# Patient Record
Sex: FEMALE | Race: WHITE | Employment: OTHER | URBAN - METROPOLITAN AREA
[De-identification: names, ages, dates, MRNs, and addresses within clinical notes are randomized per-mention and may not be internally consistent; named-entity substitution may affect disease eponyms.]

---

## 2017-02-09 ENCOUNTER — OFFICE VISIT (OUTPATIENT)
Dept: CARDIOLOGY CLINIC | Age: 82
End: 2017-02-09

## 2017-02-09 VITALS
SYSTOLIC BLOOD PRESSURE: 150 MMHG | WEIGHT: 213.2 LBS | HEIGHT: 63 IN | DIASTOLIC BLOOD PRESSURE: 80 MMHG | OXYGEN SATURATION: 99 % | BODY MASS INDEX: 37.78 KG/M2 | HEART RATE: 80 BPM

## 2017-02-09 DIAGNOSIS — I10 ESSENTIAL HYPERTENSION: Primary | ICD-10-CM

## 2017-02-09 DIAGNOSIS — R07.9 CHEST PAIN, UNSPECIFIED: ICD-10-CM

## 2017-02-09 DIAGNOSIS — E78.5 DYSLIPIDEMIA: ICD-10-CM

## 2017-02-09 RX ORDER — POTASSIUM CHLORIDE 750 MG/1
10 TABLET, EXTENDED RELEASE ORAL DAILY
Qty: 90 TAB | Refills: 3 | Status: SHIPPED | OUTPATIENT
Start: 2017-02-09 | End: 2018-03-08

## 2017-02-09 RX ORDER — NAPROXEN 250 MG/1
TABLET ORAL 2 TIMES DAILY WITH MEALS
COMMUNITY
End: 2017-10-30

## 2017-02-09 RX ORDER — CHLORTHALIDONE 25 MG/1
25 TABLET ORAL DAILY
Qty: 90 TAB | Refills: 3 | Status: SHIPPED | OUTPATIENT
Start: 2017-02-09 | End: 2019-12-10 | Stop reason: SDUPTHER

## 2017-02-09 RX ORDER — LOSARTAN POTASSIUM 100 MG/1
100 TABLET ORAL
Qty: 90 TAB | Refills: 3 | Status: SHIPPED | OUTPATIENT
Start: 2017-02-09 | End: 2018-03-08 | Stop reason: SDUPTHER

## 2017-02-09 NOTE — MR AVS SNAPSHOT
Visit Information Date & Time Provider Department Dept. Phone Encounter #  
 2/9/2017  2:00 PM Uma Rogers MD [facility] [place] [place] [phone] [id number] Your Appointments 8/25/2017 11:00 AM  
VASCULAR TEST with TANISHA SALGADO  
[facility] ([facility] SCHEDULING) Appt Note: vas at11am per dr Miguelina Lai dx carotid ov at [address] [facility] [address] [address]  
[phone]  
  
   
 [address] [address]  
  
    
 8/25/2017 11:40 AM  
ESTABLISHED PATIENT with Uma Rogers MD  
[facility] ([address]) Appt Note: vas at11am per dr Miguelina Lai dx carotid ov at [address] [facility] [address] [facility]  
[address] [facility] [place] [address] [address] Upcoming Health Maintenance Date Due  
 FOOT EXAM Q1 8/23/1945 MICROALBUMIN Q1 8/23/1945 EYE EXAM RETINAL OR DILATED Q1 8/23/1945 DTaP/Tdap/Td series (1 - Tdap) 8/23/1956 ZOSTER VACCINE AGE 60> 8/23/1995 GLAUCOMA SCREENING Q2Y 8/23/2000 MEDICARE YEARLY EXAM 8/23/2000 INFLUENZA AGE 9 TO ADULT 8/1/2016 HEMOGLOBIN A1C Q6M 2/17/2017 Pneumococcal 65+ Low/Medium Risk (2 of 2 - PCV13) 5/2/2017 LIPID PANEL Q1 8/17/2017 Allergies as of 2/9/2017  Review Complete On: 2/9/2017 By: Uma Rogers MD  
  
 Severity Noted Reaction Type Reactions Tolectin [Tolmetin] High 12/22/2010    Anaphylaxis Aldactone [Spironolactone]  12/06/2013    Other (comments)  
 hyperkalemia Carvedilol  08/29/2012   Side Effect Other (comments) \"slow heart rate down to 44\" Ceclor [Cefaclor]  12/22/2010    Hives Ciprofloxacin  12/22/2010    Hives, Swelling Darvon [Propoxyphene]  12/22/2010    Swelling Swelling lips and hands Beverli Self [Nitrofurantoin Monohyd/m-cryst]  12/16/2014    Other (comments) Does not recall Norvasc [Amlodipine]  08/29/2012   Side Effect Swelling Swelling ankles/legs Tetracycline  12/22/2010    Swelling Swelling lips/hands Toprol Xl [Metoprolol Succinate]  10/05/2012    Swelling Swellings ankles/legs Valsartan  05/01/2016    Other (comments) Also \"slow heart rate down to 44\" Zestril [Lisinopril]  12/22/2010    Hives Current Immunizations  Never Reviewed Name Date Pneumococcal Polysaccharide (PPSV-23) 5/2/2016  6:25 PM  
  
 Not reviewed this visit You Were Diagnosed With   
  
 Codes Comments Essential hypertension    -  Primary ICD-10-CM: I10 
ICD-9-CM: 401.9 Dyslipidemia     ICD-10-CM: E78.5 ICD-9-CM: 272.4 Chest pain, unspecified     ICD-10-CM: R07.9 ICD-9-CM: 786.50 Vitals BP Pulse Height(growth percentile) Weight(growth percentile) SpO2 BMI  
 150/80 (BP 1 Location: Right arm, BP Patient Position: Sitting) 80 5' 3\" (1.6 m) 213 lb 3.2 oz (96.7 kg) 99% 37.77 kg/m2 OB Status Smoking Status Postmenopausal Never Smoker Vitals History BMI and BSA Data Body Mass Index Body Surface Area  
 37.77 kg/m 2 2.07 m 2 Preferred Pharmacy Pharmacy Name Phone  [address] [phone] Your Updated Medication List  
  
   
This list is accurate as of: 2/9/17  2:33 PM.  Always use your most recent med list.  
  
  
  
  
 aspirin delayed-release 81 mg tablet Take 81 mg by mouth every evening. CALCIUM 600 PO Take 1 Tab by mouth two (2) times a day. 600-200 Iu  
  
 chlorthalidone 25 mg tablet Commonly known as:  Wilhemena Nickels Take 1 Tab by mouth daily. cloNIDine HCl 0.1 mg tablet Commonly known as:  CATAPRES Take 0.2 mg at bedtime and 0.1 mg in morning  
  
 cyanocobalamin 1,000 mcg tablet Take 1,000 mcg by mouth every seven (7) days. dilTIAZem  mg ER capsule Commonly known as:  CARDIZEM CD Take 1 Cap by mouth daily. EYE VITAMIN AND MINERALS PO Take 1 Cap by mouth daily. LASIX 40 mg tablet Generic drug:  furosemide Take 40 mg by mouth as needed. Only take about once a month. levothyroxine 100 mcg tablet Commonly known as:  SYNTHROID Take 100 mcg by mouth Daily (before breakfast). loratadine 10 mg tablet Commonly known as:  Myesha Delgado Take 10 mg by mouth daily. losartan 100 mg tablet Commonly known as:  COZAAR Take 1 Tab by mouth nightly. metFORMIN 500 mg tablet Commonly known as:  GLUCOPHAGE Take 500 mg by mouth two (2) times daily (with meals). [address] Take 1 Tab by mouth every evening. MULTIPLE VITAMINS DAILY PO Take 1 Tab by mouth daily. naproxen 250 mg tablet Commonly known as:  NAPROSYN Take  by mouth two (2) times daily (with meals). omeprazole 20 mg capsule Commonly known as:  PRILOSEC Take 20 mg by mouth two (2) times a day. potassium chloride 10 mEq tablet Commonly known as:  K-DUR, KLOR-CON Take 1 Tab by mouth daily. simvastatin 10 mg tablet Commonly known as:  ZOCOR Take 1 Tab by mouth nightly. SINGULAIR 10 mg tablet Generic drug:  montelukast  
Take 10 mg by mouth every evening. TYLENOL PM PO Take 1-2 Tabs by mouth nightly as needed. VITAMIN C 500 mg tablet Generic drug:  ascorbic acid (vitamin C) Take 500 mg by mouth every evening. Prescriptions Sent to Pharmacy Refills  
 chlorthalidone (HYGROTEN) 25 mg tablet 3 Sig: Take 1 Tab by mouth daily. Class: Normal  
 Pharmacy: [facility] [address] Ph #: [phone] Route: Oral  
 potassium chloride (K-DUR, KLOR-CON) 10 mEq tablet 3 Sig: Take 1 Tab by mouth daily. Class: Normal  
 Pharmacy: [facility] [address] Ph #: [phone] Route: Oral  
 losartan (COZAAR) 100 mg tablet 3 Sig: Take 1 Tab by mouth nightly.   
 Class: Normal  
 Pharmacy: [facility] [address] Ph #: [phone] Route: Oral  
  
Patient Instructions Heart-Healthy Diet: Care Instructions Your Care Instructions A heart-healthy diet has lots of vegetables, fruits, nuts, beans, and whole grains, and is low in salt. It limits foods that are high in saturated fat, such as meats, cheeses, and fried foods. It may be hard to change your diet, but even small changes can lower your risk of heart attack and heart disease. Follow-up care is a key part of your treatment and safety. Be sure to make and go to all appointments, and call your doctor if you are having problems. It's also a good idea to know your test results and keep a list of the medicines you take. How can you care for yourself at home? Watch your portions · Learn what a serving is. A \"serving\" and a \"portion\" are not always the same thing. Make sure that you are not eating larger portions than are recommended. For example, a serving of pasta is ½ cup. A serving size of meat is 2 to 3 ounces. A 3-ounce serving is about the size of a deck of cards. Measure serving sizes until you are good at [place]" them. Keep in mind that restaurants often serve portions that are 2 or 3 times the size of one serving. · To keep your energy level up and keep you from feeling hungry, eat often but in smaller portions. · Eat only the number of calories you need to stay at a healthy weight. If you need to lose weight, eat fewer calories than your body burns (through exercise and other physical activity). Eat more fruits and vegetables · Eat a variety of fruit and vegetables every day. Dark green, deep orange, red, or yellow fruits and vegetables are especially good for you. Examples include spinach, carrots, peaches, and berries. · Keep carrots, celery, and other veggies handy for snacks. Buy fruit that is in season and store it where you can see it so that you will be tempted to eat it. · Cook dishes that have a lot of veggies in them, such as stir-fries and soups. Limit saturated and trans fat · Read food labels, and try to avoid saturated and trans fats. They increase your risk of heart disease. Trans fat is found in many processed foods such as cookies and crackers. · Use olive or canola oil when you cook. Try cholesterol-lowering spreads, such as Benecol or Take Control. · Bake, broil, grill, or steam foods instead of frying them. · Choose lean meats instead of high-fat meats such as hot dogs and sausages. Cut off all visible fat when you prepare meat. · Eat fish, skinless poultry, and meat alternatives such as soy products instead of high-fat meats. Soy products, such as tofu, may be especially good for your heart. · Choose low-fat or fat-free milk and dairy products. Eat fish · Eat at least two servings of fish a week. Certain fish, such as salmon and tuna, contain omega-3 fatty acids, which may help reduce your risk of heart attack. Eat foods high in fiber · Eat a variety of grain products every day. Include whole-grain foods that have lots of fiber and nutrients. Examples of whole-grain foods include oats, whole wheat bread, and brown rice. · Buy whole-grain breads and cereals, instead of white bread or pastries. Limit salt and sodium · Limit how much salt and sodium you eat to help lower your blood pressure. · Taste food before you salt it. Add only a little salt when you think you need it. With time, your taste buds will adjust to less salt. · Eat fewer snack items, fast foods, and other high-salt, processed foods. Check food labels for the amount of sodium in packaged foods. · Choose low-sodium versions of canned goods (such as soups, vegetables, and beans). Limit sugar · Limit drinks and foods with added sugar. These include candy, desserts, and soda pop. Limit alcohol · Limit alcohol to no more than 2 drinks a day for men and 1 drink a day for women. Too much alcohol can cause health problems. When should you call for help? Watch closely for changes in your health, and be sure to contact your doctor if: 
· You would like help planning heart-healthy meals. Where can you learn more? Go to http://micki-ryland.info/. Enter V137 in the search box to learn more about \"Heart-Healthy Diet: Care Instructions. \" Current as of: January 27, 2016 Content Version: 11.1 © 1867-2916 [employer]. Care instructions adapted under license by [employer] (which disclaims liability or warranty for this information). If you have questions about a medical condition or this instruction, always ask your healthcare professional. [address] any warranty or liability for your use of this information. Please provide this summary of care documentation to your next provider. Your primary care clinician is listed as Larry Farmer. If you have any questions after today's visit, please call [phone].

## 2017-02-09 NOTE — PROGRESS NOTES
Visit Vitals    /80 (BP 1 Location: Right arm, BP Patient Position: Sitting)    Pulse 80    Ht 5' 3\" (1.6 m)    Wt 213 lb 3.2 oz (96.7 kg)    SpO2 99%    BMI 37.77 kg/m2   verbal order to discontinue medication per Dr June Girard

## 2017-02-09 NOTE — PATIENT INSTRUCTIONS

## 2017-02-09 NOTE — PROGRESS NOTES
Kareem Fletcher MD. [facility]              Patient: Cinda Mukherjee  : 1935      Today's Date: 2017            HISTORY OF PRESENT ILLNESS:     History of Present Illness:  Ms. Adan Pichardo is here for follow-up. Due to spinal stenosis she has problems with back and leg pain. She has had an epidural injection which helps. No falls. No CP. Has chronic CHRISTIAN. No cardiac complaints. Not as active as she used to be. PAST MEDICAL HISTORY:     Past Medical History   Diagnosis Date    Asthma     Breast cancer ([address]     Chest pain, unspecified      atypical chest discomfort with normal stress cardiolite , and normal stress echoes ,  and . Negative dobutamine cardiolite 2009 for ischemia with an EF=63%. Cardiac Cath 9/3/2010 showed mild CAD.  Diabetes mellitus ([address]     Dyslipidemia     Frequent PVCs     GERD (gastroesophageal reflux disease)     Hiatal hernia     HTN (hypertension)     Obesity     LA on CPAP     RBBB (right bundle branch block)     Spinal stenosis          Past Surgical History   Procedure Laterality Date    Hx bunionectomy      Hx hip replacement      Hx cholecystectomy      Hx heart catheterization       Cardiac Cath 9/3/2010 showed mild CAD.  Hx other surgical       Echo 10/5/12 - LVEF 60-65%, mod-marked LAE, MAC, grade 1 diastolic dysfunction     Hx other surgical       Lexiscan cardiolite 13 - normal MPI, LVEF 74%    Hx other surgical       Aorta duplex 14 - aorta size 1.7 cm, no dilation            MEDICATIONS:     Current Outpatient Prescriptions   Medication Sig Dispense Refill    naproxen (NAPROSYN) 250 mg tablet Take  by mouth two (2) times daily (with meals).  ibuprofen (MOTRIN) 800 mg tablet Take  by mouth two (2) times a day.  chlorthalidone (HYGROTEN) 25 mg tablet Take 1 Tab by mouth daily. 30 Tab 12    potassium chloride (K-DUR, KLOR-CON) 10 mEq tablet Take 1 Tab by mouth daily.  30 Tab 12  losartan (COZAAR) 100 mg tablet Take 1 Tab by mouth nightly. 30 Tab 12    cloNIDine HCl (CATAPRES) 0.1 mg tablet Take 0.2 mg at bedtime and 0.1 mg in morning 270 Tab 3    ACETAMINOPHEN/DIPHENHYDRAMINE (TYLENOL PM PO) Take 1-2 Tabs by mouth nightly as needed.  loratadine (CLARITIN) 10 mg tablet Take 10 mg by mouth daily.  cyanocobalamin 1,000 mcg tablet Take 1,000 mcg by mouth every seven (7) days.  VIT A/C/E AC/ZNOX/CUPRIC OXIDE (EYE VITAMIN AND MINERALS PO) Take 1 Cap by mouth daily.  GLUCOSAM/CHOND/HYALU/CF BORATE (MOVE FREE JOINT HEALTH PO) Take 1 Tab by mouth every evening.  levothyroxine (SYNTHROID) 100 mcg tablet Take 100 mcg by mouth Daily (before breakfast).  omeprazole (PRILOSEC) 20 mg capsule Take 20 mg by mouth two (2) times a day.  simvastatin (ZOCOR) 10 mg tablet Take 1 Tab by mouth nightly. 30 Tab 11    diltiazem CD (CARDIZEM CD) 300 mg ER capsule Take 1 Cap by mouth daily. 90 Cap 3    ascorbic acid (VITAMIN C) 500 mg tablet Take 500 mg by mouth every evening.  aspirin delayed-release 81 mg tablet Take 81 mg by mouth every evening.  CALCIUM CARBONATE (CALCIUM 600 PO) Take 1 Tab by mouth two (2) times a day. 600-200 Iu      furosemide (LASIX) 40 mg tablet Take 40 mg by mouth as needed. Only take about once a month.  metformin (GLUCOPHAGE) 500 mg tablet Take 500 mg by mouth two (2) times daily (with meals).  MULTIVITAMIN (MULTIPLE VITAMINS DAILY PO) Take 1 Tab by mouth daily.  montelukast (SINGULAIR) 10 mg tablet Take 10 mg by mouth every evening.          Allergies   Allergen Reactions    Tolectin [Tolmetin] Anaphylaxis    Aldactone [Spironolactone] Other (comments)     hyperkalemia    Carvedilol Other (comments)     \"slow heart rate down to 44\"    Ceclor [Cefaclor] Hives    Ciprofloxacin Hives and Swelling    Darvon [Propoxyphene] Swelling     Swelling lips and hands    Macrobid [Nitrofurantoin Monohyd/M-Cryst] Other (comments) Does not recall    Norvasc [Amlodipine] Swelling     Swelling ankles/legs    Tetracycline Swelling     Swelling lips/hands    Toprol Xl [Metoprolol Succinate] Swelling     Swellings ankles/legs    Valsartan Other (comments)     Also \"slow heart rate down to 44\"    Zestril [Lisinopril] Hives             SOCIAL HISTORY:     Social History   Substance Use Topics    Smoking status: Never Smoker    Smokeless tobacco: Never Used    Alcohol use No           FAMILY HISTORY:     Family History   Problem Relation Age of Onset    Coronary Artery Disease Father     Coronary Artery Disease Paternal Aunt              PHYSICAL EXAM:        Physical Exam:  Visit Vitals    /80 (BP 1 Location: Right arm, BP Patient Position: Sitting)    Pulse 80    Ht 5' 3\" (1.6 m)    Wt 213 lb 3.2 oz (96.7 kg)    SpO2 99%    BMI 37.77 kg/m2        Patient appears generally well, mood and affect are appropriate and pleasant. HEENT: Hearing intact, non-icteric, normocephalic, atraumatic.    Neck Exam: Supple, No JVD or carotid bruits.    Lung Exam: Clear to auscultation, even breath sounds.    Cardiac Exam: Regular rate and rhythm with no murmur  Abdomen: Soft, non-tender, normal bowel sounds. + obese   Extremities: Moves all ext well. Mild lower extremity edema.   Psych: Appropriate affect  Neuro - Grossly intact                      LABS / OTHER STUDIES:                Labs 5/17/16 - CMP OK, A1c 6.0, Mg 2.1  Labs 8/17/16 - glc 130, K 3.9, LFT's OK, chol 189, HDL 53, , LDL 84, A1c 5.9, TSH 3.3                Lab Results   Component Value Date/Time     SODIUM 140 10/31/2016 09:07 AM     POTASSIUM 3.9 10/31/2016 09:07 AM     CHLORIDE 98 10/31/2016 09:07 AM     CO2 27 10/31/2016 09:07 AM     ANION GAP 12 05/01/2016 03:46 PM     GLUCOSE 109 10/31/2016 09:07 AM     BUN 20 10/31/2016 09:07 AM     CREATININE 0.91 10/31/2016 09:07 AM     BUN/CREATININE RATIO 22 10/31/2016 09:07 AM     GFR EST AA 68 10/31/2016 09:07 AM     GFR EST NON-AA 59 10/31/2016 09:07 AM     CALCIUM 9.6 10/31/2016 09:07 AM                     CARDIAC DIAGNOSTICS:        Cardiac Evaluation Includes:  Cath 2010: LAD 25%, RCA 20%, Mid RCA 20%. Stress Nuc 2013: Normal perfusion. EF 74. V/Q 5/1/16 - low prob  Echo 5/2/16 - LVEF 60%  Lexiscan Cardiolite 5/2/16 - 1) No ischemic EKG changes with Lexiscan.  Frequent PVC's during stress.  Mild chest pain noted during Lexiscan Injection.    2) Normal Lexiscan gated SPECT myocardial perfusion study. 3) LVEF 55%          [facility] Screening 10/17/15 - mild carotid disease, normal PALAK and AAA      EKG 5/1/16 - Sinus tachycardia with frequent premature ventricular complexes, Incomplete right bundle branch block , Possible Right ventricular hypertrophy           ASSESSMENT AND PLAN:        Assessment and Plan:  1) CHRISTIAN and chest pain  - extensive cardiac evaluation in past has been unremarkable (see above).    - Prior stress test, echo and BNP level have been unremarkable.    - She saw Pulmonary and felt better with Advair    - Doing better recently       2) HTN    - Meds are being tolerated (using lasix PRN and rarely)  - She takes NSAIDS for back pain which can raise BP. She was counseled on that. - Switched to chlorthalidone on 10/18/16  - BP mildly high at home - she feels pain would be making pain worse and also is not sleeping well. Continue medications as is for now. Goal < 150/90. May consider hydralazine if BP remains well above goal.       3) Mild CAD - cont ASA and statin       4) Dyslipidemia - Ms. Gunter could not tolerate the switch form simvastatin to atorvastatin. She is on low dose simvastatin at 10 mg (low dose due to drug-drug interactions) . Lipids followed by Dr. Jarvis Arguelles.   Joanne Duncan  5) edema  - lasix PRN (takes once a month)       6) Mild-mod Carotid dz on screening doppler in June 2015  - recheck carotid dopplers next visit   - cont ASA and statin       7) See me in 6 months.   Patient expressed understanding of the plan - questions were answered.           Francisco Odell MD, [address], [address]  [address]  [address], [place]  Ph: [phone]  [phone]

## 2017-06-29 RX ORDER — CLONIDINE HYDROCHLORIDE 0.1 MG/1
TABLET ORAL
Qty: 270 TAB | Refills: 3 | Status: SHIPPED | OUTPATIENT
Start: 2017-06-29 | End: 2017-08-25 | Stop reason: SDUPTHER

## 2017-08-25 ENCOUNTER — OFFICE VISIT (OUTPATIENT)
Dept: CARDIOLOGY CLINIC | Age: 82
End: 2017-08-25

## 2017-08-25 ENCOUNTER — CLINICAL SUPPORT (OUTPATIENT)
Dept: CARDIOLOGY CLINIC | Age: 82
End: 2017-08-25

## 2017-08-25 VITALS
BODY MASS INDEX: 37.39 KG/M2 | HEART RATE: 68 BPM | DIASTOLIC BLOOD PRESSURE: 84 MMHG | HEIGHT: 63 IN | WEIGHT: 211 LBS | SYSTOLIC BLOOD PRESSURE: 140 MMHG

## 2017-08-25 DIAGNOSIS — E78.5 DYSLIPIDEMIA: ICD-10-CM

## 2017-08-25 DIAGNOSIS — I10 ESSENTIAL HYPERTENSION: Primary | ICD-10-CM

## 2017-08-25 DIAGNOSIS — I65.23 CAROTID STENOSIS, BILATERAL: Primary | ICD-10-CM

## 2017-08-25 DIAGNOSIS — I45.10 RBBB (RIGHT BUNDLE BRANCH BLOCK): ICD-10-CM

## 2017-08-25 RX ORDER — CLONIDINE HYDROCHLORIDE 0.1 MG/1
0.2 TABLET ORAL 2 TIMES DAILY
Qty: 270 TAB | Refills: 3
Start: 2017-08-25 | End: 2020-06-15 | Stop reason: SDUPTHER

## 2017-08-25 NOTE — PROCEDURES
[facility]  *** FINAL REPORT ***    Name: Mindi Donato  MRN: [id number]       Outpatient  : 23 Aug 1935  HIS Order #: [id number]  [address] Visit #: [id number]  Date: 25 Aug 2017    TYPE OF TEST: Cerebrovascular Duplex    REASON FOR TEST  Known carotid stenosis    Right Carotid:-             Proximal               Mid                 Distal  cm/s  Systolic  Diastolic  Systolic  Diastolic  Systolic  Diastolic  CCA:     85.3       9.0                            48.0       8.0  Bulb:  ECA:     66.0       4.0  ICA:     64.0      12.0       56.0      15.0       56.0      15.0  ICA/CCA:  1.3       1.5    ICA Stenosis: <50%    Right Vertebral:-  Finding: Antegrade  Sys:       39.0  Allyson:       11.0    Right Subclavian:    Left Carotid:-            Proximal                Mid                 Distal  cm/s  Systolic  Diastolic  Systolic  Diastolic  Systolic  Diastolic  CCA:     06.6      12.0                            47.0      12.0  Bulb:  ECA:     48.0       0.0  ICA:     49.0      14.0       68.0      15.0       81.0      23.0  ICA/CCA:  1.0       1.2    ICA Stenosis: <50%    Left Vertebral:-  Finding: Antegrade  Sys:       31.0  Allyson:       11.0    Left Subclavian:    INTERPRETATION/FINDINGS  PROCEDURE:  Evaluation of the extracranial cerebrovascular arteries  with ultrasound (B-mode imaging, pulsed Doppler, color Doppler). Includes the common carotid, internal carotid, external carotid, and  vertebral arteries. FINDINGS:    Right: Mild, partially calcified plaque is visualized at the level  of the bifurcation extending into the proximal internal and external  carotid arteries. Color flow imaging reveals filling defects at the  site of the plaque formation. Peak ICA velocities are normal at 64  cm/s. The distal ICA is noted to be tortuous. Left:  Mild focal plaque is exhibited within the proximal internal  carotid artery.   No appreciable filling defect is noted on color flow  imaging and peak systolic velocities are normal at 82 cm/s. IMPRESSION: Findings are consistent with 10-49% stenosis of the right  internal carotid and 0-9% stenosis of the left internal carotid. Vertebrals are patent with antegrade flow. ADDITIONAL COMMENTS    I have personally reviewed the data relevant to the interpretation of  this  study.     TECHNOLOGIST: JAVAN Wick  Signed: 08/25/2017 11:38 AM    PHYSICIAN: Jonathan Hughes MD, [facility]  Signed: 08/25/2017 07:32 PM

## 2017-08-25 NOTE — PROGRESS NOTES
Lorenzo Sullivan MD. [facility]              Patient: Darlene Bryant  : 1935      Today's Date: 2017          HISTORY OF PRESENT ILLNESS:     History of Present Illness:  Ms. Garcia He is here for follow-up  Doing OK overall. Biggest complaint is back pain. BP was up so Dr. Geovany Toledo increased her clonidine. No CP. Chronic CHRISTIAN. Clonidine makes her drowsy. PAST MEDICAL HISTORY:     Past Medical History:   Diagnosis Date    Asthma     Breast cancer ([address]     Chest pain, unspecified     atypical chest discomfort with normal stress cardiolite , and normal stress echoes ,  and . Negative dobutamine cardiolite 2009 for ischemia with an EF=63%. Cardiac Cath 9/3/2010 showed mild CAD.  Diabetes mellitus ([address]     Dyslipidemia     Frequent PVCs     GERD (gastroesophageal reflux disease)     Hiatal hernia     HTN (hypertension)     Obesity     LA on CPAP     RBBB (right bundle branch block)     Spinal stenosis        Past Surgical History:   Procedure Laterality Date    HX BUNIONECTOMY      HX CHOLECYSTECTOMY      HX HEART CATHETERIZATION      Cardiac Cath 9/3/2010 showed mild CAD.  HX HIP REPLACEMENT      HX OTHER SURGICAL      Echo 10/5/12 - LVEF 60-65%, mod-marked LAE, MAC, grade 1 diastolic dysfunction     HX OTHER SURGICAL      Lexiscan cardiolite 13 - normal MPI, LVEF 74%    HX OTHER SURGICAL      Aorta duplex 14 - aorta size 1.7 cm, no dilation            MEDICATIONS:     Current Outpatient Prescriptions   Medication Sig Dispense Refill    cloNIDine HCl (CATAPRES) 0.1 mg tablet Take 2 Tabs by mouth two (2) times a day. 270 Tab 3    naproxen (NAPROSYN) 250 mg tablet Take  by mouth two (2) times daily (with meals).  chlorthalidone (HYGROTEN) 25 mg tablet Take 1 Tab by mouth daily. 90 Tab 3    potassium chloride (K-DUR, KLOR-CON) 10 mEq tablet Take 1 Tab by mouth daily.  90 Tab 3    losartan (COZAAR) 100 mg tablet Take 1 Tab by mouth nightly. 90 Tab 3    ACETAMINOPHEN/DIPHENHYDRAMINE (TYLENOL PM PO) Take 1-2 Tabs by mouth nightly as needed.  loratadine (CLARITIN) 10 mg tablet Take 10 mg by mouth daily.  cyanocobalamin 1,000 mcg tablet Take 1,000 mcg by mouth every seven (7) days.  VIT A/C/E AC/ZNOX/CUPRIC OXIDE (EYE VITAMIN AND MINERALS PO) Take 1 Cap by mouth daily.  GLUCOSAM/CHOND/HYALU/CF BORATE (MOVE FREE JOINT HEALTH PO) Take 1 Tab by mouth every evening.  levothyroxine (SYNTHROID) 100 mcg tablet Take 100 mcg by mouth Daily (before breakfast).  omeprazole (PRILOSEC) 20 mg capsule Take 20 mg by mouth two (2) times a day.  simvastatin (ZOCOR) 10 mg tablet Take 1 Tab by mouth nightly. 30 Tab 11    diltiazem CD (CARDIZEM CD) 300 mg ER capsule Take 1 Cap by mouth daily. 90 Cap 3    ascorbic acid (VITAMIN C) 500 mg tablet Take 500 mg by mouth every evening.  aspirin delayed-release 81 mg tablet Take 81 mg by mouth every evening.  CALCIUM CARBONATE (CALCIUM 600 PO) Take 1 Tab by mouth two (2) times a day. 600-200 Iu      furosemide (LASIX) 40 mg tablet Take 40 mg by mouth as needed. Only take about once a month.  metformin (GLUCOPHAGE) 500 mg tablet Take 500 mg by mouth two (2) times daily (with meals).  MULTIVITAMIN (MULTIPLE VITAMINS DAILY PO) Take 1 Tab by mouth daily.  montelukast (SINGULAIR) 10 mg tablet Take 10 mg by mouth every evening.          Allergies   Allergen Reactions    Tolectin [Tolmetin] Anaphylaxis    Aldactone [Spironolactone] Other (comments)     hyperkalemia    Carvedilol Other (comments)     \"slow heart rate down to 44\"    Ceclor [Cefaclor] Hives    Ciprofloxacin Hives and Swelling    Darvon [Propoxyphene] Swelling     Swelling lips and hands    Macrobid [Nitrofurantoin Monohyd/M-Cryst] Other (comments)     Does not recall    Norvasc [Amlodipine] Swelling     Swelling ankles/legs    Tetracycline Swelling     Swelling lips/hands    Toprol Xl [Metoprolol Succinate] Swelling     Swellings ankles/legs    Valsartan Other (comments)     Also \"slow heart rate down to 44\"    Zestril [Lisinopril] Hives             SOCIAL HISTORY:     Social History   Substance Use Topics    Smoking status: Never Smoker    Smokeless tobacco: Never Used    Alcohol use No         FAMILY HISTORY:     Family History   Problem Relation Age of Onset    Coronary Artery Disease Father     Coronary Artery Disease Paternal Aunt              PHYSICAL EXAM:        Physical Exam:    Visit Vitals    /84    Pulse 68    Ht 5' 3\" (1.6 m)    Wt 211 lb (95.7 kg)    BMI 37.38 kg/m2         Patient appears generally well, mood and affect are appropriate and pleasant. HEENT: Hearing intact, non-icteric, normocephalic, atraumatic.    Neck Exam: Supple, No JVD or carotid bruits.    Lung Exam: Clear to auscultation, even breath sounds.    Cardiac Exam: Regular rate and rhythm with no murmur  Abdomen: Soft, non-tender, normal bowel sounds. + obese   Extremities: Moves all ext well. Mild lower extremity edema.   Psych: Appropriate affect  Neuro - Grossly intact                      LABS / OTHER STUDIES:                Labs 5/17/16 - CMP OK, A1c 6.0, Mg 2.1  Labs 8/17/16 - glc 130, K 3.9, LFT's OK, chol 189, HDL 53, , LDL 84, A1c 5.9, TSH 3.3                    Lab Results   Component Value Date/Time      SODIUM 140 10/31/2016 09:07 AM      POTASSIUM 3.9 10/31/2016 09:07 AM      CHLORIDE 98 10/31/2016 09:07 AM      CO2 27 10/31/2016 09:07 AM      ANION GAP 12 05/01/2016 03:46 PM      GLUCOSE 109 10/31/2016 09:07 AM      BUN 20 10/31/2016 09:07 AM      CREATININE 0.91 10/31/2016 09:07 AM      BUN/CREATININE RATIO 22 10/31/2016 09:07 AM      GFR EST AA 68 10/31/2016 09:07 AM      GFR EST NON-AA 59 10/31/2016 09:07 AM      CALCIUM 9.6 10/31/2016 09:07 AM           Labs 7/19/17 - chol 198, HDL 50, LDL 77, , Cr 1.2, K 4.6, LFT's OK , A1c 5.6     Labs 8/19/17 - TSH 1.8          CARDIAC DIAGNOSTICS:        Cardiac Evaluation Includes:  Cath 2010: LAD 25%, RCA 20%, Mid RCA 20%. Stress Nuc 2013: Normal perfusion. EF 74. V/Q 5/1/16 - low prob  Echo 5/2/16 - LVEF 60%  Lexiscan Cardiolite 5/2/16 - 1) No ischemic EKG changes with Lexiscan.  Frequent PVC's during stress.  Mild chest pain noted during Lexiscan Injection.    2) Normal Lexiscan gated SPECT myocardial perfusion study. 3) LVEF 55%      [facility] Screening 10/17/15 - mild carotid disease, normal PALAK and AAA  Carotid Doppler 8/25/17 - 10-49% stenosis JONATAN and 9-4% LICA,       EKG 9/1/33 - Sinus tachycardia with frequent premature ventricular complexes, Incomplete right bundle branch block , Possible Right ventricular hypertrophy   EKG 8/25/17 - NSR, RBBB            ASSESSMENT AND PLAN:        Assessment and Plan:  1) CHRISTIAN and chest pain  - extensive cardiac evaluation in past has been unremarkable (see above).    - Prior stress test, echo and BNP level have been unremarkable.    - She saw Pulmonary and felt better with Advair    - Doing better recently       2) HTN    - Switched to chlorthalidone on 10/18/16  - Dr. Marshall Castaneda recently increased her clonidine to 0.2 mg BID.    - Follow BP at home - if still high at home, will try low dose norvasc (swelling with it in the past) - she is to call with results (goal < 140-145/90)        3) Mild CAD and - cont ASA and statin       4) Dyslipidemia - Ms. Gunter could not tolerate the switch form simvastatin to atorvastatin. She is on low dose simvastatin at 10 mg (low dose due to drug-drug interactions) . Lipids followed by Dr. Ana Edgar.   Ashley Alejandro  5) edema  - lasix PRN (takes once a month)       6) Mild-mod Carotid dz on screening doppler in June 2015  - mild disease 8/17   - cont ASA and statin       7) See me in 6 months.   Patient expressed understanding of the plan - questions were answered.           Jonathan Hughes MD, [address]  [address] [facility], [address] [place] [facility]  [address], [address] [place], [address] [place] [address]  Ph: [phone] Ph [phone]

## 2017-09-29 ENCOUNTER — OFFICE VISIT (OUTPATIENT)
Dept: NEUROLOGY | Age: 82
End: 2017-09-29

## 2017-09-29 VITALS
WEIGHT: 200 LBS | BODY MASS INDEX: 35.44 KG/M2 | TEMPERATURE: 98 F | HEART RATE: 83 BPM | RESPIRATION RATE: 16 BRPM | DIASTOLIC BLOOD PRESSURE: 90 MMHG | OXYGEN SATURATION: 98 % | SYSTOLIC BLOOD PRESSURE: 140 MMHG | HEIGHT: 63 IN

## 2017-09-29 DIAGNOSIS — M48.062 NEUROGENIC CLAUDICATION DUE TO LUMBAR SPINAL STENOSIS: Primary | ICD-10-CM

## 2017-09-29 DIAGNOSIS — R20.2 NUMBNESS AND TINGLING OF BOTH LOWER EXTREMITIES: ICD-10-CM

## 2017-09-29 DIAGNOSIS — R26.9 GAIT DISORDER: ICD-10-CM

## 2017-09-29 DIAGNOSIS — R20.0 NUMBNESS AND TINGLING OF BOTH LOWER EXTREMITIES: ICD-10-CM

## 2017-09-29 DIAGNOSIS — R27.8 SENSORY ATAXIA: ICD-10-CM

## 2017-09-29 RX ORDER — GABAPENTIN 100 MG/1
200 CAPSULE ORAL
COMMUNITY
End: 2021-01-29 | Stop reason: SDUPTHER

## 2017-09-29 NOTE — PROGRESS NOTES
New patient presenting with unsteady gait. Reports numbness and tingling in lower back radiating down both legs since 2013. No scans since then. Receiving epidural for back pain by Dr Riley Brink.

## 2017-09-29 NOTE — PATIENT INSTRUCTIONS
Information Regarding Testing     If you have physican order for a test or a medication denied by your insurance company, this does not mean the test or medication is not appropriate for you as that is a medical decision, not a decision to be made by an insurance company representative or by an [facility] physician who has not interviewed and examined you. This is a decision to be made between you and your physician. The denial of services is a contractual matter between you and your insurance company, not an issue between your physician and the insurance company. If your test or medication is denied, you can take the following steps to help resolve the issue:    1. File a complaint with the [facility] regarding your insurance company's denial of services ordered for you. You can do this either by calling them directly or by completing an on-line complaint form on the [employer]. This can be found at www.SunLink    2. Also file a formal complaint with your insurance company and ask to have the name of the person denying the service so that you may explore a legal option should you be harmed by this denial of service. Again, the fact the insurance company will not pay for the service does not mean it is not medically necessary and I would encourage you to follow through with the plan that was made with your physician    3. File a written complaint with your employer so your employer and benefit manager is aware of the poor coverage they are providing their employees. If you have medicare/medicaid, complain to your representative in the House and to your Fifi Hernandez.     [facility]   Statement to Patients  April 1, 2014      In an effort to ensure the large volume of patient prescription refills is processed in the most efficient and expeditious manner, we are asking our patients to assist us by calling your Pharmacy for all prescription refills, this will include also your  Mail Order Pharmacy. The pharmacy will contact our office electronically to continue the refill process. Please do not wait until the last minute to call your pharmacy. We need at least 48 hours (2days) to fill prescriptions. We also encourage you to call your pharmacy before going to  your prescription to make sure it is ready. With regard to controlled substance prescription refill requests (narcotic refills) that need to be picked up at our office, we ask your cooperation by providing us with at least 72 hours (3days) notice that you will need a refill. We will not refill narcotic prescription refill requests after 4:00pm on any weekday, Monday through Thursday, or after 2:00pm on Fridays, or on the weekends. We encourage everyone to explore another way of getting your prescription refill request processed using Quizrr, our patient web portal through our electronic medical record system. Quizrr is an efficient and effective way to communicate your medication request directly to the office and  downloadable as an jakub on your smart phone . Quizrr also features a review functionality that allows you to view your medication list as well as leave messages for your physician. Are you ready to get connected? If so please review the attatched instructions or speak to any of our staff to get you set up right away! Thank you so much for your cooperation. Should you have any questions please contact our Practice Administrator. The Physicians and Staff,  [facility] [facility]     If we have ordered testing for you, we do not call patients with results and we do not give test results over the phone. We schedule follow up appointments so that your results can be discussed in person and any questions you have regarding them may be addressed.   If something of concern is revealed on your test, we will call you for a sooner follow up appointment. Additionally, results may be found by using the My Chart feature and one of our patient service representatives at the  can give you instructions on how to access this feature of our electronic medical record system. Learning About Living [place]ango  What is a living will? A living will is a legal form you use to write down the kind of care you want at the end of your life. It is used by the health professionals who will treat you if you aren't able to decide for yourself. If you put your wishes in writing, your loved ones and others will know what kind of care you want. They won't need to guess. This can ease your mind and be helpful to others. A living will is not the same as an estate or property will. An estate will explains what you want to happen with your money and property after you die. Is a living will a legal document? A living will is a legal document. Each state has its own laws about living quiroz. If you move to another state, make sure that your living will is legal in the state where you now live. Or you might use a universal form that has been approved by many states. This kind of form can sometimes be completed and stored online. Your electronic copy will then be available wherever you have a connection to the Internet. In most cases, doctors will respect your wishes even if you have a form from a different state. · You don't need an  to complete a living will. But legal advice can be helpful if your state's laws are unclear, your health history is complicated, or your family can't agree on what should be in your living will. · You can change your living will at any time. Some people find that their wishes about end-of-life care change as their health changes. · In addition to making a living will, think about completing a medical power of  form.  This form lets you name the person you want to make end-of-life treatment decisions for you (your \"health care agent\") if you're not able to. Many hospitals and nursing homes will give you the forms you need to complete a living will and a medical power of . · Your living will is used only if you can't make or communicate decisions for yourself anymore. If you become able to make decisions again, you can accept or refuse any treatment, no matter what you wrote in your living will. · Your state may offer an online registry. This is a place where you can store your living will online so the doctors and nurses who need to treat you can find it right away. What should you think about when creating a living will? Talk about your end-of-life wishes with your family members and your doctor. Let them know what you want. That way the people making decisions for you won't be surprised by your choices. Think about these questions as you make your living will:  · Do you know enough about life support methods that might be used? If not, talk to your doctor so you know what might be done if you can't breathe on your own, your heart stops, or you're unable to swallow. · What things would you still want to be able to do after you receive life-support methods? Would you want to be able to walk? To speak? To eat on your own? To live without the help of machines? · If you have a choice, where do you want to be cared for? In your home? At a hospital or nursing home? · Do you want certain Spiritism practices performed if you become very ill? · If you have a choice at the end of your life, where would you prefer to die? At home? In a hospital or nursing home? Somewhere else? · Would you prefer to be buried or cremated? · Do you want your organs to be donated after you die? What should you do with your living will? · Make sure that your family members and your health care agent have copies of your living will. · Give your doctor a copy of your living will to keep in your medical record.  If you have more than one doctor, make sure that each one has a copy. · You may want to put a copy of your living will where it can be easily found. Where can you learn more? Go to http://micki-ryland.info/. Enter Y164 in the search box to learn more about \"Learning About Living [place]. \"  Current as of: August 8, 2016  Content Version: 11.3  © 8253-7658 [employer]. Care instructions adapted under license by [employer] (which disclaims liability or warranty for this information). If you have questions about a medical condition or this instruction, always ask your healthcare professional. [address] any warranty or liability for your use of this information.

## 2017-09-29 NOTE — MR AVS SNAPSHOT
Visit Information Date & Time Provider Department Dept. Phone Encounter #  
 9/29/2017  1:30 PM Elana Valentino MD [facility] Neurology [facility] [phone] [id number] Follow-up Instructions Return for After tests. Your Appointments 3/8/2018 11:20 AM  
ESTABLISHED PATIENT with Brent Griffith MD  
[facility] ([address]) Appt Note: 6  mmo fu  
 [address] [facility] [place] [address] [facility]  
[address] [facility] [place] [address] [address] Upcoming Health Maintenance Date Due  
 FOOT EXAM Q1 8/23/1945 MICROALBUMIN Q1 8/23/1945 EYE EXAM RETINAL OR DILATED Q1 8/23/1945 DTaP/Tdap/Td series (1 - Tdap) 8/23/1956 ZOSTER VACCINE AGE 60> 6/23/1995 GLAUCOMA SCREENING Q2Y 8/23/2000 MEDICARE YEARLY EXAM 8/23/2000 HEMOGLOBIN A1C Q6M 2/17/2017 Pneumococcal 65+ Low/Medium Risk (2 of 2 - PCV13) 5/2/2017 INFLUENZA AGE 9 TO ADULT 8/1/2017 LIPID PANEL Q1 8/17/2017 Allergies as of 9/29/2017  Review Complete On: 9/29/2017 By: Elana Valentino MD  
  
 Severity Noted Reaction Type Reactions Tolectin [Tolmetin] High 12/22/2010    Anaphylaxis Aldactone [Spironolactone]  12/06/2013    Other (comments)  
 hyperkalemia Carvedilol  08/29/2012   Side Effect Other (comments) \"slow heart rate down to 44\" Ceclor [Cefaclor]  12/22/2010    Hives Ciprofloxacin  12/22/2010    Hives, Swelling Darvon [Propoxyphene]  12/22/2010    Swelling Swelling lips and hands Waldemar Breaux [Nitrofurantoin Monohyd/m-cryst]  12/16/2014    Other (comments) Does not recall Norvasc [Amlodipine]  08/29/2012   Side Effect Swelling Swelling ankles/legs Tetracycline  12/22/2010    Swelling Swelling lips/hands Toprol Xl [Metoprolol Succinate]  10/05/2012    Swelling Swellings ankles/legs Valsartan  05/01/2016    Other (comments) Also \"slow heart rate down to 44\" Zestril [Lisinopril]  12/22/2010    Hives Current Immunizations  Never Reviewed Name Date Pneumococcal Polysaccharide (PPSV-23) 5/2/2016  6:25 PM  
  
 Not reviewed this visit You Were Diagnosed With   
  
 Codes Comments Neurogenic claudication due to lumbar spinal stenosis    -  Primary ICD-10-CM: M48.06 
ICD-9-CM: 724.03 Gait disorder     ICD-10-CM: R26.9 ICD-9-CM: 781.2 Sensory ataxia     ICD-10-CM: R27.8 ICD-9-CM: 781.3 Numbness and tingling of both lower extremities     ICD-10-CM: R20.0, R20.2 ICD-9-CM: 983. 0 Vitals BP Pulse Temp Resp Height(growth percentile) Weight(growth percentile) 140/90 83 98 °F (36.7 °C) 16 5' 3\" (1.6 m) 200 lb (90.7 kg) SpO2 BMI OB Status Smoking Status 98% 35.43 kg/m2 Postmenopausal Never Smoker BMI and BSA Data Body Mass Index Body Surface Area  
 35.43 kg/m 2 2.01 m 2 Preferred Pharmacy Pharmacy Name Phone [address] [facility], . [address] [phone] Your Updated Medication List  
  
   
This list is accurate as of: 9/29/17  2:36 PM.  Always use your most recent med list. amLODIPine 2.5 mg tablet Commonly known as:  Kathie Grebe Take 1 Tab by mouth daily. aspirin delayed-release 81 mg tablet Take 81 mg by mouth every evening. CALCIUM 600 PO Take 1 Tab by mouth two (2) times a day. 600-200 Iu  
  
 chlorthalidone 25 mg tablet Commonly known as:  Asa Fails Take 1 Tab by mouth daily. cloNIDine HCl 0.1 mg tablet Commonly known as:  CATAPRES Take 2 Tabs by mouth two (2) times a day. cyanocobalamin 1,000 mcg tablet Take 1,000 mcg by mouth every seven (7) days. dilTIAZem  mg ER capsule Commonly known as:  CARDIZEM CD Take 1 Cap by mouth daily. EYE VITAMIN AND MINERALS PO Take 1 Cap by mouth daily. gabapentin 100 mg capsule Commonly known as:  NEURONTIN Take  by mouth daily. levothyroxine 100 mcg tablet Commonly known as:  SYNTHROID Take 100 mcg by mouth Daily (before breakfast). loratadine 10 mg tablet Commonly known as:  Phyliss Cam Take 10 mg by mouth daily. losartan 100 mg tablet Commonly known as:  COZAAR Take 1 Tab by mouth nightly. metFORMIN 500 mg tablet Commonly known as:  GLUCOPHAGE Take 500 mg by mouth two (2) times daily (with meals). [address] Take 1 Tab by mouth every evening. MULTIPLE VITAMINS DAILY PO Take 1 Tab by mouth daily. naproxen 250 mg tablet Commonly known as:  NAPROSYN Take  by mouth two (2) times daily (with meals). omeprazole 20 mg capsule Commonly known as:  PRILOSEC Take 20 mg by mouth two (2) times a day. OTHER  
zyflamend 2 caps daily  
  
 potassium chloride 10 mEq tablet Commonly known as:  KLOR-CON Take 1 Tab by mouth daily. simvastatin 10 mg tablet Commonly known as:  ZOCOR Take 1 Tab by mouth nightly. SINGULAIR 10 mg tablet Generic drug:  montelukast  
Take 10 mg by mouth every evening. TYLENOL PM PO Take 1-2 Tabs by mouth nightly as needed. VITAMIN C 500 mg tablet Generic drug:  ascorbic acid (vitamin C) Take 500 mg by mouth every evening. Follow-up Instructions Return for After tests. To-Do List   
 09/29/2017 Neurology:  EMG LIMITED   
  
 09/29/2017 Imaging:  MRI LUMB SPINE W WO CONT Patient Instructions Information Regarding Testing If you have physican order for a test or a medication denied by your insurance company, this does not mean the test or medication is not appropriate for you as that is a medical decision, not a decision to be made by an insurance company representative or by an [employer] physician who has not interviewed and examined you. This is a decision to be made between you and your physician. The denial of services is a contractual matter between you and your insurance company, not an issue between your physician and the insurance company. If your test or medication is denied, you can take the following steps to help resolve the issue: 1. File a complaint with the [facility] of Insurance regarding your insurance company's denial of services ordered for you. You can do this either by calling them directly or by completing an on-line complaint form on the [employer]. This can be found at [email or web address] 2. Also file a formal complaint with your insurance company and ask to have the name of the person denying the service so that you may explore a legal option should you be harmed by this denial of service. Again, the fact the insurance company will not pay for the service does not mean it is not medically necessary and I would encourage you to follow through with the plan that was made with your physician 3. File a written complaint with your employer so your employer and benefit manager is aware of the poor coverage they are providing their employees. If you have medicare/medicaid, complain to your representative in the House and to your Fifi Hernandez. PRESCRIPTION REFILL POLICY [facility] Statement to Patients April 1, 2014 In an effort to ensure the large volume of patient prescription refills is processed in the most efficient and expeditious manner, we are asking our patients to assist us by calling your Pharmacy for all prescription refills, this will include also your  Mail Order Pharmacy. The pharmacy will contact our office electronically to continue the refill process. Please do not wait until the last minute to call your pharmacy. We need at least 48 hours (2days) to fill prescriptions. We also encourage you to call your pharmacy before going to  your prescription to make sure it is ready. With regard to controlled substance prescription refill requests (narcotic refills) that need to be picked up at our office, we ask your cooperation by providing us with at least 72 hours (3days) notice that you will need a refill. We will not refill narcotic prescription refill requests after 4:00pm on any weekday, Monday through Thursday, or after 2:00pm on Fridays, or on the weekends. We encourage everyone to explore another way of getting your prescription refill request processed using Fiz, our patient web portal through our electronic medical record system. Fiz is an efficient and effective way to communicate your medication request directly to the office and  downloadable as an jakub on your smart phone . Fiz also features a review functionality that allows you to view your medication list as well as leave messages for your physician. Are you ready to get connected? If so please review the attatched instructions or speak to any of our staff to get you set up right away! Thank you so much for your cooperation. Should you have any questions please contact our Practice Administrator. The Physicians and Staff,  [facility] If we have ordered testing for you, we do not call patients with results and we do not give test results over the phone. We schedule follow up appointments so that your results can be discussed in person and any questions you have regarding them may be addressed. If something of concern is revealed on your test, we will call you for a sooner follow up appointment. Additionally, results may be found by using the My Chart feature and one of our patient service representatives at the  can give you instructions on how to access this feature of our electronic medical record system. Dereck Resendez 17[address] What is a living will?  
A living will is a legal form you use to write down the kind of care you want at the end of your life. It is used by the health professionals who will treat you if you aren't able to decide for yourself. If you put your wishes in writing, your loved ones and others will know what kind of care you want. They won't need to guess. This can ease your mind and be helpful to others. A living will is not the same as an estate or property will. An estate will explains what you want to happen with your money and property after you die. Is a living will a legal document? A living will is a legal document. Each state has its own laws about living quiroz. If you move to another state, make sure that your living will is legal in the state where you now live. Or you might use a universal form that has been approved by many states. This kind of form can sometimes be completed and stored online. Your electronic copy will then be available wherever you have a connection to the Internet. In most cases, doctors will respect your wishes even if you have a form from a different state. · You don't need an  to complete a living will. But legal advice can be helpful if your state's laws are unclear, your health history is complicated, or your family can't agree on what should be in your living will. · You can change your living will at any time. Some people find that their wishes about end-of-life care change as their health changes. · In addition to making a living will, think about completing a medical power of  form. This form lets you name the person you want to make end-of-life treatment decisions for you (your \"health care agent\") if you're not able to. Many hospitals and nursing homes will give you the forms you need to complete a living will and a medical power of . · Your living will is used only if you can't make or communicate decisions for yourself anymore.  If you become able to make decisions again, you can accept or refuse any treatment, no matter what you wrote in your living will. · Your state may offer an online registry. This is a place where you can store your living will online so the doctors and nurses who need to treat you can find it right away. What should you think about when creating a living will? Talk about your end-of-life wishes with your family members and your doctor. Let them know what you want. That way the people making decisions for you won't be surprised by your choices. Think about these questions as you make your living will: · Do you know enough about life support methods that might be used? If not, talk to your doctor so you know what might be done if you can't breathe on your own, your heart stops, or you're unable to swallow. · What things would you still want to be able to do after you receive life-support methods? Would you want to be able to walk? To speak? To eat on your own? To live without the help of machines? · If you have a choice, where do you want to be cared for? In your home? At a hospital or nursing home? · Do you want certain Yarsani practices performed if you become very ill? · If you have a choice at the end of your life, where would you prefer to die? At home? In a hospital or nursing home? Somewhere else? · Would you prefer to be buried or cremated? · Do you want your organs to be donated after you die? What should you do with your living will? · Make sure that your family members and your health care agent have copies of your living will. · Give your doctor a copy of your living will to keep in your medical record. If you have more than one doctor, make sure that each one has a copy. · You may want to put a copy of your living will where it can be easily found. Where can you learn more? Go to http://micki-ryland.info/. Enter T392 in the search box to learn more about \"Learning About Living Mango. \" Current as of: August 8, 2016 Content Version: 11.3 © 3419-7991 [employer]. Care instructions adapted under license by [employer] (which disclaims liability or warranty for this information). If you have questions about a medical condition or this instruction, always ask your healthcare professional. [address] any warranty or liability for your use of this information. Please provide this summary of care documentation to your next provider. Your primary care clinician is listed as Roni Angel. If you have any questions after today's visit, please call [phone].

## 2017-09-29 NOTE — PROGRESS NOTES
Date:  17     Name:  Hira Contreras  :  1935  MRN:  [id number]     PCP:  Del Malloy MD    Chief Complaint   Patient presents with    Gait Problem     new patient       HISTORY OF PRESENT ILLNESS: 80 y.o female Present today with numbness in bilateral legs. This hs been going on for 4 years. Recently she had 2 falls one April and the other sept. Both times she was reaching to pick something off the fall. She did Fracture her humerus is April. Patient has a history of spinal stenosis. Her balance was off so Dr. Keshawn Lopez felt like she needed to see a neurologist because of the falls and poor balance. She has been having numbness in the lower body for 4 years. Her balance has been off since her numbness in her lower extremities. The numbness is starting to become more frequent. Numbness starts in the hips and moves down. Intermitting numbness bilateral legs that occur  4-5 times day. That last for about 3-30 mins if she sits down. There is a relief of pain when she gets off her feet. she is unable to walk when the numbness occurs. States \" she waddles\". When walking in a grocery store  She has to use a shopping cart. She uses a cane and a walker to get around. She is currently on gabapentin 100mg  At bedtime for the numbness on her leg. She has not been on any other neuropathy medications. denies dizziness, but states  \"fizzy in the head\". Except as noted above, denies  fever, chills, cough. No CP or SOB. No dysuria, loss of bowel or bladder control. No Weight loss. Appetite good. Sleeping well. No sweats. No edema. No bruising or bleeding. No nausea or vomit. No diarrhea. No frequency, urgency, No depressive sxs. No anxiety. Denies sore throat, nasal congestion, nasal discharge, epistaxis, tinnitus, hearing loss, back pain, muscle pain, or joint pain.        Current Outpatient Prescriptions   Medication Sig    gabapentin (NEURONTIN) 100 mg capsule Take  by mouth daily.  OTHER zyflamend 2 caps daily    amLODIPine (NORVASC) 2.5 mg tablet Take 1 Tab by mouth daily.  cloNIDine HCl (CATAPRES) 0.1 mg tablet Take 2 Tabs by mouth two (2) times a day.  chlorthalidone (HYGROTEN) 25 mg tablet Take 1 Tab by mouth daily.  potassium chloride (K-DUR, KLOR-CON) 10 mEq tablet Take 1 Tab by mouth daily.  losartan (COZAAR) 100 mg tablet Take 1 Tab by mouth nightly.  ACETAMINOPHEN/DIPHENHYDRAMINE (TYLENOL PM PO) Take 1-2 Tabs by mouth nightly as needed.  loratadine (CLARITIN) 10 mg tablet Take 10 mg by mouth daily.  cyanocobalamin 1,000 mcg tablet Take 1,000 mcg by mouth every seven (7) days.  VIT A/C/E AC/ZNOX/CUPRIC OXIDE (EYE VITAMIN AND MINERALS PO) Take 1 Cap by mouth daily.  omeprazole (PRILOSEC) 20 mg capsule Take 20 mg by mouth two (2) times a day.  simvastatin (ZOCOR) 10 mg tablet Take 1 Tab by mouth nightly.  diltiazem CD (CARDIZEM CD) 300 mg ER capsule Take 1 Cap by mouth daily.  ascorbic acid (VITAMIN C) 500 mg tablet Take 500 mg by mouth every evening.  aspirin delayed-release 81 mg tablet Take 81 mg by mouth every evening.  CALCIUM CARBONATE (CALCIUM 600 PO) Take 1 Tab by mouth two (2) times a day. 600-200 Iu    metformin (GLUCOPHAGE) 500 mg tablet Take 500 mg by mouth two (2) times daily (with meals).  MULTIVITAMIN (MULTIPLE VITAMINS DAILY PO) Take 1 Tab by mouth daily.  montelukast (SINGULAIR) 10 mg tablet Take 10 mg by mouth every evening.  naproxen (NAPROSYN) 250 mg tablet Take  by mouth two (2) times daily (with meals).  GLUCOSAM/CHOND/HYALU/CF BORATE (MOVE FREE Narvalous PO) Take 1 Tab by mouth every evening.  levothyroxine (SYNTHROID) 100 mcg tablet Take 100 mcg by mouth Daily (before breakfast). No current facility-administered medications for this visit.       Allergies   Allergen Reactions    Tolectin [Tolmetin] Anaphylaxis    Aldactone [Spironolactone] Other (comments) hyperkalemia    Carvedilol Other (comments)     \"slow heart rate down to 44\"    Ceclor [Cefaclor] Hives    Ciprofloxacin Hives and Swelling    Darvon [Propoxyphene] Swelling     Swelling lips and hands    Macrobid [Nitrofurantoin Monohyd/M-Cryst] Other (comments)     Does not recall    Norvasc [Amlodipine] Swelling     Swelling ankles/legs    Tetracycline Swelling     Swelling lips/hands    Toprol Xl [Metoprolol Succinate] Swelling     Swellings ankles/legs    Valsartan Other (comments)     Also \"slow heart rate down to 44\"    Zestril [Lisinopril] Hives     Past Medical History:   Diagnosis Date    Arthritis     Asthma     Breast cancer ([facility] [address]     Chest pain, unspecified     atypical chest discomfort with normal stress cardiolite 2002, and normal stress echoes 2004, 2006 and 2009. Negative dobutamine cardiolite March 2009 for ischemia with an EF=63%. Cardiac Cath 9/3/2010 showed mild CAD.  Diabetes mellitus ([facility] [address]     Diverticulosis     Dyslipidemia     Frequent PVCs     GERD (gastroesophageal reflux disease)     GERD (gastroesophageal reflux disease)     Hiatal hernia     HTN (hypertension)     Obesity     LA on CPAP     RBBB (right bundle branch block)     Spinal stenosis     Thyroid disease      Past Surgical History:   Procedure Laterality Date    HX BUNIONECTOMY      HX CHOLECYSTECTOMY      HX HEART CATHETERIZATION      Cardiac Cath 9/3/2010 showed mild CAD.  HX HIP REPLACEMENT      HX ORTHOPAEDIC      HX OTHER SURGICAL      Echo 10/5/12 - LVEF 60-65%, mod-marked LAE, MAC, grade 1 diastolic dysfunction     HX OTHER SURGICAL      Lexiscan cardiolite 1/25/13 - normal MPI, LVEF 74%    HX OTHER SURGICAL      Aorta duplex 1/6/14 - aorta size 1.7 cm, no dilation      Social History     Social History    Marital status:      Spouse name: N/A    Number of children: N/A    Years of education: N/A     Occupational History    Not on file.      Social History Main Topics    Smoking status: Never Smoker    Smokeless tobacco: Never Used    Alcohol use No    Drug use: Not on file    Sexual activity: Not on file     Other Topics Concern    Not on file     Social History Narrative     Family History   Problem Relation Age of Onset    Coronary Artery Disease Father     Heart Disease Father     Coronary Artery Disease Paternal Aunt     Cancer Mother     Parkinson's Disease Mother     Stroke Mother     Thyroid Disease Mother     Seizures Brother        PHYSICAL EXAMINATION:    Visit Vitals    /90    Pulse 83    Temp 98 °F (36.7 °C)    Resp 16    Ht 5' 3\" (1.6 m)    Wt 90.7 kg (200 lb)    SpO2 98%    BMI 35.43 kg/m2   General: Well defined, nourished, and groomed individual in no acute distress. Neck: Supple, nontender, no bruits, no pain with resistance to active range of motion. Heart: Regular rate and rhythm, no murmurs, rub, or gallop. Normal S1S2. Lungs: Clear to auscultation bilaterally with equal chest expansion, no cough, no wheeze  Musculoskeletal: Extremities revealed no edema and had full range of motion of joints. Psych: Good mood and bright affect      NEUROLOGICAL EXAMINATION:   Mental Status: Alert and oriented to person, place, and time       Cranial Nerves:   II, III, IV, VI: Visual acuity grossly intact. Visual fields are normal.   Pupils are equal, round, and reactive to light and accommodation. Extra-ocular movements are full and fluid. Fundoscopic exam was benign, no ptosis or nystagmus. V-XII: Hearing is grossly intact. Facial features are symmetric, with normal sensation and strength. The palate rises symmetrically and the tongue protrudes midline. Sternocleidomastoids 5/5.       Motor Examination: Normal tone, bulk, and strength, 5/5 muscle strength throughout.       Coordination: No resting or intention tremor      Gait and Station: Steady while walking. Normal arm swing. No pronator drift.  No muscle wasting or fasiculations noted.       Reflexes: DTRs 2+ throughout. ASSESSMENT AND PLAN    ICD-10-CM ICD-9-CM    1. Neurogenic claudication due to lumbar spinal stenosis M48.06 724.03 MRI LUMB SPINE W WO CONT      EMG LIMITED   2. Gait disorder R26.9 781.2 MRI LUMB SPINE W WO CONT      EMG LIMITED   3. 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 Numbness and tingling of both lower extremities R20.0 782.0 MRI LUMB SPINE W WO CONT    R20.2  EMG LIMITED   This is an 80-year-old female who has been having numbness and tingling in her lower extremities starting from her hip down. This is more related to Neurogenic Claudication. I would like to get an MRI of the lumbar spine to check for any lesion or compression of a nerve. I would also like to get an EMG to check for any nerve damage. Follow up after testing     Crecencio Galeazzi FNP-ASTRID    I have reviewed the documentation provided by the nurse practitioner, Ms. Fidelia Mariscal, and we have discussed her findings and the clinical impression. I have formulated with her the proposed management plans for this patient. Additionally,  I have personally evaluated the patient to verify the history and to confirm physical findings. Below are my additional comments:  Nice 80-year-old lady who is here today with her  for evaluation of numbness in her lower extremities. She does have a known diagnosis of lumbar stenosis. I did review her MRI scan from 2011 of the lumbar spine I did that on PACS today. She does have significant lumbar stenosis by imaging. She tells me she has not had a recent MRI scan and that has really been the only MRI scan she has had. She describes that she gets numbness in her lower extremities bilaterally with ambulation. She says that if she walks any distance she gets numbness in the lower extremities and feels as though the legs will give out. She has to find a place to sit down quickly or she feels as though she will fall.   She has not fallen and I asked her what would happen if she did not sit and she tells me she does not get to that point and she always plans herself so that she knows she can sit down after walking just a short distance. In stores she always uses a cart and notes that that helps her as well. Once she sits and rests for just a few minutes the numbness goes away and then she is able to walk again. On examination for me today she does give direct resistance fully in all muscle groups. Her reflexes are globally depressed. She does have a Romberg. At this point she is describing quite clearly neurogenic claudication. She has known lumbar stenosis. The Romberg though does suggest some element of a peripheral neuropathy as well although again this could be secondary to multiple root compression. We are going to sort this a bit by getting a lumbar spine MRI seeing how much the stenosis has progressed and also get an EMG of the bilateral lower extremities. Once we get that data than we can have a better idea of how to proceed. With her advanced age and coexisting medical illnesses I am really not too enthused about sending her for surgical option but let us see what the studies show us and will have her return afterwards.     Esdras Murray MD

## 2017-10-05 NOTE — TELEPHONE ENCOUNTER
Requested Prescriptions     Pending Prescriptions Disp Refills    amLODIPine (NORVASC) 2.5 mg tablet 30 Tab 0     Sig: Take 1 Tab by mouth daily.      Last OV 8/25/17  Next OV 3/8/18    Pharmacy verified    Thank you, AP

## 2017-10-06 RX ORDER — AMLODIPINE BESYLATE 2.5 MG/1
2.5 TABLET ORAL DAILY
Qty: 30 TAB | Refills: 0 | Status: CANCELLED | OUTPATIENT
Start: 2017-10-06

## 2017-10-06 NOTE — TELEPHONE ENCOUNTER
----- Message from Fish Conner LPN sent at 02/4/0160  8:56 AM EDT -----  Regarding: Amlodipine Refill  There is a request in this patient's chart for amlodipine. A warning pops up regarding duplicate therapy with diltiazem. Please fill amlodipine if appropriate. Thanks.

## 2017-10-06 NOTE — TELEPHONE ENCOUNTER
Dr Stanton Balderas added amlodipine in a tele message recently. Will make sure he still wants both.

## 2017-10-09 ENCOUNTER — TELEPHONE (OUTPATIENT)
Dept: CARDIOLOGY CLINIC | Age: 82
End: 2017-10-09

## 2017-10-09 RX ORDER — AMLODIPINE BESYLATE 2.5 MG/1
2.5 TABLET ORAL DAILY
Qty: 30 TAB | Refills: 6 | Status: SHIPPED | OUTPATIENT
Start: 2017-10-09 | End: 2018-10-01 | Stop reason: SDUPTHER

## 2017-10-09 NOTE — TELEPHONE ENCOUNTER
Dr Kimberly Holm asked that I call and double check to see how she is doing before refilling the amlodipine. I reached her this AM.    She said HR & BP are better with the addition of the low dose amlodipine to her current regimen. HR running in the mid 60's 's to low 150's/70-low 80's. This is down from HR upper 70's 's/upper 90's.

## 2017-10-09 NOTE — TELEPHONE ENCOUNTER
Pt is in need of a refill on her amlodipine, however, when one of my co-workers tried to refill it, a \"warning\" box came up with a duplication med since she is taking diltiazem. Dr Nicole Briones had added the low dose amlodipine in a tele message 9/15/17. Dr Nicole Briones asked that I call and double check to see how she is doing before refilling the amlodipine. I reached her this AM.     She said HR & BP are better with the addition of the low dose amlodipine to her current regimen.      HR running in the mid 60's 's to low 150's/70-low 80's.     This is down from HR upper 70's 's/upper 90's.

## 2017-10-09 NOTE — TELEPHONE ENCOUNTER
MD Chirag Young, RN        Caller: Unspecified (Today,  9:33 AM)                     Ok - continue amlodipine.    Thanks,   SK

## 2017-10-12 ENCOUNTER — HOSPITAL ENCOUNTER (OUTPATIENT)
Dept: MRI IMAGING | Age: 82
Discharge: HOME | End: 2017-10-12
Attending: PSYCHIATRY & NEUROLOGY
Payer: MEDICARE

## 2017-10-12 ENCOUNTER — OFFICE VISIT (OUTPATIENT)
Dept: NEUROLOGY | Age: 82
End: 2017-10-12

## 2017-10-12 ENCOUNTER — TELEPHONE (OUTPATIENT)
Dept: CARDIOLOGY CLINIC | Age: 82
End: 2017-10-12

## 2017-10-12 DIAGNOSIS — R20.2 PARESTHESIA: Primary | ICD-10-CM

## 2017-10-12 DIAGNOSIS — M48.062 NEUROGENIC CLAUDICATION DUE TO LUMBAR SPINAL STENOSIS: ICD-10-CM

## 2017-10-12 DIAGNOSIS — R20.2 NUMBNESS AND TINGLING OF BOTH LOWER EXTREMITIES: ICD-10-CM

## 2017-10-12 DIAGNOSIS — R27.8 SENSORY ATAXIA: ICD-10-CM

## 2017-10-12 DIAGNOSIS — R20.0 NUMBNESS AND TINGLING OF BOTH LOWER EXTREMITIES: ICD-10-CM

## 2017-10-12 DIAGNOSIS — R26.9 GAIT DISORDER: ICD-10-CM

## 2017-10-12 LAB — CREAT BLD-MCNC: 1.3 MG/DL (ref 0.6–1.3)

## 2017-10-12 PROCEDURE — 72158 MRI LUMBAR SPINE W/O & W/DYE: CPT

## 2017-10-12 PROCEDURE — 74011250636 HC RX REV CODE- 250/636: Performed by: PSYCHIATRY & NEUROLOGY

## 2017-10-12 PROCEDURE — 82565 ASSAY OF CREATININE: CPT

## 2017-10-12 PROCEDURE — A9576 INJ PROHANCE MULTIPACK: HCPCS | Performed by: PSYCHIATRY & NEUROLOGY

## 2017-10-12 RX ADMIN — GADOTERIDOL 18 ML: 279.3 INJECTION, SOLUTION INTRAVENOUS at 13:59

## 2017-10-12 NOTE — PROCEDURES
EMG/ NCS Report  [facility], [address]  Ph: [phone]/ [phone]  FAX: [phone]/ [phone]  Test Date:  10/12/2017    Patient: Guerline Gallegos : 1935 Physician: Lorene Boyce MD   Sex: Female Height: ' \" Ref Phys: Antonino Peña MD   ID#: [id number] Weight:  lbs. Technician: Naty Larsen     Patient History / Exam:  CC:NUMBNESS,PAIN IN BOTH FEET AND LOWER BACK,SYMPTOMS X 6 YRS. DMX 8 YRS  (+) numbness of both hands    EMG & NCV Findings:  Evaluation of the left Fibular motor and the right Fibular motor nerves showed no response (Ankle), no response (B Fib), and no response (Poplt). The left Fibular TA motor and the right Fibular TA motor nerves showed normal distal onset latency (L3.8, R3.5 ms), reduced amplitude (L1.7, R2.8 mV), and normal conduction velocity (Poplit-Fib Head, L67, R63 m/s). The left tibial motor nerve showed normal distal onset latency (5.5 ms), normal amplitude (1.2 mV), and normal conduction velocity (Knee-Ankle, 42 m/s). The right tibial motor nerve showed normal distal onset latency (4.9 ms), reduced amplitude (0.5 mV), and decreased conduction velocity (Knee-Ankle, 38 m/s). The left Sup Fibular sensory nerve showed no response (Lower leg) and no response (Site 2). The right Sup Fibular sensory nerve showed no response (Lower leg). The left sural sensory and the right sural sensory nerves showed no response (Calf). F Wave studies indicate that the left tibial F wave has prolonged latency (59.38 ms). The right tibial F wave has prolonged latency (59.38 ms). All F Wave left vs. right side latency differences were within normal limits. H-reflex studies indicate that the left tibial H-reflex has no response. The right tibial H-reflex has no response.       Needle evaluation of the left extensor digitorum brevis and the right extensor digitorum brevis muscles showed recruitment, Incr Duration, and increased motor unit amplitude. The left abductor hallucis muscle showed diminished recruitment, Incr Duration, increased motor unit amplitude, and slightly increased polyphasic potentials. The right abductor hallucis muscle showed diminished recruitment, Incr Duration, and increased motor unit amplitude. All remaining muscles (as indicated in the following table) showed no evidence of electrical instability. 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         Melissa Austin MD  Diplomate, American Board of Psychiatry and Neurology  Diplomate, Neuromuscular Medicine  Diplomate, American Board of Electrodiagnostic Medicine        Nerve Conduction Studies  Anti Sensory Summary Table     Stim Site NR Peak (ms) Norm Peak (ms) P-T Amp (µV) Norm P-T Amp Site1 Site2 Dist (cm)   Left Sup Fibular Anti Sensory (Lat ankle)  33.3°C   Lower leg NR  <4.6  >4 Lower leg Lat ankle 10.0   Site 2 NR          Right Sup Fibular Anti Sensory (Lat ankle)  32.1°C   Lower leg NR  <4.6  >4 Lower leg Lat ankle 10.0   Left Sural Anti Sensory (Lat Mall)  36.3°C   Calf NR  <4.5  >4.0 Calf Lat Mall 14.0   Right Sural Anti Sensory (Lat Mall)  32.7°C   Calf NR  <4.5  >4.0 Calf Lat Mall 14.0     Motor Summary Table     Stim Site NR Onset (ms) Norm Onset (ms) O-P Amp (mV) Norm O-P Amp Amp (Prev) (%) Site1 Site2 Dist (cm) Jimmie (m/s) Norm Jimmie (m/s)   Left Fibular Motor (Ext Dig Brev)  32°C   Ankle NR  <6.5  >1.1  Ankle Ext Dig Brev 8.0     B Fib NR      B Fib Ankle 34.0  >38   Poplt NR      Poplt B Fib 10.0  >42   Right Fibular Motor (Ext Dig Brev)  31.7°C   Ankle NR  <6.5  >1.1  Ankle Ext Dig Brev 8.0     B Fib NR      B Fib Ankle 0.0  >38   Poplt NR      Poplt B Fib 10.0  >42   Left Fibular TA Motor (Tib Ant)  23.4°C   Fib Head    3.8 <4.5 1.7 >3.0 100.0 Fib Head Tib Ant 10.0 Poplit    5.3  1.4  82.4 Poplit Fib Head 10.0 67 >40   Right Fibular TA Motor (Tib Ant)  21.5°C   Fib Head    3.5 <4.5 2.8 >3.0 100.0 Fib Head Tib Ant 10.0     Poplit    5.1  2.7  96.4 Poplit Fib Head 10.0 63 >40   Left Tibial Motor (Abd Liriano Brev)  31.2°C   Ankle    5.5 <6.1 1.2 >1.1 100.0 Ankle Abd Liriano Brev 8.0     Knee    14.7  0.8  66.7 Knee Ankle 39.0 42 >39   Right Tibial Motor (Abd Liriano Brev)  31.3°C   Ankle    4.9 <6.1 0.5 >1.1 100.0 Ankle Abd Liriano Brev 8.0     Knee    15.5  0.4  80.0 Knee Ankle 40.0 38 >39     F Wave Studies     NR F-Lat (ms) Lat Norm (ms) L-R F-Lat (ms) L-R Lat Norm   Left Tibial (Mrkrs) (Abd Hallucis)  30.8°C      59.38 <56 0.00 <5.7   Right Tibial (Mrkrs) (Abd Hallucis)  31.1°C      59.38 <56 0.00 <5.7     H Reflex Studies     NR H-Lat (ms) L-R H-Lat (ms) L-R Lat Norm   Left Tibial (Gastroc)  30.5°C   NR   <2.0   Right Tibial (Gastroc)  31°C   NR   <2.0     EMG     Side Muscle Nerve Root Ins Act Fibs Psw Recrt Duration Amp Poly Comment   Left Ext Dig Brev Dp Br Peron L5, S1 Nml Nml Nml SMU Incr Incr Nml    Left AbdHallucis MedPlantar S1-2 Nml Nml Nml Reduced Incr Incr 1+    Left AntTibialis Dp Br Peron L4-5 Nml Nml Nml Nml Nml Nml Nml    Left MedGastroc Tibial S1-2 Nml Nml Nml Nml Nml Nml Nml    Left VastusLat Femoral L2-4 Nml Nml Nml Nml Nml Nml Nml    Right Ext Dig Brev Dp Br Peron L5, S1 Nml Nml Nml SMU Incr Incr Nml    Right AbdHallucis MedPlantar S1-2 Nml Nml Nml Reduced Incr Incr Nml    Right AntTibialis Dp Br Peron L4-5 Nml Nml Nml Nml Nml Nml Nml    Right MedGastroc Tibial S1-2 Nml Nml Nml Nml Nml Nml Nml    Right VastusLat Femoral L2-4 Nml Nml Nml Nml Nml Nml Nml    Right GluteusMed SupGluteal L4-S1 Nml Nml Nml Nml Nml Nml Nml    Right Lower Lumb Parasp Rami L5,S1 Nml Nml Nml Nml Nml Nml Nml                Nerve Conduction Studies  Anti Sensory Left/Right Comparison     Stim Site L Lat (ms) R Lat (ms) L-R Lat (ms) L Amp (µV) R Amp (µV) L-R Amp (%) Site1 Site2 L Jimmie (m/s) R Jimmie (m/s) L-R Jimmie (m/s)   Sup Fibular Anti Sensory (Lat ankle)  33.3°C   Lower leg       Lower leg Lat ankle      Site 2              Sural Anti Sensory (Lat Mall)  36.3°C   Calf       Calf Lat Mall        Motor Left/Right Comparison     Stim Site L Lat (ms) R Lat (ms) L-R Lat (ms) L Amp (mV) R Amp (mV) L-R Amp (%) Site1 Site2 L Jimmie (m/s) R Jimmie (m/s) L-R Jimmie (m/s)   Fibular Motor (Ext Dig Brev)  32°C   Ankle       Ankle Ext Dig Brev      B Fib       B Fib Ankle      Poplt       Poplt B Fib      Fibular TA Motor (Tib Ant)  23.4°C   Fib Head 3.8 3.5 0.3 1.7 2.8 39.3 Fib Head Tib Ant      Poplit 5.3 5.1 0.2 1.4 2.7 48.1 Poplit Fib Head 67 63 4   Tibial Motor (Abd Liriano Brev)  31.2°C   Ankle 5.5 4.9 0.6 1.2 0.5 58.3 Ankle Abd Liriano Brev      Knee 14.7 15.5 0.8 0.8 0.4 50.0 Knee Ankle 42 38 4         Waveforms:

## 2017-10-12 NOTE — TELEPHONE ENCOUNTER
Haley Mckinley with [facility] calling about the Amlodine the case number is [id number]. They can be reached at [phone].  [employer]

## 2017-10-30 ENCOUNTER — OFFICE VISIT (OUTPATIENT)
Dept: NEUROLOGY | Age: 82
End: 2017-10-30

## 2017-10-30 VITALS
SYSTOLIC BLOOD PRESSURE: 150 MMHG | HEIGHT: 63 IN | DIASTOLIC BLOOD PRESSURE: 80 MMHG | BODY MASS INDEX: 37.39 KG/M2 | WEIGHT: 211 LBS

## 2017-10-30 DIAGNOSIS — M54.50 LUMBAR SPINE PAIN: Primary | ICD-10-CM

## 2017-10-30 RX ORDER — DEXTROMETHORPHAN HYDROBROMIDE, GUAIFENESIN 5; 100 MG/5ML; MG/5ML
650 LIQUID ORAL
COMMUNITY
End: 2018-04-27 | Stop reason: DRUGHIGH

## 2017-10-30 NOTE — MR AVS SNAPSHOT
Visit Information Date & Time Provider Department Dept. Phone Encounter #  
 10/30/2017  2:00 PM Smith Polk NP [facility] [phone] [id number] Follow-up Instructions Return if symptoms worsen or fail to improve. Your Appointments 3/8/2018 11:20 AM  
ESTABLISHED PATIENT with Shan Schmid MD  
[facility] ([facility]) Appt Note: 6  mmo fu  
 [address] [facility]  
[address] [facility] [address] [address] [address] [place] Upcoming Health Maintenance Date Due  
 FOOT EXAM Q1 8/23/1945 MICROALBUMIN Q1 8/23/1945 EYE EXAM RETINAL OR DILATED Q1 8/23/1945 DTaP/Tdap/Td series (1 - Tdap) 8/23/1956 ZOSTER VACCINE AGE 60> 6/23/1995 GLAUCOMA SCREENING Q2Y 8/23/2000 MEDICARE YEARLY EXAM 8/23/2000 HEMOGLOBIN A1C Q6M 2/17/2017 Pneumococcal 65+ Low/Medium Risk (2 of 2 - PCV13) 5/2/2017 INFLUENZA AGE 9 TO ADULT 8/1/2017 LIPID PANEL Q1 8/17/2017 Allergies as of 10/30/2017  Review Complete On: 10/30/2017 By: Remedios Howard Severity Noted Reaction Type Reactions Tolectin [Tolmetin] High 12/22/2010    Anaphylaxis Aldactone [Spironolactone]  12/06/2013    Other (comments)  
 hyperkalemia Carvedilol  08/29/2012   Side Effect Other (comments) \"slow heart rate down to 44\" Ceclor [Cefaclor]  12/22/2010    Hives Ciprofloxacin  12/22/2010    Hives, Swelling Darvon [Propoxyphene]  12/22/2010    Swelling Swelling lips and hands Merleen Trey [Nitrofurantoin Monohyd/m-cryst]  12/16/2014    Other (comments) Does not recall Norvasc [Amlodipine]  08/29/2012   Side Effect Swelling Swelling ankles/legs Tetracycline  12/22/2010    Swelling Swelling lips/hands Toprol Xl [Metoprolol Succinate]  10/05/2012    Swelling Swellings ankles/legs Valsartan  05/01/2016    Other (comments) Also \"slow heart rate down to 44\" Zestril [Lisinopril]  12/22/2010    Hives Current Immunizations  Never Reviewed Name Date Pneumococcal Polysaccharide (PPSV-23) 5/2/2016  6:25 PM  
  
 Not reviewed this visit Vitals BP Height(growth percentile) Weight(growth percentile) BMI OB Status Smoking Status 150/80 5' 3\" (1.6 m) 211 lb (95.7 kg) 37.38 kg/m2 Postmenopausal Never Smoker Vitals History BMI and BSA Data Body Mass Index Body Surface Area  
 37.38 kg/m 2 2.06 m 2 Preferred Pharmacy Pharmacy Name Phone [facility] [phone] Your Updated Medication List  
  
   
This list is accurate as of: 10/30/17  2:24 PM.  Always use your most recent med list. amLODIPine 2.5 mg tablet Commonly known as:  Horris Bills Take 1 Tab by mouth daily. aspirin delayed-release 81 mg tablet Take 81 mg by mouth every evening. CALCIUM 600 PO Take 1 Tab by mouth two (2) times a day. 600-200 Iu  
  
 chlorthalidone 25 mg tablet Commonly known as:  Shruthi Carry Take 1 Tab by mouth daily. cloNIDine HCl 0.1 mg tablet Commonly known as:  CATAPRES Take 2 Tabs by mouth two (2) times a day. cyanocobalamin 1,000 mcg tablet Take 1,000 mcg by mouth every seven (7) days. dilTIAZem  mg ER capsule Commonly known as:  CARDIZEM CD Take 1 Cap by mouth daily. EYE VITAMIN AND MINERALS PO Take 1 Cap by mouth daily. gabapentin 100 mg capsule Commonly known as:  NEURONTIN Take  by mouth daily. levothyroxine 100 mcg tablet Commonly known as:  SYNTHROID Take 100 mcg by mouth Daily (before breakfast). loratadine 10 mg tablet Commonly known as:  Jearline María Take 10 mg by mouth daily. losartan 100 mg tablet Commonly known as:  COZAAR Take 1 Tab by mouth nightly. metFORMIN 500 mg tablet Commonly known as:  GLUCOPHAGE  
 Take 500 mg by mouth two (2) times daily (with meals). MULTIPLE VITAMINS DAILY PO Take 1 Tab by mouth daily. omeprazole 20 mg capsule Commonly known as:  PRILOSEC Take 20 mg by mouth two (2) times a day. OTHER  
zyflamend 2 caps daily  
  
 potassium chloride 10 mEq tablet Commonly known as:  KLOR-CON Take 1 Tab by mouth daily. simvastatin 10 mg tablet Commonly known as:  ZOCOR Take 1 Tab by mouth nightly. SINGULAIR 10 mg tablet Generic drug:  montelukast  
Take 10 mg by mouth every evening. TYLENOL ARTHRITIS PAIN 650 mg CR tablet Generic drug:  acetaminophen Take 650 mg by mouth every six (6) hours as needed for Pain. VITAMIN C 500 mg tablet Generic drug:  ascorbic acid (vitamin C) Take 500 mg by mouth every evening. Follow-up Instructions Return if symptoms worsen or fail to improve. Patient Instructions PRESCRIPTION REFILL POLICY [facility] Statement to Patients April 1, 2014 In an effort to ensure the large volume of patient prescription refills is processed in the most efficient and expeditious manner, we are asking our patients to assist us by calling your Pharmacy for all prescription refills, this will include also your  Mail Order Pharmacy. The pharmacy will contact our office electronically to continue the refill process. Please do not wait until the last minute to call your pharmacy. We need at least 48 hours (2days) to fill prescriptions. We also encourage you to call your pharmacy before going to  your prescription to make sure it is ready. With regard to controlled substance prescription refill requests (narcotic refills) that need to be picked up at our office, we ask your cooperation by providing us with at least 72 hours (3days) notice that you will need a refill.  
 
We will not refill narcotic prescription refill requests after 4:00pm on any weekday, Monday through Thursday, or after 2:00pm on Fridays, or on the weekends. We encourage everyone to explore another way of getting your prescription refill request processed using [employer], our patient web portal through our electronic medical record system. Zandot is an efficient and effective way to communicate your medication request directly to the office and  downloadable as an jakub on your smart phone . [employer] also features a review functionality that allows you to view your medication list as well as leave messages for your physician. Are you ready to get connected? If so please review the attatched instructions or speak to any of our staff to get you set up right away! Thank you so much for your cooperation. Should you have any questions please contact our Practice Administrator. The Physicians and Staff,  [facility] [address] What is a living will? A living will is a legal form you use to write down the kind of care you want at the end of your life. It is used by the health professionals who will treat you if you aren't able to decide for yourself. If you put your wishes in writing, your loved ones and others will know what kind of care you want. They won't need to guess. This can ease your mind and be helpful to others. A living will is not the same as an estate or property will. An estate will explains what you want to happen with your money and property after you die. Is a living will a legal document? A living will is a legal document. Each state has its own laws about living quiroz. If you move to another state, make sure that your living will is legal in the state where you now live. Or you might use a universal form that has been approved by many states. This kind of form can sometimes be completed and stored online. Your electronic copy will then be available wherever you have a connection to the Internet.  In most cases, doctors will respect your wishes even if you have a form from a different state. · You don't need an  to complete a living will. But legal advice can be helpful if your state's laws are unclear, your health history is complicated, or your family can't agree on what should be in your living will. · You can change your living will at any time. Some people find that their wishes about end-of-life care change as their health changes. · In addition to making a living will, think about completing a medical power of  form. This form lets you name the person you want to make end-of-life treatment decisions for you (your \"health care agent\") if you're not able to. Many hospitals and nursing homes will give you the forms you need to complete a living will and a medical power of . · Your living will is used only if you can't make or communicate decisions for yourself anymore. If you become able to make decisions again, you can accept or refuse any treatment, no matter what you wrote in your living will. · Your state may offer an online registry. This is a place where you can store your living will online so the doctors and nurses who need to treat you can find it right away. What should you think about when creating a living will? Talk about your end-of-life wishes with your family members and your doctor. Let them know what you want. That way the people making decisions for you won't be surprised by your choices. Think about these questions as you make your living will: · Do you know enough about life support methods that might be used? If not, talk to your doctor so you know what might be done if you can't breathe on your own, your heart stops, or you're unable to swallow. · What things would you still want to be able to do after you receive life-support methods? Would you want to be able to walk? To speak? To eat on your own? To live without the help of machines? · If you have a choice, where do you want to be cared for? In your home? At a hospital or nursing home? · Do you want certain Yarsani practices performed if you become very ill? · If you have a choice at the end of your life, where would you prefer to die? At home? In a hospital or nursing home? Somewhere else? · Would you prefer to be buried or cremated? · Do you want your organs to be donated after you die? What should you do with your living will? · Make sure that your family members and your health care agent have copies of your living will. · Give your doctor a copy of your living will to keep in your medical record. If you have more than one doctor, make sure that each one has a copy. · You may want to put a copy of your living will where it can be easily found. Where can you learn more? Go to http://mickiLatest Medicalryland.info/. Enter O416 in the search box to learn more about \"Learning About Living [place] [facility]. \" Current as of: September 24, 2016 Content Version: 11.4 © 3552-1839 [employer] Care instructions adapted under license by [employer] (which disclaims liability or warranty for this information). If you have questions about a medical condition or this instruction, always ask your healthcare professional. [place] [address] any warranty or liability for your use of this information. Advance Directives: Care Instructions Your Care Instructions An advance directive is a legal way to state your wishes at the end of your life. It tells your family and your doctor what to do if you can no longer say what you want. There are two main types of advance directives. You can change them any time that your wishes change. · A living will tells your family and your doctor your wishes about life support and other treatment.  
· A durable power of  for health care lets you name a person to make treatment decisions for you when you can't speak for yourself. This person is called a health care agent. If you do not have an advance directive, decisions about your medical care may be made by a doctor or a  who doesn't know you. It may help to think of an advance directive as a gift to the people who care for you. If you have one, they won't have to make tough decisions by themselves. Follow-up care is a key part of your treatment and safety. Be sure to make and go to all appointments, and call your doctor if you are having problems. It's also a good idea to know your test results and keep a list of the medicines you take. How can you care for yourself at home? · Discuss your wishes with your loved ones and your doctor. This way, there are no surprises. · Many states have a unique form. Or you might use a universal form that has been approved by many states. This kind of form can sometimes be completed and stored online. Your electronic copy will then be available wherever you have a connection to the Internet. In most cases, doctors will respect your wishes even if you have a form from a different state. · You don't need a  to do an advance directive. But you may want to get legal advice. · Think about these questions when you prepare an advance directive: ¨ Who do you want to make decisions about your medical care if you are not able to? Many people choose a family member or close friend. ¨ Do you know enough about life support methods that might be used? If not, talk to your doctor so you understand. ¨ What are you most afraid of that might happen? You might be afraid of having pain, losing your independence, or being kept alive by machines. ¨ Where would you prefer to die? Choices include your home, a hospital, or a nursing home. ¨ Would you like to have information about hospice care to support you and your family? ¨ Do you want to donate organs when you die? ¨ Do you want certain Jehovah's witness practices performed before you die? If so, put your wishes in the advance directive. · Read your advance directive every year, and make changes as needed. When should you call for help? Be sure to contact your doctor if you have any questions. Where can you learn more? Go to http://micki-ryland.info/. Enter R264 in the search box to learn more about \"Advance Directives: Care Instructions. \" Current as of: September 24, 2016 Content Version: 11.4 © 3197-2808 [employer]. Care instructions adapted under license by [employer] (which disclaims liability or warranty for this information). If you have questions about a medical condition or this instruction, always ask your healthcare professional. [address] any warranty or liability for your use of this information. Please provide this summary of care documentation to your next provider. Your primary care clinician is listed as Lauren Grant. If you have any questions after today's visit, please call [phone].

## 2017-10-30 NOTE — PROGRESS NOTES
Patient is here for MRI and EMG results. Pt is accompanied by her . She is scheduled for another epidural Nov. 8 at the [facility]. She was told that there are 2 bulging discs but nothing else.

## 2017-10-30 NOTE — PATIENT INSTRUCTIONS
10 [facility]   Statement to Patients  April 1, 2014      In an effort to ensure the large volume of patient prescription refills is processed in the most efficient and expeditious manner, we are asking our patients to assist us by calling your Pharmacy for all prescription refills, this will include also your  Mail Order Pharmacy. The pharmacy will contact our office electronically to continue the refill process. Please do not wait until the last minute to call your pharmacy. We need at least 48 hours (2days) to fill prescriptions. We also encourage you to call your pharmacy before going to  your prescription to make sure it is ready. With regard to controlled substance prescription refill requests (narcotic refills) that need to be picked up at our office, we ask your cooperation by providing us with at least 72 hours (3days) notice that you will need a refill. We will not refill narcotic prescription refill requests after 4:00pm on any weekday, Monday through Thursday, or after 2:00pm on Fridays, or on the weekends. We encourage everyone to explore another way of getting your prescription refill request processed using "[employer]", our patient web portal through our electronic medical record system. "[employer]" is an efficient and effective way to communicate your medication request directly to the office and  downloadable as an jakub on your smart phone . "[employer]" also features a review functionality that allows you to view your medication list as well as leave messages for your physician. Are you ready to get connected? If so please review the attatched instructions or speak to any of our staff to get you set up right away! Thank you so much for your cooperation. Should you have any questions please contact our Practice Administrator.     The Physicians and Staff,  [facility] [address] [address]  What is a living will?    A living will is a legal form you use to write down the kind of care you want at the end of your life. It is used by the health professionals who will treat you if you aren't able to decide for yourself. If you put your wishes in writing, your loved ones and others will know what kind of care you want. They won't need to guess. This can ease your mind and be helpful to others. A living will is not the same as an estate or property will. An estate will explains what you want to happen with your money and property after you die. Is a living will a legal document? A living will is a legal document. Each state has its own laws about living quiroz. If you move to another state, make sure that your living will is legal in the state where you now live. Or you might use a universal form that has been approved by many states. This kind of form can sometimes be completed and stored online. Your electronic copy will then be available wherever you have a connection to the Internet. In most cases, doctors will respect your wishes even if you have a form from a different state. · You don't need an  to complete a living will. But legal advice can be helpful if your state's laws are unclear, your health history is complicated, or your family can't agree on what should be in your living will. · You can change your living will at any time. Some people find that their wishes about end-of-life care change as their health changes. · In addition to making a living will, think about completing a medical power of  form. This form lets you name the person you want to make end-of-life treatment decisions for you (your \"health care agent\") if you're not able to. Many hospitals and nursing homes will give you the forms you need to complete a living will and a medical power of . · Your living will is used only if you can't make or communicate decisions for yourself anymore.  If you become able to make decisions again, you can accept or refuse any treatment, no matter what you wrote in your living will. · Your state may offer an online registry. This is a place where you can store your living will online so the doctors and nurses who need to treat you can find it right away. What should you think about when creating a living will? Talk about your end-of-life wishes with your family members and your doctor. Let them know what you want. That way the people making decisions for you won't be surprised by your choices. Think about these questions as you make your living will:  · Do you know enough about life support methods that might be used? If not, talk to your doctor so you know what might be done if you can't breathe on your own, your heart stops, or you're unable to swallow. · What things would you still want to be able to do after you receive life-support methods? Would you want to be able to walk? To speak? To eat on your own? To live without the help of machines? · If you have a choice, where do you want to be cared for? In your home? At a hospital or nursing home? · Do you want certain Worship practices performed if you become very ill? · If you have a choice at the end of your life, where would you prefer to die? At home? In a hospital or nursing home? Somewhere else? · Would you prefer to be buried or cremated? · Do you want your organs to be donated after you die? What should you do with your living will? · Make sure that your family members and your health care agent have copies of your living will. · Give your doctor a copy of your living will to keep in your medical record. If you have more than one doctor, make sure that each one has a copy. · You may want to put a copy of your living will where it can be easily found. Where can you learn more? Go to http://micki-ryland.info/. Enter J584 in the search box to learn more about \"Learning About Living [employer]. \"  Current as of: September 24, 2016  Content Version: 11.4  © 4866-1011 [employer]. Care instructions adapted under license by [employer] technology (which disclaims liability or warranty for this information). If you have questions about a medical condition or this instruction, always ask your healthcare professional. [address] any warranty or liability for your use of this information. Advance Directives: Care Instructions  Your Care Instructions  An advance directive is a legal way to state your wishes at the end of your life. It tells your family and your doctor what to do if you can no longer say what you want. There are two main types of advance directives. You can change them any time that your wishes change. · A living will tells your family and your doctor your wishes about life support and other treatment. · A durable power of  for health care lets you name a person to make treatment decisions for you when you can't speak for yourself. This person is called a health care agent. If you do not have an advance directive, decisions about your medical care may be made by a doctor or a  who doesn't know you. It may help to think of an advance directive as a gift to the people who care for you. If you have one, they won't have to make tough decisions by themselves. Follow-up care is a key part of your treatment and safety. Be sure to make and go to all appointments, and call your doctor if you are having problems. It's also a good idea to know your test results and keep a list of the medicines you take. How can you care for yourself at home? · Discuss your wishes with your loved ones and your doctor. This way, there are no surprises. · Many states have a unique form. Or you might use a universal form that has been approved by many states. This kind of form can sometimes be completed and stored online.  Your electronic copy will then be available wherever you have a connection to the Internet. In most cases, doctors will respect your wishes even if you have a form from a different state. · You don't need a  to do an advance directive. But you may want to get legal advice. · Think about these questions when you prepare an advance directive:  ¨ Who do you want to make decisions about your medical care if you are not able to? Many people choose a family member or close friend. ¨ Do you know enough about life support methods that might be used? If not, talk to your doctor so you understand. ¨ What are you most afraid of that might happen? You might be afraid of having pain, losing your independence, or being kept alive by machines. ¨ Where would you prefer to die? Choices include your home, a hospital, or a nursing home. ¨ Would you like to have information about hospice care to support you and your family? ¨ Do you want to donate organs when you die? ¨ Do you want certain Christian practices performed before you die? If so, put your wishes in the advance directive. · Read your advance directive every year, and make changes as needed. When should you call for help? Be sure to contact your doctor if you have any questions. Where can you learn more? Go to http://micki-ryland.info/. Enter R264 in the search box to learn more about \"Advance Directives: Care Instructions. \"  Current as of: September 24, 2016  Content Version: 11.4  © 1549-6561 [employer]. Care instructions adapted under license by [employer] (which disclaims liability or warranty for this information). If you have questions about a medical condition or this instruction, always ask your healthcare professional. [address] any warranty or liability for your use of this information.

## 2017-10-30 NOTE — PROGRESS NOTES
Blanca Corona is a 80 y.o. female who presents with the following  Chief Complaint   Patient presents with    Results     MRI, EMG       HPI patient comes in with  for a follow up for MRI lumbar spine and EMG results. Still having daily lumbar spine pain accompanied with leg weakness, numbness and tingling. She states she has to sit down 3-4 times during making dinner for her . She also states that she will have to sit down multiple times when walking to the car, or around the house as her legs feel like they will give out. She is getting YULY with Dr. Marcus Zapien and is set to get another next week. Did have 9 painless days with the last injection and felt like this really helped her symptoms for these 9 days. She has had 2 bad falls this year due to her symptoms that are chronic, daily. She feels as if sometimes she can not get into the shower to bath but does have a shower chair if needed. Allergies   Allergen Reactions    Tolectin [Tolmetin] Anaphylaxis    Aldactone [Spironolactone] Other (comments)     hyperkalemia    Carvedilol Other (comments)     \"slow heart rate down to 44\"    Ceclor [Cefaclor] Hives    Ciprofloxacin Hives and Swelling    Darvon [Propoxyphene] Swelling     Swelling lips and hands    Macrobid [Nitrofurantoin Monohyd/M-Cryst] Other (comments)     Does not recall    Norvasc [Amlodipine] Swelling     Swelling ankles/legs    Tetracycline Swelling     Swelling lips/hands    Toprol Xl [Metoprolol Succinate] Swelling     Swellings ankles/legs    Valsartan Other (comments)     Also \"slow heart rate down to 44\"    Zestril [Lisinopril] Hives       Current Outpatient Prescriptions   Medication Sig    acetaminophen (TYLENOL ARTHRITIS PAIN) 650 mg CR tablet Take 650 mg by mouth every six (6) hours as needed for Pain.  amLODIPine (NORVASC) 2.5 mg tablet Take 1 Tab by mouth daily.  gabapentin (NEURONTIN) 100 mg capsule Take  by mouth daily.     OTHER zyflamend 2 caps daily    cloNIDine HCl (CATAPRES) 0.1 mg tablet Take 2 Tabs by mouth two (2) times a day.  chlorthalidone (HYGROTEN) 25 mg tablet Take 1 Tab by mouth daily.  potassium chloride (K-DUR, KLOR-CON) 10 mEq tablet Take 1 Tab by mouth daily.  losartan (COZAAR) 100 mg tablet Take 1 Tab by mouth nightly.  loratadine (CLARITIN) 10 mg tablet Take 10 mg by mouth daily.  cyanocobalamin 1,000 mcg tablet Take 1,000 mcg by mouth every seven (7) days.  VIT A/C/E AC/ZNOX/CUPRIC OXIDE (EYE VITAMIN AND MINERALS PO) Take 1 Cap by mouth daily.  levothyroxine (SYNTHROID) 100 mcg tablet Take 100 mcg by mouth Daily (before breakfast).  omeprazole (PRILOSEC) 20 mg capsule Take 20 mg by mouth two (2) times a day.  simvastatin (ZOCOR) 10 mg tablet Take 1 Tab by mouth nightly.  diltiazem CD (CARDIZEM CD) 300 mg ER capsule Take 1 Cap by mouth daily.  ascorbic acid (VITAMIN C) 500 mg tablet Take 500 mg by mouth every evening.  aspirin delayed-release 81 mg tablet Take 81 mg by mouth every evening.  CALCIUM CARBONATE (CALCIUM 600 PO) Take 1 Tab by mouth two (2) times a day. 600-200 Iu    metformin (GLUCOPHAGE) 500 mg tablet Take 500 mg by mouth two (2) times daily (with meals).  MULTIVITAMIN (MULTIPLE VITAMINS DAILY PO) Take 1 Tab by mouth daily.  montelukast (SINGULAIR) 10 mg tablet Take 10 mg by mouth every evening. No current facility-administered medications for this visit. History   Smoking Status    Never Smoker   Smokeless Tobacco    Never Used       Past Medical History:   Diagnosis Date    Arthritis     Asthma     Breast cancer ([facility] [address] [address]     Chest pain, unspecified     atypical chest discomfort with normal stress cardiolite 2002, and normal stress echoes 2004, 2006 and 2009. Negative dobutamine cardiolite March 2009 for ischemia with an EF=63%. Cardiac Cath 9/3/2010 showed mild CAD.       Diabetes mellitus ([address]     Diverticulosis     Dyslipidemia     Frequent PVCs     GERD (gastroesophageal reflux disease)     GERD (gastroesophageal reflux disease)     Hiatal hernia     HTN (hypertension)     Obesity     LA on CPAP     RBBB (right bundle branch block)     Spinal stenosis     Thyroid disease        Past Surgical History:   Procedure Laterality Date    HX BUNIONECTOMY      HX CHOLECYSTECTOMY      HX HEART CATHETERIZATION      Cardiac Cath 9/3/2010 showed mild CAD.  HX HIP REPLACEMENT      HX ORTHOPAEDIC      HX OTHER SURGICAL      Echo 10/5/12 - LVEF 60-65%, mod-marked LAE, MAC, grade 1 diastolic dysfunction     HX OTHER SURGICAL      Lexiscan cardiolite 1/25/13 - normal MPI, LVEF 74%    HX OTHER SURGICAL      Aorta duplex 1/6/14 - aorta size 1.7 cm, no dilation        Family History   Problem Relation Age of Onset    Coronary Artery Disease Father     Heart Disease Father     Coronary Artery Disease Paternal Aunt     Cancer Mother     Parkinson's Disease Mother     Stroke Mother     Thyroid Disease Mother     Seizures Brother        Social History     Social History    Marital status:      Spouse name: N/A    Number of children: N/A    Years of education: N/A     Social History Main Topics    Smoking status: Never Smoker    Smokeless tobacco: Never Used    Alcohol use No    Drug use: None    Sexual activity: Not Asked     Other Topics Concern    None     Social History Narrative       Review of Systems   Constitutional: Positive for malaise/fatigue. HENT: Negative for ear pain, hearing loss and tinnitus. Eyes: Negative for blurred vision, double vision and photophobia. Gastrointestinal: Negative for nausea and vomiting. Musculoskeletal: Positive for back pain and falls. Neurological: Positive for tingling, sensory change and weakness. Negative for dizziness, tremors and headaches. Remainder of comprehensive review is negative.      Physical Exam :    Visit Vitals    /80    Ht 5' 3\" (1.6 m)    Wt 95.7 kg (211 lb)  BMI 37.38 kg/m2       EXAM:  MRI LUMB SPINE W WO CONT     INDICATION:  numbness in bilateral hips and lower extremities/legs. Katherene Means Spinal  stenosis, lumbar region with neurogenic claudication   TECHNIQUE:  Sagittal T1, T2, STIR and axial T1 and T2-weighted images of the lumbar spine  were obtained. Following intravenous infusion 20 mL ProHance gadolinium repeat  sagittal and axial T1-weighted images with fat sat were obtained. COMPARISON: MRI lumbar spine 3/2/11  FINDINGS:  The lumbar vertebra are in good alignment. The distal spinal cord has normal size, contour and signal.   There is no abnormal intradural enhancement. T11-12: Mild chronic degenerative changes with minimal T12 superior endplate  compression deformity on the right. No significant stenosis. T12-L1: No disc bulge or stenosis. L1-L2: Loss of disc space height with mild disc bulge. Mild facet arthrosis. Mild stenosis. L2-L3: Since 2011 there has been significant interval progression of  degenerative disc findings. There is now near total loss of disc space height  with reactive degenerative endplate changes. Diffuse disc bulging and endplate  osteophyte formation is well as facet arthrosis and hypertrophy. The left facet  cyst is slightly smaller however overall there is now at least moderate spinal  stenosis somewhat greater in the subarticular zones. There is also moderate  foramina stenosis. L3-L4: Broad-based posterior disc bulge with left posterior lateral disc  protrusion extending into the foramen. Moderate facet arthrosis and hypertrophy  with ligament hypertrophy and at least moderate spinal stenosis. Interval  progression since 2011. Bilateral foramina stenosis. L4-L5: Advanced chronic facet arthrosis and hypertrophy with ligament  hypertrophy and associated mild degenerative spondylolisthesis with broad-based  posterior disc bulge and protrusion greater on the left extending into the  foramen. Severe spinal stenosis.  Severe right foramina stenosis. Interval  progression since 2011. L5-S1: Diffuse disc bulge. At least moderate facet arthrosis with joint  effusions. At least mild spinal stenosis with moderate left greater than right  foramina stenosis. Interval progression since 2011.     IMPRESSION  IMPRESSION:   1. Interval progression of degenerative disc and facet findings with associated  stenosis throughout the lumbar spine since 2011. 2. Progression of chronic degenerative disc and facet findings with degenerative  spondylolisthesis and severe stenosis L4-5.  3. At least moderate stenosis L3-4 and L2-3.  4. Multilevel foramina stenosis.   .        Results for orders placed or performed during the hospital encounter of 10/12/17   POC CREATININE   Result Value Ref Range    Creatinine (POC) 1.3 0.6 - 1.3 MG/DL    GFRAA, POC 48 (L) >60 ml/min/1.73m2    GFRNA, POC 39 (L) >60 ml/min/1.73m2       Orders Placed This Encounter    acetaminophen (TYLENOL ARTHRITIS PAIN) 650 mg CR tablet     Sig: Take 650 mg by mouth every six (6) hours as needed for Pain. No diagnosis found. Follow-up Disposition:  Return if symptoms worsen or fail to improve. We discussed her MRI lumbar spine results as seen above and her EMG as seen as polyneuropathy. We discussed her symptoms and POC. We also talked about a surgical evaluation but she will defer at the moment as she does state she has seen a drastic decline in the past few months and is close to getting an opinion but not ready. Wants to try the YULY at a higher level like next injection. We also talked about PT and she had this a week ago with no real changes in balance, walking, or pain. Call if she is interested and we will send to Dr. Claudio Porter.            This note will not be viewable in [employer]

## 2018-03-08 ENCOUNTER — OFFICE VISIT (OUTPATIENT)
Dept: CARDIOLOGY CLINIC | Age: 83
End: 2018-03-08

## 2018-03-08 VITALS
RESPIRATION RATE: 16 BRPM | HEIGHT: 63 IN | SYSTOLIC BLOOD PRESSURE: 152 MMHG | OXYGEN SATURATION: 96 % | HEART RATE: 70 BPM | WEIGHT: 207 LBS | BODY MASS INDEX: 36.68 KG/M2 | DIASTOLIC BLOOD PRESSURE: 70 MMHG

## 2018-03-08 DIAGNOSIS — I10 ESSENTIAL HYPERTENSION: Primary | ICD-10-CM

## 2018-03-08 DIAGNOSIS — E78.5 DYSLIPIDEMIA: ICD-10-CM

## 2018-03-08 RX ORDER — LOSARTAN POTASSIUM 100 MG/1
100 TABLET ORAL
Qty: 90 TAB | Refills: 3 | Status: SHIPPED | OUTPATIENT
Start: 2018-03-08 | End: 2021-03-04 | Stop reason: SDUPTHER

## 2018-03-08 RX ORDER — LOSARTAN POTASSIUM 100 MG/1
100 TABLET ORAL
Qty: 90 TAB | Refills: 3 | Status: SHIPPED | OUTPATIENT
Start: 2018-03-08 | End: 2018-03-08 | Stop reason: SDUPTHER

## 2018-03-08 RX ORDER — SPIRONOLACTONE 25 MG/1
12.5 TABLET ORAL DAILY
Qty: 30 TAB | Refills: 6 | Status: SHIPPED | OUTPATIENT
Start: 2018-03-08 | End: 2019-03-13 | Stop reason: SDUPTHER

## 2018-03-08 RX ORDER — DM/PE/ACETAMINOPHEN/CHLORPHENR 10-5-325-2
1500 TABLET, SEQUENTIAL ORAL
COMMUNITY
End: 2018-04-27

## 2018-03-08 NOTE — PROGRESS NOTES
Chief Complaint   Patient presents with    Follow-up     HTN, RBBB (right bundle branch block)     1. Have you been to the ER, urgent care clinic since your last visit? Hospitalized since your last visit? No    2. Have you seen or consulted any other health care providers outside of the [address] [place] [address] since your last visit? Include any pap smears or colon screening.  No

## 2018-03-08 NOTE — MR AVS SNAPSHOT
[address] [facility] 600 1007 [facility] 
[phone] Patient: Luz Marina Martinez MRN: [id number] CEA:[id number] Visit Information Date & Time Provider Department Dept. Phone Encounter #  
 3/8/2018 11:20 AM Mark Anthony Pineda MD [facility] [phone] [id number] Your Appointments 4/27/2018 11:00 AM  
ESTABLISHED PATIENT with Mark Anthony Pineda MD  
[facility] ([address]) Appt Note: 6 wk fu  
 [address] [facility] 600 1007 [facility]  
[address] [facility] [address] [address] Upcoming Health Maintenance Date Due  
 FOOT EXAM Q1 8/23/1945 MICROALBUMIN Q1 8/23/1945 EYE EXAM RETINAL OR DILATED Q1 8/23/1945 DTaP/Tdap/Td series (1 - Tdap) 8/23/1956 ZOSTER VACCINE AGE 60> 6/23/1995 GLAUCOMA SCREENING Q2Y 8/23/2000 Pneumococcal 65+ Low/Medium Risk (2 of 2 - PCV13) 5/2/2017 Influenza Age 5 to Adult 8/1/2017 HEMOGLOBIN A1C Q6M 1/19/2018 LIPID PANEL Q1 7/19/2018 Allergies as of 3/8/2018  Review Complete On: 3/8/2018 By: Robyn Daugherty LPN Severity Noted Reaction Type Reactions Tolectin [Tolmetin] High 12/22/2010    Anaphylaxis Aldactone [Spironolactone]  12/06/2013    Other (comments)  
 hyperkalemia Carvedilol  08/29/2012   Side Effect Other (comments) \"slow heart rate down to 44\" Ceclor [Cefaclor]  12/22/2010    Hives Ciprofloxacin  12/22/2010    Hives, Swelling Darvon [Propoxyphene]  12/22/2010    Swelling Swelling lips and hands Sandra Blocker [Nitrofurantoin Monohyd/m-cryst]  12/16/2014    Other (comments) Does not recall Norvasc [Amlodipine]  08/29/2012   Side Effect Swelling Swelling ankles/legs Tetracycline  12/22/2010    Swelling Swelling lips/hands Toprol Xl [Metoprolol Succinate]  10/05/2012    Swelling Swellings ankles/legs Valsartan  05/01/2016    Other (comments) Also \"slow heart rate down to 44\" Zestril [Lisinopril]  12/22/2010    Hives Current Immunizations  Never Reviewed Name Date Pneumococcal Polysaccharide (PPSV-23) 5/2/2016  6:25 PM  
  
 Not reviewed this visit You Were Diagnosed With   
  
 Codes Comments Essential hypertension    -  Primary ICD-10-CM: I10 
ICD-9-CM: 401.9 Dyslipidemia     ICD-10-CM: E78.5 ICD-9-CM: 272.4 Vitals BP Pulse Resp Height(growth percentile) Weight(growth percentile) SpO2  
 152/70 (BP 1 Location: Right arm, BP Patient Position: Sitting) 70 16 5' 3\" (1.6 m) 207 lb (93.9 kg) 96% BMI OB Status Smoking Status 36.67 kg/m2 Postmenopausal Never Smoker Vitals History BMI and BSA Data Body Mass Index Body Surface Area  
 36.67 kg/m 2 2.04 m 2 Preferred Pharmacy Pharmacy Name Phone  [address] [phone] Your Updated Medication List  
  
   
This list is accurate as of 3/8/18 11:38 AM.  Always use your most recent med list. amLODIPine 2.5 mg tablet Commonly known as:  Kaye Prow Take 1 Tab by mouth daily. aspirin delayed-release 81 mg tablet Take 81 mg by mouth every evening. CALCIUM 600 PO Take 1 Tab by mouth two (2) times a day. 600-200 Iu  
  
 chlorthalidone 25 mg tablet Commonly known as:   Ek Take 1 Tab by mouth daily. cloNIDine HCl 0.1 mg tablet Commonly known as:  CATAPRES Take 2 Tabs by mouth two (2) times a day. cyanocobalamin 1,000 mcg tablet Take 1,000 mcg by mouth four (4) days a week. dilTIAZem  mg ER capsule Commonly known as:  CARDIZEM CD Take 1 Cap by mouth daily. EYE VITAMIN AND MINERALS PO Take 1 Cap by mouth daily. gabapentin 100 mg capsule Commonly known as:  NEURONTIN Take  by mouth daily. GLUCOSAMINE RELIEF 1,000 mg Tab Generic drug:  glucosamine Take 1,500 mg by mouth.  
  
 levothyroxine 100 mcg tablet Commonly known as:  SYNTHROID Take 100 mcg by mouth Daily (before breakfast). loratadine 10 mg tablet Commonly known as:  Breana Marcos Take 10 mg by mouth daily. losartan 100 mg tablet Commonly known as:  COZAAR Take 1 Tab by mouth nightly. metFORMIN 500 mg tablet Commonly known as:  GLUCOPHAGE Take 500 mg by mouth two (2) times daily (with meals). MULTIPLE VITAMINS DAILY PO Take 1 Tab by mouth daily. omeprazole 20 mg capsule Commonly known as:  PRILOSEC Take 20 mg by mouth two (2) times a day. OTHER  
zyflamend 2 caps daily  
  
 simvastatin 10 mg tablet Commonly known as:  ZOCOR Take 1 Tab by mouth nightly. SINGULAIR 10 mg tablet Generic drug:  montelukast  
Take 10 mg by mouth every evening. spironolactone 25 mg tablet Commonly known as:  ALDACTONE Take 0.5 Tabs by mouth daily. TYLENOL ARTHRITIS PAIN 650 mg Pepper Huddle Generic drug:  acetaminophen Take 650 mg by mouth every six (6) hours as needed for Pain. VITAMIN C 500 mg tablet Generic drug:  ascorbic acid (vitamin C) Take 500 mg by mouth every evening. Prescriptions Sent to Pharmacy Refills  
 spironolactone (ALDACTONE) 25 mg tablet 6 Sig: Take 0.5 Tabs by mouth daily. Class: Normal  
 Pharmacy: [address] [place] [address]. Ph #: [phone] Route: Oral  
 losartan (COZAAR) 100 mg tablet 3 Sig: Take 1 Tab by mouth nightly. Class: Normal  
 Pharmacy:  [address] Ph #: [phone] Route: Oral  
  
We Performed the Following METABOLIC PANEL, BASIC [74821 CPT(R)] METABOLIC PANEL, BASIC [04573 CPT(R)] Introducing [facility]! [address] [facility] introduces [facility] patient portal. Now you can access parts of your medical record, email your doctor's office, and request medication refills online. 1. In your internet browser, go to [email or web address] 2. Click on the First Time User? Click Here link in the Sign In box. You will see the New Member Sign Up page. 3. Enter your "[employer] Access Code exactly as it appears below. You will not need to use this code after youve completed the sign-up process. If you do not sign up before the expiration date, you must request a new code. · "[employer] Access Code: [email or web address] Expires: 6/6/2018 11:14 AM 
 
4. Enter the last four digits of your Social Security Number (xxxx) and Date of Birth (mm/dd/yyyy) as indicated and click Submit. You will be taken to the next sign-up page. 5. Create a H[employer] ID. This will be your "[employer] login ID and cannot be changed, so think of one that is secure and easy to remember. 6. Create a "[employer] password. You can change your password at any time. 7. Enter your Password Reset Question and Answer. This can be used at a later time if you forget your password. 8. Enter your e-mail address. You will receive e-mail notification when new information is available in [address] 9. Click Sign Up. You can now view and download portions of your medical record. 10. Click the Download Summary menu link to download a portable copy of your medical information. If you have questions, please visit the Frequently Asked Questions section of the "[employer] website. Remember, "[employer] is NOT to be used for urgent needs. For medical emergencies, dial 911. Now available from your iPhone and Android! Please provide this summary of care documentation to your next provider. Your primary care clinician is listed as Ahsan Perales. If you have any questions after today's visit, please call [phone].

## 2018-03-08 NOTE — PROGRESS NOTES
Barber Howell MD. [facility] [place]              Patient: Aishwarya Storey  : 1935      Today's Date: 3/8/2018            HISTORY OF PRESENT ILLNESS:     History of Present Illness:  Ms. Daylin Hennessy is here for follow-up. No cardiac complaints. No CP. Chronic CHRISTIAN. Due to knee pain and back pain, hard to walk around. Is fairly sedentary. Uses a cane and rollator - not steady on her feet. BP still high at home. Is in pain which raises BP she worries. PAST MEDICAL HISTORY:     Past Medical History:   Diagnosis Date    Arthritis     Asthma     Breast cancer ([address]     Chest pain, unspecified     atypical chest discomfort with normal stress cardiolite , and normal stress echoes ,  and . Negative dobutamine cardiolite 2009 for ischemia with an EF=63%. Cardiac Cath 9/3/2010 showed mild CAD.  Diabetes mellitus ([address]     Diverticulosis     Dyslipidemia     Frequent PVCs     GERD (gastroesophageal reflux disease)     GERD (gastroesophageal reflux disease)     Hiatal hernia     HTN (hypertension)     Obesity     LA on CPAP     RBBB (right bundle branch block)     Spinal stenosis     Thyroid disease          Past Surgical History:   Procedure Laterality Date    HX BUNIONECTOMY      HX CHOLECYSTECTOMY      HX HEART CATHETERIZATION      Cardiac Cath 9/3/2010 showed mild CAD.  HX HIP REPLACEMENT      HX ORTHOPAEDIC      HX OTHER SURGICAL      Echo 10/5/12 - LVEF 60-65%, mod-marked LAE, MAC, grade 1 diastolic dysfunction     HX OTHER SURGICAL      Lexiscan cardiolite 13 - normal MPI, LVEF 74%    HX OTHER SURGICAL      Aorta duplex 14 - aorta size 1.7 cm, no dilation            MEDICATIONS:     Current Outpatient Prescriptions   Medication Sig Dispense Refill    acetaminophen (TYLENOL ARTHRITIS PAIN) 650 mg CR tablet Take 650 mg by mouth every six (6) hours as needed for Pain.       amLODIPine (NORVASC) 2.5 mg tablet Take 1 Tab by mouth daily. 30 Tab 6    gabapentin (NEURONTIN) 100 mg capsule Take  by mouth daily.  OTHER zyflamend 2 caps daily      cloNIDine HCl (CATAPRES) 0.1 mg tablet Take 2 Tabs by mouth two (2) times a day. 270 Tab 3    chlorthalidone (HYGROTEN) 25 mg tablet Take 1 Tab by mouth daily. 90 Tab 3    potassium chloride (K-DUR, KLOR-CON) 10 mEq tablet Take 1 Tab by mouth daily. 90 Tab 3    losartan (COZAAR) 100 mg tablet Take 1 Tab by mouth nightly. 90 Tab 3    loratadine (CLARITIN) 10 mg tablet Take 10 mg by mouth daily.  cyanocobalamin 1,000 mcg tablet Take 1,000 mcg by mouth every seven (7) days.  VIT A/C/E AC/ZNOX/CUPRIC OXIDE (EYE VITAMIN AND MINERALS PO) Take 1 Cap by mouth daily.  levothyroxine (SYNTHROID) 100 mcg tablet Take 100 mcg by mouth Daily (before breakfast).  omeprazole (PRILOSEC) 20 mg capsule Take 20 mg by mouth two (2) times a day.  simvastatin (ZOCOR) 10 mg tablet Take 1 Tab by mouth nightly. 30 Tab 11    diltiazem CD (CARDIZEM CD) 300 mg ER capsule Take 1 Cap by mouth daily. 90 Cap 3    ascorbic acid (VITAMIN C) 500 mg tablet Take 500 mg by mouth every evening.  aspirin delayed-release 81 mg tablet Take 81 mg by mouth every evening.  CALCIUM CARBONATE (CALCIUM 600 PO) Take 1 Tab by mouth two (2) times a day. 600-200 Iu      metformin (GLUCOPHAGE) 500 mg tablet Take 500 mg by mouth two (2) times daily (with meals).  MULTIVITAMIN (MULTIPLE VITAMINS DAILY PO) Take 1 Tab by mouth daily.  montelukast (SINGULAIR) 10 mg tablet Take 10 mg by mouth every evening.          Allergies   Allergen Reactions    Tolectin [Tolmetin] Anaphylaxis    Aldactone [Spironolactone] Other (comments)     hyperkalemia    Carvedilol Other (comments)     \"slow heart rate down to 44\"    Ceclor [Cefaclor] Hives    Ciprofloxacin Hives and Swelling    Darvon [Propoxyphene] Swelling     Swelling lips and hands    Macrobid [Nitrofurantoin Monohyd/M-Cryst] Other (comments)     Does not recall    Norvasc [Amlodipine] Swelling     Swelling ankles/legs    Tetracycline Swelling     Swelling lips/hands    Toprol Xl [Metoprolol Succinate] Swelling     Swellings ankles/legs    Valsartan Other (comments)     Also \"slow heart rate down to 44\"    Zestril [Lisinopril] Hives             SOCIAL HISTORY:     Social History   Substance Use Topics    Smoking status: Never Smoker    Smokeless tobacco: Never Used    Alcohol use No           FAMILY HISTORY:     Family History   Problem Relation Age of Onset    Coronary Artery Disease Father     Heart Disease Father     Coronary Artery Disease Paternal Aunt     Cancer Mother     Parkinson's Disease Mother     Stroke Mother     Thyroid Disease Mother     Seizures Brother                REVIEW OF SYMPTOMS:     Review of Symptoms:  Constitutional: Negative for fever, chills  HEENT: Negative for nosebleeds, tinnitus, and vision changes. Respiratory: Negative for cough, wheezing  Cardiovascular: Negative for syncope, and PND. Gastrointestinal: Negative for abdominal pain, diarrhea, melena. Genitourinary: Negative for dysuria  Musculoskeletal: + back pain, joint pain   Skin: Negative for rash  Heme: No problems bleeding. Neurological: Negative for speech change and focal weakness.             PHYSICAL EXAM:        Physical Exam:    Visit Vitals    /70 (BP 1 Location: Right arm, BP Patient Position: Sitting)    Pulse 70    Resp 16    Ht 5' 3\" (1.6 m)    Wt 207 lb (93.9 kg)    SpO2 96%    BMI 36.67 kg/m2         Patient appears generally well, mood and affect are appropriate and pleasant. HEENT: Hearing intact, non-icteric, normocephalic, atraumatic.    Neck Exam: Supple, No JVD  Lung Exam: Clear to auscultation, even breath sounds.    Cardiac Exam: Regular rate and rhythm with no murmur  Abdomen: Soft, non-tender, normal bowel sounds. + obese   Extremities: Moves all ext well. Mild lower extremity edema.   Psych: Appropriate affect  Neuro - Grossly intact                      LABS / OTHER STUDIES:                Labs 5/17/16 - CMP OK, A1c 6.0, Mg 2.1  Labs 8/17/16 - glc 130, K 3.9, LFT's OK, chol 189, HDL 53, , LDL 84, A1c 5.9, TSH 3.3                        Lab Results   Component Value Date/Time      SODIUM 140 10/31/2016 09:07 AM      POTASSIUM 3.9 10/31/2016 09:07 AM      CHLORIDE 98 10/31/2016 09:07 AM      CO2 27 10/31/2016 09:07 AM      ANION GAP 12 05/01/2016 03:46 PM      GLUCOSE 109 10/31/2016 09:07 AM      BUN 20 10/31/2016 09:07 AM      CREATININE 0.91 10/31/2016 09:07 AM      BUN/CREATININE RATIO 22 10/31/2016 09:07 AM      GFR EST AA 68 10/31/2016 09:07 AM      GFR EST NON-AA 59 10/31/2016 09:07 AM      CALCIUM 9.6 10/31/2016 09:07 AM           Labs 7/19/17 - chol 198, HDL 50, LDL 77, , Cr 1.2, K 4.6, LFT's OK , A1c 5.6     Labs 8/19/17 - TSH 1.8  Labs 1/23/18 - Cr 1.18, \"CMP OK\"          CARDIAC DIAGNOSTICS:        Cardiac Evaluation Includes:  Cath 2010: LAD 25%, RCA 20%, Mid RCA 20%. Stress Nuc 2013: Normal perfusion. EF 74. V/Q 5/1/16 - low prob  Echo 5/2/16 - LVEF 60%  Lexiscan Cardiolite 5/2/16 - 1) No ischemic EKG changes with Lexiscan.  Frequent PVC's during stress.  Mild chest pain noted during Lexiscan Injection.    2) Normal Lexiscan gated SPECT myocardial perfusion study. 3) LVEF 55%        [facility] Screening 10/17/15 - mild carotid disease, normal PALAK and AAA  Carotid Doppler 8/25/17 - 10-49% stenosis JONATAN and 7-9% LICA,        EKG 5/3/20 - Sinus tachycardia with frequent premature ventricular complexes, Incomplete right bundle branch block , Possible Right ventricular hypertrophy   EKG 8/25/17 - NSR, RBBB             ASSESSMENT AND PLAN:        Assessment and Plan:  1) CHRISTIAN and chest pain  - extensive cardiac evaluation in past has been unremarkable (see above).     - Prior stress test, echo and BNP level have been unremarkable.    - She saw Pulmonary and felt better with Advair    - Doing better recently       2) HTN    - Switched to chlorthalidone on 10/18/16  - Dr. Jagdish Vallejo recently increased her clonidine to 0.2 mg BID.    - BP still high at home (will be conservative with her fall risk aiming for a BP < 140/90). - Will try low dose aldactone at 12.5 mg daily - will stop Kdur and hopefully should not have hyperkalemia since also on thiazide diuretic. Follow BMP closely.    3) Mild CAD and - cont ASA and statin       4) Dyslipidemia - Ms. Gunter could not tolerate the switch form simvastatin to atorvastatin. She is on low dose simvastatin at 10 mg (low dose due to drug-drug interactions) . Lipids followed by Dr. Russell Rojas.   Natalia Loco  5) edema  - lasix PRN (takes once a month)       6) Mild-mod Carotid dz on screening doppler in June 2015  - mild disease 8/17   - cont ASA and statin       7) See me in 1 month.  Patient expressed understanding of the plan - questions were answered. 25 yr old grandson lives with her (parents half a mile away).  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   Natanael Lindsay MD, [address], [facility] [address]  Ph: [phone]   Ph [phone]

## 2018-03-20 LAB
BUN SERPL-MCNC: 26 MG/DL (ref 8–27)
BUN/CREAT SERPL: 28 (ref 12–28)
CALCIUM SERPL-MCNC: 9.4 MG/DL (ref 8.7–10.3)
CHLORIDE SERPL-SCNC: 98 MMOL/L (ref 96–106)
CO2 SERPL-SCNC: 23 MMOL/L (ref 18–29)
CREAT SERPL-MCNC: 0.93 MG/DL (ref 0.57–1)
GFR SERPLBLD CREATININE-BSD FMLA CKD-EPI: 57 ML/MIN/1.73
GFR SERPLBLD CREATININE-BSD FMLA CKD-EPI: 66 ML/MIN/1.73
GLUCOSE SERPL-MCNC: 110 MG/DL (ref 65–99)
INTERPRETATION: NORMAL
POTASSIUM SERPL-SCNC: 4.2 MMOL/L (ref 3.5–5.2)
SODIUM SERPL-SCNC: 140 MMOL/L (ref 134–144)

## 2018-04-11 LAB
BUN SERPL-MCNC: 29 MG/DL (ref 8–27)
BUN/CREAT SERPL: 28 (ref 12–28)
CALCIUM SERPL-MCNC: 9.3 MG/DL (ref 8.7–10.3)
CHLORIDE SERPL-SCNC: 98 MMOL/L (ref 96–106)
CO2 SERPL-SCNC: 25 MMOL/L (ref 18–29)
CREAT SERPL-MCNC: 1.04 MG/DL (ref 0.57–1)
GFR SERPLBLD CREATININE-BSD FMLA CKD-EPI: 50 ML/MIN/1.73
GFR SERPLBLD CREATININE-BSD FMLA CKD-EPI: 58 ML/MIN/1.73
GLUCOSE SERPL-MCNC: 105 MG/DL (ref 65–99)
INTERPRETATION: NORMAL
POTASSIUM SERPL-SCNC: 4.6 MMOL/L (ref 3.5–5.2)
SODIUM SERPL-SCNC: 141 MMOL/L (ref 134–144)

## 2018-04-27 ENCOUNTER — OFFICE VISIT (OUTPATIENT)
Dept: CARDIOLOGY CLINIC | Age: 83
End: 2018-04-27

## 2018-04-27 VITALS
WEIGHT: 205.4 LBS | RESPIRATION RATE: 18 BRPM | HEIGHT: 63 IN | DIASTOLIC BLOOD PRESSURE: 78 MMHG | BODY MASS INDEX: 36.39 KG/M2 | SYSTOLIC BLOOD PRESSURE: 152 MMHG | HEART RATE: 80 BPM | OXYGEN SATURATION: 97 %

## 2018-04-27 DIAGNOSIS — E78.5 DYSLIPIDEMIA: ICD-10-CM

## 2018-04-27 DIAGNOSIS — I10 ESSENTIAL HYPERTENSION: Primary | ICD-10-CM

## 2018-04-27 DIAGNOSIS — I45.10 RBBB (RIGHT BUNDLE BRANCH BLOCK): ICD-10-CM

## 2018-04-27 RX ORDER — ACETAMINOPHEN 500 MG
500-1000 TABLET ORAL
COMMUNITY

## 2018-04-27 RX ORDER — POTASSIUM CHLORIDE 750 MG/1
TABLET, FILM COATED, EXTENDED RELEASE ORAL DAILY
COMMUNITY
End: 2018-04-27

## 2018-04-27 NOTE — MR AVS SNAPSHOT
[address] [facility] 600 1007 [facility] 
[phone] Patient: Jude Hutchinson MRN: [id number] ILN:[id number] Visit Information Date & Time Provider Department Dept. Phone Encounter #  
 4/27/2018 11:00 AM Neeta Magaña MD [facility] Am[facility] Blair [phone] [id number] Your Appointments 10/12/2018 11:20 AM  
ESTABLISHED PATIENT with Neeta Magaña MD  
[facility] Appt Note: 6 mo fu  
 [address] [facility] 600 1007 [facility]  
[address] [facility] [place] [address] [address] Upcoming Health Maintenance Date Due  
 FOOT EXAM Q1 8/23/1945 MICROALBUMIN Q1 8/23/1945 EYE EXAM RETINAL OR DILATED Q1 8/23/1945 DTaP/Tdap/Td series (1 - Tdap) 8/23/1956 ZOSTER VACCINE AGE 60> 6/23/1995 GLAUCOMA SCREENING Q2Y 8/23/2000 Pneumococcal 65+ Low/Medium Risk (2 of 2 - PCV13) 5/2/2017 HEMOGLOBIN A1C Q6M 1/19/2018 MEDICARE YEARLY EXAM 3/14/2018 LIPID PANEL Q1 7/19/2018 Influenza Age 5 to Adult 8/1/2018 Allergies as of 4/27/2018  Review Complete On: 4/27/2018 By: Neeta Magaña MD  
  
 Severity Noted Reaction Type Reactions Tolectin [Tolmetin] High 12/22/2010    Anaphylaxis Aldactone [Spironolactone]  12/06/2013    Other (comments)  
 hyperkalemia Carvedilol  08/29/2012   Side Effect Other (comments) \"slow heart rate down to 44\" Ceclor [Cefaclor]  12/22/2010    Hives Ciprofloxacin  12/22/2010    Hives, Swelling Darvon [Propoxyphene]  12/22/2010    Swelling Swelling lips and hands Bonnye Amara [Nitrofurantoin Monohyd/m-cryst]  12/16/2014    Other (comments) Does not recall Norvasc [Amlodipine]  08/29/2012   Side Effect Swelling Swelling ankles/legs Tetracycline  12/22/2010    Swelling Swelling lips/hands Toprol Xl [Metoprolol Succinate]  10/05/2012    Swelling Swellings ankles/legs Valsartan  05/01/2016    Other (comments) Also \"slow heart rate down to 44\" Zestril [Lisinopril]  12/22/2010    Hives Current Immunizations  Never Reviewed Name Date Pneumococcal Polysaccharide (PPSV-23) 5/2/2016  6:25 PM  
  
 Not reviewed this visit You Were Diagnosed With   
  
 Codes Comments Essential hypertension    -  Primary ICD-10-CM: I10 
ICD-9-CM: 401.9 Dyslipidemia     ICD-10-CM: E78.5 ICD-9-CM: 272.4 RBBB (right bundle branch block)     ICD-10-CM: I45.10 ICD-9-CM: 426.4 Vitals BP Pulse Resp Height(growth percentile) Weight(growth percentile) SpO2  
 152/78 (BP 1 Location: Right arm) 80 18 5' 3\" (1.6 m) 205 lb 6.4 oz (93.2 kg) 97% BMI OB Status Smoking Status 36.38 kg/m2 Postmenopausal Never Smoker Vitals History BMI and BSA Data Body Mass Index Body Surface Area  
 36.38 kg/m 2 2.04 m 2 Preferred Pharmacy Pharmacy Name Phone  [facility] [phone] Your Updated Medication List  
  
   
This list is accurate as of 4/27/18 11:30 AM.  Always use your most recent med list. amLODIPine 2.5 mg tablet Commonly known as:  Jesse Rolon Take 1 Tab by mouth daily. aspirin delayed-release 81 mg tablet Take 81 mg by mouth every evening. CALCIUM 600 PO Take 1 Tab by mouth two (2) times a day. chlorthalidone 25 mg tablet Commonly known as:  Kamran Champ Take 1 Tab by mouth daily. cloNIDine HCl 0.1 mg tablet Commonly known as:  CATAPRES Take 2 Tabs by mouth two (2) times a day. cyanocobalamin 1,000 mcg tablet Take 1,000 mcg by mouth four (4) days a week. dilTIAZem  mg ER capsule Commonly known as:  CARDIZEM CD Take 1 Cap by mouth daily. gabapentin 100 mg capsule Commonly known as:  NEURONTIN Take 200 mg by mouth daily. levothyroxine 100 mcg tablet Commonly known as:  SYNTHROID Take 100 mcg by mouth Daily (before breakfast). loratadine 10 mg tablet Commonly known as:  Veryl Bleak Take 10 mg by mouth daily. losartan 100 mg tablet Commonly known as:  COZAAR Take 1 Tab by mouth nightly. metFORMIN 500 mg tablet Commonly known as:  GLUCOPHAGE Take 500 mg by mouth two (2) times daily (with meals). [address] Take  by mouth daily. omeprazole 20 mg capsule Commonly known as:  PRILOSEC Take 20 mg by mouth two (2) times a day. OTHER  
zyflamend 2 caps daily PRESERVISION AREDS 2 PO Take  by mouth two (2) times a day. simvastatin 10 mg tablet Commonly known as:  ZOCOR Take 1 Tab by mouth nightly. SINGULAIR 10 mg tablet Generic drug:  montelukast  
Take 10 mg by mouth every evening. spironolactone 25 mg tablet Commonly known as:  ALDACTONE Take 0.5 Tabs by mouth daily. TYLENOL EXTRA STRENGTH 500 mg tablet Generic drug:  acetaminophen Take  by mouth every six (6) hours as needed for Pain. Introducing [facility]! [facility] introduces Urbster patient portal. Now you can access parts of your medical record, email your doctor's office, and request medication refills online. 1. In your internet browser, go to [email or web address] 2. Click on the First Time User? Click Here link in the Sign In box. You will see the New Member Sign Up page. 3. Enter your Urbster Access Code exactly as it appears below. You will not need to use this code after youve completed the sign-up process. If you do not sign up before the expiration date, you must request a new code. · Urbster Access Code: [email or web address] Expires: 6/6/2018 12:14 PM 
 
4. Enter the last four digits of your Social Security Number (xxxx) and Date of Birth (mm/dd/yyyy) as indicated and click Submit.  You will be taken to the next sign-up page. 5. Create a Xsilon ID. This will be your Xsilon login ID and cannot be changed, so think of one that is secure and easy to remember. 6. Create a Xsilon password. You can change your password at any time. 7. Enter your Password Reset Question and Answer. This can be used at a later time if you forget your password. 8. Enter your e-mail address. You will receive e-mail notification when new information is available in [address] 9. Click Sign Up. You can now view and download portions of your medical record. 10. Click the Download Summary menu link to download a portable copy of your medical information. If you have questions, please visit the Frequently Asked Questions section of the Xsilon website. Remember, Xsilon is NOT to be used for urgent needs. For medical emergencies, dial 911. Now available from your iPhone and Android! Please provide this summary of care documentation to your next provider. Your primary care clinician is listed as Arie Trujillo. If you have any questions after today's visit, please call [phone].

## 2018-04-27 NOTE — PROGRESS NOTES
Liza Hernandez MD. [facility] [place]              Patient: Luh Crabtree  : 1935      Today's Date: 2018            HISTORY OF PRESENT ILLNESS:     History of Present Illness:  Here for follow-up. Doing OK. Back pain better after epidural but still has back pain. Has arthritis. BP at home 120's/65. Walking is difficult. Breathing OK. No dizziness. PAST MEDICAL HISTORY:     Past Medical History:   Diagnosis Date    Arthritis     Asthma     Breast cancer ([address]     Chest pain, unspecified     atypical chest discomfort with normal stress cardiolite , and normal stress echoes ,  and . Negative dobutamine cardiolite 2009 for ischemia with an EF=63%. Cardiac Cath 9/3/2010 showed mild CAD.  Diabetes mellitus ([facility]     Diverticulosis     Dyslipidemia     Frequent PVCs     GERD (gastroesophageal reflux disease)     Hiatal hernia     HTN (hypertension)     Obesity     LA on CPAP     RBBB (right bundle branch block)     Spinal stenosis     Thyroid disease          Past Surgical History:   Procedure Laterality Date    HX BUNIONECTOMY      HX CHOLECYSTECTOMY      HX HEART CATHETERIZATION      Cardiac Cath 9/3/2010 showed mild CAD.  HX HIP REPLACEMENT      HX ORTHOPAEDIC      HX OTHER SURGICAL      Echo 10/5/12 - LVEF 60-65%, mod-marked LAE, MAC, grade 1 diastolic dysfunction     HX OTHER SURGICAL      Lexiscan cardiolite 13 - normal MPI, LVEF 74%    HX OTHER SURGICAL      Aorta duplex 14 - aorta size 1.7 cm, no dilation            MEDICATIONS:     Current Outpatient Prescriptions   Medication Sig Dispense Refill    vit C/E/Zn/coppr/lutein/zeaxan (PRESERVISION AREDS 2 PO) Take  by mouth two (2) times a day.  potassium chloride SR (KLOR-CON 10) 10 mEq tablet Take  by mouth daily.  glucosam/chond/hyalu/CF borate (MOVE FREE JOINT HEALTH PO) Take  by mouth daily.       acetaminophen (TYLENOL EXTRA STRENGTH) 500 mg tablet Take  by mouth every six (6) hours as needed for Pain.  spironolactone (ALDACTONE) 25 mg tablet Take 0.5 Tabs by mouth daily. 30 Tab 6    losartan (COZAAR) 100 mg tablet Take 1 Tab by mouth nightly. 90 Tab 3    amLODIPine (NORVASC) 2.5 mg tablet Take 1 Tab by mouth daily. 30 Tab 6    gabapentin (NEURONTIN) 100 mg capsule Take 200 mg by mouth daily.  OTHER zyflamend 2 caps daily      cloNIDine HCl (CATAPRES) 0.1 mg tablet Take 2 Tabs by mouth two (2) times a day. 270 Tab 3    chlorthalidone (HYGROTEN) 25 mg tablet Take 1 Tab by mouth daily. 90 Tab 3    loratadine (CLARITIN) 10 mg tablet Take 10 mg by mouth daily.  cyanocobalamin 1,000 mcg tablet Take 1,000 mcg by mouth four (4) days a week.  levothyroxine (SYNTHROID) 100 mcg tablet Take 100 mcg by mouth Daily (before breakfast).  omeprazole (PRILOSEC) 20 mg capsule Take 20 mg by mouth two (2) times a day.  simvastatin (ZOCOR) 10 mg tablet Take 1 Tab by mouth nightly. 30 Tab 11    diltiazem CD (CARDIZEM CD) 300 mg ER capsule Take 1 Cap by mouth daily. 90 Cap 3    aspirin delayed-release 81 mg tablet Take 81 mg by mouth every evening.  CALCIUM CARBONATE (CALCIUM 600 PO) Take 1 Tab by mouth two (2) times a day.  metformin (GLUCOPHAGE) 500 mg tablet Take 500 mg by mouth two (2) times daily (with meals).  montelukast (SINGULAIR) 10 mg tablet Take 10 mg by mouth every evening.          Allergies   Allergen Reactions    Tolectin [Tolmetin] Anaphylaxis    Aldactone [Spironolactone] Other (comments)     hyperkalemia    Carvedilol Other (comments)     \"slow heart rate down to 44\"    Ceclor [Cefaclor] Hives    Ciprofloxacin Hives and Swelling    Darvon [Propoxyphene] Swelling     Swelling lips and hands    Macrobid [Nitrofurantoin Monohyd/M-Cryst] Other (comments)     Does not recall    Norvasc [Amlodipine] Swelling     Swelling ankles/legs    Tetracycline Swelling     Swelling lips/hands    Toprol Xl [Metoprolol Succinate] Swelling     Swellings ankles/legs    Valsartan Other (comments)     Also \"slow heart rate down to 44\"    Zestril [Lisinopril] Hives             SOCIAL HISTORY:     Social History   Substance Use Topics    Smoking status: Never Smoker    Smokeless tobacco: Never Used    Alcohol use No           FAMILY HISTORY:     Family History   Problem Relation Age of Onset    Coronary Artery Disease Father     Heart Disease Father     Coronary Artery Disease Paternal Aunt     Cancer Mother     Parkinson's Disease Mother     Stroke Mother     Thyroid Disease Mother     Seizures Brother             REVIEW OF SYMPTOMS:      Review of Symptoms:  Constitutional: Negative for fever, chills  HEENT: Negative for nosebleeds, tinnitus, and vision changes. Respiratory: Negative for cough, wheezing  Cardiovascular: Negative for syncope, and PND. Gastrointestinal: Negative for abdominal pain, diarrhea, melena. Genitourinary: Negative for dysuria  Musculoskeletal: + back pain, joint pain   Skin: Negative for rash  Heme: No problems bleeding. Neurological: Negative for speech change and focal weakness.                PHYSICAL EXAM:        Physical Exam:    Visit Vitals    /78 (BP 1 Location: Right arm)    Pulse 80    Resp 18    Ht 5' 3\" (1.6 m)    Wt 205 lb 6.4 oz (93.2 kg)    SpO2 97%    BMI 36.38 kg/m2          Patient appears generally well, mood and affect are appropriate and pleasant. HEENT: Hearing intact, non-icteric, normocephalic, atraumatic.    Neck Exam: Supple, No JVD  Lung Exam: Clear to auscultation, even breath sounds.    Cardiac Exam: Regular rate and rhythm with no murmur  Abdomen: Soft, non-tender, normal bowel sounds. + obese   Extremities: Moves all ext well. Mild lower extremity edema.   Psych: Appropriate affect  Neuro - Grossly intact                      LABS / OTHER STUDIES:            Labs 7/19/17 - chol 198, HDL 50, LDL 77, , Cr 1.2, K 4.6, LFT's OK , A1c 5.6       Component      Latest Ref Rng & Units 4/10/2018 3/19/2018           2:35 PM  1:51 PM   Glucose      65 - 99 mg/dL 105 (H) 110 (H)   BUN      8 - 27 mg/dL 29 (H) 26   Creatinine      0.57 - 1.00 mg/dL 1.04 (H) 0.93   GFR est non-AA      >59 mL/min/1.73 50 (L) 57 (L)   GFR est AA      >59 mL/min/1.73 58 (L) 66   BUN/Creatinine ratio      12 - 28 28 28   Sodium      134 - 144 mmol/L 141 140   Potassium      3.5 - 5.2 mmol/L 4.6 4.2   Chloride      96 - 106 mmol/L 98 98   CO2      18 - 29 mmol/L 25 23   Calcium      8.7 - 10.3 mg/dL 9.3 9.4               CARDIAC DIAGNOSTICS:        Cardiac Evaluation Includes:  Cath 2010: LAD 25%, RCA 20%, Mid RCA 20%. Stress Nuc 2013: Normal perfusion. EF 74. V/Q 5/1/16 - low prob  Echo 5/2/16 - LVEF 60%  Lexiscan Cardiolite 5/2/16 - 1) No ischemic EKG changes with Lexiscan.  Frequent PVC's during stress.  Mild chest pain noted during Lexiscan Injection.    2) Normal Lexiscan gated SPECT myocardial perfusion study. 3) LVEF 55%          [facility] Screening 10/17/15 - mild carotid disease, normal PALAK and AAA  Carotid Doppler 8/25/17 - 10-49% stenosis JONATAN and 4-7% LICA,         EKG 0/8/42 - Sinus tachycardia with frequent premature ventricular complexes, Incomplete right bundle branch block , Possible Right ventricular hypertrophy   EKG 8/25/17 - NSR, RBBB              ASSESSMENT AND PLAN:        Assessment and Plan:  1) CHRISTIAN and chest pain  - extensive cardiac evaluation in past has been unremarkable (see above).    - Prior stress test, echo and BNP level have been unremarkable.    - She saw Pulmonary and felt better with Advair    - Doing better recently       2) HTN    - Switched to chlorthalidone on 10/18/16  - Dr. Lieutenant Thurman recently increased her clonidine to 0.2 mg BID.    - On 3/18 - BP still high at home (will be conservative with her fall risk aiming for a BP < 140/90). Added low dose aldactone then. - On 4/27/18 she is doing OK.   BP at home now 120's/60's (although a little high in the office) - will keep meds as they are.        3) Mild CAD and - cont ASA and statin       4) Dyslipidemia - Ms. Gunter could not tolerate the switch form simvastatin to atorvastatin. She is on low dose simvastatin at 10 mg (low dose due to drug-drug interactions) . Lipids followed by Dr. Poncho Bowen.   Lj M[address]  5) edema  - lasix PRN (takes once a month)       6) Mild-mod Carotid dz on screening doppler in June 2015  - mild disease 8/17   - cont ASA and statin       7) See me in 6 months.  Patient expressed understanding of the plan - questions were answered. 25 yr old grandson lives with her (parents half a mile away).  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   Jerrica Camarillo MD, [address]  Ph: [phone]                                                             Ph [phone]

## 2018-10-12 ENCOUNTER — OFFICE VISIT (OUTPATIENT)
Dept: CARDIOLOGY CLINIC | Age: 83
End: 2018-10-12

## 2018-10-12 VITALS
HEART RATE: 78 BPM | HEIGHT: 63 IN | BODY MASS INDEX: 36.14 KG/M2 | WEIGHT: 204 LBS | OXYGEN SATURATION: 97 % | DIASTOLIC BLOOD PRESSURE: 82 MMHG | SYSTOLIC BLOOD PRESSURE: 160 MMHG

## 2018-10-12 DIAGNOSIS — I45.10 RBBB (RIGHT BUNDLE BRANCH BLOCK): ICD-10-CM

## 2018-10-12 DIAGNOSIS — I10 ESSENTIAL HYPERTENSION: Primary | ICD-10-CM

## 2018-10-12 DIAGNOSIS — E78.5 DYSLIPIDEMIA: ICD-10-CM

## 2018-10-12 RX ORDER — DILTIAZEM HYDROCHLORIDE 300 MG/1
300 CAPSULE, COATED, EXTENDED RELEASE ORAL DAILY
Qty: 90 CAP | Refills: 3 | Status: ON HOLD | OUTPATIENT
Start: 2018-10-12 | End: 2020-07-20

## 2018-10-12 RX ORDER — ASPIRIN 81 MG/1
81 TABLET ORAL DAILY
Qty: 30 TAB | Refills: 0
Start: 2018-10-12 | End: 2020-02-13
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
## 2018-10-12 NOTE — PROGRESS NOTES
Patient has some swelling in ankles.     Patient needs refill on cardizem 300 mg     Visit Vitals    /82 (BP 1 Location: Right arm, BP Patient Position: Sitting)    Pulse 78    Ht 5' 3\" (1.6 m)    Wt 204 lb (92.5 kg)    SpO2 97%    BMI 36.14 kg/m2

## 2018-10-12 NOTE — MR AVS SNAPSHOT
[address] [facility] [address] [facility] 
[phone] Patient: Izabella Mayfield MRN: [id number] QCQ:[id number]/34/[id number] Visit Information Date & Time Provider Department Dept. Phone Encounter #  
 10/12/2018 11:20 AM Darrell Hughes MD [facility]ay[facility] Falcon [phone] [id number] Your Appointments 4/19/2019 11:00 AM  
VASCULAR TEST with VASCULARTANISHA  
[facility] ([facility] SCHEDULING) Appt Note: vas at 11am per dr Morrow Running at [address] [facility] [address] [place]  
[address] [address] [address] [place] 4/19/2019 11:40 AM  
ESTABLISHED PATIENT with Darrell Hughes MD  
[facility] ([facility]) Appt Note: vas at 11am per dr Morrow Running at [address] [facility] [address] [facility]  
[address] [address] [address] Upcoming Health Maintenance Date Due  
 FOOT EXAM Q1 8/23/1945 MICROALBUMIN Q1 8/23/1945 EYE EXAM RETINAL OR DILATED Q1 8/23/1945 DTaP/Tdap/Td series (1 - Tdap) 8/23/1956 Shingrix Vaccine Age 50> (1 of 2) 8/23/1985 GLAUCOMA SCREENING Q2Y 8/23/2000 Pneumococcal 65+ Low/Medium Risk (2 of 2 - PCV13) 5/2/2017 HEMOGLOBIN A1C Q6M 1/19/2018 MEDICARE YEARLY EXAM 3/14/2018 LIPID PANEL Q1 7/19/2018 Influenza Age 5 to Adult 8/1/2018 Allergies as of 10/12/2018  Review Complete On: 10/12/2018 By: Darrell Hughes MD  
  
 Severity Noted Reaction Type Reactions Tolectin [Tolmetin] High 12/22/2010    Anaphylaxis Aldactone [Spironolactone]  12/06/2013    Other (comments)  
 hyperkalemia Carvedilol  08/29/2012   Side Effect Other (comments) \"slow heart rate down to 44\" Ceclor [Cefaclor]  12/22/2010    Hives Ciprofloxacin  12/22/2010    Hives, Swelling Darvon [Propoxyphene]  12/22/2010    Swelling Swelling lips and hands Roilda Mccaining [Nitrofurantoin Monohyd/m-cryst]  12/16/2014    Other (comments) Does not recall Norvasc [Amlodipine]  08/29/2012   Side Effect Swelling Swelling ankles/legs Tetracycline  12/22/2010    Swelling Swelling lips/hands Toprol Xl [Metoprolol Succinate]  10/05/2012    Swelling Swellings ankles/legs Valsartan  05/01/2016    Other (comments) Also \"slow heart rate down to 44\" Zestril [Lisinopril]  12/22/2010    Hives Current Immunizations  Never Reviewed Name Date Pneumococcal Polysaccharide (PPSV-23) 5/2/2016  6:25 PM  
  
 Not reviewed this visit You Were Diagnosed With   
  
 Codes Comments Essential hypertension    -  Primary ICD-10-CM: I10 
ICD-9-CM: 401.9 Vitals BP Pulse Height(growth percentile) Weight(growth percentile) SpO2 BMI  
 160/82 (BP 1 Location: Right arm, BP Patient Position: Sitting) 78 5' 3\" (1.6 m) 204 lb (92.5 kg) 97% 36.14 kg/m2 OB Status Smoking Status Postmenopausal Never Smoker Vitals History BMI and BSA Data Body Mass Index Body Surface Area  
 36.14 kg/m 2 2.03 m 2 Preferred Pharmacy Pharmacy Name Phone  [address] [phone] Your Updated Medication List  
  
   
This list is accurate as of 10/12/18 12:15 PM.  Always use your most recent med list. amLODIPine 2.5 mg tablet Commonly known as:  Jeana Lamas TAKE 1 TABLET DAILY * aspirin delayed-release 81 mg tablet Take 81 mg by mouth every evening. * aspirin delayed-release 81 mg tablet Take 1 Tab by mouth daily. CALCIUM 600 PO Take 1 Tab by mouth two (2) times a day. chlorthalidone 25 mg tablet Commonly known as:  Aleck Vargas Take 1 Tab by mouth daily. cloNIDine HCl 0.1 mg tablet Commonly known as:  CATAPRES Take 2 Tabs by mouth two (2) times a day. cyanocobalamin 1,000 mcg tablet Take 1,000 mcg by mouth daily. dilTIAZem  mg ER capsule Commonly known as:  CARDIZEM CD Take 1 Cap by mouth daily. gabapentin 100 mg capsule Commonly known as:  NEURONTIN Take 200 mg by mouth daily. levothyroxine 100 mcg tablet Commonly known as:  SYNTHROID Take 100 mcg by mouth Daily (before breakfast). loratadine 10 mg tablet Commonly known as:  Si Reedy Take 10 mg by mouth daily. losartan 100 mg tablet Commonly known as:  COZAAR Take 1 Tab by mouth nightly. metFORMIN 500 mg tablet Commonly known as:  GLUCOPHAGE Take 500 mg by mouth two (2) times daily (with meals). [address] Take  by mouth daily. omeprazole 20 mg capsule Commonly known as:  PRILOSEC Take 20 mg by mouth two (2) times a day. OTHER  
zyflamend 2 caps daily PRESERVISION AREDS 2 PO Take  by mouth two (2) times a day. simvastatin 10 mg tablet Commonly known as:  ZOCOR Take 1 Tab by mouth nightly. SINGULAIR 10 mg tablet Generic drug:  montelukast  
Take 10 mg by mouth every evening. spironolactone 25 mg tablet Commonly known as:  ALDACTONE Take 0.5 Tabs by mouth daily. TYLENOL EXTRA STRENGTH 500 mg tablet Generic drug:  acetaminophen Take  by mouth every six (6) hours as needed for Pain. * Notice: This list has 2 medication(s) that are the same as other medications prescribed for you. Read the directions carefully, and ask your doctor or other care provider to review them with you. Prescriptions Sent to Pharmacy Refills  
 dilTIAZem CD (CARDIZEM CD) 300 mg ER capsule 3 Sig: Take 1 Cap by mouth daily. Class: Normal  
 Pharmacy: [facility] [address] Ph #: [phone] Route: Oral  
  
We Performed the Following AMB POC EKG ROUTINE W/ 12 LEADS, INTER & REP [48529 CPT(R)] Introducing [facility] SERVICES! [facility] [place] introduces [employer] patient portal. Now you can access parts of your medical record, email your doctor's office, and request medication refills online. 1. In your internet browser, go to [email or web address] 2. Click on the First Time User? Click Here link in the Sign In box. You will see the New Member Sign Up page. 3. Enter your [employer] Access Code exactly as it appears below. You will not need to use this code after youve completed the sign-up process. If you do not sign up before the expiration date, you must request a new code. · [employer] Access Code: [email or web address] Expires: 1/10/2019 12:15 PM 
 
4. Enter the last four digits of your Social Security Number (xxxx) and Date of Birth (mm/dd/yyyy) as indicated and click Submit. You will be taken to the next sign-up page. 5. Create a [employer] ID. This will be your [employer] login ID and cannot be changed, so think of one that is secure and easy to remember. 6. Create a [employer] password. You can change your password at any time. 7. Enter your Password Reset Question and Answer. This can be used at a later time if you forget your password. 8. Enter your e-mail address. You will receive e-mail notification when new information is available in [address] 9. Click Sign Up. You can now view and download portions of your medical record. 10. Click the Download Summary menu link to download a portable copy of your medical information. If you have questions, please visit the Frequently Asked Questions section of the [employer] website. Remember, [employer] is NOT to be used for urgent needs. For medical emergencies, dial 911. Now available from your iPhone and Android! Please provide this summary of care documentation to your next provider. Your primary care clinician is listed as [facility]. If you have any questions after today's visit, please call [phone].

## 2019-02-14 ENCOUNTER — HOSPITAL ENCOUNTER (OUTPATIENT)
Dept: MAMMOGRAPHY | Age: 84
Discharge: HOME | End: 2019-02-14
Attending: INTERNAL MEDICINE
Payer: MEDICARE

## 2019-02-14 DIAGNOSIS — M81.0 AGE-RELATED OSTEOPOROSIS WITHOUT CURRENT PATHOLOGICAL FRACTURE: ICD-10-CM

## 2019-02-14 PROCEDURE — 77080 DXA BONE DENSITY AXIAL: CPT

## 2019-04-19 ENCOUNTER — OFFICE VISIT (OUTPATIENT)
Dept: CARDIOLOGY CLINIC | Age: 84
End: 2019-04-19

## 2019-04-19 VITALS
OXYGEN SATURATION: 98 % | DIASTOLIC BLOOD PRESSURE: 78 MMHG | HEIGHT: 64 IN | BODY MASS INDEX: 35.17 KG/M2 | SYSTOLIC BLOOD PRESSURE: 172 MMHG | WEIGHT: 206 LBS | HEART RATE: 83 BPM

## 2019-04-19 DIAGNOSIS — E78.5 DYSLIPIDEMIA: ICD-10-CM

## 2019-04-19 DIAGNOSIS — I10 ESSENTIAL HYPERTENSION: Primary | ICD-10-CM

## 2019-04-19 RX ORDER — SPIRONOLACTONE 25 MG/1
25 TABLET ORAL DAILY
Qty: 90 TAB | Refills: 3 | Status: SHIPPED | OUTPATIENT
Start: 2019-04-19 | End: 2020-04-29

## 2019-04-19 NOTE — PROGRESS NOTES
Lashae Tijerina MD. [facility] [place] Patient: Esteban Chiang : 1935 Today's Date: 2019 HISTORY OF PRESENT ILLNESS:  
 
History of Present Illness: 
Here for follow-up. She feels fine sitting. But has bad back and knee pain on her feet. Some CHRISTIAN, but OK at rest.  Biggest complaint is arthritis. BP at home 130-160's/ 70-80 --- higher when she has pain. PAST MEDICAL HISTORY:  
 
Past Medical History:  
Diagnosis Date  Arthritis  Asthma  Breast cancer ([address]  Chest pain, unspecified   
 atypical chest discomfort with normal stress cardiolite , and normal stress echoes ,  and . Negative dobutamine cardiolite 2009 for ischemia with an EF=63%. Cardiac Cath 9/3/2010 showed mild CAD.  Diabetes mellitus ([address]  Diverticulosis  Dyslipidemia  Frequent PVCs  GERD (gastroesophageal reflux disease)  Hiatal hernia   
 HTN (hypertension)  Obesity  LA on CPAP   
 RBBB (right bundle branch block)  Spinal stenosis  Thyroid disease Past Surgical History:  
Procedure Laterality Date  HX BUNIONECTOMY  HX CHOLECYSTECTOMY  HX HEART CATHETERIZATION Cardiac Cath 9/3/2010 showed mild CAD.  HX HIP REPLACEMENT    
 HX ORTHOPAEDIC    
 HX OTHER SURGICAL Echo 10/5/12 - LVEF 60-65%, mod-marked LAE, MAC, grade 1 diastolic dysfunction  HX OTHER SURGICAL Lexiscan cardiolite 13 - normal MPI, LVEF 74%  HX OTHER SURGICAL Aorta duplex 14 - aorta size 1.7 cm, no dilation MEDICATIONS:  
 
Current Outpatient Medications Medication Sig Dispense Refill  spironolactone (ALDACTONE) 25 mg tablet TAKE 1/2 TABLET BY MOUTH DAILY 30 Tab 2  
 aspirin delayed-release 81 mg tablet Take 1 Tab by mouth daily. 30 Tab 0  
 dilTIAZem CD (CARDIZEM CD) 300 mg ER capsule Take 1 Cap by mouth daily.  90 Cap 3  
  amLODIPine (NORVASC) 2.5 mg tablet TAKE 1 TABLET DAILY 90 Tab 0  
 vit C/E/Zn/coppr/lutein/zeaxan (PRESERVISION AREDS 2 PO) Take  by mouth two (2) times a day.  glucosam/chond/hyalu/CF borate (MOVE FREE JOINT HEALTH PO) Take  by mouth daily.  acetaminophen (TYLENOL EXTRA STRENGTH) 500 mg tablet Take  by mouth every six (6) hours as needed for Pain.  losartan (COZAAR) 100 mg tablet Take 1 Tab by mouth nightly. 90 Tab 3  
 gabapentin (NEURONTIN) 100 mg capsule Take 200 mg by mouth daily.  OTHER zyflamend 2 caps daily  cloNIDine HCl (CATAPRES) 0.1 mg tablet Take 2 Tabs by mouth two (2) times a day. 270 Tab 3  chlorthalidone (HYGROTEN) 25 mg tablet Take 1 Tab by mouth daily. 90 Tab 3  
 loratadine (CLARITIN) 10 mg tablet Take 10 mg by mouth daily.  cyanocobalamin 1,000 mcg tablet Take 1,000 mcg by mouth every seven (7) days.  levothyroxine (SYNTHROID) 100 mcg tablet Take 100 mcg by mouth Daily (before breakfast).  omeprazole (PRILOSEC) 20 mg capsule Take 20 mg by mouth two (2) times a day.  simvastatin (ZOCOR) 10 mg tablet Take 1 Tab by mouth nightly. 30 Tab 11  
 aspirin delayed-release 81 mg tablet Take 81 mg by mouth every evening.  CALCIUM CARBONATE (CALCIUM 600 PO) Take 1 Tab by mouth two (2) times a day.  metformin (GLUCOPHAGE) 500 mg tablet Take 500 mg by mouth daily (with breakfast).  montelukast (SINGULAIR) 10 mg tablet Take 10 mg by mouth every evening. Allergies Allergen Reactions  Tolectin [Tolmetin] Anaphylaxis  Aldactone [Spironolactone] Other (comments)  
  hyperkalemia  Carvedilol Other (comments) \"slow heart rate down to 44\"  Ceclor [Cefaclor] Hives  Ciprofloxacin Hives and Swelling  Darvon [Propoxyphene] Swelling Swelling lips and hands  Macrobid [Nitrofurantoin Monohyd/M-Cryst] Other (comments) Does not recall  Norvasc [Amlodipine] Swelling Swelling ankles/legs  Tetracycline Swelling Swelling lips/hands  Toprol Xl [Metoprolol Succinate] Swelling Swellings ankles/legs  Valsartan Other (comments) Also \"slow heart rate down to 44\"  Zestril [Lisinopril] Hives SOCIAL HISTORY:  
 
Social History Tobacco Use  Smoking status: Never Smoker  Smokeless tobacco: Never Used Substance Use Topics  Alcohol use: No  
  Alcohol/week: 0.0 oz  Drug use: No  
 
 
 
FAMILY HISTORY:  
 
Family History Problem Relation Age of Onset  Coronary Artery Disease Father  Heart Disease Father  Coronary Artery Disease Paternal Aunt  Cancer Mother  Parkinson's Disease Mother  Stroke Mother  Thyroid Disease Mother  Seizures Brother   
 
 
 
  
  
REVIEW OF SYMPTOMS:  
   
Review of Symptoms: 
Constitutional: Negative for fever, chills HEENT: Negative for nosebleeds, tinnitus, and vision changes. Respiratory: Negative for cough, wheezing Cardiovascular: Negative for syncope, and PND. Gastrointestinal: Negative for abdominal pain, diarrhea, melena. Genitourinary: Negative for dysuria Musculoskeletal: + back pain, joint pain Skin: Negative for rash Heme: No problems bleeding. Neurological: Negative for speech change and focal weakness.  
   
   
   
   
PHYSICAL EXAM:   
   
Physical Exam: 
  
Visit Vitals /78 (BP 1 Location: Right arm, BP Patient Position: Sitting) Pulse 83 Ht 5' 3.5\" (1.613 m) Wt 206 lb (93.4 kg) SpO2 98% BMI 35.92 kg/m²  
 
 
  
Patient appears generally well, mood and affect are appropriate and pleasant. HEENT: Hearing intact, non-icteric, normocephalic, atraumatic.   
Neck Exam: Supple, No JVD Lung Exam: Clear to auscultation, even breath sounds.   
Cardiac Exam: Regular rate and rhythm with no murmur Abdomen: Soft, non-tender, normal bowel sounds. + obese Extremities: Moves all ext well. Mild lower extremity edema. Psych: Appropriate affect Neuro - Grossly intact 
   
    
   
   
   
LABS / OTHER STUDIES:   
   
Lab Results Component Value Date/Time Sodium 141 04/10/2018 02:35 PM  
 Potassium 4.6 04/10/2018 02:35 PM  
 Chloride 98 04/10/2018 02:35 PM  
 CO2 25 04/10/2018 02:35 PM  
 Anion gap 12 05/01/2016 03:46 PM  
 Glucose 105 (H) 04/10/2018 02:35 PM  
 BUN 29 (H) 04/10/2018 02:35 PM  
 Creatinine 1.04 (H) 04/10/2018 02:35 PM  
 BUN/Creatinine ratio 28 04/10/2018 02:35 PM  
 GFR est AA 58 (L) 04/10/2018 02:35 PM  
 GFR est non-AA 50 (L) 04/10/2018 02:35 PM  
 Calcium 9.3 04/10/2018 02:35 PM  
 Bilirubin, total 0.3 05/01/2016 03:46 PM  
 AST (SGOT) 15 05/01/2016 03:46 PM  
 Alk. phosphatase 65 05/01/2016 03:46 PM  
 Protein, total 6.7 05/01/2016 03:46 PM  
 Albumin 3.4 (L) 05/01/2016 03:46 PM  
 Globulin 3.3 05/01/2016 03:46 PM  
 A-G Ratio 1.0 (L) 05/01/2016 03:46 PM  
 ALT (SGPT) 19 05/01/2016 03:46 PM  
 
Lab Results Component Value Date/Time WBC 11.3 (H) 05/01/2016 10:22 AM  
 HGB 12.5 05/01/2016 10:22 AM  
 HCT 39.0 05/01/2016 10:22 AM  
 PLATELET 033 60/69/8794 10:22 AM  
 MCV 95.6 05/01/2016 10:22 AM  
 
Will get labs from PCP.    
   
CARDIAC DIAGNOSTICS:   
   
Cardiac Evaluation Includes: 
Cath 2010: LAD 25%, RCA 20%, Mid RCA 20%. Stress Nuc 2013: Normal perfusion. EF 74. V/Q 5/1/16 - low prob Echo 5/2/16 - LVEF 60% Lexiscan Cardiolite 5/2/16 - 1) No ischemic EKG changes with Lexiscan.  Frequent PVC's during stress.  Mild chest pain noted during Lexiscan Injection.    2) Normal Lexiscan gated SPECT myocardial perfusion study. 3) LVEF 55%    
   
[facility] Screening 10/17/15 - mild carotid disease, normal PALAK and AAA Carotid Doppler 8/25/17 - 10-49% stenosis JONATAN and 2-9% LICA,    
 
Renal Artery Dopplers - 4/19/19 - normal  
   
EKG 5/1/16 - Sinus tachycardia with frequent premature ventricular complexes, Incomplete right bundle branch block , Possible Right ventricular hypertrophy EKG 8/25/17 - NSR, RBBB EKG 10/12/18 - NSR, RBBB, LAFB 
   
   
   
 ASSESSMENT AND PLAN:   
   
Assessment and Plan: 
1) CHRISTIAN and chest pain 
- extensive cardiac evaluation in past has been unremarkable (see above).   
- Prior stress test, echo and BNP level have been unremarkable.   
- She saw Pulmonary and felt better with Advair   
- Doing better recently  
   
2) HTN   
- Will be conservative with her fall risk aiming for a BP < 140/90).    
- BP at home is high often - She says aldactone in past did increase K+ at the higher dose. She is now on chlorthalidone. Will try increasing aldactone to 25 mg daily and follow K+ closely with repeat labs.  
   
3) Mild CAD and - cont ASA and statin  
   
4) Dyslipidemia - Given age and lack of significant CAD and presence of joint pain, she can stop her statin  
   
5) edema 
- lasix PRN (takes once a month)  
   
6) Mild-mod Carotid dz on screening doppler in June 2015 
- mild disease 8/17  
- cont ASA and statin  
   
7) See me in 6 months.  Patient expressed understanding of the plan - questions were answered. 25 yr old grandson lives with her (parents half a mile away).   She was a nurse for 43 years.  
Daniel Houston MD, [facility] - [address] [place] [address], [address]  [place] [address] [address] Ph: [phone]   Ph [phone]

## 2019-04-19 NOTE — PROGRESS NOTES
Patient has shortness of breath with exertion Patient has swelling in ankles everyday Visit Vitals /78 (BP 1 Location: Right arm, BP Patient Position: Sitting) Pulse 83 Ht 5' 3.5\" (1.613 m) Wt 206 lb (93.4 kg) SpO2 98% BMI 35.92 kg/m²

## 2019-04-23 LAB
BUN SERPL-MCNC: 29 MG/DL (ref 8–27)
BUN/CREAT SERPL: 25 (ref 12–28)
CALCIUM SERPL-MCNC: 9.5 MG/DL (ref 8.7–10.3)
CHLORIDE SERPL-SCNC: 104 MMOL/L (ref 96–106)
CO2 SERPL-SCNC: 21 MMOL/L (ref 20–29)
CREAT SERPL-MCNC: 1.15 MG/DL (ref 0.57–1)
GLUCOSE SERPL-MCNC: 115 MG/DL (ref 65–99)
INTERPRETATION: NORMAL
POTASSIUM SERPL-SCNC: 4.9 MMOL/L (ref 3.5–5.2)
SODIUM SERPL-SCNC: 143 MMOL/L (ref 134–144)

## 2019-04-30 LAB
BUN SERPL-MCNC: 32 MG/DL (ref 8–27)
BUN/CREAT SERPL: 34 (ref 12–28)
CALCIUM SERPL-MCNC: 9.5 MG/DL (ref 8.7–10.3)
CHLORIDE SERPL-SCNC: 99 MMOL/L (ref 96–106)
CO2 SERPL-SCNC: 21 MMOL/L (ref 20–29)
CREAT SERPL-MCNC: 0.95 MG/DL (ref 0.57–1)
GLUCOSE SERPL-MCNC: 108 MG/DL (ref 65–99)
INTERPRETATION: NORMAL
POTASSIUM SERPL-SCNC: 4 MMOL/L (ref 3.5–5.2)
SODIUM SERPL-SCNC: 136 MMOL/L (ref 134–144)

## 2019-12-10 ENCOUNTER — OFFICE VISIT (OUTPATIENT)
Dept: CARDIOLOGY CLINIC | Age: 84
End: 2019-12-10

## 2019-12-10 VITALS
BODY MASS INDEX: 34.31 KG/M2 | OXYGEN SATURATION: 96 % | HEIGHT: 64 IN | HEART RATE: 61 BPM | WEIGHT: 201 LBS | DIASTOLIC BLOOD PRESSURE: 60 MMHG | SYSTOLIC BLOOD PRESSURE: 138 MMHG | RESPIRATION RATE: 18 BRPM

## 2019-12-10 DIAGNOSIS — I10 ESSENTIAL HYPERTENSION: Primary | ICD-10-CM

## 2019-12-10 DIAGNOSIS — E78.5 DYSLIPIDEMIA: ICD-10-CM

## 2019-12-10 RX ORDER — ROSUVASTATIN CALCIUM 10 MG/1
10 TABLET, COATED ORAL
Status: ON HOLD | COMMUNITY
Start: 2019-12-03 | End: 2021-02-04 | Stop reason: SDUPTHER

## 2019-12-10 RX ORDER — CHLORTHALIDONE 25 MG/1
25 TABLET ORAL DAILY
Qty: 90 TAB | Refills: 3 | Status: ON HOLD | OUTPATIENT
Start: 2019-12-10 | End: 2020-07-20

## 2019-12-10 NOTE — PROGRESS NOTES
Clarisa Martell is a 80 y.o. female    Chief Complaint   Patient presents with    Hypertension    Cholesterol Problem       Visit Vitals  /64 (BP 1 Location: Left arm, BP Patient Position: Sitting)   Pulse 61   Resp 18   Ht 5' 3.5\" (1.613 m)   Wt 201 lb (91.2 kg)   SpO2 96%   BMI 35.05 kg/m²       1. Have you been to the ER, urgent care clinic since your last visit? Hospitalized since your last visit? No    2. Have you seen or consulted any other health care providers outside of the [address] [place] [address] since your last visit? Include any pap smears or colon screening.  No

## 2019-12-10 NOTE — PROGRESS NOTES
Halley Montalvo MD. [facility] [place]              Patient: Dwayne Greer  : 1935      Today's Date: 12/10/2019            HISTORY OF PRESENT ILLNESS:     History of Present Illness:  Here for follow-up. BP high at home -170's  Had back pain so has a hard time on her back. Poor balance due to back. No CP or SOB. No syncope. PAST MEDICAL HISTORY:     Past Medical History:   Diagnosis Date    Arthritis     Asthma     Breast cancer ([address]     Chest pain, unspecified     atypical chest discomfort with normal stress cardiolite , and normal stress echoes ,  and . Negative dobutamine cardiolite 2009 for ischemia with an EF=63%. Cardiac Cath 9/3/2010 showed mild CAD.  Diabetes mellitus ([address]     Diverticulosis     Dyslipidemia     Frequent PVCs     GERD (gastroesophageal reflux disease)     Hiatal hernia     HTN (hypertension)     Obesity     LA on CPAP     RBBB (right bundle branch block)     Spinal stenosis     Thyroid disease        Past Surgical History:   Procedure Laterality Date    HX BUNIONECTOMY      HX CHOLECYSTECTOMY      HX HEART CATHETERIZATION      Cardiac Cath 9/3/2010 showed mild CAD.  HX HIP REPLACEMENT      HX ORTHOPAEDIC      HX OTHER SURGICAL      Echo 10/5/12 - LVEF 60-65%, mod-marked LAE, MAC, grade 1 diastolic dysfunction     HX OTHER SURGICAL      Lexiscan cardiolite 13 - normal MPI, LVEF 74%    HX OTHER SURGICAL      Aorta duplex 14 - aorta size 1.7 cm, no dilation            MEDICATIONS:     Current Outpatient Medications   Medication Sig Dispense Refill    rosuvastatin (CRESTOR) 10 mg tablet       spironolactone (ALDACTONE) 25 mg tablet Take 1 Tab by mouth daily. 90 Tab 3    aspirin delayed-release 81 mg tablet Take 1 Tab by mouth daily. 30 Tab 0    dilTIAZem CD (CARDIZEM CD) 300 mg ER capsule Take 1 Cap by mouth daily.  90 Cap 3    amLODIPine (NORVASC) 2.5 mg tablet TAKE 1 TABLET DAILY 90 Tab 0  vit C/E/Zn/coppr/lutein/zeaxan (PRESERVISION AREDS 2 PO) Take  by mouth two (2) times a day.  glucosam/chond/hyalu/CF borate (MOVE FREE JOINT HEALTH PO) Take  by mouth daily.  acetaminophen (TYLENOL EXTRA STRENGTH) 500 mg tablet Take  by mouth every six (6) hours as needed for Pain.  losartan (COZAAR) 100 mg tablet Take 1 Tab by mouth nightly. 90 Tab 3    gabapentin (NEURONTIN) 100 mg capsule Take 200 mg by mouth daily.  OTHER zyflamend 2 caps daily      cloNIDine HCl (CATAPRES) 0.1 mg tablet Take 2 Tabs by mouth two (2) times a day. 270 Tab 3    chlorthalidone (HYGROTEN) 25 mg tablet Take 1 Tab by mouth daily. 90 Tab 3    loratadine (CLARITIN) 10 mg tablet Take 10 mg by mouth daily.  cyanocobalamin 1,000 mcg tablet Take 1,000 mcg by mouth every seven (7) days.  levothyroxine (SYNTHROID) 100 mcg tablet Take 100 mcg by mouth Daily (before breakfast).  omeprazole (PRILOSEC) 20 mg capsule Take 20 mg by mouth two (2) times a day.  CALCIUM CARBONATE (CALCIUM 600 PO) Take 1 Tab by mouth two (2) times a day.  metformin (GLUCOPHAGE) 500 mg tablet Take 500 mg by mouth daily (with breakfast).  montelukast (SINGULAIR) 10 mg tablet Take 10 mg by mouth every evening.        Allergies   Allergen Reactions    Tolectin [Tolmetin] Anaphylaxis    Aldactone [Spironolactone] Other (comments)     hyperkalemia    Carvedilol Other (comments)     \"slow heart rate down to 44\"    Ceclor [Cefaclor] Hives    Ciprofloxacin Hives and Swelling    Darvon [Propoxyphene] Swelling     Swelling lips and hands    Macrobid [Nitrofurantoin Monohyd/M-Cryst] Other (comments)     Does not recall    Norvasc [Amlodipine] Swelling     Swelling ankles/legs    Tetracycline Swelling     Swelling lips/hands    Toprol Xl [Metoprolol Succinate] Swelling     Swellings ankles/legs    Valsartan Other (comments)     Also \"slow heart rate down to 44\"    Zestril [Lisinopril] Hives             SOCIAL HISTORY:     Social History     Tobacco Use    Smoking status: Never Smoker    Smokeless tobacco: Never Used   Substance Use Topics    Alcohol use: No     Alcohol/week: 0.0 standard drinks    Drug use: No         FAMILY HISTORY:     Family History   Problem Relation Age of Onset    Coronary Artery Disease Father     Heart Disease Father     Coronary Artery Disease Paternal Aunt     Cancer Mother     Parkinson's Disease Mother     Stroke Mother     Thyroid Disease Mother     Seizures Brother           REVIEW OF SYMPTOMS:       Review of Symptoms:  Constitutional: Negative for fever, chills  HEENT: Negative for nosebleeds, tinnitus, and vision changes. Respiratory: Negative for cough, wheezing  Cardiovascular: Negative for syncope, and PND. Gastrointestinal: Negative for abdominal pain, diarrhea, melena. Genitourinary: Negative for dysuria  Musculoskeletal: + back pain, joint pain   Skin: Negative for rash  Heme: No problems bleeding. Neurological: Negative for speech change and focal weakness.                   PHYSICAL EXAM:        Physical Exam:     Visit Vitals  /60   Pulse 61   Resp 18   Ht 5' 3.5\" (1.613 m)   Wt 201 lb (91.2 kg)   SpO2 96%   BMI 35.05 kg/m²             Patient appears generally well, mood and affect are appropriate and pleasant. HEENT: Hearing intact, non-icteric, normocephalic, atraumatic.    Neck Exam: Supple, No JVD  Lung Exam: Clear to auscultation, even breath sounds.    Cardiac Exam: Regular rate and rhythm with no murmur  Abdomen: Soft, non-tender, normal bowel sounds. + obese   Extremities: Moves all ext well. Mild lower extremity edema.   Psych: Appropriate affect  Neuro - Grossly intact                      LABS / OTHER STUDIES:        Lab Results   Component Value Date/Time    Sodium 136 04/29/2019 04:34 PM    Potassium 4.0 04/29/2019 04:34 PM    Chloride 99 04/29/2019 04:34 PM    CO2 21 04/29/2019 04:34 PM    Anion gap 12 05/01/2016 03:46 PM    Glucose 108 (H) 04/29/2019 04:34 PM    BUN 32 (H) 04/29/2019 04:34 PM    Creatinine 0.95 04/29/2019 04:34 PM    BUN/Creatinine ratio 34 (H) 04/29/2019 04:34 PM    GFR est AA 64 04/29/2019 04:34 PM    GFR est non-AA 56 (L) 04/29/2019 04:34 PM    Calcium 9.5 04/29/2019 04:34 PM     Labs 11/7/19 - Hgb 11.5, Cr 1.25, K 4.7, LFT's OK       CARDIAC DIAGNOSTICS:        Cardiac Evaluation Includes:  Cath 2010: LAD 25%, RCA 20%, Mid RCA 20%. Stress Nuc 2013: Normal perfusion. EF 74. V/Q 5/1/16 - low prob  Echo 5/2/16 - LVEF 60%  Lexiscan Cardiolite 5/2/16 - 1) No ischemic EKG changes with Lexiscan.  Frequent PVC's during stress.  Mild chest pain noted during Lexiscan Injection.    2) Normal Lexiscan gated SPECT myocardial perfusion study. 3) LVEF 55%          [facility] Screening 10/17/15 - mild carotid disease, normal PALAK and AAA  Carotid Doppler 8/25/17 - 10-49% stenosis JONATAN and 7-3% LICA,        Renal Artery Dopplers - 4/19/19 - normal       EKG 5/1/16 - Sinus tachycardia with frequent premature ventricular complexes, Incomplete right bundle branch block , Possible Right ventricular hypertrophy   EKG 8/25/17 - NSR, RBBB  EKG 10/12/18 - NSR, RBBB, LAFB  EKG 12/10/19 - NSR, RBBB, LAFB              ASSESSMENT AND PLAN:        Assessment and Plan:  1) CHRISTIAN and chest pain  - extensive cardiac evaluation in past has been unremarkable (see above). - Prior stress test, echo and BNP level have been unremarkable.    - She saw Pulmonary and felt better with Advair    - Doing better recently       2) HTN    - Will be conservative with her fall risk aiming for a BP < 140/90).     - On 4/19 - She says aldactone in past did increase K+ at the higher dose. She is now on chlorthalidone. Will try increasing aldactone to 25 mg daily and follow K+ closely with repeat labs. - On 12/10/19 -  at home, but in office looks better today - 138/60. Will have her follow BP at home more closely and then we can adjust meds as needed.  Diastolic is already low at 60 and need to be careful about lowering that too low.       3) Mild CAD - ASA not necessary       4) Dyslipidemia  - Given age and lack of significant CAD and presence of joint pain,  stopped her statin       5) edema  - lasix PRN (takes once a month)       6) Mild-mod Carotid dz on screening doppler in June 2015  - mild disease 8/17   - cont ASA and statin       7) See me in 6 months.  Patient expressed understanding of the plan - questions were answered. 25 yr old grandson lives with her (parents half a mile away).   She was a nurse for 37 years.   Gerri Rosales MD, [address], [address]      [address] [place] [address].  Suite [facility] [address], [address]                 [address]  Ph: [phone]                               Ph [phone]

## 2020-01-03 ENCOUNTER — TELEPHONE (OUTPATIENT)
Dept: CARDIOLOGY CLINIC | Age: 85
End: 2020-01-03

## 2020-01-03 NOTE — TELEPHONE ENCOUNTER
Patient wants to speak with Dmitriy Blake in regards to her BP reading per Dr. Geno Steiner.     QYOOA:[phone]

## 2020-01-06 NOTE — TELEPHONE ENCOUNTER
Patient calling in to report BP values:      12/16- 146/92 77 AM   12/18- 170/80 54 PM  12/20- 169/83 83 PM   12/21- 177/85 81 PM  12/22- 136/77 55 PM  12/23- 162/83 83 AM   12/24- 140/90 69 159/75 70 PM   12/26- 179/93 60 PM  12/27- 161/84 57 PM  12/28- 165/96 56   12/29- 149/85 69   12/30- 149/73 64   1/3/2020- 163/77 61     Medications: Taking as prescribed  Chlorthalidone 25 mg daily AM  Spironolactone 25 mg daily AM  Diltiazem 300 mg daily AM  Amlodipine 2.5 mg daily AM  Losartan 100 mg daily AM  Clonidine 0.1mg 2 tabs AM and PM    Patient is Asymptomatic.

## 2020-01-07 RX ORDER — AMLODIPINE BESYLATE 5 MG/1
5 TABLET ORAL DAILY
Qty: 90 TAB | Refills: 2 | Status: SHIPPED | OUTPATIENT
Start: 2020-01-07 | End: 2020-02-06 | Stop reason: SINTOL

## 2020-01-07 NOTE — TELEPHONE ENCOUNTER
Spoke to pt,  Per Dr. Pierce Left: Tamiko Spine amlodipine to 5 mg daily --- if still high, will make it 10 mg daily (side effect could be leg swelling).       Goal BP < 140/90. Keep following BP at home. \"    Per Dr. Godfrey Vazquez VO:  FTN:[id number]  Future Appointments   Date Time Provider Shanon Farida   6/11/2020  1:00 PM Demetrice Mendoza MD [facility] SCHED     Requested Prescriptions     Pending Prescriptions Disp Refills    amLODIPine (NORVASC) 5 mg tablet 90 Tab 1     Sig: Take 1 Tab by mouth daily.

## 2020-02-05 ENCOUNTER — TELEPHONE (OUTPATIENT)
Dept: CARDIOLOGY CLINIC | Age: 85
End: 2020-02-05

## 2020-02-05 NOTE — TELEPHONE ENCOUNTER
Spoke to pt,  Lower legs and feet are so swollen can't get shoes on. Amlodipine causing swelling? Recommended to hold in morning until I call her back with instruction.     BP:   Am readings:  1/12 152/79 p 65  1/15 160/93 p 66  1/18 143/84 p 65  1/23 136/78 p 62  1/30 164/73 p 56  1/31 174/66 p 81  2/4 128/56 p 54  2/5 141/85 p 54    Pm readings:  1/10 147/70 p 67  1/25 155/98 p 70  2/1 143/73 p 78

## 2020-02-05 NOTE — TELEPHONE ENCOUNTER
Patient requesting to speak with Amari Harris to give BP readings.  Please advise    DOBAT:[phone]

## 2020-02-06 NOTE — TELEPHONE ENCOUNTER
Spoke to pt,  Per Dr. Burt Montoya: \"Strop amlodipine - see me in next few days. \"  Made appt to see Dr. Burt Montoya next Thursday at 3:40pm

## 2020-02-13 ENCOUNTER — OFFICE VISIT (OUTPATIENT)
Dept: CARDIOLOGY CLINIC | Age: 85
End: 2020-02-13

## 2020-02-13 VITALS
BODY MASS INDEX: 34.83 KG/M2 | RESPIRATION RATE: 16 BRPM | DIASTOLIC BLOOD PRESSURE: 76 MMHG | SYSTOLIC BLOOD PRESSURE: 130 MMHG | HEART RATE: 67 BPM | OXYGEN SATURATION: 97 % | HEIGHT: 64 IN | WEIGHT: 204 LBS

## 2020-02-13 DIAGNOSIS — I10 ESSENTIAL HYPERTENSION: Primary | ICD-10-CM

## 2020-02-13 DIAGNOSIS — E78.5 DYSLIPIDEMIA: ICD-10-CM

## 2020-02-13 RX ORDER — TERBINAFINE HYDROCHLORIDE 250 MG/1
250 TABLET ORAL DAILY
Status: ON HOLD | COMMUNITY
End: 2020-07-20

## 2020-02-13 NOTE — PROGRESS NOTES
Chief Complaint   Patient presents with    Other     RBBB    Hypertension     Visit Vitals  /76 (BP 1 Location: Left arm, BP Patient Position: Sitting)   Pulse 67   Resp 16   Ht 5' 3.5\" (1.613 m)   Wt 204 lb (92.5 kg)   SpO2 97%   BMI 35.57 kg/m²     Patient presents to office with no complaints of pain, SOB, dizziness, or chest pain. Reports dizziness on Monday, all day, no sudden movement before it, none today. Resolved on its own Monday. Reports swelling is improved. No refills needed. No recent hospitalizations reported.    Ronald Tomas LPN

## 2020-02-13 NOTE — PROGRESS NOTES
Elayne Gastelum MD. [facility]              Patient: Rosetta Trivedi  : 1935      Today's Date: 2020            HISTORY OF PRESENT ILLNESS:     History of Present Illness:  Here for follow-up. She is doing OK. Back pain seems better. Has knee pain - injected in December. Cardiac wise seems OK. Back and joint limit her walking. Breathing OK and no CP. PAST MEDICAL HISTORY:     Past Medical History:   Diagnosis Date    Arthritis     Asthma     Breast cancer ([address]     Chest pain, unspecified     atypical chest discomfort with normal stress cardiolite , and normal stress echoes ,  and . Negative dobutamine cardiolite 2009 for ischemia with an EF=63%. Cardiac Cath 9/3/2010 showed mild CAD.  Diabetes mellitus ([address]     Diverticulosis     Dyslipidemia     Frequent PVCs     GERD (gastroesophageal reflux disease)     Hiatal hernia     HTN (hypertension)     Obesity     LA on CPAP     RBBB (right bundle branch block)     Spinal stenosis     Thyroid disease        Past Surgical History:   Procedure Laterality Date    HX BUNIONECTOMY      HX CHOLECYSTECTOMY      HX HEART CATHETERIZATION      Cardiac Cath 9/3/2010 showed mild CAD.  HX HIP REPLACEMENT      HX ORTHOPAEDIC      HX OTHER SURGICAL      Echo 10/5/12 - LVEF 60-65%, mod-marked LAE, MAC, grade 1 diastolic dysfunction     HX OTHER SURGICAL      Lexiscan cardiolite 13 - normal MPI, LVEF 74%    HX OTHER SURGICAL      Aorta duplex 14 - aorta size 1.7 cm, no dilation          MEDICATIONS:     Current Outpatient Medications   Medication Sig Dispense Refill    terbinafine HCl (LAMISIL) 250 mg tablet Take 250 mg by mouth daily.  rosuvastatin (CRESTOR) 10 mg tablet       chlorthalidone (HYGROTEN) 25 mg tablet Take 1 Tab by mouth daily. 90 Tab 3    spironolactone (ALDACTONE) 25 mg tablet Take 1 Tab by mouth daily.  90 Tab 3    dilTIAZem CD (CARDIZEM CD) 300 mg ER capsule Take 1 Cap by mouth daily. 90 Cap 3    vit C/E/Zn/coppr/lutein/zeaxan (PRESERVISION AREDS 2 PO) Take  by mouth two (2) times a day.  glucosam/chond/hyalu/CF borate (MOVE FREE JOINT HEALTH PO) Take  by mouth daily.  acetaminophen (TYLENOL EXTRA STRENGTH) 500 mg tablet Take  by mouth every six (6) hours as needed for Pain.  losartan (COZAAR) 100 mg tablet Take 1 Tab by mouth nightly. 90 Tab 3    gabapentin (NEURONTIN) 100 mg capsule Take 200 mg by mouth daily.  OTHER zyflamend 2 caps daily      cloNIDine HCl (CATAPRES) 0.1 mg tablet Take 2 Tabs by mouth two (2) times a day. 270 Tab 3    loratadine (CLARITIN) 10 mg tablet Take 10 mg by mouth daily.  cyanocobalamin 1,000 mcg tablet Take 1,000 mcg by mouth every seven (7) days.  levothyroxine (SYNTHROID) 100 mcg tablet Take 100 mcg by mouth Daily (before breakfast).  omeprazole (PRILOSEC) 20 mg capsule Take 20 mg by mouth two (2) times a day.  CALCIUM CARBONATE (CALCIUM 600 PO) Take 1 Tab by mouth two (2) times a day.  metformin (GLUCOPHAGE) 500 mg tablet Take 500 mg by mouth daily (with breakfast).  montelukast (SINGULAIR) 10 mg tablet Take 10 mg by mouth every evening.          Allergies   Allergen Reactions    Tolectin [Tolmetin] Anaphylaxis    Aldactone [Spironolactone] Other (comments)     hyperkalemia    Carvedilol Other (comments)     \"slow heart rate down to 44\"    Ceclor [Cefaclor] Hives    Ciprofloxacin Hives and Swelling    Darvon [Propoxyphene] Swelling     Swelling lips and hands    Macrobid [Nitrofurantoin Monohyd/M-Cryst] Other (comments)     Does not recall    Norvasc [Amlodipine] Swelling     Swelling ankles/legs    Tetracycline Swelling     Swelling lips/hands    Toprol Xl [Metoprolol Succinate] Swelling     Swellings ankles/legs    Valsartan Other (comments)     Also \"slow heart rate down to 44\"    Zestril [Lisinopril] Hives             SOCIAL HISTORY:     Social History Tobacco Use    Smoking status: Never Smoker    Smokeless tobacco: Never Used   Substance Use Topics    Alcohol use: No     Alcohol/week: 0.0 standard drinks    Drug use: No         FAMILY HISTORY:     Family History   Problem Relation Age of Onset    Coronary Artery Disease Father     Heart Disease Father     Coronary Artery Disease Paternal Aunt     Cancer Mother     Parkinson's Disease Mother     Stroke Mother     Thyroid Disease Mother     Seizures Brother           REVIEW OF SYMPTOMS:       Review of Symptoms:  Constitutional: Negative for fever, chills  HEENT: Negative for nosebleeds, tinnitus, and vision changes. Respiratory: Negative for cough, wheezing  Cardiovascular: Negative for syncope, and PND. Gastrointestinal: Negative for abdominal pain, diarrhea, melena. Genitourinary: Negative for dysuria  Musculoskeletal: + back pain, joint pain   Skin: Negative for rash  Heme: No problems bleeding. Neurological: Negative for speech change and focal weakness.                   PHYSICAL EXAM:        Physical Exam:       Visit Vitals  /76 (BP 1 Location: Left arm, BP Patient Position: Sitting)   Pulse 67   Resp 16   Ht 5' 3.5\" (1.613 m)   Wt 204 lb (92.5 kg)   SpO2 97%   BMI 35.57 kg/m²         Patient appears generally well, mood and affect are appropriate and pleasant. HEENT: Hearing intact, non-icteric, normocephalic, atraumatic.    Neck Exam: Supple, No JVD  Lung Exam: Clear to auscultation, even breath sounds.    Cardiac Exam: Regular rate and rhythm with no murmur  Abdomen: Soft, non-tender, normal bowel sounds. + obese   Extremities: Moves all ext well. Mild lower extremity edema.   Psych: Appropriate affect  Neuro - Grossly intact                      LABS / OTHER STUDIES:              Lab Results   Component Value Date/Time     Sodium 136 04/29/2019 04:34 PM     Potassium 4.0 04/29/2019 04:34 PM     Chloride 99 04/29/2019 04:34 PM     CO2 21 04/29/2019 04:34 PM     Anion gap 12 05/01/2016 03:46 PM     Glucose 108 (H) 04/29/2019 04:34 PM     BUN 32 (H) 04/29/2019 04:34 PM     Creatinine 0.95 04/29/2019 04:34 PM     BUN/Creatinine ratio 34 (H) 04/29/2019 04:34 PM     GFR est AA 64 04/29/2019 04:34 PM     GFR est non-AA 56 (L) 04/29/2019 04:34 PM     Calcium 9.5 04/29/2019 04:34 PM      Labs 11/7/19 - Hgb 11.5, Cr 1.25, K 4.7, LFT's OK     Will get labs to review from PCP     [facility]  CARDIAC DIAGNOSTICS:        Cardiac Evaluation Includes:  Cath 2010: LAD 25%, RCA 20%, Mid RCA 20%. Stress Nuc 2013: Normal perfusion. EF 74. V/Q 5/1/16 - low prob  Echo 5/2/16 - LVEF 60%  Lexiscan Cardiolite 5/2/16 - 1) No ischemic EKG changes with Lexiscan.  Frequent PVC's during stress.  Mild chest pain noted during Lexiscan Injection.    2) Normal Lexiscan gated SPECT myocardial perfusion study. 3) LVEF 55%          [facility] Screening 10/17/15 - mild carotid disease, normal PALAK and AAA  Carotid Doppler 8/25/17 - 10-49% stenosis JONATAN and 9-6% LICA,        Renal Artery Dopplers - 4/19/19 - normal       EKG 5/1/16 - Sinus tachycardia with frequent premature ventricular complexes, Incomplete right bundle branch block , Possible Right ventricular hypertrophy   EKG 8/25/17 - NSR, RBBB  EKG 10/12/18 - NSR, RBBB, LAFB  EKG 12/10/19 - NSR, RBBB, LAFB              ASSESSMENT AND PLAN:        Assessment and Plan:  1) CHRISTIAN and prior chest pain  - extensive cardiac evaluation in past has been unremarkable (see above). - Prior stress test, echo and BNP level have been unremarkable.    - She saw Pulmonary and felt better with Advair    - Doing better recently. Breathing is OK. Back and joint issues limit her walking.       2) HTN    - Will be conservative with her fall risk aiming for a BP < 140/90).     - On 4/19 - She says aldactone in past did increase K+ at the higher dose.  She is now on chlorthalidone.  Will try increasing aldactone to 25 mg daily and follow K+ closely with repeat labs.    - On 12/10/19 -  at home, but in office looks better today - 138/60. Will have her follow BP at home more closely and then we can adjust meds as needed. Diastolic is already low at 60 and need to be careful about lowering that too low. - On 2/13/20 - BP has been up and down, but looks OK today. She had labs checked by PCP this AM.  Amlodipine stopped last week due to LE edema. Follow BP at home. Get labs from PCP to review. BP at home 130/70's usually - higher sometimes.       3) Mild CAD      4) Dyslipidemia  - Given age and lack of significant CAD and presence of joint pain, statin benefit questionable. - can continue crestor if she wants (Endocrinology Dr. Oxana Hyde restarted a statin)       5) edema  - lasix PRN (takes once a month)       6) Mild-mod Carotid dz on screening doppler in June 2015  - mild disease 8/17       7) See me in 4 months.  Patient expressed understanding of the plan - questions were answered. 21 yr old grandson lives with her (parents half a mile away) - was  in 2019.   She was a nurse for 37 years.   Katelyn Murcia MD, [address] [place]  [address] [place] [address] [address], [address]      [id number] [address].  [address]  Donna Farley, [address] [place] [address]  Ph: [phone]                                [phone]

## 2020-04-23 ENCOUNTER — TELEPHONE (OUTPATIENT)
Dept: CARDIOLOGY CLINIC | Age: 85
End: 2020-04-23

## 2020-04-29 DIAGNOSIS — E78.5 DYSLIPIDEMIA: ICD-10-CM

## 2020-04-29 DIAGNOSIS — I10 ESSENTIAL HYPERTENSION: ICD-10-CM

## 2020-04-29 RX ORDER — SPIRONOLACTONE 25 MG/1
TABLET ORAL
Qty: 90 TAB | Refills: 3 | Status: SHIPPED | OUTPATIENT
Start: 2020-04-29 | End: 2020-08-31 | Stop reason: SDUPTHER

## 2020-06-15 ENCOUNTER — VIRTUAL VISIT (OUTPATIENT)
Dept: CARDIOLOGY CLINIC | Age: 85
End: 2020-06-15

## 2020-06-15 DIAGNOSIS — I10 ESSENTIAL HYPERTENSION: Primary | ICD-10-CM

## 2020-06-15 RX ORDER — CETIRIZINE HCL 10 MG
10 TABLET ORAL DAILY
COMMUNITY
End: 2021-06-08

## 2020-06-15 RX ORDER — CLONIDINE HYDROCHLORIDE 0.1 MG/1
0.2 TABLET ORAL
Qty: 270 TAB | Refills: 3 | Status: ON HOLD
Start: 2020-06-15 | End: 2021-02-04 | Stop reason: SDUPTHER

## 2020-06-15 NOTE — PROGRESS NOTES
Mohan Sandoval MD. [facility] [place]  [address] [facility], [address] [place] [address] [phone]; Fax [phone]        Patient: Lakshmi Gomez  : 1935      Today's Date: 6/15/2020        [facility] Telemedicine Encounter                                                         Pursuant to the emergency declaration under the [address] [place] [address] waiver authority and the [employer] Act, this Virtual  Visit was conducted, with patient's consent, to reduce the patient's risk of exposure to COVID-19 and provide continuity of care for an established patient. Services were provided through a synchronous discussion virtually to substitute for in-person clinic visit. HISTORY OF PRESENT ILLNESS:     History of Present Illness:    Lakshmi Gomez is a 80 y.o. female who was seen by synchronous (real-time) audio- technology on 6/15/2020. She has been doing OK. BP a little high but she has not taking morning clonidine since it makes her sleepy in AM.       PAST MEDICAL HISTORY:     Past Medical History:   Diagnosis Date    Arthritis     Asthma     Breast cancer ([address]     Chest pain, unspecified     atypical chest discomfort with normal stress cardiolite , and normal stress echoes ,  and . Negative dobutamine cardiolite 2009 for ischemia with an EF=63%. Cardiac Cath 9/3/2010 showed mild CAD.  Diabetes mellitus ([address]     Diverticulosis     Dyslipidemia     Frequent PVCs     GERD (gastroesophageal reflux disease)     Hiatal hernia     HTN (hypertension)     Obesity     LA on CPAP     RBBB (right bundle branch block)     Spinal stenosis     Thyroid disease            Past Surgical History:   Procedure Laterality Date    HX BUNIONECTOMY      HX CHOLECYSTECTOMY      HX HEART CATHETERIZATION      Cardiac Cath 9/3/2010 showed mild CAD.       HX HIP REPLACEMENT      HX ORTHOPAEDIC      HX OTHER SURGICAL      Echo 10/5/12 - LVEF 60-65%, mod-marked LAE, MAC, grade 1 diastolic dysfunction     HX OTHER SURGICAL      Lexiscan cardiolite 1/25/13 - normal MPI, LVEF 74%    HX OTHER SURGICAL      Aorta duplex 1/6/14 - aorta size 1.7 cm, no dilation            Patient Active Problem List   Diagnosis Code    Chest pain, unspecified R07.9    RBBB (right bundle branch block) I45.10    Diabetes mellitus ([facility] E11.9    Asthma J45.909    LA on CPAP G47.33, Z99.89    Dyslipidemia E78.5    HTN (hypertension) I10    Chest pain R07.9             MEDICATIONS:     Current Outpatient Medications   Medication Sig Dispense Refill    cetirizine (ZyrTEC) 10 mg tablet Take 10 mg by mouth daily.  spironolactone (ALDACTONE) 25 mg tablet TAKE 1 TABLET DAILY 90 Tab 3    rosuvastatin (CRESTOR) 10 mg tablet       chlorthalidone (HYGROTEN) 25 mg tablet Take 1 Tab by mouth daily. 90 Tab 3    dilTIAZem CD (CARDIZEM CD) 300 mg ER capsule Take 1 Cap by mouth daily. 90 Cap 3    vit C/E/Zn/coppr/lutein/zeaxan (PRESERVISION AREDS 2 PO) Take  by mouth two (2) times a day.  glucosam/chond/hyalu/CF borate (MOVE FREE JOINT HEALTH PO) Take  by mouth daily.  acetaminophen (TYLENOL EXTRA STRENGTH) 500 mg tablet Take  by mouth every six (6) hours as needed for Pain.  losartan (COZAAR) 100 mg tablet Take 1 Tab by mouth nightly. 90 Tab 3    OTHER zyflamend 2 caps daily      loratadine (CLARITIN) 10 mg tablet Take 10 mg by mouth daily.  cyanocobalamin 1,000 mcg tablet Take 1,000 mcg by mouth every seven (7) days.  levothyroxine (SYNTHROID) 100 mcg tablet Take 100 mcg by mouth Daily (before breakfast).  omeprazole (PRILOSEC) 20 mg capsule Take 20 mg by mouth two (2) times a day.  CALCIUM CARBONATE (CALCIUM 600 PO) Take 1 Tab by mouth two (2) times a day.  metformin (GLUCOPHAGE) 500 mg tablet Take 500 mg by mouth daily (with breakfast).       montelukast (SINGULAIR) 10 mg tablet Take 10 mg by mouth every evening.  terbinafine HCl (LAMISIL) 250 mg tablet Take 250 mg by mouth daily.  gabapentin (NEURONTIN) 100 mg capsule Take 200 mg by mouth daily.  cloNIDine HCl (CATAPRES) 0.1 mg tablet Take 2 Tabs by mouth two (2) times a day. 270 Tab 3           Allergies   Allergen Reactions    Tolectin [Tolmetin] Anaphylaxis    Aldactone [Spironolactone] Other (comments)     hyperkalemia    Carvedilol Other (comments)     \"slow heart rate down to 44\"    Ceclor [Cefaclor] Hives    Ciprofloxacin Hives and Swelling    Darvon [Propoxyphene] Swelling     Swelling lips and hands    Macrobid [Nitrofurantoin Monohyd/M-Cryst] Other (comments)     Does not recall    Norvasc [Amlodipine] Swelling     Swelling ankles/legs    Tetracycline Swelling     Swelling lips/hands    Toprol Xl [Metoprolol Succinate] Swelling     Swellings ankles/legs    Valsartan Other (comments)     Also \"slow heart rate down to 44\"    Zestril [Lisinopril] Hives               SOCIAL HISTORY:     Social History     Tobacco Use    Smoking status: Never Smoker    Smokeless tobacco: Never Used   Substance Use Topics    Alcohol use: No     Alcohol/week: 0.0 standard drinks    Drug use: No               FAMILY HISTORY:     Family History   Problem Relation Age of Onset    Coronary Artery Disease Father     Heart Disease Father     Coronary Artery Disease Paternal Aunt     Cancer Mother     Parkinson's Disease Mother     Stroke Mother     Thyroid Disease Mother     Seizures Brother                REVIEW OF SYMPTOMS:     Review of Symptoms:  Constitutional: Negative for fever, chills  HEENT: Negative for nosebleeds, vision changes. Respiratory: Negative for cough, wheezing  Cardiovascular: Negative for claudication, syncope  Gastrointestinal: Negative for abdominal pain, diarrhea, melena. Genitourinary: Negative for dysuria  Musculoskeletal: Negative for myalgias.    Skin: Negative for rash  Heme: No problems bleeding. Neurological: Negative for speech change and focal weakness. PHYSICAL EXAM:       Physical Exam:    Due to this being a TeleHealth evaluation, many elements of the physical examination are unable to be assessed. General: Well developed, in no acute distress, cooperative and alert  HEENT: Hearing intact, non-icteric, normocephalic, atraumatic. Pupils equal/round. Moist mucous membranes. Lungs / Respiratory: No audible wheezing, no signs of respiratory distress, lips non cyanotic  Cardiac: No marked JVD visible on video. Extremities:  No edema  Neuro: A&Ox3, speech clear, no facial droop, answering questions appropriately  Skin: Skin color is normal. No rashes or lesions. Non diaphoretic on visible skin during exam  Psych: Appropriate affect         LABS / OTHER STUDIES:         No results found for: CHOL, CHOLX, CHLST, CHOLV, 690928, HDL, HDLP, LDL, LDLC, DLDLP, TGLX, TRIGL, TRIGP, CHHD, CHHDX    Lab Results   Component Value Date/Time    Sodium 136 04/29/2019 04:34 PM    Potassium 4.0 04/29/2019 04:34 PM    Chloride 99 04/29/2019 04:34 PM    CO2 21 04/29/2019 04:34 PM    Anion gap 12 05/01/2016 03:46 PM    Glucose 108 (H) 04/29/2019 04:34 PM    BUN 32 (H) 04/29/2019 04:34 PM    Creatinine 0.95 04/29/2019 04:34 PM    BUN/Creatinine ratio 34 (H) 04/29/2019 04:34 PM    GFR est AA 64 04/29/2019 04:34 PM    GFR est non-AA 56 (L) 04/29/2019 04:34 PM    Calcium 9.5 04/29/2019 04:34 PM    Bilirubin, total 0.3 05/01/2016 03:46 PM    Alk.  phosphatase 65 05/01/2016 03:46 PM    Protein, total 6.7 05/01/2016 03:46 PM    Albumin 3.4 (L) 05/01/2016 03:46 PM    Globulin 3.3 05/01/2016 03:46 PM    A-G Ratio 1.0 (L) 05/01/2016 03:46 PM    ALT (SGPT) 19 05/01/2016 03:46 PM        Lab Results   Component Value Date/Time    WBC 11.3 (H) 05/01/2016 10:22 AM    HGB 12.5 05/01/2016 10:22 AM    HCT 39.0 05/01/2016 10:22 AM    PLATELET 935 10/58/4955 10:22 AM    MCV 95.6 05/01/2016 10:22 AM       Lab Results   Component Value Date/Time    TSH 3.22 05/01/2016 03:46 PM         Labs 11/7/19 - Hgb 11.5, Cr 1.25, K 4.7, LFT's OK      Will get labs to review from PCP          CARDIAC DIAGNOSTICS:        Cardiac Evaluation Includes:  Cath 2010: LAD 25%, RCA 20%, Mid RCA 20%. Stress Nuc 2013: Normal perfusion. EF 74. V/Q 5/1/16 - low prob  Echo 5/2/16 - LVEF 60%  Lexiscan Cardiolite 5/2/16 - 1) No ischemic EKG changes with Lexiscan.  Frequent PVC's during stress.  Mild chest pain noted during Lexiscan Injection.    2) Normal Lexiscan gated SPECT myocardial perfusion study. 3) LVEF 55%          [facility] Screening 10/17/15 - mild carotid disease, normal PALAK and AAA  Carotid Doppler 8/25/17 - 10-49% stenosis JONATAN and 0-4% LICA,        Renal Artery Dopplers - 4/19/19 - normal       EKG 5/1/16 - Sinus tachycardia with frequent premature ventricular complexes, Incomplete right bundle branch block , Possible Right ventricular hypertrophy   EKG 8/25/17 - NSR, RBBB  EKG 10/12/18 - NSR, RBBB, LAFB  EKG 12/10/19 - NSR, RBBB, LAFB              ASSESSMENT AND PLAN:        Assessment and Plan:  1) CHRISTIAN and prior chest pain  - extensive cardiac evaluation in past has been unremarkable (see above). - Prior stress test, echo and BNP level have been unremarkable.    - She saw Pulmonary and felt better with Advair    - Doing better recently. Breathing is OK. Back and joint issues limit her walking.       2) HTN    - Will be conservative with her fall risk aiming for a BP < 140/90).     -Mikel Boor 4/19 - She says aldactone in past did increase K+ at the higher dose.  She is now on chlorthalidone.  Will try increasing aldactone to 25 mg daily and follow K+ closely with repeat labs.   - On 12/10/19 -  at home, but in office looks better today - 138/60.   Will have her follow BP at home more closely and then we can adjust meds as needed.  Diastolic is already low at 60 and need to be careful about lowering that too low. - On 2/13/20 - BP has been up and down, but looks OK today. She had labs checked by PCP. Amlodipine stopped last week due to LE edema. Follow BP at home. Get labs from PCP to review. BP at home 130/70's usually - higher sometimes. - On 6/15/20 - BP up and down - BP seems to be OK after her meds. Will get recent labs to review. May increase aldactone after reviewing recent labs. Will have her try cardizem at bedtime and see how she does.         3) Mild CAD      4) Dyslipidemia  - Given age and lack of significant CAD and presence of joint pain, statin benefit questionable. - can continue crestor if she wants (Endocrinology Dr. Wallace Tsang restarted a statin)       5) edema  - lasix PRN (takes once a month)       6) Mild-mod Carotid dz on screening doppler in June 2015  - mild disease 8/17       7) See me in 4 months.  Patient expressed understanding of the plan - questions were answered. 21 yr old grandson lives with her (parents half a mile away) - was  in 2019.   She was a nurse for 37 years.   Helen Jackson MD, [facility] [address], [address]      [id number] [facility]. [address] [place] [address]  Ph: [phone]                                [phone]               We discussed the expected course, resolution and complications of the diagnosis(es) in detail. Medication risks, benefits, costs, interactions, and alternatives were discussed as indicated. I advised her to contact the office if her condition worsens, changes or fails to improve as anticipated. She expressed understanding with the diagnosis(es) and plan    Romeo Fowler MD    Greater than 20 minutes was spent in direct video patient care, planning and chart review. This visit was conducted using telemedicine services.            I was in the office while conducting this encounter. Consent:  She and/or her healthcare decision maker is aware that this patient-initiated Telehealth encounter is a billable service, with coverage as determined by her insurance carrier. She is aware that she may receive a bill and has provided verbal consent to proceed: Yes    This virtual visit was conducted telephone encounter only. -  I affirm this is a Patient Initiated Episode with an Established Patient who has not had a related appointment within my department in the past 7 days or scheduled within the next 24 hours. Note: this encounter is not billable if this call serves to triage the patient into an appointment for the relevant concern. Total Time: minutes: 11-20 minutes.

## 2020-07-20 ENCOUNTER — APPOINTMENT (OUTPATIENT)
Dept: CT IMAGING | Age: 85
End: 2020-07-20
Attending: STUDENT IN AN ORGANIZED HEALTH CARE EDUCATION/TRAINING PROGRAM
Payer: MEDICARE

## 2020-07-20 ENCOUNTER — HOSPITAL ENCOUNTER (OUTPATIENT)
Age: 85
Setting detail: OBSERVATION
Discharge: HOME | End: 2020-07-22
Attending: EMERGENCY MEDICINE | Admitting: INTERNAL MEDICINE
Payer: MEDICARE

## 2020-07-20 ENCOUNTER — APPOINTMENT (OUTPATIENT)
Dept: GENERAL RADIOLOGY | Age: 85
End: 2020-07-20
Attending: STUDENT IN AN ORGANIZED HEALTH CARE EDUCATION/TRAINING PROGRAM
Payer: MEDICARE

## 2020-07-20 DIAGNOSIS — R06.02 SOB (SHORTNESS OF BREATH): Primary | ICD-10-CM

## 2020-07-20 DIAGNOSIS — R07.89 ATYPICAL CHEST PAIN: ICD-10-CM

## 2020-07-20 PROBLEM — R06.00 DYSPNEA: Status: ACTIVE | Noted: 2020-07-20

## 2020-07-20 LAB
ALBUMIN SERPL-MCNC: 3.7 G/DL (ref 3.5–5)
ALBUMIN/GLOB SERPL: 1 {RATIO} (ref 1.1–2.2)
ALP SERPL-CCNC: 49 U/L (ref 45–117)
ALT SERPL-CCNC: 30 U/L (ref 12–78)
ANION GAP SERPL CALC-SCNC: 9 MMOL/L (ref 5–15)
AST SERPL-CCNC: 16 U/L (ref 15–37)
BASOPHILS # BLD: 0 K/UL (ref 0–0.1)
BASOPHILS NFR BLD: 0 % (ref 0–1)
BILIRUB SERPL-MCNC: 0.4 MG/DL (ref 0.2–1)
BNP SERPL-MCNC: 675 PG/ML
BUN SERPL-MCNC: 34 MG/DL (ref 6–20)
BUN/CREAT SERPL: 27 (ref 12–20)
CALCIUM SERPL-MCNC: 8.4 MG/DL (ref 8.5–10.1)
CHLORIDE SERPL-SCNC: 97 MMOL/L (ref 97–108)
CK SERPL-CCNC: 174 U/L (ref 26–192)
CO2 SERPL-SCNC: 23 MMOL/L (ref 21–32)
COMMENT, HOLDF: NORMAL
CREAT SERPL-MCNC: 1.28 MG/DL (ref 0.55–1.02)
D DIMER PPP FEU-MCNC: 1.12 MG/L FEU (ref 0–0.65)
DIFFERENTIAL METHOD BLD: ABNORMAL
EOSINOPHIL # BLD: 0.1 K/UL (ref 0–0.4)
EOSINOPHIL NFR BLD: 1 % (ref 0–7)
ERYTHROCYTE [DISTWIDTH] IN BLOOD BY AUTOMATED COUNT: 13 % (ref 11.5–14.5)
GLOBULIN SER CALC-MCNC: 3.8 G/DL (ref 2–4)
GLUCOSE SERPL-MCNC: 116 MG/DL (ref 65–100)
HCT VFR BLD AUTO: 35.2 % (ref 35–47)
HGB BLD-MCNC: 11.9 G/DL (ref 11.5–16)
IMM GRANULOCYTES # BLD AUTO: 0 K/UL (ref 0–0.04)
IMM GRANULOCYTES NFR BLD AUTO: 0 % (ref 0–0.5)
LYMPHOCYTES # BLD: 1 K/UL (ref 0.8–3.5)
LYMPHOCYTES NFR BLD: 14 % (ref 12–49)
MCH RBC QN AUTO: 32.8 PG (ref 26–34)
MCHC RBC AUTO-ENTMCNC: 33.8 G/DL (ref 30–36.5)
MCV RBC AUTO: 97 FL (ref 80–99)
MONOCYTES # BLD: 0.6 K/UL (ref 0–1)
MONOCYTES NFR BLD: 7 % (ref 5–13)
NEUTS SEG # BLD: 5.8 K/UL (ref 1.8–8)
NEUTS SEG NFR BLD: 78 % (ref 32–75)
NRBC # BLD: 0 K/UL (ref 0–0.01)
NRBC BLD-RTO: 0 PER 100 WBC
PLATELET # BLD AUTO: 236 K/UL (ref 150–400)
PMV BLD AUTO: 10.2 FL (ref 8.9–12.9)
POTASSIUM SERPL-SCNC: 4.5 MMOL/L (ref 3.5–5.1)
PROCALCITONIN SERPL-MCNC: <0.05 NG/ML
PROT SERPL-MCNC: 7.5 G/DL (ref 6.4–8.2)
RBC # BLD AUTO: 3.63 M/UL (ref 3.8–5.2)
SAMPLES BEING HELD,HOLD: NORMAL
SODIUM SERPL-SCNC: 129 MMOL/L (ref 136–145)
TROPONIN I SERPL-MCNC: <0.05 NG/ML
TSH SERPL DL<=0.05 MIU/L-ACNC: 1.36 UIU/ML (ref 0.36–3.74)
WBC # BLD AUTO: 7.5 K/UL (ref 3.6–11)

## 2020-07-20 PROCEDURE — 87635 SARS-COV-2 COVID-19 AMP PRB: CPT

## 2020-07-20 PROCEDURE — 82550 ASSAY OF CK (CPK): CPT

## 2020-07-20 PROCEDURE — 85025 COMPLETE CBC W/AUTO DIFF WBC: CPT

## 2020-07-20 PROCEDURE — 84484 ASSAY OF TROPONIN QUANT: CPT

## 2020-07-20 PROCEDURE — 84145 PROCALCITONIN (PCT): CPT

## 2020-07-20 PROCEDURE — 85379 FIBRIN DEGRADATION QUANT: CPT

## 2020-07-20 PROCEDURE — 74011250637 HC RX REV CODE- 250/637: Performed by: INTERNAL MEDICINE

## 2020-07-20 PROCEDURE — 96374 THER/PROPH/DIAG INJ IV PUSH: CPT

## 2020-07-20 PROCEDURE — 99218 HC RM OBSERVATION: CPT

## 2020-07-20 PROCEDURE — 74011250636 HC RX REV CODE- 250/636: Performed by: INTERNAL MEDICINE

## 2020-07-20 PROCEDURE — 80053 COMPREHEN METABOLIC PANEL: CPT

## 2020-07-20 PROCEDURE — 93005 ELECTROCARDIOGRAM TRACING: CPT

## 2020-07-20 PROCEDURE — 82803 BLOOD GASES ANY COMBINATION: CPT

## 2020-07-20 PROCEDURE — 83880 ASSAY OF NATRIURETIC PEPTIDE: CPT

## 2020-07-20 PROCEDURE — 71046 X-RAY EXAM CHEST 2 VIEWS: CPT

## 2020-07-20 PROCEDURE — 74011636320 HC RX REV CODE- 636/320: Performed by: INTERNAL MEDICINE

## 2020-07-20 PROCEDURE — 99285 EMERGENCY DEPT VISIT HI MDM: CPT

## 2020-07-20 PROCEDURE — 36600 WITHDRAWAL OF ARTERIAL BLOOD: CPT

## 2020-07-20 PROCEDURE — 84443 ASSAY THYROID STIM HORMONE: CPT

## 2020-07-20 PROCEDURE — 71275 CT ANGIOGRAPHY CHEST: CPT

## 2020-07-20 RX ORDER — CHLORTHALIDONE 25 MG/1
25 TABLET ORAL DAILY
COMMUNITY
End: 2020-08-31

## 2020-07-20 RX ORDER — MONTELUKAST SODIUM 10 MG/1
10 TABLET ORAL EVERY EVENING
Status: DISCONTINUED | OUTPATIENT
Start: 2020-07-21 | End: 2020-07-22 | Stop reason: HOSPADM

## 2020-07-20 RX ORDER — LEVOTHYROXINE SODIUM 100 UG/1
100 TABLET ORAL
Status: DISCONTINUED | OUTPATIENT
Start: 2020-07-21 | End: 2020-07-22 | Stop reason: HOSPADM

## 2020-07-20 RX ORDER — SPIRONOLACTONE 25 MG/1
25 TABLET ORAL DAILY
Status: DISCONTINUED | OUTPATIENT
Start: 2020-07-21 | End: 2020-07-22 | Stop reason: HOSPADM

## 2020-07-20 RX ORDER — FAMOTIDINE 20 MG/1
20 TABLET, FILM COATED ORAL DAILY
Status: DISCONTINUED | OUTPATIENT
Start: 2020-07-21 | End: 2020-07-20

## 2020-07-20 RX ORDER — ONDANSETRON 2 MG/ML
4 INJECTION INTRAMUSCULAR; INTRAVENOUS
Status: DISCONTINUED | OUTPATIENT
Start: 2020-07-20 | End: 2020-07-22 | Stop reason: HOSPADM

## 2020-07-20 RX ORDER — CHLORTHALIDONE 25 MG/1
25 TABLET ORAL DAILY
Status: DISCONTINUED | OUTPATIENT
Start: 2020-07-21 | End: 2020-07-22 | Stop reason: HOSPADM

## 2020-07-20 RX ORDER — LOSARTAN POTASSIUM 50 MG/1
100 TABLET ORAL
Status: DISCONTINUED | OUTPATIENT
Start: 2020-07-21 | End: 2020-07-22 | Stop reason: HOSPADM

## 2020-07-20 RX ORDER — METFORMIN HYDROCHLORIDE 500 MG/1
500 TABLET ORAL
Status: DISCONTINUED | OUTPATIENT
Start: 2020-07-23 | End: 2020-07-22 | Stop reason: HOSPADM

## 2020-07-20 RX ORDER — POLYETHYLENE GLYCOL 3350 17 G/17G
17 POWDER, FOR SOLUTION ORAL DAILY PRN
Status: DISCONTINUED | OUTPATIENT
Start: 2020-07-20 | End: 2020-07-22 | Stop reason: HOSPADM

## 2020-07-20 RX ORDER — INSULIN LISPRO 100 [IU]/ML
INJECTION, SOLUTION INTRAVENOUS; SUBCUTANEOUS
Status: DISCONTINUED | OUTPATIENT
Start: 2020-07-21 | End: 2020-07-22 | Stop reason: HOSPADM

## 2020-07-20 RX ORDER — FUROSEMIDE 40 MG/1
40 TABLET ORAL
COMMUNITY
End: 2020-07-22

## 2020-07-20 RX ORDER — ONDANSETRON 4 MG/1
4 TABLET, ORALLY DISINTEGRATING ORAL
Status: DISCONTINUED | OUTPATIENT
Start: 2020-07-20 | End: 2020-07-22 | Stop reason: HOSPADM

## 2020-07-20 RX ORDER — SODIUM CHLORIDE 0.9 % (FLUSH) 0.9 %
5-40 SYRINGE (ML) INJECTION EVERY 8 HOURS
Status: DISCONTINUED | OUTPATIENT
Start: 2020-07-21 | End: 2020-07-22 | Stop reason: HOSPADM

## 2020-07-20 RX ORDER — MULTIVITAMIN
1 TABLET ORAL 2 TIMES DAILY WITH MEALS
COMMUNITY
End: 2021-06-08

## 2020-07-20 RX ORDER — FAMOTIDINE 20 MG/1
20 TABLET, FILM COATED ORAL 2 TIMES DAILY
Status: DISCONTINUED | OUTPATIENT
Start: 2020-07-21 | End: 2020-07-20

## 2020-07-20 RX ORDER — FAMOTIDINE 20 MG/1
20 TABLET, FILM COATED ORAL
Status: DISCONTINUED | OUTPATIENT
Start: 2020-07-21 | End: 2020-07-22 | Stop reason: HOSPADM

## 2020-07-20 RX ORDER — ACETAMINOPHEN 325 MG/1
650 TABLET ORAL
Status: DISCONTINUED | OUTPATIENT
Start: 2020-07-20 | End: 2020-07-22 | Stop reason: HOSPADM

## 2020-07-20 RX ORDER — SODIUM CHLORIDE 0.9 % (FLUSH) 0.9 %
5-40 SYRINGE (ML) INJECTION AS NEEDED
Status: DISCONTINUED | OUTPATIENT
Start: 2020-07-20 | End: 2020-07-22 | Stop reason: HOSPADM

## 2020-07-20 RX ORDER — PANTOPRAZOLE SODIUM 40 MG/1
40 TABLET, DELAYED RELEASE ORAL
Status: DISCONTINUED | OUTPATIENT
Start: 2020-07-21 | End: 2020-07-22 | Stop reason: HOSPADM

## 2020-07-20 RX ORDER — HYDRALAZINE HYDROCHLORIDE 20 MG/ML
20 INJECTION INTRAMUSCULAR; INTRAVENOUS
Status: COMPLETED | OUTPATIENT
Start: 2020-07-20 | End: 2020-07-20

## 2020-07-20 RX ORDER — GABAPENTIN 100 MG/1
200 CAPSULE ORAL DAILY
Status: DISCONTINUED | OUTPATIENT
Start: 2020-07-21 | End: 2020-07-22 | Stop reason: HOSPADM

## 2020-07-20 RX ORDER — DILTIAZEM HYDROCHLORIDE 240 MG/1
240 CAPSULE, COATED, EXTENDED RELEASE ORAL DAILY
Status: DISCONTINUED | OUTPATIENT
Start: 2020-07-21 | End: 2020-07-22 | Stop reason: HOSPADM

## 2020-07-20 RX ORDER — ROSUVASTATIN CALCIUM 10 MG/1
10 TABLET, COATED ORAL
Status: DISCONTINUED | OUTPATIENT
Start: 2020-07-21 | End: 2020-07-22 | Stop reason: HOSPADM

## 2020-07-20 RX ORDER — ACETAMINOPHEN 650 MG/1
650 SUPPOSITORY RECTAL
Status: DISCONTINUED | OUTPATIENT
Start: 2020-07-20 | End: 2020-07-22 | Stop reason: HOSPADM

## 2020-07-20 RX ORDER — DEXTROSE 50 % IN WATER (D50W) INTRAVENOUS SYRINGE
25-50 AS NEEDED
Status: DISCONTINUED | OUTPATIENT
Start: 2020-07-20 | End: 2020-07-22 | Stop reason: HOSPADM

## 2020-07-20 RX ORDER — CLONIDINE HYDROCHLORIDE 0.1 MG/1
0.1 TABLET ORAL 2 TIMES DAILY
Status: DISCONTINUED | OUTPATIENT
Start: 2020-07-21 | End: 2020-07-21

## 2020-07-20 RX ORDER — ENOXAPARIN SODIUM 100 MG/ML
40 INJECTION SUBCUTANEOUS DAILY
Status: DISCONTINUED | OUTPATIENT
Start: 2020-07-21 | End: 2020-07-22 | Stop reason: HOSPADM

## 2020-07-20 RX ORDER — DILTIAZEM HYDROCHLORIDE 300 MG/1
300 CAPSULE, EXTENDED RELEASE ORAL
COMMUNITY
End: 2020-08-31

## 2020-07-20 RX ORDER — MAGNESIUM SULFATE 100 %
4 CRYSTALS MISCELLANEOUS AS NEEDED
Status: DISCONTINUED | OUTPATIENT
Start: 2020-07-20 | End: 2020-07-22 | Stop reason: HOSPADM

## 2020-07-20 RX ADMIN — LOSARTAN POTASSIUM 100 MG: 50 TABLET, FILM COATED ORAL at 23:46

## 2020-07-20 RX ADMIN — CHLORTHALIDONE 25 MG: 25 TABLET ORAL at 23:46

## 2020-07-20 RX ADMIN — SPIRONOLACTONE 25 MG: 25 TABLET ORAL at 23:47

## 2020-07-20 RX ADMIN — ROSUVASTATIN CALCIUM 10 MG: 10 TABLET, COATED ORAL at 23:47

## 2020-07-20 RX ADMIN — Medication 10 ML: at 23:47

## 2020-07-20 RX ADMIN — FAMOTIDINE 20 MG: 20 TABLET, FILM COATED ORAL at 23:46

## 2020-07-20 RX ADMIN — GABAPENTIN 200 MG: 100 CAPSULE ORAL at 23:46

## 2020-07-20 RX ADMIN — IOPAMIDOL 70 ML: 755 INJECTION, SOLUTION INTRAVENOUS at 20:22

## 2020-07-20 RX ADMIN — CLONIDINE HYDROCHLORIDE 0.1 MG: 0.1 TABLET ORAL at 23:46

## 2020-07-20 RX ADMIN — HYDRALAZINE HYDROCHLORIDE 20 MG: 20 INJECTION INTRAMUSCULAR; INTRAVENOUS at 21:50

## 2020-07-20 NOTE — ED PROVIDER NOTES
Ms Demetra Wright is an 81 yo F w/ hx of HTN, HLD, OA, T2DM, GERD, LA, RBBB, asthma, and hypothyroidism that presents with CHRISTIAN. This started 2 days ago. She has noticed that she can only take 2 steps without becoming short of breath. She denies chest pain, but endorses some intermittent chest tightness in addition. She has also noticed BLE edema since this time as well. She was told to not take her chlorthalidone and replace it with lasix 40 mg if she has LE edema, which she did for the past two days. This has not helped her symptoms, but she has gained 2 pounds since Saturday. Pt also reports that she has been having some episodes at home with her heart rate in the 40s. She has not had any medication changes recently other than her moving her diltiazem morning time dosing to nighttime. Pt follows with Dr. Irina Gill for cardiology. Her last echo was in 2016 with an EF of 60%. She had a cath in 2010 without significant stenosis. Past Medical History:   Diagnosis Date    Arthritis     Asthma     Breast cancer ([address]     Chest pain, unspecified     atypical chest discomfort with normal stress cardiolite 2002, and normal stress echoes 2004, 2006 and 2009. Negative dobutamine cardiolite March 2009 for ischemia with an EF=63%. Cardiac Cath 9/3/2010 showed mild CAD.  Diabetes mellitus ([address]     Diverticulosis     Dyslipidemia     Frequent PVCs     GERD (gastroesophageal reflux disease)     Hiatal hernia     HTN (hypertension)     Obesity     LA on CPAP     RBBB (right bundle branch block)     Spinal stenosis     Thyroid disease        Past Surgical History:   Procedure Laterality Date    HX BUNIONECTOMY      HX CHOLECYSTECTOMY      HX HEART CATHETERIZATION      Cardiac Cath 9/3/2010 showed mild CAD.       HX HIP REPLACEMENT      HX ORTHOPAEDIC      HX OTHER SURGICAL      Echo 10/5/12 - LVEF 60-65%, mod-marked LAE, MAC, grade 1 diastolic dysfunction     HX OTHER SURGICAL      Lexiscan cardiolite 1/25/13 - normal MPI, LVEF 74%    HX OTHER SURGICAL      Aorta duplex 1/6/14 - aorta size 1.7 cm, no dilation          Family History:   Problem Relation Age of Onset    Coronary Artery Disease Father     Heart Disease Father     Coronary Artery Disease Paternal Aunt     Cancer Mother     Parkinson's Disease Mother     Stroke Mother     Thyroid Disease Mother     Seizures Brother        Social History     Socioeconomic History    Marital status:      Spouse name: Not on file    Number of children: Not on file    Years of education: Not on file    Highest education level: Not on file   Occupational History    Not on file   Social Needs    Financial resource strain: Not on file    Food insecurity     Worry: Not on file     Inability: Not on file    Transportation needs     Medical: Not on file     Non-medical: Not on file   Tobacco Use    Smoking status: Never Smoker    Smokeless tobacco: Never Used   Substance and Sexual Activity    Alcohol use: No     Alcohol/week: 0.0 standard drinks    Drug use: No    Sexual activity: Never   Lifestyle    Physical activity     Days per week: Not on file     Minutes per session: Not on file    Stress: Not on file   Relationships    Social connections     Talks on phone: Not on file     Gets together: Not on file     Attends Hinduism service: Not on file     Active member of club or organization: Not on file     Attends meetings of clubs or organizations: Not on file     Relationship status: Not on file    Intimate partner violence     Fear of current or ex partner: Not on file     Emotionally abused: Not on file     Physically abused: Not on file     Forced sexual activity: Not on file   Other Topics Concern    Not on file   Social History Narrative    Not on file         ALLERGIES: Tolectin [tolmetin]; Aldactone [spironolactone]; Carvedilol; Ceclor [cefaclor]; Ciprofloxacin; Darvon [propoxyphene];  Macrobid [nitrofurantoin monohyd/m-cryst]; Norvasc [amlodipine]; Tetracycline; Toprol xl [metoprolol succinate]; Valsartan; and Zestril [lisinopril]    Review of Systems   Constitutional: Negative for chills and fever. HENT: Negative for congestion, ear pain, sinus pain and sore throat. Eyes: Negative for pain and redness. Respiratory: Positive for chest tightness and shortness of breath. Negative for cough and wheezing. Cardiovascular: Positive for leg swelling. Negative for chest pain and palpitations. Gastrointestinal: Negative for abdominal pain, constipation, diarrhea, nausea and vomiting. Genitourinary: Negative for dysuria, frequency, hematuria and urgency. Musculoskeletal: Negative for arthralgias and joint swelling. Skin: Negative for color change and rash. Neurological: Negative for dizziness, light-headedness and headaches. Hematological: Negative for adenopathy. Does not bruise/bleed easily. Psychiatric/Behavioral: Negative for confusion. The patient is not nervous/anxious. Vitals:    07/20/20 1645 07/20/20 1700 07/20/20 1732 07/20/20 1745   BP: 184/58 188/61  159/88   Pulse: 71 67  61   Resp: 13 16  14   Temp:       SpO2: 98% 98% 97% 97%   Weight:       Height:                Physical Exam  Constitutional:       Appearance: She is well-developed. HENT:      Head: Normocephalic and atraumatic. Mouth/Throat:      Mouth: Mucous membranes are moist.      Pharynx: No oropharyngeal exudate. Eyes:      Extraocular Movements: Extraocular movements intact. Pupils: Pupils are equal, round, and reactive to light. Neck:      Musculoskeletal: Normal range of motion and neck supple. Cardiovascular:      Rate and Rhythm: Normal rate and regular rhythm. Pulses: Normal pulses. Heart sounds: Murmur present. Comments: 1/6 systolic murmur auscultated over RSB  Pulmonary:      Effort: Pulmonary effort is normal. No tachypnea or respiratory distress.       Breath sounds: Examination of the right-lower field reveals rales. Examination of the left-lower field reveals rales. Rales present. Chest:      Chest wall: No tenderness. Abdominal:      General: Bowel sounds are normal.      Palpations: Abdomen is soft. Tenderness: There is no abdominal tenderness. There is no guarding. Musculoskeletal: Normal range of motion. Comments: 1+ BLE edema   Skin:     General: Skin is warm and dry. Capillary Refill: Capillary refill takes less than 2 seconds. Neurological:      General: No focal deficit present. Mental Status: She is alert and oriented to person, place, and time. Psychiatric:         Mood and Affect: Mood normal.         Behavior: Behavior normal.          MDM  Number of Diagnoses or Management Options  Atypical chest pain:   SOB (shortness of breath):   Diagnosis management comments: 1. Dyspnea on exertion - Wells 0, placing pt at low risk. PERC 1, due to age. Pt is low risk, so will collect d-dimer to rule out PE given dyspnea on exertion and older age with less mobility. In addition, will rule out ACS and heart failure. No wheezing on exam with good air movement, so asthma exacerbation is less likely. - EKG unchanged, interpretation below  - troponin, CK, NT pro-BNP  - CBC, CMP  - CXR    2. BLE edema - equal on both sides. No recent echo. Last in 2016 with EF of 60%. - NT-pro BNP, BMP, d-dimer    ED EKG interpretation: 4:39 PM  Rhythm: normal sinus rhythm; and regular . Rate (approx.): 78; Axis: left axis deviation; P wave: normal; QRS interval: prolonged; ST/T wave: T wave inverted; Other findings: abnormal ekg. RBBB. Only change noted was inversion in V2 when compared to 12/2019. This EKG was interpreted by Perry Denny MD,ED Provider. 7:54 PM Reassessment  Pt stable. Doing well. D-dimer elevated and pro-BNP mildly elevated. Will get CTA to rule out PE given SOB and no findings on xray. 8:48 PM  CTA negative for PE.  Will admit to hospitalist team for ACS rule out. Procedures    Hospitalist Meño Serve for Admission  8:43 PM    ED Room Number: ER06/06  Patient Name and age:  Bonnie Gunter 80 y.o.  female  Working Diagnosis:   1. SOB (shortness of breath)    2. Atypical chest pain      Pt has been having CHRISTIAN for 3 days with associated chest pressure. Increase BLE edema and weight gain. Intermittent bradycardia at home. No O2 requirement. EKG without significant change. CXR normal. Trop, CK normal.  D-dimer elevated. Pro-BNP very mildly elevated. CTA neg for PE. Admit for monitoring and trending cardiac enzymes. COVID-19 Suspicion:  no    Code Status:  Full Code  Readmission: no  Isolation Requirements:  no  Recommended Level of Care:  telemetry  Department:[facility] ED - [phone]        Discussed with supervising physician, Dr. Riaz Gusman.     Maddy Mccollum MD  Family Medicine Resident

## 2020-07-20 NOTE — ED NOTES
Report received from [facility], [facility]. Assumed pt care. Pt resting in bed. No acute distress at this time.

## 2020-07-21 ENCOUNTER — APPOINTMENT (OUTPATIENT)
Dept: NON INVASIVE DIAGNOSTICS | Age: 85
End: 2020-07-21
Attending: SPECIALIST
Payer: MEDICARE

## 2020-07-21 LAB
ALBUMIN SERPL-MCNC: 3.2 G/DL (ref 3.5–5)
ALBUMIN/GLOB SERPL: 0.9 {RATIO} (ref 1.1–2.2)
ALP SERPL-CCNC: 38 U/L (ref 45–117)
ALT SERPL-CCNC: 25 U/L (ref 12–78)
ANION GAP SERPL CALC-SCNC: 10 MMOL/L (ref 5–15)
APPEARANCE UR: CLEAR
ARTERIAL PATENCY WRIST A: ABNORMAL
AST SERPL-CCNC: 18 U/L (ref 15–37)
ATRIAL RATE: 78 BPM
BACTERIA URNS QL MICRO: NEGATIVE /HPF
BASE DEFICIT BLDA-SCNC: 0.3 MMOL/L
BDY SITE: ABNORMAL
BILIRUB SERPL-MCNC: 0.2 MG/DL (ref 0.2–1)
BILIRUB UR QL: NEGATIVE
BUN SERPL-MCNC: 30 MG/DL (ref 6–20)
BUN/CREAT SERPL: 22 (ref 12–20)
CALCIUM SERPL-MCNC: 8.1 MG/DL (ref 8.5–10.1)
CALCULATED P AXIS, ECG09: 72 DEGREES
CALCULATED R AXIS, ECG10: -40 DEGREES
CALCULATED T AXIS, ECG11: 3 DEGREES
CHLORIDE SERPL-SCNC: 99 MMOL/L (ref 97–108)
CO2 SERPL-SCNC: 22 MMOL/L (ref 21–32)
COLOR UR: NORMAL
CREAT SERPL-MCNC: 1.37 MG/DL (ref 0.55–1.02)
DIAGNOSIS, 93000: NORMAL
ECHO AO ROOT DIAM: 3.2 CM
ECHO AR MAX VEL PISA: 185.99 CENTIMETER/SECOND
ECHO AV AREA PEAK VELOCITY: 1.33 CM2
ECHO AV AREA PEAK VELOCITY: 1.4 CM2
ECHO AV AREA VTI: 1.56 CM2
ECHO AV AREA/BSA VTI: 0.8 CM2/M2
ECHO AV MEAN GRADIENT: 10.02 MMHG
ECHO AV PEAK GRADIENT: 22.06 MMHG
ECHO AV PEAK VELOCITY: 234.84 CM/S
ECHO AV REGURGITANT PHT: 1.23 S
ECHO AV VTI: 45.77 CM
ECHO EST RA PRESSURE: 3 MMHG
ECHO LA AREA 4C: 18.27 CM2
ECHO LA MAJOR AXIS: 2.9 CM
ECHO LA MINOR AXIS: 1.49 CM
ECHO LA VOL 2C: 62.52 ML (ref 22–52)
ECHO LA VOL 4C: 42.55 ML (ref 22–52)
ECHO LA VOL BP: 58.66 ML (ref 22–52)
ECHO LA VOL/BSA BIPLANE: 30.08 ML/M2 (ref 16–28)
ECHO LA VOLUME INDEX A2C: 32.06 ML/M2 (ref 16–28)
ECHO LA VOLUME INDEX A4C: 21.82 ML/M2 (ref 16–28)
ECHO LV E' LATERAL VELOCITY: 5.16 CENTIMETER/SECOND
ECHO LV E' SEPTAL VELOCITY: 5.11 CENTIMETER/SECOND
ECHO LV INTERNAL DIMENSION DIASTOLIC: 4.36 CM (ref 3.9–5.3)
ECHO LV INTERNAL DIMENSION SYSTOLIC: 2.77 CM
ECHO LV IVSD: 0.89 CM (ref 0.6–0.9)
ECHO LV MASS 2D: 140.7 G (ref 67–162)
ECHO LV MASS INDEX 2D: 72.1 G/M2 (ref 43–95)
ECHO LV POSTERIOR WALL DIASTOLIC: 1.06 CM (ref 0.6–0.9)
ECHO LVOT DIAM: 1.83 CM
ECHO LVOT PEAK GRADIENT: 5.63 MMHG
ECHO LVOT PEAK GRADIENT: 6.32 MMHG
ECHO LVOT PEAK VELOCITY: 118.62 CM/S
ECHO LVOT PEAK VELOCITY: 124.04 CM/S
ECHO LVOT SV: 71.4 ML
ECHO LVOT VTI: 27.02 CM
ECHO MV A VELOCITY: 141.79 CENTIMETER/SECOND
ECHO MV AREA VTI: 1.93 CM2
ECHO MV E DECELERATION TIME (DT): 0.31 S
ECHO MV E VELOCITY: 93.42 CENTIMETER/SECOND
ECHO MV MAX VELOCITY: 138.12 CM/S
ECHO MV MEAN GRADIENT: 2.72 MMHG
ECHO MV PEAK GRADIENT: 7.63 MMHG
ECHO MV VTI: 36.91 CM
ECHO PV PEAK INSTANTANEOUS GRADIENT SYSTOLIC: 5.14 MMHG
ECHO PV REGURGITANT MAX VELOCITY: 113.38 CM/S
ECHO RIGHT VENTRICULAR SYSTOLIC PRESSURE (RVSP): 27.3 MMHG
ECHO RV INTERNAL DIMENSION: 3.17 CM
ECHO RV TAPSE: 1.85 CM (ref 1.5–2)
ECHO TV REGURGITANT MAX VELOCITY: 246.5 CM/S
ECHO TV REGURGITANT PEAK GRADIENT: 24.3 MMHG
EPITH CASTS URNS QL MICRO: NORMAL /LPF
ERYTHROCYTE [DISTWIDTH] IN BLOOD BY AUTOMATED COUNT: 13.2 % (ref 11.5–14.5)
FIO2 ON VENT: 21 %
GLOBULIN SER CALC-MCNC: 3.4 G/DL (ref 2–4)
GLUCOSE BLD STRIP.AUTO-MCNC: 107 MG/DL (ref 65–100)
GLUCOSE BLD STRIP.AUTO-MCNC: 112 MG/DL (ref 65–100)
GLUCOSE BLD STRIP.AUTO-MCNC: 118 MG/DL (ref 65–100)
GLUCOSE BLD STRIP.AUTO-MCNC: 147 MG/DL (ref 65–100)
GLUCOSE SERPL-MCNC: 162 MG/DL (ref 65–100)
GLUCOSE UR STRIP.AUTO-MCNC: NEGATIVE MG/DL
HCO3 BLDA-SCNC: 23 MMOL/L (ref 22–26)
HCT VFR BLD AUTO: 34 % (ref 35–47)
HGB BLD-MCNC: 11.7 G/DL (ref 11.5–16)
HGB UR QL STRIP: NEGATIVE
HYALINE CASTS URNS QL MICRO: NORMAL /LPF (ref 0–5)
KETONES UR QL STRIP.AUTO: NEGATIVE MG/DL
LA VOL DISK BP: 53.71 ML (ref 22–52)
LEUKOCYTE ESTERASE UR QL STRIP.AUTO: NEGATIVE
LVOT MG: 2.58 MMHG
MAGNESIUM SERPL-MCNC: 2.3 MG/DL (ref 1.6–2.4)
MCH RBC QN AUTO: 33.3 PG (ref 26–34)
MCHC RBC AUTO-ENTMCNC: 34.4 G/DL (ref 30–36.5)
MCV RBC AUTO: 96.9 FL (ref 80–99)
NITRITE UR QL STRIP.AUTO: NEGATIVE
NRBC # BLD: 0 K/UL (ref 0–0.01)
NRBC BLD-RTO: 0 PER 100 WBC
P-R INTERVAL, ECG05: 154 MS
PCO2 BLDA: 32 MMHG (ref 35–45)
PH BLDA: 7.46 [PH] (ref 7.35–7.45)
PH UR STRIP: 7.5 [PH] (ref 5–8)
PLATELET # BLD AUTO: 214 K/UL (ref 150–400)
PMV BLD AUTO: 9.6 FL (ref 8.9–12.9)
PO2 BLDA: 99 MMHG (ref 80–100)
POTASSIUM SERPL-SCNC: 4.2 MMOL/L (ref 3.5–5.1)
PROT SERPL-MCNC: 6.6 G/DL (ref 6.4–8.2)
PROT UR STRIP-MCNC: NEGATIVE MG/DL
Q-T INTERVAL, ECG07: 408 MS
QRS DURATION, ECG06: 136 MS
QTC CALCULATION (BEZET), ECG08: 465 MS
RBC # BLD AUTO: 3.51 M/UL (ref 3.8–5.2)
RBC #/AREA URNS HPF: NORMAL /HPF (ref 0–5)
SAO2 % BLD: 98 % (ref 92–97)
SARS-COV-2, COV2: NOT DETECTED
SERVICE CMNT-IMP: ABNORMAL
SODIUM SERPL-SCNC: 131 MMOL/L (ref 136–145)
SOURCE, COVRS: NORMAL
SP GR UR REFRACTOMETRY: 1.02 (ref 1–1.03)
SPECIMEN SITE: ABNORMAL
SPECIMEN SOURCE, FCOV2M: NORMAL
UROBILINOGEN UR QL STRIP.AUTO: 0.2 EU/DL (ref 0.2–1)
VENTRICULAR RATE, ECG03: 78 BPM
WBC # BLD AUTO: 6.8 K/UL (ref 3.6–11)
WBC URNS QL MICRO: NORMAL /HPF (ref 0–4)

## 2020-07-21 PROCEDURE — 97165 OT EVAL LOW COMPLEX 30 MIN: CPT

## 2020-07-21 PROCEDURE — 83735 ASSAY OF MAGNESIUM: CPT

## 2020-07-21 PROCEDURE — 87086 URINE CULTURE/COLONY COUNT: CPT

## 2020-07-21 PROCEDURE — 74011250636 HC RX REV CODE- 250/636: Performed by: INTERNAL MEDICINE

## 2020-07-21 PROCEDURE — 81001 URINALYSIS AUTO W/SCOPE: CPT

## 2020-07-21 PROCEDURE — 97535 SELF CARE MNGMENT TRAINING: CPT

## 2020-07-21 PROCEDURE — 77030018846 HC SOL IRR STRL H20 ICUM -A

## 2020-07-21 PROCEDURE — 97116 GAIT TRAINING THERAPY: CPT

## 2020-07-21 PROCEDURE — 82962 GLUCOSE BLOOD TEST: CPT

## 2020-07-21 PROCEDURE — 74011636637 HC RX REV CODE- 636/637: Performed by: INTERNAL MEDICINE

## 2020-07-21 PROCEDURE — 96376 TX/PRO/DX INJ SAME DRUG ADON: CPT

## 2020-07-21 PROCEDURE — 87186 SC STD MICRODIL/AGAR DIL: CPT

## 2020-07-21 PROCEDURE — 97161 PT EVAL LOW COMPLEX 20 MIN: CPT

## 2020-07-21 PROCEDURE — 85027 COMPLETE CBC AUTOMATED: CPT

## 2020-07-21 PROCEDURE — 96372 THER/PROPH/DIAG INJ SC/IM: CPT

## 2020-07-21 PROCEDURE — 80053 COMPREHEN METABOLIC PANEL: CPT

## 2020-07-21 PROCEDURE — 74011250637 HC RX REV CODE- 250/637: Performed by: INTERNAL MEDICINE

## 2020-07-21 PROCEDURE — 36415 COLL VENOUS BLD VENIPUNCTURE: CPT

## 2020-07-21 PROCEDURE — 93306 TTE W/DOPPLER COMPLETE: CPT

## 2020-07-21 PROCEDURE — 99218 HC RM OBSERVATION: CPT

## 2020-07-21 PROCEDURE — 87077 CULTURE AEROBIC IDENTIFY: CPT

## 2020-07-21 PROCEDURE — 74011250637 HC RX REV CODE- 250/637: Performed by: SPECIALIST

## 2020-07-21 RX ORDER — CLONIDINE HYDROCHLORIDE 0.1 MG/1
0.1 TABLET ORAL
Status: DISCONTINUED | OUTPATIENT
Start: 2020-07-21 | End: 2020-07-22 | Stop reason: HOSPADM

## 2020-07-21 RX ORDER — HYDRALAZINE HYDROCHLORIDE 20 MG/ML
10 INJECTION INTRAMUSCULAR; INTRAVENOUS
Status: DISCONTINUED | OUTPATIENT
Start: 2020-07-21 | End: 2020-07-22 | Stop reason: HOSPADM

## 2020-07-21 RX ADMIN — CLONIDINE HYDROCHLORIDE 0.1 MG: 0.1 TABLET ORAL at 08:10

## 2020-07-21 RX ADMIN — INSULIN LISPRO 2 UNITS: 100 INJECTION, SOLUTION INTRAVENOUS; SUBCUTANEOUS at 08:10

## 2020-07-21 RX ADMIN — LEVOTHYROXINE SODIUM 100 MCG: 0.1 TABLET ORAL at 06:15

## 2020-07-21 RX ADMIN — HYDRALAZINE HYDROCHLORIDE 10 MG: 20 INJECTION INTRAMUSCULAR; INTRAVENOUS at 23:31

## 2020-07-21 RX ADMIN — GABAPENTIN 200 MG: 100 CAPSULE ORAL at 21:02

## 2020-07-21 RX ADMIN — Medication 10 ML: at 21:02

## 2020-07-21 RX ADMIN — MONTELUKAST SODIUM 10 MG: 10 TABLET, FILM COATED ORAL at 17:21

## 2020-07-21 RX ADMIN — PANTOPRAZOLE SODIUM 40 MG: 40 TABLET, DELAYED RELEASE ORAL at 06:15

## 2020-07-21 RX ADMIN — FAMOTIDINE 20 MG: 20 TABLET, FILM COATED ORAL at 21:02

## 2020-07-21 RX ADMIN — LOSARTAN POTASSIUM 100 MG: 50 TABLET, FILM COATED ORAL at 21:02

## 2020-07-21 RX ADMIN — ACETAMINOPHEN 650 MG: 325 TABLET ORAL at 16:05

## 2020-07-21 RX ADMIN — ACETAMINOPHEN 650 MG: 325 TABLET ORAL at 08:37

## 2020-07-21 RX ADMIN — CLONIDINE HYDROCHLORIDE 0.1 MG: 0.1 TABLET ORAL at 21:02

## 2020-07-21 RX ADMIN — Medication 10 ML: at 16:06

## 2020-07-21 RX ADMIN — ENOXAPARIN SODIUM 40 MG: 40 INJECTION SUBCUTANEOUS at 08:10

## 2020-07-21 RX ADMIN — Medication 10 ML: at 06:17

## 2020-07-21 RX ADMIN — ROSUVASTATIN CALCIUM 10 MG: 10 TABLET, COATED ORAL at 21:02

## 2020-07-21 RX ADMIN — HYDRALAZINE HYDROCHLORIDE 10 MG: 20 INJECTION INTRAMUSCULAR; INTRAVENOUS at 11:33

## 2020-07-21 NOTE — CONSULTS
Manuel Diez MD. [facility]              Patient: Stanislav Ferrera  : 1935      Today's Date: 2020          HISTORY OF PRESENT ILLNESS:     History of Present Illness:    Ms. Jeremi Fuentes is a 80 y.o.  female with PMH of HTN, DM, LA, who presented to the Emergency Department complaining of CHRISTIAN. She has a long history of CHRISTIAN, but it has been worse past few days. She drank a lot of water to compensate for recent lasix dose and gained 2 lbs prior to admission. At rest she feels OK but has CHRISTIAN. Does not seem to have much orthopnea. BP at home is OK in AM but higher ('s) in evening. She has some chest pressure with a deep breath, but otherwise no sig CP. She felt pulse was 40's at home recently.            PAST MEDICAL HISTORY:     Past Medical History:   Diagnosis Date    Arthritis     Asthma     Breast cancer ([address]     Chest pain, unspecified     atypical chest discomfort with normal stress cardiolite , and normal stress echoes ,  and . Negative dobutamine cardiolite 2009 for ischemia with an EF=63%. Cardiac Cath 9/3/2010 showed mild CAD.  Diabetes mellitus ([address]     Diverticulosis     Dyslipidemia     Frequent PVCs     GERD (gastroesophageal reflux disease)     Hiatal hernia     HTN (hypertension)     Hypothyroid     Neuropathy     Obesity     LA on CPAP     RBBB (right bundle branch block)     Spinal stenosis          Past Surgical History:   Procedure Laterality Date    HX BUNIONECTOMY      HX CHOLECYSTECTOMY      HX HEART CATHETERIZATION      Cardiac Cath 9/3/2010 showed mild CAD.       HX HIP REPLACEMENT      HX ORTHOPAEDIC      HX OTHER SURGICAL      Echo 10/5/12 - LVEF 60-65%, mod-marked LAE, MAC, grade 1 diastolic dysfunction     HX OTHER SURGICAL      Lexiscan cardiolite 13 - normal MPI, LVEF 74%    HX OTHER SURGICAL      Aorta duplex 14 - aorta size 1.7 cm, no dilation            MEDICATIONS:       Prior to Admission Medications:      Medication Documentation Review Audit         Reviewed by Joseph Gibson PHARMD (Pharmacist) on 07/20/20 at 2251       Medication Sig Documenting Provider Last Dose Status Taking?   acetaminophen (TYLENOL EXTRA STRENGTH) 500 mg tablet Take  by mouth every six (6) hours as needed for Pain. Provider, Historical 7/19/2020 Active Yes   calcium-cholecalciferol, D3, (CALTRATE 600+D) tablet Take 1 Tab by mouth two (2) times daily (with meals). Provider, Historical 7/20/2020 AM Active Yes   cetirizine (ZyrTEC) 10 mg tablet Take 10 mg by mouth daily. Provider, Historical 7/20/2020 Active Yes   chlorthalidone (HYGROTEN) 25 mg tablet Take 25 mg by mouth daily. Does not take if taking furosemide 40 mg Provider, Historical 7/17/2020 Active Yes   cloNIDine HCL (CATAPRES) 0.1 mg tablet Take 2 Tabs by mouth nightly. Ck Little MD 7/19/2020 PM Active Yes   cyanocobalamin 1,000 mcg tablet Take 1,000 mcg by mouth every Sunday. Tee Salazar MD 7/19/2020 Active Yes   dilTIAZem ER (TIAZAC) 300 mg capsule Take 300 mg by mouth nightly. Provider, Historical 7/19/2020 PM Active Yes   furosemide (LASIX) 40 mg tablet Take 40 mg by mouth daily as needed (Swelling, shortness of breath). Patient has furosemide 40 mg tablets at home, she takes this once daily as needed for swelling or SOB. Patient holds the chlorthalidone dose if she is taking lasix. Provider, Historical 7/20/2020 Active Yes   gabapentin (NEURONTIN) 100 mg capsule Take 200 mg by mouth nightly. Provider, Historical 7/19/2020 Active Yes                 Med Note (Deidre Christiansen   Fri Apr 27, 2018 11:07 AM)     glucosam/chond/hyalu/CF borate (MOVE FREE JOINT HEALTH PO) Take 1 Tab by mouth daily. Provider, Historical 7/20/2020 Active Yes   levothyroxine (SYNTHROID) 100 mcg tablet Take 100 mcg by mouth Daily (before breakfast). Provider, Historical 7/20/2020 Active Yes   losartan (COZAAR) 100 mg tablet Take 1 Tab by mouth nightly.  Ck Little MD 7/19/2020 PM Active Yes   metformin (GLUCOPHAGE) 500 mg tablet Take 500 mg by mouth daily (with breakfast). Provider, Historical 7/20/2020 Active Yes   montelukast (SINGULAIR) 10 mg tablet Take 10 mg by mouth nightly. Provider, Historical 7/19/2020 Active Yes   omeprazole (PRILOSEC) 20 mg capsule Take 20 mg by mouth two (2) times a day. Provider, Historical 7/20/2020 AM Active Yes   OTHER zyflamend 2 caps daily Provider, Historical 7/20/2020 Active Yes   rosuvastatin (CRESTOR) 10 mg tablet Take 10 mg by mouth nightly. Provider, Historical 7/19/2020 Active Yes   spironolactone (ALDACTONE) 25 mg tablet TAKE 1 TABLET DAILY Lisa GREENBERG NP 7/20/2020 Active Yes   vit C/E/Zn/coppr/lutein/zeaxan (PRESERVISION AREDS 2 PO) Take 1 Cap by mouth two (2) times a day.  Provider, Historical 7/20/2020 AM Active Yes                     Current Facility-Administered Medications   Medication Dose Route Frequency Provider Last Rate Last Dose    hydrALAZINE (APRESOLINE) 20 mg/mL injection 10 mg  10 mg IntraVENous Q6H PRN Scott Gusman MD   10 mg at 07/21/20 1133    chlorthalidone (HYGROTEN) tablet 25 mg  25 mg Oral DAILY Deshawn Menezes MD   25 mg at 07/20/20 2346    cloNIDine HCL (CATAPRES) tablet 0.1 mg  0.1 mg Oral BID Deshawn Menezes MD   0.1 mg at 07/21/20 2891    dilTIAZem ER (CARDIZEM CD) capsule 240 mg  240 mg Oral DAILY Deshawn Menezes MD   Stopped at 07/21/20 0900    gabapentin (NEURONTIN) capsule 200 mg  200 mg Oral DAILY Deshawn Menezes MD   200 mg at 07/20/20 2346    levothyroxine (SYNTHROID) tablet 100 mcg  100 mcg Oral ACB Deshawn Menezes MD   100 mcg at 07/21/20 0615    losartan (COZAAR) tablet 100 mg  100 mg Oral QHS Deshawn Menezes MD   100 mg at 07/20/20 2346    [START ON 7/23/2020] metFORMIN (GLUCOPHAGE) tablet 500 mg  500 mg Oral DAILY WITH Elzbieta Castellon MD        montelukast (SINGULAIR) tablet 10 mg  10 mg Oral QPM Deshawn Menezes MD        pantoprazole (PROTONIX) tablet 40 mg  40 mg Oral ACB Glenn Boggs MD   40 mg at 07/21/20 0615    rosuvastatin (CRESTOR) tablet 10 mg  10 mg Oral QHS Glenn Boggs MD   10 mg at 07/20/20 2347    spironolactone (ALDACTONE) tablet 25 mg  25 mg Oral DAILY Glenn Boggs MD   25 mg at 07/20/20 2347    glucose chewable tablet 16 g  4 Tab Oral PRN Glenn Boggs MD        dextrose (D50W) injection syrg 12.5-25 g  25-50 mL IntraVENous PRN Glenn Boggs MD        glucagon [facility]) injection 1 mg  1 mg IntraMUSCular PRN Glenn Boggs MD        acetaminophen (TYLENOL) tablet 650 mg  650 mg Oral Q6H PRN Glenn Boggs MD   650 mg at 07/21/20 4259    Or    acetaminophen (TYLENOL) suppository 650 mg  650 mg Rectal Q6H PRN Glenn Boggs MD        polyethylene glycol [facility]) packet 17 g  17 g Oral DAILY PRN Glenn Boggs MD        ondansetron (ZOFRAN ODT) tablet 4 mg  4 mg Oral Q8H PRN Glenn Boggs MD        Or    ondansetron [facility]) injection 4 mg  4 mg IntraVENous Q6H PRN Glenn Boggs MD        enoxaparin (LOVENOX) injection 40 mg  40 mg SubCUTAneous DAILY Glenn Boggs MD   40 mg at 07/21/20 0810    insulin lispro (HUMALOG) injection   SubCUTAneous AC&HS Glenn Boggs MD   Stopped at 07/21/20 1130    sodium chloride (NS) flush 5-40 mL  5-40 mL IntraVENous Q8H Glenn Boggs MD   10 mL at 07/21/20 0617    sodium chloride (NS) flush 5-40 mL  5-40 mL IntraVENous PRN Glenn Boggs MD        famotidine (PEPCID) tablet 20 mg  20 mg Oral QHS Glenn Boggs MD   20 mg at 07/20/20 2346       Allergies   Allergen Reactions    Tolectin [Tolmetin] Anaphylaxis    Aldactone [Spironolactone] Other (comments)     hyperkalemia    Carvedilol Other (comments)     \"slow heart rate down to 44\"    Ceclor [Cefaclor] Hives    Ciprofloxacin Hives and Swelling    Darvon [Propoxyphene] Swelling     Swelling lips and hands    Macrobid [Nitrofurantoin Monohyd/M-Cryst] Other (comments)     Does not recall    Norvasc [Amlodipine] Swelling     Swelling ankles/legs    Tetracycline Swelling     Swelling lips/hands    Toprol Xl [Metoprolol Succinate] Swelling     Swellings ankles/legs    Valsartan Other (comments)     Also \"slow heart rate down to 44\"    Zestril [Lisinopril] Hives           SOCIAL HISTORY:     Social History     Tobacco Use    Smoking status: Never Smoker    Smokeless tobacco: Never Used   Substance Use Topics    Alcohol use: No     Alcohol/week: 0.0 standard drinks    Drug use: No         FAMILY HISTORY:     Family History   Problem Relation Age of Onset    Coronary Artery Disease Father     Heart Disease Father     Coronary Artery Disease Paternal Aunt     Cancer Mother     Parkinson's Disease Mother     Stroke Mother     Thyroid Disease Mother     Seizures Brother           REVIEW OF SYMPTOMS:       Review of Symptoms:  Constitutional: Negative for fever, chills  HEENT: Negative for nosebleeds, tinnitus, and vision changes. Respiratory: Negative for cough, wheezing  Cardiovascular: Negative for syncope, and PND. Gastrointestinal: Negative for abdominal pain, diarrhea, melena. Genitourinary: Negative for dysuria  Musculoskeletal: + back pain, joint pain   Skin: Negative for rash  Heme: No problems bleeding. Neurological: Negative for speech change and focal weakness.                   PHYSICAL EXAM:        Physical Exam:     Visit Vitals  /81   Pulse (!) 58   Temp 98 °F (36.7 °C)   Resp 16   Ht 5' 3.5\" (1.613 m)   Wt 204 lb (92.5 kg)   SpO2 96%   BMI 35.57 kg/m²       Patient appears generally well, mood and affect are appropriate and pleasant. HEENT: Hearing intact, non-icteric, normocephalic, atraumatic.    Neck Exam: Supple, No JVD or bruits   Lung Exam: Clear to auscultation, even breath sounds.    Cardiac Exam: Regular rate and rhythm with no murmur  Abdomen: Soft, non-tender, normal bowel sounds.  + obese Extremities: Moves all ext well. Mild lower extremity edema. Psych: Appropriate affect  Neuro - Grossly intact          LABS / OTHER STUDIES:     Recent Results (from the past 24 hour(s))   CK W/ REFLX CKMB    Collection Time: 07/20/20  4:34 PM   Result Value Ref Range     26 - 192 U/L   CBC WITH AUTOMATED DIFF    Collection Time: 07/20/20  4:34 PM   Result Value Ref Range    WBC 7.5 3.6 - 11.0 K/uL    RBC 3.63 (L) 3.80 - 5.20 M/uL    HGB 11.9 11.5 - 16.0 g/dL    HCT 35.2 35.0 - 47.0 %    MCV 97.0 80.0 - 99.0 FL    MCH 32.8 26.0 - 34.0 PG    MCHC 33.8 30.0 - 36.5 g/dL    RDW 13.0 11.5 - 14.5 %    PLATELET 180 780 - 427 K/uL    MPV 10.2 8.9 - 12.9 FL    NRBC 0.0 0  WBC    ABSOLUTE NRBC 0.00 0.00 - 0.01 K/uL    NEUTROPHILS 78 (H) 32 - 75 %    LYMPHOCYTES 14 12 - 49 %    MONOCYTES 7 5 - 13 %    EOSINOPHILS 1 0 - 7 %    BASOPHILS 0 0 - 1 %    IMMATURE GRANULOCYTES 0 0.0 - 0.5 %    ABS. NEUTROPHILS 5.8 1.8 - 8.0 K/UL    ABS. LYMPHOCYTES 1.0 0.8 - 3.5 K/UL    ABS. MONOCYTES 0.6 0.0 - 1.0 K/UL    ABS. EOSINOPHILS 0.1 0.0 - 0.4 K/UL    ABS. BASOPHILS 0.0 0.0 - 0.1 K/UL    ABS. IMM. GRANS. 0.0 0.00 - 0.04 K/UL    DF AUTOMATED     METABOLIC PANEL, COMPREHENSIVE    Collection Time: 07/20/20  4:34 PM   Result Value Ref Range    Sodium 129 (L) 136 - 145 mmol/L    Potassium 4.5 3.5 - 5.1 mmol/L    Chloride 97 97 - 108 mmol/L    CO2 23 21 - 32 mmol/L    Anion gap 9 5 - 15 mmol/L    Glucose 116 (H) 65 - 100 mg/dL    BUN 34 (H) 6 - 20 MG/DL    Creatinine 1.28 (H) 0.55 - 1.02 MG/DL    BUN/Creatinine ratio 27 (H) 12 - 20      GFR est AA 48 (L) >60 ml/min/1.73m2    GFR est non-AA 40 (L) >60 ml/min/1.73m2    Calcium 8.4 (L) 8.5 - 10.1 MG/DL    Bilirubin, total 0.4 0.2 - 1.0 MG/DL    ALT (SGPT) 30 12 - 78 U/L    AST (SGOT) 16 15 - 37 U/L    Alk.  phosphatase 49 45 - 117 U/L    Protein, total 7.5 6.4 - 8.2 g/dL    Albumin 3.7 3.5 - 5.0 g/dL    Globulin 3.8 2.0 - 4.0 g/dL    A-G Ratio 1.0 (L) 1.1 - 2.2     NT-PRO BNP Collection Time: 07/20/20  4:34 PM   Result Value Ref Range    NT pro- (H) <450 PG/ML   TROPONIN I    Collection Time: 07/20/20  4:34 PM   Result Value Ref Range    Troponin-I, Qt. <0.05 <0.05 ng/mL   PROCALCITONIN    Collection Time: 07/20/20  4:34 PM   Result Value Ref Range    Procalcitonin <0.05 ng/mL   SAMPLES BEING HELD    Collection Time: 07/20/20  4:35 PM   Result Value Ref Range    SAMPLES BEING HELD SST.[id number].[email or web address]     COMMENT        Add-on orders for these samples will be processed based on acceptable specimen integrity and analyte stability, which may vary by analyte.    D DIMER    Collection Time: 07/20/20  4:35 PM   Result Value Ref Range    D-dimer 1.12 (H) 0.00 - 0.65 mg/L FEU   TSH 3RD GENERATION    Collection Time: 07/20/20  4:35 PM   Result Value Ref Range    TSH 1.36 0.36 - 3.74 uIU/mL   SARS-COV-2    Collection Time: 07/20/20  9:27 PM   Result Value Ref Range    Specimen source Nasopharyngeal      SARS-CoV-2 PENDING     Specimen source Nasopharyngeal     BLOOD GAS, ARTERIAL    Collection Time: 07/20/20 11:50 PM   Result Value Ref Range    pH 7.46 (H) 7.35 - 7.45      PCO2 32 (L) 35.0 - 45.0 mmHg    PO2 99 80 - 100 mmHg    O2 SAT 98 (H) 92 - 97 %    BICARBONATE 23 22 - 26 mmol/L    BASE DEFICIT 0.3 mmol/L    FIO2 21 %    Sample source ARTERIAL      SITE RB      NOVA'S TEST N/A     URINALYSIS W/MICROSCOPIC    Collection Time: 07/21/20  1:23 AM   Result Value Ref Range    Color YELLOW/STRAW      Appearance CLEAR CLEAR      Specific gravity 1.016 1.003 - 1.030      pH (UA) 7.5 5.0 - 8.0      Protein Negative NEG mg/dL    Glucose Negative NEG mg/dL    Ketone Negative NEG mg/dL    Bilirubin Negative NEG      Blood Negative NEG      Urobilinogen 0.2 0.2 - 1.0 EU/dL    Nitrites Negative NEG      Leukocyte Esterase Negative NEG      WBC 0-4 0 - 4 /hpf    RBC 0-5 0 - 5 /hpf    Epithelial cells FEW FEW /lpf    Bacteria Negative NEG /hpf    Hyaline cast 0-2 0 - 5 /lpf   METABOLIC PANEL, COMPREHENSIVE    Collection Time: 07/21/20  1:23 AM   Result Value Ref Range    Sodium 131 (L) 136 - 145 mmol/L    Potassium 4.2 3.5 - 5.1 mmol/L    Chloride 99 97 - 108 mmol/L    CO2 22 21 - 32 mmol/L    Anion gap 10 5 - 15 mmol/L    Glucose 162 (H) 65 - 100 mg/dL    BUN 30 (H) 6 - 20 MG/DL    Creatinine 1.37 (H) 0.55 - 1.02 MG/DL    BUN/Creatinine ratio 22 (H) 12 - 20      GFR est AA 45 (L) >60 ml/min/1.73m2    GFR est non-AA 37 (L) >60 ml/min/1.73m2    Calcium 8.1 (L) 8.5 - 10.1 MG/DL    Bilirubin, total 0.2 0.2 - 1.0 MG/DL    ALT (SGPT) 25 12 - 78 U/L    AST (SGOT) 18 15 - 37 U/L    Alk. phosphatase 38 (L) 45 - 117 U/L    Protein, total 6.6 6.4 - 8.2 g/dL    Albumin 3.2 (L) 3.5 - 5.0 g/dL    Globulin 3.4 2.0 - 4.0 g/dL    A-G Ratio 0.9 (L) 1.1 - 2.2     MAGNESIUM    Collection Time: 07/21/20  1:23 AM   Result Value Ref Range    Magnesium 2.3 1.6 - 2.4 mg/dL   CBC W/O DIFF    Collection Time: 07/21/20  1:23 AM   Result Value Ref Range    WBC 6.8 3.6 - 11.0 K/uL    RBC 3.51 (L) 3.80 - 5.20 M/uL    HGB 11.7 11.5 - 16.0 g/dL    HCT 34.0 (L) 35.0 - 47.0 %    MCV 96.9 80.0 - 99.0 FL    MCH 33.3 26.0 - 34.0 PG    MCHC 34.4 30.0 - 36.5 g/dL    RDW 13.2 11.5 - 14.5 %    PLATELET 660 787 - 103 K/uL    MPV 9.6 8.9 - 12.9 FL    NRBC 0.0 0  WBC    ABSOLUTE NRBC 0.00 0.00 - 0.01 K/uL   GLUCOSE, POC    Collection Time: 07/21/20  6:38 AM   Result Value Ref Range    Glucose (POC) 147 (H) 65 - 100 mg/dL    Performed by Jorje Ortiz (PCT)    GLUCOSE, POC    Collection Time: 07/21/20 11:06 AM   Result Value Ref Range    Glucose (POC) 107 (H) 65 - 100 mg/dL    Performed by Yohan Cali (PCT)              CARDIAC DIAGNOSTICS:        Cardiac Evaluation Includes:  Cath 2010: LAD 25%, RCA 20%, Mid RCA 20%. Stress Nuc 2013: Normal perfusion. EF 74.   V/Q 5/1/16 - low prob  Echo 5/2/16 - LVEF 60%  Lexiscan Cardiolite 5/2/16 - 1) No ischemic EKG changes with Lexiscan.  Frequent PVC's during stress.  Mild chest pain noted during Isidor Yohana Injection.    2) Normal Lexiscan gated SPECT myocardial perfusion study. 3) LVEF 55%  [facility] Screening 10/17/15 - mild carotid disease, normal PALAK and AAA  Carotid Doppler 8/25/17 - 10-49% stenosis JONATAN and 2-3% LICA,     Renal Artery Dopplers - 4/19/19 - normal       CXR 7/20/20 - no acute process     Chest CTA 7/20/20 - No PE. No acute disease in the chest. + coronary calcifications         EKG 8/25/17 - NSR, RBBB  EKG 10/12/18 - NSR, RBBB, LAFB  EKG 12/10/19 - NSR, RBBB,   EKG 7/21/20 - NSR, RBBB, LAD      I viewed EKG and CXR myself.                   ASSESSMENT AND PLAN:        Assessment and Plan:  1) CHRISTIAN   - She has along history of CHRISTIAN and atypical chest pain   - Prior cath, stress test, echo and BNP level have been unremarkable (see above)   - She saw Pulmonary and felt better with Advair in past   - This admission CXR and Chest CTA unremarkable 7/20/20. ProBNP just minimally elevated (and not diagnostic for CHF). No JVD or crackles on exam. I suspect volume status overall compensated. - Will recheck an echo   - She probably some degree of diastolic dysfunction which may be playing a role in [address] (in addition to her obesity, age, deconditioning, and asthma playing roles). For management of diastolic dysfunction aim for tight BP control and using diuretics as well (aldactone specifically). Given hyponatremia and elevated Cr, will have to be cautious with dieretics as well.   ---> Due to bradycardia, Cardizem dose cut to 240 mg daily - continue her outpatient chlorthalidone, aldactone, losartan, and clonidine. Clonidine helps for BP but it makes her tired so can only take it at night. Avoiding adding amlodipine since she is already on cardizem and has problems with LE edema at baseline.   - O2 sats are 97% on RA at rest   - Hopefully home tomorrow if BP is better   - Can do lexiscan cardiolite as outpatient       2) HTN   - BP high on arrival and at home in evenings.    - med changes as above       3) Mild CAD      4) Dyslipidemia  - Given age and lack of significant CAD and presence of joint pain, statin benefit questionable. - can continue crestor if she wants (Endocrinology Dr. Juwan Knott restarted a statin)       5) Chronic LE edema  - Is on chlorthalidone and aldactone -- uses lasix PRN  - apply TEDS       6) Reviewed with Dr. Sarah Hurley. Hopefully home tomorrow. Luz Elena President, MD, [facility] [address] [place] [address].  [address], [address] [place] [address]  Ph: [phone]   Ph [phone]

## 2020-07-21 NOTE — DIABETES MGMT
RENETTA GARZA  CLINICAL NURSE SPECIALIST CONSULT  [facility]    INITIAL NOTE    Presentation   Christella Barthel is a 80 y.o. female admitted for dyspnea on exertion. PMH: arthritis, asthma, breast cancer, chest pain, diabetes, diverticulosis, dyslipidemia, frequent PVC's, GERD, hiatal hernia, HTN, hypothyroid, neuropathy, obesity, LA with CPAP, right bundle branch block, and spinal stenosis. Current clinical course has been uncomplicated. Diabetes: Patient has known Type 2 diabetes, diagno\"over 10 years\" ago. Patient's home diabetes medication regimen is Metformin 500 mg once daily. Family history positive for diabetes- both parents diagnosed in their 63's with type 2 diabetes. A1c 2/13/20: 6.1% (patient reports had an A1c in May 2020 that was 6%, no record of this result in chart)    Consulted by Provider for advanced diabetes nursing assessment and care, specifically related to     [x] Home management assessment      Diabetes-related medical history  Acute complications  denies  Neurological complications  Peripheral neuropathy  Microvascular disease  Nephropathy  Macrovascular disease  denies  Other associated conditions     denies    Diabetes medication history  Drug class Currently in use Discontinued Never used   Biguanide Metformin 500mg daily     DPP-4 inhibitor       Sulfonylurea      Thiazolidinedione      GLP-1 RA      SGLT-2 inhibitors      Basal insulin      Fixed Dose  Combinations      Bolus insulin        Subjective   I just take Metformin and supposed to watch my diet but I am not always as compliant with the diet as I should be.     Patient reports the following home diabetes self-care practices:  Eating pattern  [x] Breakfast 1/2 bagel with cream cheese or cereal with a banana, orange juice and coffee  [x] Lunch  Patient reports she doesn't typically eat lunch but occasionally will eat 1/2 peanut butter sandwich if hungry  [x] Dinner  Meat, potato or macaroni, vegetable and roll  [x] Snacks \"Small pieces of candy\"   [x] Beverages Water with crystal light lemonade or diet soda. Physical activity pattern  Patient denies a specific exercise routine at home. Reports can't do much activity without holding on to something. Patient uses a walker. Monitoring pattern  Patient reports that she checks her BG \"when I think about it. \" patient reports was checking more frequently but since A1c has been consistently around 6% she hasn't been checking as often. Taking medications pattern  [x] Consistent administration  [x] Affordable    Social determinants of health impacting diabetes self-management practices   Patient denies any issues or concerns at this time. Objective   Physical exam  General Alert, oriented and in no acute distress. Conversant and cooperative. Vital Signs   Visit Vitals  /72 (BP 1 Location: Right arm, BP Patient Position: At rest)   Pulse (!) 58   Temp 98 °F (36.7 °C)   Resp 16   Ht 5' 3.5\" (1.613 m)   Wt 92.5 kg (204 lb)   SpO2 96%   BMI 35.57 kg/m²     Skin  Warm and dry. No Acanthosis noted along neckline. No lipohypertrophy or lipoatrophy noted at injection sites   Heart   Regular rate and rhythm.  No murmurs, rubs or gallops  Lungs  Clear to auscultation without rales or rhonchi  Extremities No foot wounds    Diabetic foot exam:    Left Foot     Visual Exam: normal    Pulse DP: 2+ (normal)   Filament test: normal sensation      Right Foot   Visual Exam: normal    Pulse DP: 2+ (normal)   Filament test: normal sensation         Laboratory  No results found for: HBA1C, HGBE8, HRZ2RJOS, SXB3LLFQ  No results found for: LDL, LDLC, DLDLP  Lab Results   Component Value Date/Time    Creatinine (POC) 1.3 10/12/2017 01:51 PM    Creatinine 1.37 (H) 07/21/2020 01:23 AM     Lab Results   Component Value Date/Time    Sodium 131 (L) 07/21/2020 01:23 AM    Potassium 4.2 07/21/2020 01:23 AM    Chloride 99 07/21/2020 01:23 AM    CO2 22 07/21/2020 01:23 AM    Anion gap 10 07/21/2020 01:23 AM    Glucose 162 (H) 07/21/2020 01:23 AM    BUN 30 (H) 07/21/2020 01:23 AM    Creatinine 1.37 (H) 07/21/2020 01:23 AM    BUN/Creatinine ratio 22 (H) 07/21/2020 01:23 AM    GFR est AA 45 (L) 07/21/2020 01:23 AM    GFR est non-AA 37 (L) 07/21/2020 01:23 AM    Calcium 8.1 (L) 07/21/2020 01:23 AM    Bilirubin, total 0.2 07/21/2020 01:23 AM    Alk. phosphatase 38 (L) 07/21/2020 01:23 AM    Protein, total 6.6 07/21/2020 01:23 AM    Albumin 3.2 (L) 07/21/2020 01:23 AM    Globulin 3.4 07/21/2020 01:23 AM    A-G Ratio 0.9 (L) 07/21/2020 01:23 AM    ALT (SGPT) 25 07/21/2020 01:23 AM     Lab Results   Component Value Date/Time    ALT (SGPT) 25 07/21/2020 01:23 AM       Factors affecting BG pattern  Factor Dose Comments   Nutrition:  Carb-controlled meals   60 grams/meal   Cardiac 2 GM NA, consistent carb, 1800kcal, FR 1500 mL   Drugs:  Steroids  HIV   Other: n/a   n/a  n/a    Pain 0/10    Infection n/a    Other: n/a n/a      Blood glucose pattern      Evaluation   This 80year old female with Type 2 diabetes, has achieved inpatient blood glucose target of 100-180mg/dl. BG's have ranged 107-162 over the past 24 hours. Basal insulin isn't in use. Bolus insulin isn't in use. Patient is eating meals. Corrective insulin is in use. Patient has required 2 units of corrective insulin over the past 24 hours. Metformin 500 mg daily ordered to start 7/23/20. Patient had CT scan with contrast 7/20/20. Inpatient blood glucose management has been impacted by  [x] Kidney dysfunction    Impression:  With an A1c of 6.1% it indicates that patient's home diabetes medication regimen has been effective in maintaining control of BG's. It is likely that this will continue on corrective insulin alone until Metformin can be restarted. Metformin can be restarted 7/23/20.      Assessment and Plan   Nursing Diagnosis Risk for unstable blood glucose pattern   Nursing Intervention Domain 8978 Decision-making Support   Nursing Interventions Examined current inpatient diabetes control   Explored factors facilitating and impeding inpatient management  Identified self-management practices impeding diabetes control  Explored corrective strategies with patient and responsible inpatient provider   Informed patient of rational for insulin strategy while hospitalized       Recommendations     Resume Metformin 500mg daily 7/23/20 (48 hours after contrast given)    Use Subcutaneous Insulin Order set (9867):    Corrective insulin  INITIATE CORRECTIVE INSULIN PROTOCOL (ANETTE):  RX ANETTE Insulin Resistant (Obese, Steroids use)    AC (before meals), Q6H, and Q4H CORRECTIONAL SCALE only For Blood Sugar (mg/dl) of :             140-199=3 units            200-249=4 units  250-299=7 units  300-349=10 units  350 or greater = Call MD  Give in addition to basal medications. Do Not Hold for NPO    BEDTIME CORRECTIONAL sliding scale when scheduled:  200-249=2 units  250-299=4 units   300-349=5 units  350 or greater = Call MD  Give in addition to basal medications. Do Not Hold for NPO  Fast Acting - Administer Immediately - or within 15 minutes of start of meal, if mealtime coverage. Referral     [x] Diabetes Self-Management Training through [facility] (Phone [phone] to schedule appointment)    Time Spent     Total time spent with patient: 30 Minutes   I personally reviewed chart, notes, data and current medications in the medical record. I have personally examined and treated the patient at bedside during this period.      Kameron Collado, CNS  [facility]  Access via [employer]

## 2020-07-21 NOTE — PROGRESS NOTES
[facility] [address], [place], [address] [place]  [phone]      Medical Progress Note      NAME: Zain Batista   :  1935  MRM:  [id number]    Date of service: 2020  7:19 AM       Assessment and Plan:   1. Exertional dyspnea/ BL leg edema - POA, unclear etiology. No hypoxia. No xray findings. check echocardiogram. Consult Dr Franko Centeno. Needs pulmonary PFTs as outpatient.         2.  Diabetes mellitus type 2 without complications - Diabetic diet and counseling. SSI per protocol. Continue home metformin. Check A1c.     3. Asthma - Per chart hx, but never been tested and not on any meds. Not wheezing.      4. LA on CPAP / Obesity - She states she is compliant with CPAP. Can use home machine. Advise weight loss     5. HTN (hypertension) / Bradycardia -   resume clonidine, spironolactone, chlorthalidone, diltiazem (reduced dose), losartan.       6. Dyslipidemia - continue rosuvastatin     7. Hypothyroid - Continue synthroid. TSH is normal     8. GERD (gastroesophageal reflux disease) - PPI     9. Arthritis / Neuropathy - At baseline. Continue gabapentin              Subjective:     Chief Complaint[de-identified] Patient was seen and examined as a follow up for SOB. Chart was reviewed. c/o exertional dyspnea    ROS:  (bold if positive, if negative)    Tolerating PT  Tolerating Diet        Objective:     Last 24hrs VS reviewed since prior progress note.  Most recent are:    Visit Vitals  /52 (BP 1 Location: Right arm, BP Patient Position: At rest)   Pulse 68   Temp 98 °F (36.7 °C)   Resp 16   Ht 5' 3.5\" (1.613 m)   Wt 92.5 kg (204 lb)   SpO2 96%   BMI 35.57 kg/m²     SpO2 Readings from Last 6 Encounters:   20 96%   20 97%   12/10/19 96%   19 98%   10/12/18 97%   18 97%        No intake or output data in the 24 hours ending 20 0719     Physical Exam:    Gen:  Well-developed, well-nourished, in no acute distress  HEENT:  Pink conjunctivae, PERRL, hearing intact to voice, moist mucous membranes  Neck:  Supple, without masses, thyroid non-tender  Resp:  No accessory muscle use, clear breath sounds without wheezes rales or rhonchi  Card:  No murmurs, normal S1, S2 without thrills, bruits.  ++ peripheral edema  Abd:  Soft, non-tender, non-distended, normoactive bowel sounds are present, no palpable organomegaly and no detectable hernias  Lymph:  No cervical or inguinal adenopathy  Musc:  No cyanosis or clubbing  Skin:  No rashes or ulcers, skin turgor is good  Neuro:  Cranial nerves are grossly intact, no focal motor weakness, follows commands appropriately  Psych:  Good insight, oriented to person, place and time, alert  __________________________________________________________________  Medications Reviewed: (see below)  Medications:     Current Facility-Administered Medications   Medication Dose Route Frequency    chlorthalidone (HYGROTEN) tablet 25 mg  25 mg Oral DAILY    cloNIDine HCL (CATAPRES) tablet 0.1 mg  0.1 mg Oral BID    dilTIAZem ER (CARDIZEM CD) capsule 240 mg  240 mg Oral DAILY    gabapentin (NEURONTIN) capsule 200 mg  200 mg Oral DAILY    levothyroxine (SYNTHROID) tablet 100 mcg  100 mcg Oral ACB    losartan (COZAAR) tablet 100 mg  100 mg Oral QHS    [START ON 7/23/2020] metFORMIN (GLUCOPHAGE) tablet 500 mg  500 mg Oral DAILY WITH BREAKFAST    montelukast (SINGULAIR) tablet 10 mg  10 mg Oral QPM    pantoprazole (PROTONIX) tablet 40 mg  40 mg Oral ACB    rosuvastatin (CRESTOR) tablet 10 mg  10 mg Oral QHS    spironolactone (ALDACTONE) tablet 25 mg  25 mg Oral DAILY    glucose chewable tablet 16 g  4 Tab Oral PRN    dextrose (D50W) injection syrg 12.5-25 g  25-50 mL IntraVENous PRN    glucagon (GLUCAGEN) injection 1 mg  1 mg IntraMUSCular PRN    acetaminophen (TYLENOL) tablet 650 mg  650 mg Oral Q6H PRN    Or    acetaminophen (TYLENOL) suppository 650 mg  650 mg Rectal Q6H PRN    polyethylene glycol (MIRALAX) packet 17 g  17 g Oral DAILY PRN    ondansetron (ZOFRAN ODT) tablet 4 mg  4 mg Oral Q8H PRN    Or    ondansetron (ZOFRAN) injection 4 mg  4 mg IntraVENous Q6H PRN    enoxaparin (LOVENOX) injection 40 mg  40 mg SubCUTAneous DAILY    insulin lispro (HUMALOG) injection   SubCUTAneous AC&HS    sodium chloride (NS) flush 5-40 mL  5-40 mL IntraVENous Q8H    sodium chloride (NS) flush 5-40 mL  5-40 mL IntraVENous PRN    famotidine (PEPCID) tablet 20 mg  20 mg Oral QHS        Lab Data Reviewed: (see below)  Lab Review:     Recent Labs     07/21/20  0123 07/20/20  1634   WBC 6.8 7.5   HGB 11.7 11.9   HCT 34.0* 35.2    236     Recent Labs     07/21/20  0123 07/20/20  1634   * 129*   K 4.2 4.5   CL 99 97   CO2 22 23   * 116*   BUN 30* 34*   CREA 1.37* 1.28*   CA 8.1* 8.4*   MG 2.3  --    ALB 3.2* 3.7   TBILI 0.2 0.4   ALT 25 30     Lab Results   Component Value Date/Time    Glucose (POC) 147 (H) 07/21/2020 06:38 AM    Glucose (POC) 115 (H) 05/02/2016 07:49 AM    Glucose (POC) 111 (H) 05/01/2016 09:45 PM    Glucose (POC) 107 (H) 05/01/2016 06:17 PM    Glucose (POC) 121 (H) 09/03/2010 12:26 PM     Recent Labs     07/20/20  2350   PH 7.46*   PCO2 32*   PO2 99   HCO3 23   FIO2 21     No results for input(s): INR, INREXT in the last 72 hours. All Micro Results     Procedure Component Value Units Date/Time    CULTURE, URINE [[id number]] Collected:  07/21/20 0123    Order Status:  Completed Specimen:  Cath Urine Updated:  07/21/20 0132          I have reviewed notes of prior 24hr. Other pertinent lab:      Total time spent with patient: [address] discussed with: Patient, Nursing Staff and >50% of time spent in counseling and coordination of care    Discussed:  Care Plan    Prophylaxis:  Lovenox    Disposition: home            ___________________________________________________    Attending Physician: Brannon Hernandez MD

## 2020-07-21 NOTE — PROGRESS NOTES
OCCUPATIONAL THERAPY EVALUATION/DISCHARGE  Patient: Carmen Mendoza (78 y.o. female)  Date: 7/21/2020  Primary Diagnosis: Dyspnea [R06.00]       Precautions:  Bed Alarm, Fall    ASSESSMENT  Based on the objective data described below, the patient presents with near baseline ADL performance following admission for CHRISTIAN. Pt is pleasant and agreeable to participate and follows all commands during session. She is able to perform serial ADL tasks in bathroom and seated in chair with overall mod I. Pt reports familiarity of AE but is able to demonstrate lower body dressing with side sitting on EOB. Pt is receptive to all education regarding energy conservation, home safety, and ADL adaptations. She requires min cues for safe RW mgmt at sink and is able to maintain stable SpO2 at 97% on RA throughout ADL tasks. All questions answered. No further skilled OT needs indicated at this time. Current Level of Function (ADLs/self-care): overall mod I to supervision for ADLs    Functional Outcome Measure: The patient scored 75/100 on the Barthel outcome measure which is indicative of minimal functional impairment. Other factors to consider for discharge: lives with      PLAN :  Recommend with staff: OOB to chair for all meals with 1 person assist; mobility to bathroom for toileting with 1 person assist    Recommendation for discharge: (in order for the patient to meet his/her long term goals)  No skilled occupational therapy/ follow up rehabilitation needs identified at this time. This discharge recommendation:  Has been made in collaboration with the attending provider and/or case management    IF patient discharges home will need the following DME: patient owns DME required for discharge       SUBJECTIVE:   Patient stated I like to just stand at the sink to bathe because I get afraid to get in the shower.     OBJECTIVE DATA SUMMARY:   HISTORY:   Past Medical History:   Diagnosis Date    Arthritis     Asthma  [facility] cancer ([facility] [address]     Chest pain, unspecified     atypical chest discomfort with normal stress cardiolite 2002, and normal stress echoes 2004, 2006 and 2009. Negative dobutamine cardiolite March 2009 for ischemia with an EF=63%. Cardiac Cath 9/3/2010 showed mild CAD.  Diabetes mellitus ([facility] [address]     Diverticulosis     Dyslipidemia     Frequent PVCs     GERD (gastroesophageal reflux disease)     Hiatal hernia     HTN (hypertension)     Hypothyroid     Neuropathy     Obesity     LA on CPAP     RBBB (right bundle branch block)     Spinal stenosis      Past Surgical History:   Procedure Laterality Date    HX BUNIONECTOMY      HX CHOLECYSTECTOMY      HX HEART CATHETERIZATION      Cardiac Cath 9/3/2010 showed mild CAD.  HX HIP REPLACEMENT      HX ORTHOPAEDIC      HX OTHER SURGICAL      Echo 10/5/12 - LVEF 60-65%, mod-marked LAE, MAC, grade 1 diastolic dysfunction     HX OTHER SURGICAL      Lexiscan cardiolite 1/25/13 - normal MPI, LVEF 74%    HX OTHER SURGICAL      Aorta duplex 1/6/14 - aorta size 1.7 cm, no dilation        Prior Level of Function/Environment/Context: Pt is mod I for ADLs and mobility with use of walker; lives with ;   Expanded or extensive additional review of patient history:   Home Situation  Home Environment: Private residence  Wheelchair Ramp: Yes  One/Two Story Residence: Two story, live on 1st floor  # of Interior Steps: 12  Interior Rails: Right  Lift Chair Available: No  Living Alone: No  Support Systems: Spouse/Significant Other/Partner  Patient Expects to be Discharged to[de-identified] Private residence  Current DME Used/Available at Home: Darrall Osier, straight, Walker, rollator, Walker, rolling  Tub or Shower Type: Shower    Hand dominance: Right    EXAMINATION OF PERFORMANCE DEFICITS:  Cognitive/Behavioral Status:  Neurologic State: Alert  Orientation Level: Oriented X4  Cognition: Appropriate decision making; Appropriate for age attention/concentration; Appropriate safety awareness; Follows commands  Perception: Appears intact  Perseveration: No perseveration noted  Safety/Judgement: Awareness of environment; Fall prevention;Good awareness of safety precautions; Home safety; Insight into deficits    Hearing: Auditory  Auditory Impairment: None    Range of Motion:  AROM: Generally decreased, functional  PROM: Generally decreased, functional    Strength:  Strength: Generally decreased, functional    Coordination:  Coordination: Within functional limits  Fine Motor Skills-Upper: Left Intact; Right Intact    Gross Motor Skills-Upper: Left Intact; Right Intact    Tone & Sensation:  Tone: Normal  Sensation: Intact    Balance:  Sitting: Intact; Without support  Standing: Intact; With support    Functional Mobility and Transfers for ADLs:  Bed Mobility:  Rolling: Modified independent  Supine to Sit: Modified independent  Sit to Supine: (Pt seated up in chair at end of session)  Scooting: Modified independent    Transfers:  Sit to Stand: Modified independent  Stand to Sit: Modified independent  Bathroom Mobility: Modified independent  Toilet Transfer : Modified independent    ADL Assessment:  Feeding: Independent    Oral Facial Hygiene/Grooming: Modified Independent(standing at sink)    Bathing: Supervision(for standing bathing tasks)    Upper Body Dressing: Setup    Lower Body Dressing: Modified independent    Toileting: Modified independent    ADL Intervention and task modifications:  Grooming  Grooming Assistance: Modified independent  Position Performed: Standing(at sink with rolling walker)  Washing Face: Modified independent  Washing Hands: Modified independent  Brushing/Combing Hair: Modified independent  Cues: Verbal cues provided(for safety with rolling walker at sink)    Upper Body Bathing  Bathing Assistance: Supervision  Position Performed: Standing(at sink)  Adaptive Equipment: Walker    Lower Body Bathing  Perineal  : Modified independent  Position Performed: Seated on toilet  Adaptive Equipment: Chales Car    Lower Body Dressing Assistance  Underpants: Modified independent  Pants With Elastic Waist: Modified independent  Socks: Modified independent(pt side sits on EOB with LE up on mattress)  Shoes with Cloth Laces: Modified independent(pt side sits EOB )  Leg Crossed Method Used: No  Position Performed: Seated edge of bed  Cues: Don;Verbal cues provided    Toileting  Bladder Hygiene: Modified independent(seated)  Clothing Management: Modified independent  Adaptive Equipment: Grab bars; Walker    Cognitive Retraining  Safety/Judgement: Awareness of environment; Fall prevention;Good awareness of safety precautions; Home safety; Insight into deficits    Educated patient about importance of keeping hands free at all times when using rolling walker for fall prevention. Instructed patient on safe and appropriate hand placement during transfers (push up from sitting surface when standing up, reach back for sitting surface when sitting down, use grab bars, etc.), including never to pull up on rolling walker for fall prevention and safety. Instructed patient to push rolling walker up to surface (countertop, sink, etc.) and  it as opposed to abandoning rolling walker on the side for safety. Patient verbalized understanding of same. Patient instructed and indicated understanding energy conservation techniques to increase independence and safety during ADLs. Functional Measure:  Barthel Index:    Bathin  Bladder: 10  Bowels: 10  Groomin  Dressing: 10  Feeding: 10  Mobility: 5  Stairs: 0  Toilet Use: 10  Transfer (Bed to Chair and Back): 15  Total: 75/100        The Barthel ADL Index: Guidelines  1. The index should be used as a record of what a patient does, not as a record of what a patient could do. 2. The main aim is to establish degree of independence from any help, physical or verbal, however minor and for whatever reason.   3. The need for supervision renders the patient not independent. 4. A patient's performance should be established using the best available evidence. Asking the patient, friends/relatives and nurses are the usual sources, but direct observation and common sense are also important. However direct testing is not needed. 5. Usually the patient's performance over the preceding 24-48 hours is important, but occasionally longer periods will be relevant. 6. Middle categories imply that the patient supplies over 50 per cent of the effort. 7. Use of aids to be independent is allowed. Precious Saldana., BarthelMELLISAW. (3672). Functional evaluation: the Barthel Index. [address] [facility] (14)2. Auther Pelon sarabjit CECY Barrios, Claudio Valdez, Mehran Sauceda, Dianne, [address] (1999). Measuring the change indisability after inpatient rehabilitation; comparison of the responsiveness of the Barthel Index and Functional [place] Measure. Journal of Neurology, Neurosurgery, and Psychiatry, 66(4), 709-760. Tramaine Mcgill, N.J.A, SUDARSHAN Bates, & Daniel Reynoso MMikiA. (2004.) Assessment of post-stroke quality of life in cost-effectiveness studies: The usefulness of the Barthel Index and the EuroQoL-5D. Quality of Life Research, 15, 355-01     Occupational Therapy Evaluation Charge Determination   History Examination Decision-Making   LOW Complexity : Brief history review  LOW Complexity : 1-3 performance deficits relating to physical, cognitive , or psychosocial skils that result in activity limitations and / or participation restrictions  MEDIUM Complexity : Patient may present with comorbidities that affect occupational performnce.  Miniml to moderate modification of tasks or assistance (eg, physical or verbal ) with assesment(s) is necessary to enable patient to complete evaluation       Based on the above components, the patient evaluation is determined to be of the following complexity level: LOW   Pain Rating:  Pt reporting minimal pain    Activity Tolerance:   Fair and SpO2 stable on RA  Please refer to the flowsheet for vital signs taken during this treatment. After treatment patient left in no apparent distress:    Sitting in chair, Call bell within reach, Bed / chair alarm activated and RN notified    COMMUNICATION/EDUCATION:   The patients plan of care was discussed with: Physical therapist and Registered nurse.      Thank you for this referral.  Garett Brian OT  Time Calculation: 47 mins

## 2020-07-21 NOTE — PROGRESS NOTES
[facility]: MED RECONCILIATION    Comments/Recommendations:   Reviewed PTA medications with patient over the phone, patient is a good historian regarding home medications. Of note- patient has been taking furosemide 40 mg daily for ~3 days for edema; she holds her chlorthalidone when she is taking this. Noted hx valsartan allergy- pt reports she tolerates losartan. Hx spironolactone \"allergy\" hyperkalemia- Pt states the diuretic has been preventing this. Potassium WNL = 4.5 on admission. Medications added:     Furosemide 40 mg PRN- see above    Medications removed:    Loratadine  Lamisil    Medications adjusted:    Diltiazem, clonidine, gabapentin, losartan, montelukast, rosuvastatin at bedtime    Information obtained from: Patient, RxQuery, Chart review    Allergies: Tolectin [tolmetin]; Aldactone [spironolactone]; Carvedilol; Ceclor [cefaclor]; Ciprofloxacin; Darvon [propoxyphene]; Macrobid [nitrofurantoin monohyd/m-cryst]; Norvasc [amlodipine]; Tetracycline; Toprol xl [metoprolol succinate]; Valsartan; and Zestril [lisinopril]    Prior to Admission Medications:     Medication Documentation Review Audit       Reviewed by Leilani Romero, PHARMD (Pharmacist) on 07/20/20 at 2251      Medication Sig Documenting Provider Last Dose Status Taking?   acetaminophen (TYLENOL EXTRA STRENGTH) 500 mg tablet Take  by mouth every six (6) hours as needed for Pain. Provider, Historical 7/19/2020 Active Yes   calcium-cholecalciferol, D3, (CALTRATE 600+D) tablet Take 1 Tab by mouth two (2) times daily (with meals). Provider, Historical 7/20/2020 AM Active Yes   cetirizine (ZyrTEC) 10 mg tablet Take 10 mg by mouth daily. Provider, Historical 7/20/2020 Active Yes   chlorthalidone (HYGROTEN) 25 mg tablet Take 25 mg by mouth daily. Does not take if taking furosemide 40 mg Provider, Historical 7/17/2020 Active Yes   cloNIDine HCL (CATAPRES) 0.1 mg tablet Take 2 Tabs by mouth nightly.  Arnoldo Foster MD 7/19/2020 PM Active Yes cyanocobalamin 1,000 mcg tablet Take 1,000 mcg by mouth every Sunday. Tee Salazar MD 7/19/2020 Active Yes   dilTIAZem ER (TIAZAC) 300 mg capsule Take 300 mg by mouth nightly. Provider, Historical 7/19/2020 PM Active Yes   furosemide (LASIX) 40 mg tablet Take 40 mg by mouth daily as needed (Swelling, shortness of breath). Patient has furosemide 40 mg tablets at home, she takes this once daily as needed for swelling or SOB. Patient holds the chlorthalidone dose if she is taking lasix. Provider, Historical 7/20/2020 Active Yes   gabapentin (NEURONTIN) 100 mg capsule Take 200 mg by mouth nightly. Provider, Historical 7/19/2020 Active Yes           Med Note (Sarah Callaway Apr 27, 2018 11:07 AM)     glucosam/chond/hyalu/CF borate (MOVE FREE JOINT HEALTH PO) Take 1 Tab by mouth daily. Provider, Historical 7/20/2020 Active Yes   levothyroxine (SYNTHROID) 100 mcg tablet Take 100 mcg by mouth Daily (before breakfast). Provider, Historical 7/20/2020 Active Yes   losartan (COZAAR) 100 mg tablet Take 1 Tab by mouth nightly. Cosmo Wiggins MD 7/19/2020 PM Active Yes   metformin (GLUCOPHAGE) 500 mg tablet Take 500 mg by mouth daily (with breakfast). Provider, Historical 7/20/2020 Active Yes   montelukast (SINGULAIR) 10 mg tablet Take 10 mg by mouth nightly. Provider, Historical 7/19/2020 Active Yes   omeprazole (PRILOSEC) 20 mg capsule Take 20 mg by mouth two (2) times a day. Provider, Historical 7/20/2020 AM Active Yes   OTHER zyflamend 2 caps daily Provider, Historical 7/20/2020 Active Yes   rosuvastatin (CRESTOR) 10 mg tablet Take 10 mg by mouth nightly. Provider, Historical 7/19/2020 Active Yes   spironolactone (ALDACTONE) 25 mg tablet TAKE 1 TABLET DAILY Gwyn GREENBERG NP 7/20/2020 Active Yes   vit C/E/Zn/coppr/lutein/zeaxan (PRESERVISION AREDS 2 PO) Take 1 Cap by mouth two (2) times a day.  Provider, Historical 7/20/2020 AM Active Yes                  Tasha Mclain, PHARMD   Contact: [phone]

## 2020-07-21 NOTE — PROGRESS NOTES
Pharmacy changed famotidine to 20 mg daily, for CrCl of 36 ml/min, per renal protocol. Pharmacy will follow daily. Also, metormin to be held for 48 hours, per protocol, for CrCl <60 ml/min and received IV contrast, per protocol.

## 2020-07-21 NOTE — PROGRESS NOTES
CM Note:  Spoke with pt on the phone for d/c planning. Pt stated PTA she was independent with ADL's and used a cane, RW or rollator to assist with ambulation. Medications by mail or from [facility]. Reason for Admission:   dyspnea                   RUR Score:        N/A             Plan for utilizing home health:      none    PCP: First and Last name:  Crys Chiang   Name of Practice:    Are you a current patient: Yes/No: yes   Approximate date of last visit: May 13   Can you participate in a virtual visit with your PCP: yes                    Current Advanced Directive/Advance Care Plan: yes, not on file                         Transition of Care Plan:                    1. Family to transport pt home at d/c  2. Has PT/OT consults  3. CM following for any needs  RAYO Caceres RN    Care Management Interventions  PCP Verified by CM: Yes  Mode of Transport at Discharge:  Other (see comment)(family)  Transition of Care Consult (CM Consult): Discharge Planning  Physical Therapy Consult: Yes  Occupational Therapy Consult: Yes  Current Support Network: Lives with Spouse, Own Home(2 story house)  Confirm Follow Up Transport: Family  Discharge Location  Discharge Placement: Home with family assistance

## 2020-07-21 NOTE — H&P
[facility]    Hospitalist Admission Note      NAME:  Maxwell Mahajan   :   1935   MRN:  [id number]     PCP:  Gloria Louise MD     Date/Time of service:  2020 9:01 PM          Subjective:     CHIEF COMPLAINT: CHRISTIAN    HISTORY OF PRESENT ILLNESS:     Ms. Irena Torres is a 80 y.o.  female who presented to the Emergency Department complaining of CHRISTIAN. Worsen over last 3 days. Severe with any activity. Has seen 4 cardiologist over the years, most recently Dr James Cárdenas, and all gave opinion dyspnea was pulmonary. She has never been to a pulmonologist. She reports she LE edema, and took lasix and drank lots of water \"so I wouldn't get dehydrated\" and gained 2 pounds today. ER workup unremarkable. We will admit her for observation. Past Medical History:   Diagnosis Date    Arthritis     Asthma     Breast cancer ([address]     Chest pain, unspecified     atypical chest discomfort with normal stress cardiolite , and normal stress echoes ,  and . Negative dobutamine cardiolite 2009 for ischemia with an EF=63%. Cardiac Cath 9/3/2010 showed mild CAD.  Diabetes mellitus ([address]     Diverticulosis     Dyslipidemia     Frequent PVCs     GERD (gastroesophageal reflux disease)     Hiatal hernia     HTN (hypertension)     Hypothyroid     Neuropathy     Obesity     LA on CPAP     RBBB (right bundle branch block)     Spinal stenosis         Past Surgical History:   Procedure Laterality Date    HX BUNIONECTOMY      HX CHOLECYSTECTOMY      HX HEART CATHETERIZATION      Cardiac Cath 9/3/2010 showed mild CAD.       HX HIP REPLACEMENT      HX ORTHOPAEDIC      HX OTHER SURGICAL      Echo 10/5/12 - LVEF 60-65%, mod-marked LAE, MAC, grade 1 diastolic dysfunction     HX OTHER SURGICAL      Lexiscan cardiolite 13 - normal MPI, LVEF 74%    HX OTHER SURGICAL      Aorta duplex 14 - aorta size 1.7 cm, no dilation        Social History     Tobacco Use  Smoking status: Never Smoker    Smokeless tobacco: Never Used   Substance Use Topics    Alcohol use: No     Alcohol/week: 0.0 standard drinks        Family History   Problem Relation Age of Onset    Coronary Artery Disease Father     Heart Disease Father     Coronary Artery Disease Paternal Aunt     Cancer Mother     Parkinson's Disease Mother     Stroke Mother     Thyroid Disease Mother     Seizures Brother      Allergies   Allergen Reactions    Tolectin [Tolmetin] Anaphylaxis    Aldactone [Spironolactone] Other (comments)     hyperkalemia    Carvedilol Other (comments)     \"slow heart rate down to 44\"    Ceclor [Cefaclor] Hives    Ciprofloxacin Hives and Swelling    Darvon [Propoxyphene] Swelling     Swelling lips and hands    Macrobid [Nitrofurantoin Monohyd/M-Cryst] Other (comments)     Does not recall    Norvasc [Amlodipine] Swelling     Swelling ankles/legs    Tetracycline Swelling     Swelling lips/hands    Toprol Xl [Metoprolol Succinate] Swelling     Swellings ankles/legs    Valsartan Other (comments)     Also \"slow heart rate down to 44\"    Zestril [Lisinopril] Hives        Prior to Admission medications    Medication Sig Start Date End Date Taking? Authorizing Provider   cetirizine (ZyrTEC) 10 mg tablet Take 10 mg by mouth daily. Provider, Historical   cloNIDine HCL (CATAPRES) 0.1 mg tablet Take 2 Tabs by mouth nightly. 6/15/20   Jered Chapman MD   spironolactone (ALDACTONE) 25 mg tablet TAKE 1 TABLET DAILY 4/29/20   Virgen GREENBERG NP   terbinafine HCl (LAMISIL) 250 mg tablet Take 250 mg by mouth daily. Provider, Historical   rosuvastatin (CRESTOR) 10 mg tablet  12/3/19   Provider, Historical   chlorthalidone (HYGROTEN) 25 mg tablet Take 1 Tab by mouth daily. 12/10/19   Jered Chapman MD   dilTIAZem CD (CARDIZEM CD) 300 mg ER capsule Take 1 Cap by mouth daily.  10/12/18   Jered Chapman MD   vit C/E/Zn/coppr/lutein/zeaxan (PRESERVISION AREDS 2 PO) Take  by mouth two (2) times a day. Provider, Historical   glucosam/chond/hyalu/CF borate (MOVE FREE JOINT HEALTH PO) Take  by mouth daily. Provider, Historical   acetaminophen (TYLENOL EXTRA STRENGTH) 500 mg tablet Take  by mouth every six (6) hours as needed for Pain. Provider, Historical   losartan (COZAAR) 100 mg tablet Take 1 Tab by mouth nightly. 3/8/18   Joseph Terrazas MD   gabapentin (NEURONTIN) 100 mg capsule Take 200 mg by mouth daily. Provider, Historical   OTHER zyflamend 2 caps daily    Provider, Historical   loratadine (CLARITIN) 10 mg tablet Take 10 mg by mouth daily. Other, MD Tee   cyanocobalamin 1,000 mcg tablet Take 1,000 mcg by mouth every seven (7) days. Other, MD Tee   levothyroxine (SYNTHROID) 100 mcg tablet Take 100 mcg by mouth Daily (before breakfast). Provider, Historical   omeprazole (PRILOSEC) 20 mg capsule Take 20 mg by mouth two (2) times a day. Provider, Historical   CALCIUM CARBONATE (CALCIUM 600 PO) Take 1 Tab by mouth two (2) times a day. 12/22/10   Provider, Historical   metformin (GLUCOPHAGE) 500 mg tablet Take 500 mg by mouth daily (with breakfast). Provider, Historical   montelukast (SINGULAIR) 10 mg tablet Take 10 mg by mouth every evening.  12/22/10   Provider, Historical       Review of Systems:  (bold if positive, if negative)    Gen:  Eyes:  ENT:  CVS:  Pulm:  dyspneaGI:  :  MS:  Skin:  Psych:  Endo:  Hem:  Renal:  Neuro:  weakness      Objective:      VITALS:    Vital signs reviewed; most recent are:    Visit Vitals  /74   Pulse 67   Temp 98.7 °F (37.1 °C)   Resp 14   Ht 5' 3.5\" (1.613 m)   Wt 92.5 kg (204 lb)   SpO2 98%   BMI 35.57 kg/m²     SpO2 Readings from Last 6 Encounters:   07/20/20 98%   02/13/20 97%   12/10/19 96%   04/19/19 98%   10/12/18 97%   04/27/18 97%        No intake or output data in the 24 hours ending 07/20/20 2101     Exam:     Physical Exam:    Gen:  Obese, in no acute distress  HEENT:  Pink conjunctivae, PERRL, hearing intact to voice, moist mucous membranes  Neck:  Supple, without masses, thyroid non-tender  Resp:  No accessory muscle use, clear breath sounds without wheezes rales or rhonchi  Card:  No murmurs, normal S1, S2 without thrills, bruits or peripheral edema  Abd:  Soft, non-tender, non-distended, normoactive bowel sounds are present, no mass  Lymph:  No cervical or inguinal adenopathy  Musc:  No cyanosis or clubbing  Skin:  No rashes or ulcers, skin turgor is good  Neuro:  Cranial nerves are grossly intact, mild motor weakness, follows commands   Psych:  Poor insight, oriented to person, place and time, alert     Labs:    Recent Labs     07/20/20  1634   WBC 7.5   HGB 11.9   HCT 35.2        Recent Labs     07/20/20  1634   *   K 4.5   CL 97   CO2 23   *   BUN 34*   CREA 1.28*   CA 8.4*   ALB 3.7   TBILI 0.4   ALT 30     Lab Results   Component Value Date/Time    Glucose (POC) 115 (H) 05/02/2016 07:49 AM    Glucose (POC) 111 (H) 05/01/2016 09:45 PM     No results for input(s): PH, PCO2, PO2, HCO3, FIO2 in the last 72 hours. No results for input(s): INR, INREXT in the last 72 hours. All Micro Results     Procedure Component Value Units Date/Time    CULTURE, URINE [[id number]]     Order Status:  Sent Specimen:  Cath Urine           I have reviewed previous records       Assessment and Plan:      Dyspnea - POA, unclear etiology. No hypoxia. No xray findings. Check ABG. 6 minute walk. Consult Dr Thorne Current. Needs pulmonary PFTs as outpatient. I think anxiety and deconditioning play a large role. Diabetes mellitus type 2 without complications - Diabetic diet and counseling. SSI per protocol. Continue home metformin. Check A1c. Asthma - Per chart hx, but never been tested and not on any meds. Not wheezing. I will not treat asthma at this [facility]. LA on CPAP / Obesity - She states she is compliant with CPAP. Can use home machine.  Advise weight loss    HTN (hypertension) / Bradycardia - Elevated BP in ER, resume clonidine, spironolactone, chlorthalidone, diltiazem (reduced dose), losartan. I gave a dose of hydralazine. Dyslipidemia - continue rosuvastatin    Hypothyroid - Continue synthroid and check TSH    GERD (gastroesophageal reflux disease) - PPI    Arthritis / Neuropathy - At baseline. Continue gabapentin      Telemetry reviewed:   normal sinus rhythm    Risk of deterioration: high      Total time spent with patient: 79 Minutes I personally reviewed chart, notes, data and current medications in the medical record. I have personally examined and treated the patient at bedside during this period.                  Care Plan discussed with: Patient, Nursing Staff and >50% of time spent in counseling and coordination of care    Discussed:  Care Plan       ___________________________________________________    Attending Physician: Merritt Castro MD

## 2020-07-21 NOTE — PROGRESS NOTES
Bedside and Verbal shift change report given to Padmaja Mora (oncoming nurse) by Mary Pollard (offgoing nurse). Report included the following information SBAR, Kardex, Intake/Output, MAR and Accordion.

## 2020-07-21 NOTE — PROGRESS NOTES
Bedside and Verbal shift change report given to Tanya RN (oncoming nurse) by Zita Hess RN (offgoing nurse). Report included the following information SBAR, Kardex, MAR, Accordion and Recent Results.

## 2020-07-21 NOTE — PROGRESS NOTES
CMS Note  7/21/2020    Patient received and signed both the Observation and Barber Conrado letters. Patient was given copies for their record.   Jesse Curry CMS

## 2020-07-21 NOTE — ED NOTES
TRANSFER - OUT REPORT:    Verbal report given to SUSAN Martínez(name) on Ary Gunter  being transferred to 5th Floor(unit) for routine progression of care       Report consisted of patients Situation, Background, Assessment and   Recommendations(SBAR). Information from the following report(s) SBAR, Kardex, ED Summary, MAR, Accordion and Recent Results was reviewed with the receiving nurse. Lines:   Peripheral IV 07/20/20 Left Antecubital (Active)   Site Assessment Clean, dry, & intact 07/20/20 1634   Phlebitis Assessment 0 07/20/20 1634   Infiltration Assessment 0 07/20/20 1634   Dressing Status Clean, dry, & intact 07/20/20 1634   Dressing Type Tape;Transparent 07/20/20 1634   Hub Color/Line Status Pink;Patent; Flushed 07/20/20 1634   Action Taken Blood drawn 07/20/20 1634        Opportunity for questions and clarification was provided.       Patient transported with:   Monitor  Registered Nurse

## 2020-07-21 NOTE — PROGRESS NOTES
PHYSICAL THERAPY EVALUATION/DISCHARGE  Patient: Tati Velázquez (73 y.o. female)  Date: 7/21/2020  Primary Diagnosis: Dyspnea [R06.00]       Precautions:  Bed Alarm, Fall      ASSESSMENT  Based on the objective data described below, the patient presents with BLE edema, mild shortness of breath with activity, decreased dynamic higher level balance and decreased endurance. Patient admitted yesterday due to increased dyspnea and BLE edema. Edema improved today per patient. At baseline patient able to walk household distances and tends to furniture walk. In community she can walk in parking lots but with rest breaks and uses her rollator. She is on room air and VSS 96-97% HR 112bpm. Patient's RW wheels adjusted for improved camber. Patient advised to clear path in the home and use RW at all times for fall prevention. Patient verbalized understanding but unsure if she will do that because of space. Patient lives in 2 story home but stays on 1st floor and has a ramp to enter. She states her 79 yo  assists her more than she assists him. Patient appears at baseline and is cleared for discharge from PT standpoint. Functional Outcome Measure: The patient scored 25/28 on the Tinetti Test outcome measure which is indicative of low risk for falls. .      Other factors to consider for discharge: none     Further skilled acute physical therapy is not indicated at this time. PLAN :  Recommendation for discharge: (in order for the patient to meet his/her long term goals)  No skilled physical therapy/ follow up rehabilitation needs identified at this time.     This discharge recommendation:  Has not yet been discussed the attending provider and/or case management    IF patient discharges home will need the following DME: none       SUBJECTIVE:   Patient stated my  helps me more and he's 79 yo.    OBJECTIVE DATA SUMMARY:   HISTORY:    Past Medical History:   Diagnosis Date    Arthritis     Asthma     Breast cancer [facility])     Chest pain, unspecified     atypical chest discomfort with normal stress cardiolite 2002, and normal stress echoes 2004, 2006 and 2009. Negative dobutamine cardiolite March 2009 for ischemia with an EF=63%. Cardiac Cath 9/3/2010 showed mild CAD.  Diabetes mellitus ([address]     Diverticulosis     Dyslipidemia     Frequent PVCs     GERD (gastroesophageal reflux disease)     Hiatal hernia     HTN (hypertension)     Hypothyroid     Neuropathy     Obesity     LA on CPAP     RBBB (right bundle branch block)     Spinal stenosis      Past Surgical History:   Procedure Laterality Date    HX BUNIONECTOMY      HX CHOLECYSTECTOMY      HX HEART CATHETERIZATION      Cardiac Cath 9/3/2010 showed mild CAD.  HX HIP REPLACEMENT      HX ORTHOPAEDIC      HX OTHER SURGICAL      Echo 10/5/12 - LVEF 60-65%, mod-marked LAE, MAC, grade 1 diastolic dysfunction     HX OTHER SURGICAL      Lexiscan cardiolite 1/25/13 - normal MPI, LVEF 74%    HX OTHER SURGICAL      Aorta duplex 1/6/14 - aorta size 1.7 cm, no dilation        Prior level of function: mod I  Personal factors and/or comorbidities impacting plan of care: decreased endurance    Home Situation  Home Environment: Private residence  Wheelchair Ramp: Yes  One/Two Story Residence: Two story, live on 1st floor  # of Interior Steps: 12  Interior Rails: Right  Lift Chair Available: No  Living Alone: No  Support Systems: Spouse/Significant Other/Partner  Patient Expects to be Discharged to[de-identified] Private residence  Current DME Used/Available at Home: Bynum Devendra, straight, Walker, rollator, Walker, rolling  Tub or Shower Type: Shower    EXAMINATION/PRESENTATION/DECISION MAKING:   Critical Behavior:  Neurologic State: Alert  Orientation Level: Oriented X4        Hearing:   Auditory  Auditory Impairment: None    Range Of Motion:  AROM: Generally decreased, functional           PROM: Generally decreased, functional           Strength:    Strength: Generally decreased, functional                    Tone & Sensation:   Tone: Normal              Sensation: Intact               Coordination:  Coordination: Generally decreased, functional       Functional Mobility:  Bed Mobility:  Rolling: Modified independent  Supine to Sit: Modified independent  Sit to Supine: Modified independent  Scooting: Modified independent  Transfers:  Sit to Stand: Modified independent  Stand to Sit: Modified independent  Stand Pivot Transfers: Modified independent                    Balance:   Sitting: Intact; Without support  Standing: Intact; With support  Ambulation/Gait Training:  Distance (ft): 75 Feet (ft)(x 2)  Assistive Device: Gait belt;Walker, rolling  Ambulation - Level of Assistance: Modified independent                 Base of Support: Narrowed          Stairs - Level of Assistance: (NT)      Functional Measure:  Tinetti test:    Sitting Balance: 1  Arises: 2  Attempts to Rise: 2  Immediate Standing Balance: 2  Standing Balance: 2  Nudged: 2  Eyes Closed: 1  Turn 360 Degrees - Continuous/Discontinuous: 1  Turn 360 Degrees - Steady/Unsteady: 1  Sitting Down: 1  Balance Score: 15 Balance total score  Indication of Gait: 1  R Step Length/Height: 1  L Step Length/Height: 1  R Foot Clearance: 1  L Foot Clearance: 1  Step Symmetry: 1  Step Continuity: 1  Path: 2  Trunk: 0  Walking Time: 1  Gait Score: 10 Gait total score  Total Score: 25/28 Overall total score         Tinetti Tool Score Risk of Falls  <19 = High Fall Risk  19-24 = Moderate Fall Risk  25-28 = Low Fall Risk  Tinetti ME. Performance-Oriented Assessment of Mobility Problems in Elderly Patients. [address]; [id number].  (Scoring Description: PT Bulletin Feb. 10, 1993)    Older adults: Radha Connor al, 2009; n = [address] [facility] elderly evaluated with ABC, RHONA, ADL, and IADL)  · Mean RHONA score for males aged 69-68 years = 26.21(3.40)  · Mean RHONA score for females age 69-68 years = 25.16(4.30)  · Mean RHONA score for males over 80 years = 23.29(6.02)  · Mean RHONA score for females over 80 years = 17.20(8.32)          Physical Therapy Evaluation Charge Determination   History Examination Presentation Decision-Making   LOW Complexity : Zero comorbidities / personal factors that will impact the outcome / POC LOW Complexity : 1-2 Standardized tests and measures addressing body structure, function, activity limitation and / or participation in recreation  LOW Complexity : Stable, uncomplicated  Other outcome measures Tinetti test  LOW       Based on the above components, the patient evaluation is determined to be of the following complexity level: LOW     Pain Rating:  3/10 knees    Activity Tolerance:   Fair, SpO2 stable on RA, requires rest breaks and observed SOB with activity  Please refer to the flowsheet for vital signs taken during this treatment. After treatment patient left in no apparent distress:   Supine in bed, Call bell within reach and Side rails x 3    COMMUNICATION/EDUCATION:   The patients plan of care was discussed with: Registered nurse. Fall prevention education was provided and the patient/caregiver indicated understanding. and Patient/family have participated as able in goal setting and plan of care.     Thank you for this referral.  Michael Glasgow, PT, DPT   Time Calculation: 24 mins

## 2020-07-22 VITALS
DIASTOLIC BLOOD PRESSURE: 73 MMHG | WEIGHT: 204 LBS | BODY MASS INDEX: 36.14 KG/M2 | TEMPERATURE: 98.1 F | SYSTOLIC BLOOD PRESSURE: 184 MMHG | RESPIRATION RATE: 16 BRPM | OXYGEN SATURATION: 97 % | HEIGHT: 63 IN | HEART RATE: 79 BPM

## 2020-07-22 PROBLEM — E87.1 HYPONATREMIA: Status: ACTIVE | Noted: 2020-07-22

## 2020-07-22 PROBLEM — R06.00 DYSPNEA: Status: RESOLVED | Noted: 2020-07-20 | Resolved: 2020-07-22

## 2020-07-22 LAB
ANION GAP SERPL CALC-SCNC: 7 MMOL/L (ref 5–15)
BUN SERPL-MCNC: 27 MG/DL (ref 6–20)
BUN/CREAT SERPL: 20 (ref 12–20)
CALCIUM SERPL-MCNC: 8.6 MG/DL (ref 8.5–10.1)
CHLORIDE SERPL-SCNC: 98 MMOL/L (ref 97–108)
CO2 SERPL-SCNC: 24 MMOL/L (ref 21–32)
CREAT SERPL-MCNC: 1.32 MG/DL (ref 0.55–1.02)
GLUCOSE BLD STRIP.AUTO-MCNC: 110 MG/DL (ref 65–100)
GLUCOSE BLD STRIP.AUTO-MCNC: 116 MG/DL (ref 65–100)
GLUCOSE SERPL-MCNC: 132 MG/DL (ref 65–100)
POTASSIUM SERPL-SCNC: 4 MMOL/L (ref 3.5–5.1)
SERVICE CMNT-IMP: ABNORMAL
SERVICE CMNT-IMP: ABNORMAL
SODIUM SERPL-SCNC: 129 MMOL/L (ref 136–145)

## 2020-07-22 PROCEDURE — 80048 BASIC METABOLIC PNL TOTAL CA: CPT

## 2020-07-22 PROCEDURE — 74011250637 HC RX REV CODE- 250/637: Performed by: INTERNAL MEDICINE

## 2020-07-22 PROCEDURE — 82962 GLUCOSE BLOOD TEST: CPT

## 2020-07-22 PROCEDURE — 99218 HC RM OBSERVATION: CPT

## 2020-07-22 PROCEDURE — 74011250637 HC RX REV CODE- 250/637: Performed by: SPECIALIST

## 2020-07-22 PROCEDURE — 74011250636 HC RX REV CODE- 250/636: Performed by: INTERNAL MEDICINE

## 2020-07-22 PROCEDURE — 36415 COLL VENOUS BLD VENIPUNCTURE: CPT

## 2020-07-22 PROCEDURE — 96372 THER/PROPH/DIAG INJ SC/IM: CPT

## 2020-07-22 RX ORDER — HYDRALAZINE HYDROCHLORIDE 25 MG/1
50 TABLET, FILM COATED ORAL 3 TIMES DAILY
Status: DISCONTINUED | OUTPATIENT
Start: 2020-07-22 | End: 2020-07-22

## 2020-07-22 RX ORDER — DILTIAZEM HYDROCHLORIDE 240 MG/1
240 CAPSULE, COATED, EXTENDED RELEASE ORAL DAILY
Qty: 30 CAP | Refills: 0 | Status: SHIPPED | OUTPATIENT
Start: 2020-07-23 | End: 2022-03-10 | Stop reason: SDUPTHER

## 2020-07-22 RX ORDER — HYDRALAZINE HYDROCHLORIDE 25 MG/1
25 TABLET, FILM COATED ORAL 2 TIMES DAILY
Status: DISCONTINUED | OUTPATIENT
Start: 2020-07-22 | End: 2020-07-22 | Stop reason: HOSPADM

## 2020-07-22 RX ORDER — HYDRALAZINE HYDROCHLORIDE 25 MG/1
25 TABLET, FILM COATED ORAL 2 TIMES DAILY
Qty: 60 TAB | Refills: 1 | Status: SHIPPED | OUTPATIENT
Start: 2020-07-22 | End: 2021-02-11 | Stop reason: SDUPTHER

## 2020-07-22 RX ORDER — HYDRALAZINE HYDROCHLORIDE 25 MG/1
25 TABLET, FILM COATED ORAL 2 TIMES DAILY
Status: DISCONTINUED | OUTPATIENT
Start: 2020-07-22 | End: 2020-07-22

## 2020-07-22 RX ADMIN — LEVOTHYROXINE SODIUM 100 MCG: 0.1 TABLET ORAL at 06:36

## 2020-07-22 RX ADMIN — ENOXAPARIN SODIUM 40 MG: 40 INJECTION SUBCUTANEOUS at 08:48

## 2020-07-22 RX ADMIN — CHLORTHALIDONE 25 MG: 25 TABLET ORAL at 08:49

## 2020-07-22 RX ADMIN — HYDRALAZINE HYDROCHLORIDE 25 MG: 25 TABLET, FILM COATED ORAL at 09:25

## 2020-07-22 RX ADMIN — DILTIAZEM HYDROCHLORIDE 240 MG: 240 CAPSULE, COATED, EXTENDED RELEASE ORAL at 08:49

## 2020-07-22 RX ADMIN — Medication 10 ML: at 06:39

## 2020-07-22 RX ADMIN — ACETAMINOPHEN 650 MG: 325 TABLET ORAL at 08:53

## 2020-07-22 RX ADMIN — HYDRALAZINE HYDROCHLORIDE 25 MG: 25 TABLET, FILM COATED ORAL at 08:49

## 2020-07-22 RX ADMIN — PANTOPRAZOLE SODIUM 40 MG: 40 TABLET, DELAYED RELEASE ORAL at 06:36

## 2020-07-22 RX ADMIN — SPIRONOLACTONE 25 MG: 25 TABLET ORAL at 08:49

## 2020-07-22 NOTE — PROGRESS NOTES
Cardiology Progress Note       5th floor   NAME:  Artem Leonard   :   1935   MRN:   [id number]     Assessment/Plan:   1. Chronic CHRISTIAN/atypical chest pain: Prior cath, stress test, echo and BNP level have been unremarkable. She saw Pulmonary and felt better with Advair in past . ECHO yesterday shows nml EF with grade 1 diastolic dysfunction. 2. HTN:  Due to bradycardia, Cardizem dose cut to 240 mg daily - continue her outpatient chlorthalidone, aldactone, losartan, and clonidine. Clonidine helps for BP but it makes her tired so can only take it at night. Will add hydralazine 25 mg bid from today. Avoiding adding amlodipine since she is already on cardizem and has problems with LE edema at baseline. Can do lexiscan cardiolite as outpatient    3. Mild CAD:   4. Dyslipidemia: on crestor. (Endocrinology Dr. Stuart Jara restarted a statin)   5. Chronic LE edema: compression stockings, Is on chlorthalidone and aldactone -- uses lasix PRN      Ok for d/c today if labs ok. Will see in office       CARDIOLOGY ATTENDING  Patient personally seen and examined. All the elements of history and examination were personally performed. Assessment and plan was discussed and agree as written above    She is feeling a little better. Chronic CHRISTIAN multifactorial (deconditioning, obesity, asthma, diastolic dysfunction, etc). Volume status. Continue meds with the addition of hydralazine today. OK for home today as long as labs OK. Hrt RRR; lungs clear, trace LE edema. I viewed echo images myself - normal LVEF and grade 1 diastology. Normal PASP. Kayla Honeycutt MD, [facility] [place]                    Subjective:   HPI:    Ms. Gunter is a 80 y. o.   female with PMH of HTN, DM, LA, who presented to the Emergency Department complaining of CHRISTIAN. She has a long history of CHRISTIAN, but it has been worse past few days.   She drank a lot of water to compensate for recent lasix dose and gained 2 lbs prior to admission. At rest she feels OK but has CHRISTIAN. Does not seem to have much orthopnea. BP at home is OK in AM but higher ('s) in evening. She had some chest pressure with a deep breath, but otherwise no sig CP. She felt pulse was 40's at home recently.               Cardiac ROS: Patient denies any exertional chest pain, dyspnea, palpitations, syncope, orthopnea, edema or paroxysmal nocturnal dyspnea. Previous Cardiac Eval  20   ECHO ADULT COMPLETE 2020    Narrative · LV: Normal cavity size, wall thickness and systolic function (ejection   fraction normal). Estimated left ventricular ejection fraction is 60 -   65%. Wall motion: normal. Mild (grade 1) left ventricular diastolic   dysfunction. · MV: Moderate mitral annular calcification. Signed by: Ivis Robles MD         Review of Systems: No nausea, indigestion, vomiting, pain, cough, sputum. No bleeding. Taking po. Appetite ok. Objective:     Visit Vitals  /73 (BP 1 Location: Right arm, BP Patient Position: At rest)   Pulse 79   Temp 98.1 °F (36.7 °C)   Resp 16   Ht 5' 3\" (1.6 m)   Wt 204 lb (92.5 kg)   SpO2 97%   BMI 36.14 kg/m²      O2 Device: CPAP nasal    Temp (24hrs), Av.1 °F (36.7 °C), Min:97.9 °F (36.6 °C), Max:98.3 °F (36.8 °C)      No intake/output data recorded.  1901 -  0700  In: 240 [P.O.:240]  Out: -   TELE: SR    General: AAOx3 cooperative, no acute distress. HEENT: Atraumatic. Pink and moist.  Anicteric sclerae. Neck : Supple  Lungs: CTA bilaterally. No wheezing/rhonchi/rales. Heart: Regular rhythm, no murmur, no rubs, no gallops. No JVD. No carotid bruits. Abdomen: Soft, non-distended, non-tender. + Bowel sounds. Extremities: No edema, no clubbing, no cyanosis. Neurologic: Grossly intact. Alert and oriented X 3. No acute neurological distress. Psych: Good insight. Not anxious or agitated.         Care Plan discussed with:    Comments   Patient x Family      RN     Care Manager                    Consultant:  x        Data Review:     No lab exists for component: ITNL   Recent Labs     07/20/20  1634   TROIQ <0.05     Recent Labs     07/21/20  0123 07/20/20  1634   * 129*   K 4.2 4.5   CL 99 97   CO2 22 23   BUN 30* 34*   CREA 1.37* 1.28*   * 116*   MG 2.3  --    ALB 3.2* 3.7   WBC 6.8 7.5   HGB 11.7 11.9   HCT 34.0* 35.2    236     No results for input(s): INR, PTP, APTT, INREXT in the last 72 hours.     Medications reviewed  Current Facility-Administered Medications   Medication Dose Route Frequency    hydrALAZINE (APRESOLINE) 20 mg/mL injection 10 mg  10 mg IntraVENous Q6H PRN    cloNIDine HCL (CATAPRES) tablet 0.1 mg  0.1 mg Oral QHS    chlorthalidone (HYGROTEN) tablet 25 mg  25 mg Oral DAILY    dilTIAZem ER (CARDIZEM CD) capsule 240 mg  240 mg Oral DAILY    gabapentin (NEURONTIN) capsule 200 mg  200 mg Oral DAILY    levothyroxine (SYNTHROID) tablet 100 mcg  100 mcg Oral ACB    losartan (COZAAR) tablet 100 mg  100 mg Oral QHS    [START ON 7/23/2020] metFORMIN (GLUCOPHAGE) tablet 500 mg  500 mg Oral DAILY WITH BREAKFAST    montelukast (SINGULAIR) tablet 10 mg  10 mg Oral QPM    pantoprazole (PROTONIX) tablet 40 mg  40 mg Oral ACB    rosuvastatin (CRESTOR) tablet 10 mg  10 mg Oral QHS    spironolactone (ALDACTONE) tablet 25 mg  25 mg Oral DAILY    glucose chewable tablet 16 g  4 Tab Oral PRN    dextrose (D50W) injection syrg 12.5-25 g  25-50 mL IntraVENous PRN    glucagon (GLUCAGEN) injection 1 mg  1 mg IntraMUSCular PRN    acetaminophen (TYLENOL) tablet 650 mg  650 mg Oral Q6H PRN    Or    acetaminophen (TYLENOL) suppository 650 mg  650 mg Rectal Q6H PRN    polyethylene glycol (MIRALAX) packet 17 g  17 g Oral DAILY PRN    ondansetron (ZOFRAN ODT) tablet 4 mg  4 mg Oral Q8H PRN    Or    ondansetron (ZOFRAN) injection 4 mg  4 mg IntraVENous Q6H PRN    enoxaparin (LOVENOX) injection 40 mg  40 mg SubCUTAneous DAILY  insulin lispro (HUMALOG) injection   SubCUTAneous AC&HS    sodium chloride (NS) flush 5-40 mL  5-40 mL IntraVENous Q8H    sodium chloride (NS) flush 5-40 mL  5-40 mL IntraVENous PRN    famotidine (PEPCID) tablet 20 mg  20 mg Oral QHS         Cecille Saunders NP

## 2020-07-22 NOTE — PROGRESS NOTES
Discharge paperwork given to patient. No questions or concerns at this time. Patient aware of follow up appointments and that prescriptions were called into pharmacy. Left AC PIV removed.

## 2020-07-22 NOTE — PROGRESS NOTES
7/22/2020  Case Management Progress Note    12:06 PM  Spoke to patient in her room, I wore a mask but she was eating lunch  Confirmed that her  and son were coming to pick her up. Patient has a discharge order in and has no needs from CM standpoint. [employer] unfortunately does not go out that far. Transition of Care Plan  1. Patient being discharged today  2. No discharge needs  3. Patient will need to follow up with PCP, specialties as necessary   4.  and son to transport  5. Patient is ready for discharge from CM standpoint    Care Management Interventions  PCP Verified by CM: Yes  Mode of Transport at Discharge: Other (see comment)(family)  Transition of Care Consult (CM Consult): Discharge Planning  Physical Therapy Consult: Yes  Occupational Therapy Consult: Yes  Current Support Network: Lives with Spouse, Family Lives Nearby  Confirm Follow Up Transport: Family  Discharge Location  Discharge Placement: Home with family assistance    [facility]    10:18 AM  Chart reviewed--80year old female admitted 7/20 with dyspnea. Patient does not have a risk of unplanned readmission score due to being obs status. No patient contact at this time  Patient has been cleared by PT and OT, likely does not have any needs for discharge. Per rounds this morning she may be discharged today. Will check in with patient before discharge. Transition of Care Plan  1. Patient may be ready for discharge today  2. Family will transport when ready   3. Cleared by PT/OT  4. Patient will need to follow up with PCP, specialties as necessary   5.  CM will continue to follow and assist    ADAM Hernández

## 2020-07-22 NOTE — DISCHARGE SUMMARY
Hospitalist Discharge Summary     Patient ID:    Michele Tineo  [id number]  80 y.o.  1935    Admit date of service: 7/20/2020    Discharge date of service: 7/22/2020    Admission Diagnoses: Dyspnea [R06.00]    Chronic Diagnoses:    Problem List as of 7/22/2020 Date Reviewed: 7/20/2020          Codes Class Noted - Resolved    Hyponatremia ICD-10-CM: E87.1  ICD-9-CM: 276.1  7/22/2020 - Present        Obesity ICD-10-CM: E66.9  ICD-9-CM: 278.00  Unknown - Present        Hypothyroid ICD-10-CM: E03.9  ICD-9-CM: 244.9  Unknown - Present        GERD (gastroesophageal reflux disease) ICD-10-CM: K21.9  ICD-9-CM: 530.81  Unknown - Present        Arthritis ICD-10-CM: M19.90  ICD-9-CM: 716.90  Unknown - Present        Neuropathy ICD-10-CM: G62.9  ICD-9-CM: 355.9  Unknown - Present        HTN (hypertension) ICD-10-CM: I10  ICD-9-CM: 401.9  4/23/2013 - Present        Diabetes mellitus ([facility] [address] ICD-10-CM: E11.9  ICD-9-CM: 250.00  Unknown - Present        Asthma ICD-10-CM: J45.909  ICD-9-CM: 493.90  Unknown - Present        LA on CPAP ICD-10-CM: G47.33, Z99.89  ICD-9-CM: 327.23, V46.8  Unknown - Present        Dyslipidemia ICD-10-CM: E78.5  ICD-9-CM: 272.4  Unknown - Present        * (Principal) RESOLVED: Dyspnea ICD-10-CM: R06.00  ICD-9-CM: 786.09  7/20/2020 - 7/22/2020        RESOLVED: Chest pain ICD-10-CM: R07.9  ICD-9-CM: 786.50  5/1/2016 - 7/20/2020        RESOLVED: Chest pain, unspecified ICD-10-CM: R07.9  ICD-9-CM: 786.50  Unknown - 7/20/2020        RESOLVED: RBBB (right bundle branch block) ICD-10-CM: I45.10  ICD-9-CM: 426.4  Unknown - 7/20/2020        RESOLVED: Hypertension, benign ICD-10-CM: I10  ICD-9-CM: 401.1  Unknown - 4/23/2013              Discharge Medications:   Current Discharge Medication List      START taking these medications    Details   hydrALAZINE (APRESOLINE) 25 mg tablet Take 1 Tab by mouth two (2) times a day.   Qty: 60 Tab, Refills: 1         CONTINUE these medications which have CHANGED Details   dilTIAZem ER (CARDIZEM CD) 240 mg capsule Take 1 Cap by mouth daily. Qty: 30 Cap, Refills: 0         CONTINUE these medications which have NOT CHANGED    Details   calcium-cholecalciferol, D3, (CALTRATE 600+D) tablet Take 1 Tab by mouth two (2) times daily (with meals). chlorthalidone (HYGROTEN) 25 mg tablet Take 25 mg by mouth daily. Does not take if taking furosemide 40 mg      dilTIAZem ER (TIAZAC) 300 mg capsule Take 300 mg by mouth nightly. cetirizine (ZyrTEC) 10 mg tablet Take 10 mg by mouth daily. cloNIDine HCL (CATAPRES) 0.1 mg tablet Take 2 Tabs by mouth nightly. Qty: 270 Tab, Refills: 3      spironolactone (ALDACTONE) 25 mg tablet TAKE 1 TABLET DAILY  Qty: 90 Tab, Refills: 3    Associated Diagnoses: Essential hypertension; Dyslipidemia      rosuvastatin (CRESTOR) 10 mg tablet Take 10 mg by mouth nightly. vit C/E/Zn/coppr/lutein/zeaxan (PRESERVISION AREDS 2 PO) Take 1 Cap by mouth two (2) times a day. glucosam/chond/hyalu/CF borate (MOVE FREE JOINT HEALTH PO) Take 1 Tab by mouth daily. acetaminophen (TYLENOL EXTRA STRENGTH) 500 mg tablet Take  by mouth every six (6) hours as needed for Pain.      losartan (COZAAR) 100 mg tablet Take 1 Tab by mouth nightly. Qty: 90 Tab, Refills: 3    Associated Diagnoses: Essential hypertension; Dyslipidemia      gabapentin (NEURONTIN) 100 mg capsule Take 200 mg by mouth nightly. OTHER zyflamend 2 caps daily      cyanocobalamin 1,000 mcg tablet Take 1,000 mcg by mouth every Sunday. levothyroxine (SYNTHROID) 100 mcg tablet Take 100 mcg by mouth Daily (before breakfast). omeprazole (PRILOSEC) 20 mg capsule Take 20 mg by mouth two (2) times a day. metformin (GLUCOPHAGE) 500 mg tablet Take 500 mg by mouth daily (with breakfast). montelukast (SINGULAIR) 10 mg tablet Take 10 mg by mouth nightly.          STOP taking these medications       furosemide (LASIX) 40 mg tablet Comments:   Reason for Stopping: Follow up Care:    1. Bronwyn Glasgow MD in 1-2 weeks  2. Cardiology     Diet:  Cardiac Diet    Disposition:  Home. Advanced Directive:    Discharge Exam:  See today's note. CONSULTATIONS: Cardiology    Significant Diagnostic Studies:   Recent Labs     07/21/20  0123 07/20/20  1634   WBC 6.8 7.5   HGB 11.7 11.9   HCT 34.0* 35.2    236     Recent Labs     07/22/20  1004 07/21/20  0123 07/20/20  1634   * 131* 129*   K 4.0 4.2 4.5   CL 98 99 97   CO2 24 22 23   BUN 27* 30* 34*   CREA 1.32* 1.37* 1.28*   * 162* 116*   CA 8.6 8.1* 8.4*   MG  --  2.3  --      Recent Labs     07/21/20  0123 07/20/20  1634   ALT 25 30   AP 38* 49   TBILI 0.2 0.4   TP 6.6 7.5   ALB 3.2* 3.7   GLOB 3.4 3.8     No results for input(s): INR, PTP, APTT, INREXT in the last 72 hours. No results for input(s): FE, TIBC, PSAT, FERR in the last 72 hours. Recent Labs     07/20/20  2350   PH 7.46*   PCO2 32*   PO2 99     No results for input(s): CPK, CKMB in the last 72 hours. No lab exists for component: TROPONINI  Lab Results   Component Value Date/Time    Glucose (POC) 116 (H) 07/22/2020 11:22 AM    Glucose (POC) 110 (H) 07/22/2020 06:38 AM    Glucose (POC) 118 (H) 07/21/2020 09:16 PM    Glucose (POC) 112 (H) 07/21/2020 04:44 PM    Glucose (POC) 107 (H) 07/21/2020 11:06 AM             HOSPITAL COURSE & TREATMENT RENDERED:   1. Exertional dyspnea/ BL leg edema - POA, unclear etiology. No hypoxia.  No xray findings.  echocardiogram is normal except grade one diastolic CHF. Evaluated by Dr Juan Giles pulmonary PFTs as outpatient.         2.  Diabetes mellitus type 2 without complications - Diabetic diet and counseling.  SSI per protocol.  Continue home metformin.       3. Asthma - Per chart hx, but never been tested and not on any meds. Not wheezing.      4. LA on CPAP / Obesity - She states she is compliant with CPAP.  Can use home machine. Advise weight loss     5.   HTN (hypertension) / Bradycardia - Not well controlled. resume clonidine, spironolactone, chlorthalidone, diltiazem (reduced dose), losartan. Added hydralazin      6. Dyslipidemia - continue rosuvastatin     7. Hypothyroid - Continue synthroid. TSH is normal     8. GERD (gastroesophageal reflux disease) - PPI     9. Arthritis / Neuropathy - At baseline. Continue gabapentin     10. Renal insufficiency. Monitor renal function. 11.  Hyponatremia. likely due to diuretic. Stopped lasix and advised to check her sodium with her PCP. Discharged in improve condition.     Spent 35 minutes    Signed:  Jayleen Duenas MD  7/22/2020  11:49 AM

## 2020-07-22 NOTE — PHYSICIAN ADVISORY
Letter of Status Determination: Current Status   OBSERVATION is Appropriate         Pt Name:  Carmen Mendoza   MR#  [id number]   [facility]#   [id number]   Room and Hospital  506/01  @ [facility]   Hospitalization date  7/20/2020  4:22 PM   Current Attending Physician  Eva Puga MD   Principal diagnosis  Dyspnea    Clinicals    Ms. Peggy Jones is a 80 y.o.  female who presented to the Emergency Department complaining of CHRISTIAN. Worsen over last 3 days.    Unclear etiology, No hypoxia.  No xray findings.  echocardiogram is normal except grade one diastolic CHF, needs OP PFT   [facility] criteria   Does  NOT apply    STATUS DETERMINATION  On the basis of clinical data, available documentaion, we believe that the current status of this patient as OBSERVATION is Appropriate     The final decision of the patient's hospitalization status depends on the attending physician's judgment    Additional comments     Insurance  Payor: José Jurado / Plan: [employer] / Product Type: Managed Care Medicare /    [employer] [place]  Phone: [phone]    Subscriber: [employer] Subscriber#: [id number]    Group#: [id number] Precert#:                    Kem Jay MD  Cell: [phone]  Physician Advisor

## 2020-07-22 NOTE — PROGRESS NOTES
Problem: Falls - Risk of  Goal: *Absence of Falls  Description: Document Sudhakar David Fall Risk and appropriate interventions in the flowsheet.   Outcome: Resolved/Met     Problem: Patient Education: Go to Patient Education Activity  Goal: Patient/Family Education  Outcome: Resolved/Met

## 2020-07-22 NOTE — DIABETES MGMT
RENETTA GARZA  CLINICAL NURSE SPECIALIST CONSULT  [facility]    FOLLOW-UP NOTE    Presentation   Tona Frazier is a 80 y.o. female admitted for dyspnea on exertion.       PMH: arthritis, asthma, breast cancer, chest pain, diabetes, diverticulosis, dyslipidemia, frequent PVC's, GERD, hiatal hernia, HTN, hypothyroid, neuropathy, obesity, LA with CPAP, right bundle branch block, and spinal stenosis.      Current clinical course has been uncomplicated.      Diabetes: Patient has known Type 2 diabetes, diagno\"over 10 years\" ago. Patient's home diabetes medication regimen is Metformin 500 mg once daily. Family history positive for diabetes- both parents diagnosed in their 63's with type 2 diabetes. A1c 2/13/20: 6.1% (patient reports had an A1c in May 2020 that was 6%, no record of this result in chart)    Subjective   I am feeling better today    Objective   Physical exam  General Alert, oriented and in no acute distress. Conversant and cooperative. Vital Signs   Visit Vitals  /73 (BP 1 Location: Right arm, BP Patient Position: At rest)   Pulse 79   Temp 98.1 °F (36.7 °C)   Resp 16   Ht 5' 3\" (1.6 m)   Wt 92.5 kg (204 lb)   SpO2 97%   BMI 36.14 kg/m²     Skin  Warm and dry  Heart   Regular rate and rhythm.  No murmurs, rubs or gallops  Lungs  Clear to auscultation without rales or rhonchi  Extremities No foot wounds    Laboratory  No results found for: HBA1C, HGBE8, RPS6YEDW, KAO9XETS  No results found for: LDL, LDLC, DLDLP  Lab Results   Component Value Date/Time    Creatinine (POC) 1.3 10/12/2017 01:51 PM    Creatinine 1.32 (H) 07/22/2020 10:04 AM     Lab Results   Component Value Date/Time    Sodium 129 (L) 07/22/2020 10:04 AM    Potassium 4.0 07/22/2020 10:04 AM    Chloride 98 07/22/2020 10:04 AM    CO2 24 07/22/2020 10:04 AM    Anion gap 7 07/22/2020 10:04 AM    Glucose 132 (H) 07/22/2020 10:04 AM    BUN 27 (H) 07/22/2020 10:04 AM    Creatinine 1.32 (H) 07/22/2020 10:04 AM BUN/Creatinine ratio 20 07/22/2020 10:04 AM    GFR est AA 46 (L) 07/22/2020 10:04 AM    GFR est non-AA 38 (L) 07/22/2020 10:04 AM    Calcium 8.6 07/22/2020 10:04 AM    Bilirubin, total 0.2 07/21/2020 01:23 AM    Alk. phosphatase 38 (L) 07/21/2020 01:23 AM    Protein, total 6.6 07/21/2020 01:23 AM    Albumin 3.2 (L) 07/21/2020 01:23 AM    Globulin 3.4 07/21/2020 01:23 AM    A-G Ratio 0.9 (L) 07/21/2020 01:23 AM    ALT (SGPT) 25 07/21/2020 01:23 AM     Lab Results   Component Value Date/Time    ALT (SGPT) 25 07/21/2020 01:23 AM       Factors affecting BG pattern  Factor Dose Comments   Nutrition:  Carb-controlled meals   60 grams/meal    Drugs:  Steroids  HIV   Other: n/a   n/a  n/a    Pain 0/10    Infection n/a    Other: n/a n/a      Blood glucose pattern      Evaluation   This 80year old female with Type 2 diabetes, has achieved inpatient blood glucose target of 100-180mg/dl. BG's have ranged 110-132 over the past 24 hours.       Basal insulin isn't in use.      Bolus insulin isn't in use. Patient is eating meals.     Corrective insulin is in use. Patient has not required any corrective insulin over the past 24 hours.      Metformin 500 mg daily ordered to start 7/23/20. Patient had CT scan with contrast 7/20/20.      Inpatient blood glucose management has been impacted by  [x]? Kidney dysfunction     Impression:  With an A1c of 6.1% it indicates that patient's home diabetes medication regimen has been effective in maintaining control of BG's. It is likely that this will continue on corrective insulin alone until Metformin can be restarted. Metformin can be restarted 7/23/20.      Assessment and Plan   Nursing Diagnosis Risk for unstable blood glucose pattern   Nursing Intervention Domain 0261 Decision-making Support   Nursing Interventions Examined current inpatient diabetes control   Explored factors facilitating and impeding inpatient management       Recommendations   Resume Metformin 500mg daily 7/23/20 (48 hours after contrast given)     Use Subcutaneous Insulin Order set (4551):     Corrective insulin  INITIATE CORRECTIVE INSULIN PROTOCOL (ANETTE):  RX ANETTE Insulin Resistant (Obese, Steroids use)    AC (before meals), Q6H, and Q4H CORRECTIONAL SCALE only For Blood Sugar (mg/dl) of :             140-199=3 units            200-249=4 units  250-299=7 units  300-349=10 units  350 or greater = Call MD  Give in addition to basal medications. Do Not Hold for NPO    BEDTIME CORRECTIONAL sliding scale when scheduled:  200-249=2 units  250-299=4 units   300-349=5 units  350 or greater = Call MD  Give in addition to basal medications. Do Not Hold for NPO  Fast Acting - Administer Immediately - or within 15 minutes of start of meal, if mealtime coverage.     Referral      [x]? Diabetes Self-Management Training through [facility] (Phone [phone] to schedule appointment)    Time Spent     Total time spent with patient: 15 Minutes   I personally reviewed chart, notes, data and current medications in the medical record. I have personally examined and treated the patient at bedside during this period.      Marychuy Suarez, CNS  Access via Perfect Serve

## 2020-07-22 NOTE — DISCHARGE INSTRUCTIONS
ACUTE DIAGNOSES:  Dyspnea [R06.00]    CHRONIC MEDICAL DIAGNOSES:  Problem List as of 7/22/2020 Date Reviewed: 7/20/2020          Codes Class Noted - Resolved    Hyponatremia ICD-10-CM: E87.1  ICD-9-CM: 276.1  7/22/2020 - Present        Obesity ICD-10-CM: E66.9  ICD-9-CM: 278.00  Unknown - Present        Hypothyroid ICD-10-CM: E03.9  ICD-9-CM: 244.9  Unknown - Present        GERD (gastroesophageal reflux disease) ICD-10-CM: K21.9  ICD-9-CM: 530.81  Unknown - Present        Arthritis ICD-10-CM: M19.90  ICD-9-CM: 716.90  Unknown - Present        Neuropathy ICD-10-CM: G62.9  ICD-9-CM: 355.9  Unknown - Present        HTN (hypertension) ICD-10-CM: I10  ICD-9-CM: 401.9  4/23/2013 - Present        Diabetes mellitus ([facility] ICD-10-CM: E11.9  ICD-9-CM: 250.00  Unknown - Present        Asthma ICD-10-CM: J45.909  ICD-9-CM: 493.90  Unknown - Present        LA on CPAP ICD-10-CM: G47.33, Z99.89  ICD-9-CM: 327.23, V46.8  Unknown - Present        Dyslipidemia ICD-10-CM: E78.5  ICD-9-CM: 272.4  Unknown - Present        * (Principal) RESOLVED: Dyspnea ICD-10-CM: R06.00  ICD-9-CM: 786.09  7/20/2020 - 7/22/2020        RESOLVED: Chest pain ICD-10-CM: R07.9  ICD-9-CM: 786.50  5/1/2016 - 7/20/2020        RESOLVED: Chest pain, unspecified ICD-10-CM: R07.9  ICD-9-CM: 786.50  Unknown - 7/20/2020        RESOLVED: RBBB (right bundle branch block) ICD-10-CM: I45.10  ICD-9-CM: 426.4  Unknown - 7/20/2020        RESOLVED: Hypertension, benign ICD-10-CM: I10  ICD-9-CM: 401.1  Unknown - 4/23/2013              DISCHARGE MEDICATIONS:          · It is important that you take the medication exactly as they are prescribed. · Keep your medication in the bottles provided by the pharmacist and keep a list of the medication names, dosages, and times to be taken in your wallet. · Do not take other medications without consulting your doctor.        DIET:  Cardiac Diet    ACTIVITY: Activity as tolerated    ADDITIONAL INFORMATION: If you experience any of the following symptoms then please call your primary care physician or return to the emergency room if you cannot get hold of your doctor: Fever, chills, nausea, vomiting, diarrhea, change in mentation, falling, bleeding, shortness of breath. FOLLOW UP CARE:  Dr. Judith Cabrera MD  you are to call and set up an appointment to see them in 5 days. Please check blood chemistry     Follow-up with Dr Bry Kennedy obtained by :  I understand that if any problems occur once I am at home I am to contact my physician. I understand and acknowledge receipt of the instructions indicated above.                                                                                                                                            Physician's or R.N.'s Signature                                                                  Date/Time                                                                                                                                              Patient or Representative Signature                                                          Date/Time

## 2020-07-22 NOTE — ROUTINE PROCESS
Hospital follow-up PCP transitional care appointment has been scheduled for Wednesday, July 29th at 12:30PM. Pending patient discharge.   Samina Horne, Care Management Specialist

## 2020-07-22 NOTE — PROGRESS NOTES
Bedside and Verbal shift change report given to Emile Ronquillo (oncoming nurse) by Nereyda Jaramillo (offgoing nurse). Report included the following information SBAR, Kardex, Intake/Output, MAR and Accordion. S: 21 year old female presenting to clinic today for routine school physical examination.    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  She needs to have a school physical done as well as to have her immunizations updated and a TB test.  She denies any complaints at this time is doing well.    Active Ambulatory Problems     Diagnosis Date Noted   • No Active Ambulatory Problems     Resolved Ambulatory Problems     Diagnosis Date Noted   • Right foot pain 04/18/2016     No Additional Past Medical History       Current Outpatient Medications   Medication Sig Dispense Refill   • norethindrone-ethinyl estradiol and ferrous fumarate (JUNEL FE 24) 1-20 MG-MCG(24) tablet Take 1 tablet by mouth daily. 84 tablet 3     No current facility-administered medications for this visit.        ALLERGIES:   Allergen Reactions   • Amoxicillin RASH     Other reaction(s): Rash       Family History   Problem Relation Age of Onset   • Alcohol Abuse Paternal Grandmother        Social History     Socioeconomic History   • Marital status: Single     Spouse name: Not on file   • Number of children: Not on file   • Years of education: Not on file   • Highest education level: Not on file   Occupational History   • Not on file   Social Needs   • Financial resource strain: Not on file   • Food insecurity:     Worry: Not on file     Inability: Not on file   • Transportation needs:     Medical: Not on file     Non-medical: Not on file   Tobacco Use   • Smoking status: Never Smoker   • Smokeless tobacco: Never Used   Substance and Sexual Activity   • Alcohol use: No     Alcohol/week: 0.0 standard drinks   • Drug use: No   • Sexual activity: Yes     Birth control/protection: Condom   Lifestyle   • Physical activity:     Days per week: Not on file     Minutes per session: Not on file   • Stress: Not on file   Relationships   • Social connections:     Talks on phone: Not on file     Gets together: Not on file      Attends Oriental orthodox service: Not on file     Active member of club or organization: Not on file     Attends meetings of clubs or organizations: Not on file     Relationship status: Not on file   • Intimate partner violence:     Fear of current or ex partner: Not on file     Emotionally abused: Not on file     Physically abused: Not on file     Forced sexual activity: Not on file   Other Topics Concern   • Not on file   Social History Narrative   • Not on file       PMH/Meds/Allergies/FH/SH were reviewed with patient and updated as appropriate in electronic medical record     Review of Systems:  General: no fever, no appetite change  Skin: no rashes  HEENT: no sore throat, no \"swollen\" glands  Respiratory: no cough, no wheezing, no shortness of breath  Cardiac: no chest pain, no palpitations  Gastrointestinal: no nausea, no vomiting, no abdominal pain  Genitourinary: no dysuria, no hematuria, no frequency, no urgency  Musculoskeletal: no weakness, no joint pain  Neurological: no dizziness  Psychiatric: no mood changes    Physical exam:  Visit Vitals  /72   Pulse 100   Temp 99.3 °F (37.4 °C) (Tympanic)   Resp 18   Ht 5' 9\" (1.753 m)   Wt 64.5 kg   LMP 10/06/2019 (Exact Date)   SpO2 99%   BMI 21.00 kg/m²     General: well nourished, well developed female in NAD, alert and oriented x3  HEENT: oral mucous membranes moist, throat clear, no adenopathy, TM clear bilaterally  Cardiac: RRR, S1/S2, no murmurs/rubs/gallops  Lungs: clear to auscultation bilaterally, breathing comfortably in no respiratory distress  Abdomen: nondistended, nontender. +BS. No hepatomegaly or splenomegaly noted.  Extremities: Warm and well perfused. No edema, erythema or tenderness in bilateral lower extremities. Moves all extremities well. 2+ dorsalis pedis pulses palpable bilaterally.  Psychiatric: mood/affect appropriate.    A/P: 21 year old female presenting for routine school physical examination.  Her exam is benign.  A flu shot and Tdap  were given.  QuantiFERON gold TB test was ordered.    Follow-up as needed

## 2020-07-22 NOTE — PROGRESS NOTES
[facility]  [facility], [address] [place] [address]  [phone]      Medical Progress Note      NAME: Tona Frazier   :  1935  MRM:  [id number]    Date of service: 2020  7:19 AM       Assessment and Plan:   1. Exertional dyspnea/ BL leg edema - POA, unclear etiology. No hypoxia. No xray findings. echocardiogram is normal except grade one diastolic CHF. Evaluated by Dr Edy Mckeon. Needs pulmonary PFTs as outpatient.         2.  Diabetes mellitus type 2 without complications - Diabetic diet and counseling. SSI per protocol. Continue home metformin.       3. Asthma - Per chart hx, but never been tested and not on any meds. Not wheezing.      4. LA on CPAP / Obesity - She states she is compliant with CPAP. Can use home machine. Advise weight loss     5. HTN (hypertension) / Bradycardia -   Not well controlled. resume clonidine, spironolactone, chlorthalidone, diltiazem (reduced dose), losartan. Added hydralazin      6. Dyslipidemia - continue rosuvastatin     7. Hypothyroid - Continue synthroid. TSH is normal     8. GERD (gastroesophageal reflux disease) - PPI     9. Arthritis / Neuropathy - At baseline. Continue gabapentin    10. Renal insufficiency. Monitor renal function. Subjective:     Chief Complaint[de-identified] Patient was seen and examined as a follow up for SOB. Chart was reviewed. feels better     ROS:  (bold if positive, if negative)    Tolerating PT  Tolerating Diet        Objective:     Last 24hrs VS reviewed since prior progress note.  Most recent are:    Visit Vitals  /73 (BP 1 Location: Right arm, BP Patient Position: At rest)   Pulse 79   Temp 98.1 °F (36.7 °C)   Resp 16   Ht 5' 3\" (1.6 m)   Wt 92.5 kg (204 lb)   SpO2 97%   BMI 36.14 kg/m²     SpO2 Readings from Last 6 Encounters:   20 97%   20 97%   12/10/19 96%   19 98%   10/12/18 97%   18 97%            Intake/Output Summary (Last 24 hours) at 7/22/2020 0856  Last data filed at 7/21/2020 1801  Gross per 24 hour   Intake 240 ml   Output    Net 240 ml        Physical Exam:    Gen:  Well-developed, well-nourished, in no acute distress  HEENT:  Pink conjunctivae, PERRL, hearing intact to voice, moist mucous membranes  Neck:  Supple, without masses, thyroid non-tender  Resp:  No accessory muscle use, clear breath sounds without wheezes rales or rhonchi  Card:  No murmurs, normal S1, S2 without thrills, bruits.  ++ peripheral edema  Abd:  Soft, non-tender, non-distended, normoactive bowel sounds are present, no palpable organomegaly and no detectable hernias  Lymph:  No cervical or inguinal adenopathy  Musc:  No cyanosis or clubbing  Skin:  No rashes or ulcers, skin turgor is good  Neuro:  Cranial nerves are grossly intact, no focal motor weakness, follows commands appropriately  Psych:  Good insight, oriented to person, place and time, alert  __________________________________________________________________  Medications Reviewed: (see below)  Medications:     Current Facility-Administered Medications   Medication Dose Route Frequency    hydrALAZINE (APRESOLINE) tablet 25 mg  25 mg Oral BID    hydrALAZINE (APRESOLINE) 20 mg/mL injection 10 mg  10 mg IntraVENous Q6H PRN    cloNIDine HCL (CATAPRES) tablet 0.1 mg  0.1 mg Oral QHS    chlorthalidone (HYGROTEN) tablet 25 mg  25 mg Oral DAILY    dilTIAZem ER (CARDIZEM CD) capsule 240 mg  240 mg Oral DAILY    gabapentin (NEURONTIN) capsule 200 mg  200 mg Oral DAILY    levothyroxine (SYNTHROID) tablet 100 mcg  100 mcg Oral ACB    losartan (COZAAR) tablet 100 mg  100 mg Oral QHS    [START ON 7/23/2020] metFORMIN (GLUCOPHAGE) tablet 500 mg  500 mg Oral DAILY WITH BREAKFAST    montelukast (SINGULAIR) tablet 10 mg  10 mg Oral QPM    pantoprazole (PROTONIX) tablet 40 mg  40 mg Oral ACB    rosuvastatin (CRESTOR) tablet 10 mg  10 mg Oral QHS    spironolactone (ALDACTONE) tablet 25 mg  25 mg Oral DAILY    glucose chewable tablet 16 g  4 Tab Oral PRN    dextrose (D50W) injection syrg 12.5-25 g  25-50 mL IntraVENous PRN    glucagon (GLUCAGEN) injection 1 mg  1 mg IntraMUSCular PRN    acetaminophen (TYLENOL) tablet 650 mg  650 mg Oral Q6H PRN    Or    acetaminophen (TYLENOL) suppository 650 mg  650 mg Rectal Q6H PRN    polyethylene glycol (MIRALAX) packet 17 g  17 g Oral DAILY PRN    ondansetron (ZOFRAN ODT) tablet 4 mg  4 mg Oral Q8H PRN    Or    ondansetron (ZOFRAN) injection 4 mg  4 mg IntraVENous Q6H PRN    enoxaparin (LOVENOX) injection 40 mg  40 mg SubCUTAneous DAILY    insulin lispro (HUMALOG) injection   SubCUTAneous AC&HS    sodium chloride (NS) flush 5-40 mL  5-40 mL IntraVENous Q8H    sodium chloride (NS) flush 5-40 mL  5-40 mL IntraVENous PRN    famotidine (PEPCID) tablet 20 mg  20 mg Oral QHS        Lab Data Reviewed: (see below)  Lab Review:     Recent Labs     07/21/20  0123 07/20/20  1634   WBC 6.8 7.5   HGB 11.7 11.9   HCT 34.0* 35.2    236     Recent Labs     07/21/20  0123 07/20/20  1634   * 129*   K 4.2 4.5   CL 99 97   CO2 22 23   * 116*   BUN 30* 34*   CREA 1.37* 1.28*   CA 8.1* 8.4*   MG 2.3  --    ALB 3.2* 3.7   TBILI 0.2 0.4   ALT 25 30     Lab Results   Component Value Date/Time    Glucose (POC) 110 (H) 07/22/2020 06:38 AM    Glucose (POC) 118 (H) 07/21/2020 09:16 PM    Glucose (POC) 112 (H) 07/21/2020 04:44 PM    Glucose (POC) 107 (H) 07/21/2020 11:06 AM    Glucose (POC) 147 (H) 07/21/2020 06:38 AM     Recent Labs     07/20/20  2350   PH 7.46*   PCO2 32*   PO2 99   HCO3 23   FIO2 21     No results for input(s): INR, INREXT, INREXT in the last 72 hours.   All Micro Results     Procedure Component Value Units Date/Time    CULTURE, URINE [[id number]]  (Abnormal) Collected:  07/21/20 0123    Order Status:  Completed Specimen:  Cath Urine Updated:  07/22/20 0850     Special Requests: NO SPECIAL REQUESTS        [place] Count --        30325  COLONIES/mL       Culture result: GRAM NEGATIVE RODS (PREDOMINATING)                  MIXED UROGENITAL JENN ISOLATED                I have reviewed notes of prior 24hr. Other pertinent lab:      Total time spent with patient: [address] discussed with: Patient, Nursing Staff and >50% of time spent in counseling and coordination of care    Discussed:  Care Plan    Prophylaxis:  Lovenox    Disposition: home            ___________________________________________________    Attending Physician: Christiane Diggs MD

## 2020-07-23 ENCOUNTER — PATIENT OUTREACH (OUTPATIENT)
Dept: CASE MANAGEMENT | Age: 85
End: 2020-07-23

## 2020-07-23 LAB
BACTERIA SPEC CULT: ABNORMAL
BACTERIA SPEC CULT: ABNORMAL
CC UR VC: ABNORMAL
SERVICE CMNT-IMP: ABNORMAL

## 2020-07-27 ENCOUNTER — TELEPHONE (OUTPATIENT)
Dept: CARDIOLOGY CLINIC | Age: 85
End: 2020-07-27

## 2020-07-27 ENCOUNTER — OFFICE VISIT (OUTPATIENT)
Dept: CARDIOLOGY CLINIC | Age: 85
End: 2020-07-27

## 2020-07-27 VITALS
OXYGEN SATURATION: 98 % | SYSTOLIC BLOOD PRESSURE: 152 MMHG | DIASTOLIC BLOOD PRESSURE: 62 MMHG | HEART RATE: 68 BPM | WEIGHT: 202 LBS | BODY MASS INDEX: 35.78 KG/M2

## 2020-07-27 DIAGNOSIS — R06.09 DOE (DYSPNEA ON EXERTION): Primary | ICD-10-CM

## 2020-07-27 DIAGNOSIS — E87.1 HYPONATREMIA: ICD-10-CM

## 2020-07-27 DIAGNOSIS — R53.1 WEAKNESS: Primary | ICD-10-CM

## 2020-07-27 DIAGNOSIS — I10 ESSENTIAL HYPERTENSION: ICD-10-CM

## 2020-07-27 RX ORDER — VIT A/VIT C/VIT E/ZINC/COPPER 4296-226
1 CAPSULE ORAL
COMMUNITY
End: 2020-10-30

## 2020-07-27 NOTE — PROGRESS NOTES
Baron Tosin MD. [facility]              Patient: Maxwell Mahajan  : 1935      Today's Date: 2020            HISTORY OF PRESENT ILLNESS:     History of Present Illness:    Ms. Edil Purvis is a 80y.o. year old female, she is seen today for Transition of Care services following a hospital discharge for SOB on 20. Our office Nurse Navigator performed an outreach to Ms. Gunter on 20 (within 2 business days of discharge) to complete medication reconciliation and a telephonic assessment of her condition. Here for hospital FU. Still staying weak with HR in 40's and 50's. Her CHRISTIAN is a little better. No orthopnea. Sleeps on her side and uses 2 pillows. She has been taking cardizem 300 at night and 240 mg in AM since hospital discharge since that is what she thought she should be taking. PAST MEDICAL HISTORY:     Past Medical History:   Diagnosis Date    Arthritis     Asthma     Breast cancer ([address]     Chest pain, unspecified     atypical chest discomfort with normal stress cardiolite , and normal stress echoes ,  and . Negative dobutamine cardiolite 2009 for ischemia with an EF=63%. Cardiac Cath 9/3/2010 showed mild CAD.  Diabetes mellitus ([address]     Diverticulosis     Dyslipidemia     Frequent PVCs     GERD (gastroesophageal reflux disease)     Hiatal hernia     HTN (hypertension)     Hypothyroid     Neuropathy     Obesity     LA on CPAP     RBBB (right bundle branch block)     Spinal stenosis        Past Surgical History:   Procedure Laterality Date    HX BUNIONECTOMY      HX CHOLECYSTECTOMY      HX HEART CATHETERIZATION      Cardiac Cath 9/3/2010 showed mild CAD.       HX HIP REPLACEMENT      HX ORTHOPAEDIC      HX OTHER SURGICAL      Echo 10/5/12 - LVEF 60-65%, mod-marked LAE, MAC, grade 1 diastolic dysfunction     HX OTHER SURGICAL      Lexiscan cardiolite 13 - normal MPI, LVEF 74%    HX OTHER SURGICAL Aorta duplex 1/6/14 - aorta size 1.7 cm, no dilation            MEDICATIONS:     Current Outpatient Medications   Medication Sig Dispense Refill    vit A,C,E-zinc-copper (PreserVision AREDS) cap capsule Take 1 Cap by mouth.  dilTIAZem ER (CARDIZEM CD) 240 mg capsule Take 1 Cap by mouth daily. 30 Cap 0    hydrALAZINE (APRESOLINE) 25 mg tablet Take 1 Tab by mouth two (2) times a day. 60 Tab 1    calcium-cholecalciferol, D3, (CALTRATE 600+D) tablet Take 1 Tab by mouth two (2) times daily (with meals).  chlorthalidone (HYGROTEN) 25 mg tablet Take 25 mg by mouth daily. Does not take if taking furosemide 40 mg      dilTIAZem ER (TIAZAC) 300 mg capsule Take 300 mg by mouth nightly.  cetirizine (ZyrTEC) 10 mg tablet Take 10 mg by mouth daily.  cloNIDine HCL (CATAPRES) 0.1 mg tablet Take 2 Tabs by mouth nightly. 270 Tab 3    spironolactone (ALDACTONE) 25 mg tablet TAKE 1 TABLET DAILY 90 Tab 3    rosuvastatin (CRESTOR) 10 mg tablet Take 10 mg by mouth nightly.  vit C/E/Zn/coppr/lutein/zeaxan (PRESERVISION AREDS 2 PO) Take 1 Cap by mouth two (2) times a day.  glucosam/chond/hyalu/CF borate (MOVE FREE JOINT [facility] PO) Take 1 Tab by mouth daily.  acetaminophen (TYLENOL EXTRA STRENGTH) 500 mg tablet Take  by mouth every six (6) hours as needed for Pain.  losartan (COZAAR) 100 mg tablet Take 1 Tab by mouth nightly. 90 Tab 3    gabapentin (NEURONTIN) 100 mg capsule Take 200 mg by mouth nightly.  OTHER zyflamend 2 caps daily      cyanocobalamin 1,000 mcg tablet Take 1,000 mcg by mouth every Sunday.  levothyroxine (SYNTHROID) 100 mcg tablet Take 100 mcg by mouth Daily (before breakfast).  omeprazole (PRILOSEC) 20 mg capsule Take 20 mg by mouth two (2) times a day.  metformin (GLUCOPHAGE) 500 mg tablet Take 500 mg by mouth daily (with breakfast).  montelukast (SINGULAIR) 10 mg tablet Take 10 mg by mouth nightly.          Allergies   Allergen Reactions    Tolectin [Tolmetin] Anaphylaxis    Aldactone [Spironolactone] Other (comments)     hyperkalemia    Carvedilol Other (comments)     \"slow heart rate down to 44\"    Ceclor [Cefaclor] Hives    Ciprofloxacin Hives and Swelling    Darvon [Propoxyphene] Swelling     Swelling lips and hands    Macrobid [Nitrofurantoin Monohyd/M-Cryst] Other (comments)     Does not recall    Norvasc [Amlodipine] Swelling     Swelling ankles/legs    Tetracycline Swelling     Swelling lips/hands    Toprol Xl [Metoprolol Succinate] Swelling     Swellings ankles/legs    Valsartan Other (comments)     Also \"slow heart rate down to 44\"    Zestril [Lisinopril] Hives           SOCIAL HISTORY:     Social History     Tobacco Use    Smoking status: Never Smoker    Smokeless tobacco: Never Used   Substance Use Topics    Alcohol use: No     Alcohol/week: 0.0 standard drinks    Drug use: No         FAMILY HISTORY:     Family History   Problem Relation Age of Onset    Coronary Artery Disease Father     Heart Disease Father     Coronary Artery Disease Paternal Aunt     Cancer Mother     Parkinson's Disease Mother     Stroke Mother     Thyroid Disease Mother     Seizures Brother             REVIEW OF SYMPTOMS:       Review of Symptoms:  Constitutional: Negative for fever, chills  HEENT: Negative for nosebleeds, tinnitus, and vision changes. Respiratory: Negative for cough, wheezing  Cardiovascular: Negative for syncope, and PND.  +CHRISTIAN  Gastrointestinal: Negative for abdominal pain, diarrhea, melena. Genitourinary: Negative for dysuria  Musculoskeletal: + back pain, joint pain   Skin: Negative for rash  Heme: No problems bleeding. Neurological: Negative for speech change and focal weakness.                   PHYSICAL EXAM:        Physical Exam:    Visit Vitals  /62   Pulse 68   Wt 202 lb (91.6 kg)   SpO2 98%   BMI 35.78 kg/m²          Patient appears generally well, mood and affect are appropriate and pleasant.   HEENT: Hearing intact, non-icteric, normocephalic, atraumatic.    Neck Exam: Supple, No JVD  Lung Exam: Clear to auscultation, even breath sounds.    Cardiac Exam: Regular rate and rhythm with no murmur  Abdomen: Soft, non-tender, normal bowel sounds. + obese   Extremities: Moves all ext well. Mild lower extremity edema. Psych: Appropriate affect  Neuro - Grossly intact          LABS / OTHER STUDIES:     Lab Results   Component Value Date/Time    Sodium 129 (L) 07/22/2020 10:04 AM    Potassium 4.0 07/22/2020 10:04 AM    Chloride 98 07/22/2020 10:04 AM    CO2 24 07/22/2020 10:04 AM    Anion gap 7 07/22/2020 10:04 AM    Glucose 132 (H) 07/22/2020 10:04 AM    BUN 27 (H) 07/22/2020 10:04 AM    Creatinine 1.32 (H) 07/22/2020 10:04 AM    BUN/Creatinine ratio 20 07/22/2020 10:04 AM    GFR est AA 46 (L) 07/22/2020 10:04 AM    GFR est non-AA 38 (L) 07/22/2020 10:04 AM    Calcium 8.6 07/22/2020 10:04 AM    Bilirubin, total 0.2 07/21/2020 01:23 AM    Alk. phosphatase 38 (L) 07/21/2020 01:23 AM    Protein, total 6.6 07/21/2020 01:23 AM    Albumin 3.2 (L) 07/21/2020 01:23 AM    Globulin 3.4 07/21/2020 01:23 AM    A-G Ratio 0.9 (L) 07/21/2020 01:23 AM    ALT (SGPT) 25 07/21/2020 01:23 AM    AST (SGOT) 18 07/21/2020 01:23 AM       Lab Results   Component Value Date/Time    WBC 6.8 07/21/2020 01:23 AM    HGB 11.7 07/21/2020 01:23 AM    HCT 34.0 (L) 07/21/2020 01:23 AM    PLATELET 641 28/39/4128 01:23 AM    MCV 96.9 07/21/2020 01:23 AM         Lab Results   Component Value Date/Time    TSH 1.36 07/20/2020 04:35 PM           CARDIAC DIAGNOSTICS:        Cardiac Evaluation Includes:  Cath 2010: LAD 25%, RCA 20%, Mid RCA 20%. Stress Nuc 2013: Normal perfusion. EF 74. V/Q 5/1/16 - low prob  Echo 5/2/16 - LVEF 60%  Lexiscan Cardiolite 5/2/16 - 1) No ischemic EKG changes with Lexiscan.  Frequent PVC's during stress.  Mild chest pain noted during Lexiscan Injection.    2) Normal Lexiscan gated SPECT myocardial perfusion study.  3) LVEF 55%    [facility] Screening 10/17/15 - mild carotid disease, normal PALAK and AAA    Carotid Doppler 8/25/17 - 10-49% stenosis JONATAN and 9-1% LICA,     Renal Artery Dopplers - 4/19/19 - normal       CXR 7/20/20 - no acute process      Chest CTA 7/20/20 - No PE. No acute disease in the chest. + coronary calcifications      Echo 7/21/20 - LVEF 60-65%, grade 1 diastology, MAC          EKG 8/25/17 - NSR, RBBB  EKG 10/12/18 - NSR, RBBB, LAFB  EKG 12/10/19 - NSR, RBBB,   EKG 7/21/20 - NSR, RBBB, LAD                     ASSESSMENT AND PLAN:        Assessment and Plan:  1) CHRISTIAN   - She has along history of CHRISTIAN and atypical chest pain   - Prior cath, stress test, echo and BNP level have been unremarkable (see above)   - She saw Pulmonary and felt better with Advair in past   - On 7/20 -  Admission CXR and Chest CTA. ProBNP just minimally elevated (and not diagnostic for CHF). No JVD or crackles on exam.  Echo with normal LV systolic function and grade 1 diastology   - Her chronic CHRISTIAN is multifactorial (deconditioning, obesity, asthma, diastolic dysfunction, etc). - Going forward will aim for tighter BP control (see below)   - consider repeat CAD evaluation (Estefany Mcmillan) if problems persist despite optimizing meds. - Set up home PT for deconditioning      2) HTN   - Clonidine helps for BP but it makes her tired so can only take it at night.  - On 7/27/20 - she is on total 540 mg cardizem daily and HR running in 40's at home. Will just continue cardizem 240 mg once daily and reassess in a couple of weeks   - follow sodium levels closely (has hyponatremia)       3) Mild CAD      4) Dyslipidemia  - Given age and lack of significant CAD and presence of joint pain, statin benefit questionable. - can continue crestor if she wants (Endocrinology Dr. Lena Hunter restarted a statin)       5) Chronic LE edema  - Is on chlorthalidone and aldactone -- uses lasix PRN  - apply TEDS       6) See me in 2-4 weeks.  65+ years.   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       Urmila Mancia MD, [address], [address]      [address]  [address], [address] [place] [address]  Ph: [phone]                               Ph [phone]

## 2020-07-27 NOTE — PROGRESS NOTES
Maxine Beltrán is a 80 y.o. female  No chief complaint on file. Health Maintenance Due   Topic Date Due    A1C test (Diabetic or Prediabetic)  08/23/1945    MICROALBUMIN Q1  08/23/1945    Eye Exam Retinal or Dilated  08/23/1945    Shingrix Vaccine Age 50> (1 of 2) 08/23/1985    GLAUCOMA SCREENING Q2Y  08/23/2000    Medicare Yearly Exam  03/14/2018    Lipid Screen  01/28/2020     Visit Vitals  /62   Pulse 68   Wt 202 lb (91.6 kg)   SpO2 98%   BMI 35.78 kg/m²     1. Have you been to the ER, urgent care clinic since your last visit? Hospitalized since your last visit? yes [facility]- HR staying in the 40s and bp was high    2. Have you seen or consulted any other health care providers outside of the [address] [place] [address] since your last visit? Include any pap smears or colon screening. No     Room #: 4    Chest Pain? No (but describes a full feeling)  SOB? Yes with exertion   Swelling? yes   Dizzyness?  yes

## 2020-08-06 ENCOUNTER — PATIENT OUTREACH (OUTPATIENT)
Dept: CASE MANAGEMENT | Age: 85
End: 2020-08-06

## 2020-08-06 NOTE — PROGRESS NOTES
Patient resolved from Transition of Care episode on 08/06/20  Discussed COVID-19 related testing which was discussed at this time. Test results were negative. Patient informed of results, if available? no     Patient/family has been provided the following resources and education related to COVID-19:                         Signs, symptoms and red flags related to COVID-19            CDC exposure and quarantine guidelines            [facility] exposure contact - [phone]            Contact for their local Department of Health                 Patient currently reports that the following symptoms have improved:  no new symptoms and no worsening symptoms. No further outreach scheduled with this CTN/ACM/LPN/HC/ MA. Episode of Care resolved. Patient has this CTN/ACM/LPN/HC/MA contact information if future needs arise.

## 2020-08-31 ENCOUNTER — OFFICE VISIT (OUTPATIENT)
Dept: CARDIOLOGY CLINIC | Age: 85
End: 2020-08-31
Payer: MEDICARE

## 2020-08-31 VITALS
HEART RATE: 87 BPM | OXYGEN SATURATION: 97 % | HEIGHT: 63 IN | SYSTOLIC BLOOD PRESSURE: 172 MMHG | WEIGHT: 205 LBS | DIASTOLIC BLOOD PRESSURE: 90 MMHG | BODY MASS INDEX: 36.32 KG/M2

## 2020-08-31 DIAGNOSIS — R06.02 SOB (SHORTNESS OF BREATH): ICD-10-CM

## 2020-08-31 DIAGNOSIS — E78.5 DYSLIPIDEMIA: ICD-10-CM

## 2020-08-31 DIAGNOSIS — I10 ESSENTIAL HYPERTENSION: Primary | ICD-10-CM

## 2020-08-31 PROCEDURE — G8536 NO DOC ELDER MAL SCRN: HCPCS | Performed by: SPECIALIST

## 2020-08-31 PROCEDURE — G8753 SYS BP > OR = 140: HCPCS | Performed by: SPECIALIST

## 2020-08-31 PROCEDURE — 1090F PRES/ABSN URINE INCON ASSESS: CPT | Performed by: SPECIALIST

## 2020-08-31 PROCEDURE — G8427 DOCREV CUR MEDS BY ELIG CLIN: HCPCS | Performed by: SPECIALIST

## 2020-08-31 PROCEDURE — 99214 OFFICE O/P EST MOD 30 MIN: CPT | Performed by: SPECIALIST

## 2020-08-31 PROCEDURE — G8432 DEP SCR NOT DOC, RNG: HCPCS | Performed by: SPECIALIST

## 2020-08-31 PROCEDURE — G8399 PT W/DXA RESULTS DOCUMENT: HCPCS | Performed by: SPECIALIST

## 2020-08-31 PROCEDURE — G8755 DIAS BP > OR = 90: HCPCS | Performed by: SPECIALIST

## 2020-08-31 PROCEDURE — 3288F FALL RISK ASSESSMENT DOCD: CPT | Performed by: SPECIALIST

## 2020-08-31 PROCEDURE — G8417 CALC BMI ABV UP PARAM F/U: HCPCS | Performed by: SPECIALIST

## 2020-08-31 PROCEDURE — 1100F PTFALLS ASSESS-DOCD GE2>/YR: CPT | Performed by: SPECIALIST

## 2020-08-31 RX ORDER — TORSEMIDE 10 MG/1
10 TABLET ORAL DAILY
Qty: 90 TAB | Refills: 3 | Status: SHIPPED | OUTPATIENT
Start: 2020-08-31 | End: 2021-02-02

## 2020-08-31 RX ORDER — SPIRONOLACTONE 25 MG/1
25 TABLET ORAL 2 TIMES DAILY
Qty: 180 TAB | Refills: 3 | Status: SHIPPED | OUTPATIENT
Start: 2020-08-31 | End: 2021-02-02

## 2020-08-31 NOTE — PROGRESS NOTES
Angelito Snyder MD. [facility]              Patient: Lisa Ruiz  : 1935      Today's Date: 2020          HISTORY OF PRESENT ILLNESS:     History of Present Illness:  She feels tired all the time. Has no energy. Has chronic CHRISTIAN, but says this has been stable. No CP. No lightheadedness but balance is poor chronically (back problems). BP is still high at home. Leg swelling is not much better. PAST MEDICAL HISTORY:     Past Medical History:   Diagnosis Date    Arthritis     Asthma     Breast cancer ([address]     Chest pain, unspecified     atypical chest discomfort with normal stress cardiolite , and normal stress echoes ,  and . Negative dobutamine cardiolite 2009 for ischemia with an EF=63%. Cardiac Cath 9/3/2010 showed mild CAD.  Diabetes mellitus ([address]     Diverticulosis     Dyslipidemia     Frequent PVCs     GERD (gastroesophageal reflux disease)     Hiatal hernia     HTN (hypertension)     Hypothyroid     Neuropathy     Obesity     LA on CPAP     RBBB (right bundle branch block)     Spinal stenosis          Past Surgical History:   Procedure Laterality Date    HX BUNIONECTOMY      HX CHOLECYSTECTOMY      HX HEART CATHETERIZATION      Cardiac Cath 9/3/2010 showed mild CAD.  HX HIP REPLACEMENT      HX ORTHOPAEDIC      HX OTHER SURGICAL      Echo 10/5/12 - LVEF 60-65%, mod-marked LAE, MAC, grade 1 diastolic dysfunction     HX OTHER SURGICAL      Lexiscan cardiolite 13 - normal MPI, LVEF 74%    HX OTHER SURGICAL      Aorta duplex 14 - aorta size 1.7 cm, no dilation            MEDICATIONS:     Current Outpatient Medications   Medication Sig Dispense Refill    dilTIAZem ER (CARDIZEM CD) 240 mg capsule Take 1 Cap by mouth daily. 30 Cap 0    hydrALAZINE (APRESOLINE) 25 mg tablet Take 1 Tab by mouth two (2) times a day.  60 Tab 1    calcium-cholecalciferol, D3, (CALTRATE 600+D) tablet Take 1 Tab by mouth two (2) times daily (with meals).  chlorthalidone (HYGROTEN) 25 mg tablet Take 25 mg by mouth daily. Does not take if taking furosemide 40 mg      cetirizine (ZyrTEC) 10 mg tablet Take 10 mg by mouth daily.  cloNIDine HCL (CATAPRES) 0.1 mg tablet Take 2 Tabs by mouth nightly. 270 Tab 3    spironolactone (ALDACTONE) 25 mg tablet TAKE 1 TABLET DAILY 90 Tab 3    rosuvastatin (CRESTOR) 10 mg tablet Take 10 mg by mouth nightly.  vit C/E/Zn/coppr/lutein/zeaxan (PRESERVISION AREDS 2 PO) Take 1 Cap by mouth two (2) times a day.  glucosam/chond/hyalu/CF borate (MOVE FREE ODEC PO) Take 1 Tab by mouth daily.  acetaminophen (TYLENOL EXTRA STRENGTH) 500 mg tablet Take  by mouth every six (6) hours as needed for Pain.  losartan (COZAAR) 100 mg tablet Take 1 Tab by mouth nightly. 90 Tab 3    gabapentin (NEURONTIN) 100 mg capsule Take 200 mg by mouth nightly.  OTHER zyflamend 2 caps daily      cyanocobalamin 1,000 mcg tablet Take 1,000 mcg by mouth every Sunday.  levothyroxine (SYNTHROID) 100 mcg tablet Take 100 mcg by mouth Daily (before breakfast).  omeprazole (PRILOSEC) 20 mg capsule Take 20 mg by mouth two (2) times a day.  metformin (GLUCOPHAGE) 500 mg tablet Take 500 mg by mouth daily (with breakfast).  montelukast (SINGULAIR) 10 mg tablet Take 10 mg by mouth nightly.  vit A,C,E-zinc-copper (PreserVision AREDS) cap capsule Take 1 Cap by mouth.          Allergies   Allergen Reactions    Tolectin [Tolmetin] Anaphylaxis    Aldactone [Spironolactone] Other (comments)     hyperkalemia    Carvedilol Other (comments)     \"slow heart rate down to 44\"    Ceclor [Cefaclor] Hives    Ciprofloxacin Hives and Swelling    Darvon [Propoxyphene] Swelling     Swelling lips and hands    Macrobid [Nitrofurantoin Monohyd/M-Cryst] Other (comments)     Does not recall    Norvasc [Amlodipine] Swelling     Swelling ankles/legs    Tetracycline Swelling     Swelling lips/hands    Toprol Xl [Metoprolol Succinate] Swelling     Swellings ankles/legs    Valsartan Other (comments)     Also \"slow heart rate down to 44\"    Zestril [Lisinopril] Hives           SOCIAL HISTORY:     Social History     Tobacco Use    Smoking status: Never Smoker    Smokeless tobacco: Never Used   Substance Use Topics    Alcohol use: No     Alcohol/week: 0.0 standard drinks    Drug use: No         FAMILY HISTORY:     Family History   Problem Relation Age of Onset    Coronary Artery Disease Father     Heart Disease Father     Coronary Artery Disease Paternal Aunt     Cancer Mother     Parkinson's Disease Mother     Stroke Mother     Thyroid Disease Mother     Seizures Brother             REVIEW OF SYMPTOMS:       Review of Symptoms:  Constitutional: Negative for fever, chills  HEENT: Negative for nosebleeds, tinnitus, and vision changes. Respiratory: Negative for cough, wheezing  Cardiovascular: Negative for syncope, and PND.  +CHRISTIAN  Gastrointestinal: Negative for abdominal pain, diarrhea, melena. Genitourinary: Negative for dysuria  Musculoskeletal: + back pain, joint pain   Skin: Negative for rash  Heme: No problems bleeding. Neurological: Negative for speech change and focal weakness.                   PHYSICAL EXAM:        Physical Exam:  Visit Vitals  /90 (BP 1 Location: Left arm, BP Patient Position: Sitting)   Pulse 87   Ht 5' 3\" (1.6 m)   Wt 205 lb (93 kg)   SpO2 97%   BMI 36.31 kg/m²          Patient appears generally well, mood and affect are appropriate and pleasant. HEENT: Hearing intact, non-icteric, normocephalic, atraumatic.    Neck Exam: Supple, No JVD  Lung Exam: Clear to auscultation, even breath sounds.    Cardiac Exam: Regular rate and rhythm with no murmur  Abdomen: Soft, non-tender, normal bowel sounds. + obese   Extremities: Moves all ext well. Mild to moderate (L>R) lower extremity edema. Erythema in legs bilat.     Psych: Appropriate affect  Neuro - Grossly intact            LABS / OTHER STUDIES:        Lab Results   Component Value Date/Time    Sodium 129 (L) 07/22/2020 10:04 AM    Potassium 4.0 07/22/2020 10:04 AM    Chloride 98 07/22/2020 10:04 AM    CO2 24 07/22/2020 10:04 AM    Anion gap 7 07/22/2020 10:04 AM    Glucose 132 (H) 07/22/2020 10:04 AM    BUN 27 (H) 07/22/2020 10:04 AM    Creatinine 1.32 (H) 07/22/2020 10:04 AM    BUN/Creatinine ratio 20 07/22/2020 10:04 AM    GFR est AA 46 (L) 07/22/2020 10:04 AM    GFR est non-AA 38 (L) 07/22/2020 10:04 AM    Calcium 8.6 07/22/2020 10:04 AM    Bilirubin, total 0.2 07/21/2020 01:23 AM    Alk. phosphatase 38 (L) 07/21/2020 01:23 AM    Protein, total 6.6 07/21/2020 01:23 AM    Albumin 3.2 (L) 07/21/2020 01:23 AM    Globulin 3.4 07/21/2020 01:23 AM    A-G Ratio 0.9 (L) 07/21/2020 01:23 AM    ALT (SGPT) 25 07/21/2020 01:23 AM    AST (SGOT) 18 07/21/2020 01:23 AM       Lab Results   Component Value Date/Time    WBC 6.8 07/21/2020 01:23 AM    HGB 11.7 07/21/2020 01:23 AM    HCT 34.0 (L) 07/21/2020 01:23 AM    PLATELET 348 84/56/0828 01:23 AM    MCV 96.9 07/21/2020 01:23 AM       Labs 8/17/20 - K 4.7, LFT's OK, Cr 1.3, Na 139, A1c 5.9            CARDIAC DIAGNOSTICS:        Cardiac Evaluation Includes:  Cath 2010: LAD 25%, RCA 20%, Mid RCA 20%. Stress Nuc 2013: Normal perfusion. EF 74. V/Q 5/1/16 - low prob  Echo 5/2/16 - LVEF 60%  Lexiscan Cardiolite 5/2/16 - 1) No ischemic EKG changes with Lexiscan.  Frequent PVC's during stress.  Mild chest pain noted during Lexiscan Injection.    2) Normal Lexiscan gated SPECT myocardial perfusion study. 3) LVEF 55%     [facility] Screening 10/17/15 - mild carotid disease, normal PALAK and AAA     Carotid Doppler 8/25/17 - 10-49% stenosis JONATAN and 7-6% LICA,     Renal Artery Dopplers - 4/19/19 - normal       CXR 7/20/20 - no acute process      Chest CTA 7/20/20 - No PE.  No acute disease in the chest. + coronary calcifications      Echo 7/21/20 - LVEF 60-65%, grade 1 diastology, MAC           EKG 8/25/17 - NSR, RBBB  EKG 10/12/18 - NSR, RBBB, LAFB  EKG 12/10/19 - NSR, RBBB,   EKG 7/21/20 - NSR, RBBB, LAD                      ASSESSMENT AND PLAN:        Assessment and Plan:  1) Russel Martin  - She has along history of CHRISTIAN and atypical chest pain   - Prior cath, stress test, echo and BNP level have been unremarkable (see above)   - She saw Pulmonary and felt better with Advair in past   - On 7/20 -  Admission CXR and Chest CTA.  ProBNP just minimally elevated (and not diagnostic for CHF).  No JVD or crackles on exam.  Echo with normal LV systolic function and grade 1 diastology   - Her chronic CHRISTIAN is multifactorial (deconditioning, obesity, asthma, diastolic dysfunction, etc). - Going forward will aim for tighter BP control (see below)   - consider repeat CAD evaluation (Teri Dye) if problems persist despite optimizing meds. - About to start home PT for her shoulder   - Refer to Pulm again later if problems persist unexplained      2) HTN and leg swelling   - Clonidine helps for BP but it makes her tired so can only take it at night.  - On 7/27/20 - she is on total 540 mg cardizem daily and HR running in 40's at home. Will just continue cardizem 240 mg once daily and reassess in a couple of weeks. follow sodium levels closely (has had hyponatremia)   - On 8/31/20 - leg swelling persists. Labs recently were OK overall. Will go ahead and stop chlorthalidone and try the torsemide alone daily to help with leg swelling. Will try aldactone 25 mg BID. She has had a lot of neck pain and that could make BP worse too. Follow labs closely. 3) Mild CAD      4) Dyslipidemia  - Given age and lack of significant CAD and presence of joint pain, statin benefit questionable. - can continue crestor if she wants (Endocrinology Dr. Cierra King restarted a statin)       5) Chronic LE edema  - has TEDS  - see above       6) See me in 2 weeks.       65+ years.   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        Halley Montalvo MD, [address]      [phone]  [address]  [facility], [address] [place] [address]  Ph: [phone]                                [phone]

## 2020-08-31 NOTE — PROGRESS NOTES
Maria Fernanda Santiago is a 80 y.o. female    Visit Vitals  /90 (BP 1 Location: Left arm, BP Patient Position: Sitting)   Pulse 87   Ht 5' 3\" (1.6 m)   Wt 205 lb (93 kg)   SpO2 97%   BMI 36.31 kg/m²       Chief Complaint   Patient presents with    Hypertension    Other     CRHISTIAN       Chest pain NO  SOB NO  Dizziness NO    Swelling LOWER LEGS AND ANKLES, LEFT MORE.      87Recent hospital visit NO  Refills NO

## 2020-09-11 LAB
BUN SERPL-MCNC: 47 MG/DL (ref 8–27)
BUN/CREAT SERPL: 31 (ref 12–28)
CALCIUM SERPL-MCNC: 10 MG/DL (ref 8.7–10.3)
CHLORIDE SERPL-SCNC: 104 MMOL/L (ref 96–106)
CO2 SERPL-SCNC: 22 MMOL/L (ref 20–29)
CREAT SERPL-MCNC: 1.51 MG/DL (ref 0.57–1)
GLUCOSE SERPL-MCNC: 107 MG/DL (ref 65–99)
INTERPRETATION: NORMAL
NT-PROBNP SERPL-MCNC: 409 PG/ML (ref 0–738)
POTASSIUM SERPL-SCNC: 5.4 MMOL/L (ref 3.5–5.2)
SODIUM SERPL-SCNC: 140 MMOL/L (ref 134–144)

## 2020-09-18 ENCOUNTER — OFFICE VISIT (OUTPATIENT)
Dept: CARDIOLOGY CLINIC | Age: 85
End: 2020-09-18
Payer: MEDICARE

## 2020-09-18 VITALS
OXYGEN SATURATION: 99 % | HEIGHT: 63 IN | WEIGHT: 202 LBS | SYSTOLIC BLOOD PRESSURE: 138 MMHG | BODY MASS INDEX: 35.79 KG/M2 | DIASTOLIC BLOOD PRESSURE: 70 MMHG | HEART RATE: 69 BPM

## 2020-09-18 DIAGNOSIS — R06.02 SOB (SHORTNESS OF BREATH): Primary | ICD-10-CM

## 2020-09-18 PROCEDURE — G8432 DEP SCR NOT DOC, RNG: HCPCS | Performed by: SPECIALIST

## 2020-09-18 PROCEDURE — G8399 PT W/DXA RESULTS DOCUMENT: HCPCS | Performed by: SPECIALIST

## 2020-09-18 PROCEDURE — G8754 DIAS BP LESS 90: HCPCS | Performed by: SPECIALIST

## 2020-09-18 PROCEDURE — G8417 CALC BMI ABV UP PARAM F/U: HCPCS | Performed by: SPECIALIST

## 2020-09-18 PROCEDURE — G8536 NO DOC ELDER MAL SCRN: HCPCS | Performed by: SPECIALIST

## 2020-09-18 PROCEDURE — G8752 SYS BP LESS 140: HCPCS | Performed by: SPECIALIST

## 2020-09-18 PROCEDURE — 1100F PTFALLS ASSESS-DOCD GE2>/YR: CPT | Performed by: SPECIALIST

## 2020-09-18 PROCEDURE — 3288F FALL RISK ASSESSMENT DOCD: CPT | Performed by: SPECIALIST

## 2020-09-18 PROCEDURE — 99214 OFFICE O/P EST MOD 30 MIN: CPT | Performed by: SPECIALIST

## 2020-09-18 PROCEDURE — G8427 DOCREV CUR MEDS BY ELIG CLIN: HCPCS | Performed by: SPECIALIST

## 2020-09-18 PROCEDURE — 1090F PRES/ABSN URINE INCON ASSESS: CPT | Performed by: SPECIALIST

## 2020-09-18 NOTE — PROGRESS NOTES
Tabitha Ramírez is a 80 y.o. female    Visit Vitals  Ht 5' 3\" (1.6 m)   Wt 202 lb (91.6 kg)   BMI 35.78 kg/m²       Chief Complaint   Patient presents with    Hypertension    Other     DLD    Shortness of Breath       Chest pain NO  SOB NO  Dizziness NO  Swelling NO  Recent hospital visit NO  Refills NO

## 2020-09-18 NOTE — PROGRESS NOTES
Manuel Diez MD. [facility]              Patient: Stanislav Ferrera  : 1935      Today's Date: 2020          HISTORY OF PRESENT ILLNESS:     History of Present Illness:  She is doing fair. Biggest complaints are joints and mobility. BP has been OK at home 130-140's/70's often. PAST MEDICAL HISTORY:     Past Medical History:   Diagnosis Date    Arthritis     Asthma     Breast cancer ([address]     Chest pain, unspecified     atypical chest discomfort with normal stress cardiolite , and normal stress echoes ,  and . Negative dobutamine cardiolite 2009 for ischemia with an EF=63%. Cardiac Cath 9/3/2010 showed mild CAD.  Diabetes mellitus ([address]     Diverticulosis     Dyslipidemia     Frequent PVCs     GERD (gastroesophageal reflux disease)     Hiatal hernia     HTN (hypertension)     Hypothyroid     Neuropathy     Obesity     LA on CPAP     RBBB (right bundle branch block)     Spinal stenosis          Past Surgical History:   Procedure Laterality Date    HX BUNIONECTOMY      HX CHOLECYSTECTOMY      HX HEART CATHETERIZATION      Cardiac Cath 9/3/2010 showed mild CAD.  HX HIP REPLACEMENT      HX ORTHOPAEDIC      HX OTHER SURGICAL      Echo 10/5/12 - LVEF 60-65%, mod-marked LAE, MAC, grade 1 diastolic dysfunction     HX OTHER SURGICAL      Lexiscan cardiolite 13 - normal MPI, LVEF 74%    HX OTHER SURGICAL      Aorta duplex 14 - aorta size 1.7 cm, no dilation          MEDICATIONS:     Current Outpatient Medications   Medication Sig Dispense Refill    spironolactone (ALDACTONE) 25 mg tablet Take 1 Tab by mouth two (2) times a day. 180 Tab 3    torsemide (DEMADEX) 10 mg tablet Take 1 Tab by mouth daily. 90 Tab 3    dilTIAZem ER (CARDIZEM CD) 240 mg capsule Take 1 Cap by mouth daily. 30 Cap 0    hydrALAZINE (APRESOLINE) 25 mg tablet Take 1 Tab by mouth two (2) times a day.  60 Tab 1    calcium-cholecalciferol, D3, (CALTRATE 600+D) tablet Take 1 Tab by mouth two (2) times daily (with meals).  cetirizine (ZyrTEC) 10 mg tablet Take 10 mg by mouth daily.  cloNIDine HCL (CATAPRES) 0.1 mg tablet Take 2 Tabs by mouth nightly. 270 Tab 3    rosuvastatin (CRESTOR) 10 mg tablet Take 10 mg by mouth nightly.  vit C/E/Zn/coppr/lutein/zeaxan (PRESERVISION AREDS 2 PO) Take 1 Cap by mouth two (2) times a day.  glucosam/chond/hyalu/CF borate (MOVE FREE JOINT HEALTH PO) Take 1 Tab by mouth daily.  acetaminophen (TYLENOL EXTRA STRENGTH) 500 mg tablet Take  by mouth every six (6) hours as needed for Pain.  losartan (COZAAR) 100 mg tablet Take 1 Tab by mouth nightly. 90 Tab 3    gabapentin (NEURONTIN) 100 mg capsule Take 200 mg by mouth nightly.  OTHER zyflamend 2 caps daily      cyanocobalamin 1,000 mcg tablet Take 1,000 mcg by mouth every Sunday.  levothyroxine (SYNTHROID) 100 mcg tablet Take 100 mcg by mouth Daily (before breakfast).  omeprazole (PRILOSEC) 20 mg capsule Take 20 mg by mouth two (2) times a day.  metformin (GLUCOPHAGE) 500 mg tablet Take 500 mg by mouth daily (with breakfast).  montelukast (SINGULAIR) 10 mg tablet Take 10 mg by mouth nightly.  vit A,C,E-zinc-copper (PreserVision AREDS) cap capsule Take 1 Cap by mouth.          Allergies   Allergen Reactions    Tolectin [Tolmetin] Anaphylaxis    Aldactone [Spironolactone] Other (comments)     hyperkalemia    Carvedilol Other (comments)     \"slow heart rate down to 44\"    Ceclor [Cefaclor] Hives    Ciprofloxacin Hives and Swelling    Darvon [Propoxyphene] Swelling     Swelling lips and hands    Macrobid [Nitrofurantoin Monohyd/M-Cryst] Other (comments)     Does not recall    Norvasc [Amlodipine] Swelling     Swelling ankles/legs    Tetracycline Swelling     Swelling lips/hands    Toprol Xl [Metoprolol Succinate] Swelling     Swellings ankles/legs    Valsartan Other (comments)     Also \"slow heart rate down to 44\"    Zestril [Lisinopril] Hives           SOCIAL HISTORY:     Social History     Tobacco Use    Smoking status: Never Smoker    Smokeless tobacco: Never Used   Substance Use Topics    Alcohol use: No     Alcohol/week: 0.0 standard drinks    Drug use: No         FAMILY HISTORY:     Family History   Problem Relation Age of Onset    Coronary Artery Disease Father     Heart Disease Father     Coronary Artery Disease Paternal Aunt     Cancer Mother     Parkinson's Disease Mother     Stroke Mother     Thyroid Disease Mother     Seizures Brother             REVIEW OF SYMPTOMS:       Review of Symptoms:  Constitutional: Negative for fever, chills  HEENT: Negative for nosebleeds, tinnitus, and vision changes. Respiratory: Negative for cough, wheezing  Cardiovascular: Negative for syncope, and PND.  +CHRISTIAN  Gastrointestinal: Negative for abdominal pain, diarrhea, melena. Genitourinary: Negative for dysuria  Musculoskeletal: + back pain, joint pain   Skin: Negative for rash  Heme: No problems bleeding. Neurological: Negative for speech change and focal weakness.                   PHYSICAL EXAM:        Physical Exam:  Visit Vitals  /70 (BP 1 Location: Left arm, BP Patient Position: Sitting)   Pulse 69   Ht 5' 3\" (1.6 m)   Wt 202 lb (91.6 kg)   SpO2 99%   BMI 35.78 kg/m²          Patient appears generally well, mood and affect are appropriate and pleasant. HEENT: Hearing intact, non-icteric, normocephalic, atraumatic.    Neck Exam: Supple, No JVD  Lung Exam: Clear to auscultation, even breath sounds.    Cardiac Exam: Regular rate and rhythm with no murmur  Abdomen: Soft, non-tender, normal bowel sounds. + obese   Extremities: Moves all ext well. Mild to moderate (L>R) lower extremity edema. Erythema in legs bilat.     Psych: Appropriate affect  Neuro - Grossly intact            LABS / OTHER STUDIES:      Lab Results   Component Value Date/Time    Sodium 140 09/10/2020 02:59 PM    Potassium 5.4 (H) 09/10/2020 02:59 PM    Chloride 104 09/10/2020 02:59 PM    CO2 22 09/10/2020 02:59 PM    Anion gap 7 07/22/2020 10:04 AM    Glucose 107 (H) 09/10/2020 02:59 PM    BUN 47 (H) 09/10/2020 02:59 PM    Creatinine 1.51 (H) 09/10/2020 02:59 PM    BUN/Creatinine ratio 31 (H) 09/10/2020 02:59 PM    GFR est AA 36 (L) 09/10/2020 02:59 PM    GFR est non-AA 31 (L) 09/10/2020 02:59 PM    Calcium 10.0 09/10/2020 02:59 PM     Component      Latest Ref Rng & Units 9/10/2020 7/20/2020           2:59 PM  4:34 PM   NT pro-BNP      0 - 738 pg/mL 409 675 (H)        Labs 8/17/20 - K 4.7, LFT's OK, Cr 1.3, Na 139, A1c 5.9             CARDIAC DIAGNOSTICS:        Cardiac Evaluation Includes:  Cath 2010: LAD 25%, RCA 20%, Mid RCA 20%. Stress Nuc 2013: Normal perfusion. EF 74. V/Q 5/1/16 - low prob  Echo 5/2/16 - LVEF 60%  Lexiscan Cardiolite 5/2/16 - 1) No ischemic EKG changes with Lexiscan.  Frequent PVC's during stress.  Mild chest pain noted during Lexiscan Injection.    2) Normal Lexiscan gated SPECT myocardial perfusion study. 3) LVEF 55%     [facility] Screening 10/17/15 - mild carotid disease, normal PALAK and AAA     Carotid Doppler 8/25/17 - 10-49% stenosis JONATAN and 2-4% LICA,     Renal Artery Dopplers - 4/19/19 - normal       CXR 7/20/20 - no acute process      Chest CTA 7/20/20 - No PE.  No acute disease in the chest. + coronary calcifications      Echo 7/21/20 - LVEF 60-65%, grade 1 diastology, MAC           EKG 8/25/17 - NSR, RBBB  EKG 10/12/18 - NSR, RBBB, LAFB  EKG 12/10/19 - NSR, RBBB,   EKG 7/21/20 - NSR, RBBB, LAD                      ASSESSMENT AND PLAN:        Assessment and Plan:  1) Debra Clark  - She has along history of CHRISTIAN and atypical chest pain   - Prior cath, stress test, echo and BNP level have been unremarkable (see above)   - She saw Pulmonary and felt better with Advair in past   - On 7/20 -  Admission CXR and Chest CTA.  ProBNP just minimally elevated (and not diagnostic for CHF).  No JVD or crackles on exam.  Echo with normal LV systolic function and grade 1 diastology   - Her chronic CHRISTIAN is multifactorial (deconditioning, obesity, asthma, diastolic dysfunction, etc).    - Going forward will aim for tighter BP control (see below)   - consider repeat CAD evaluation (Page Graves) if problems persist despite optimizing meds. - Doing home PT for her shoulder / neck  - Refer to Pulm again later if problems persist unexplained   - On 9/18/20 she is doing well. Says breathing is OK. Biggest complaint is joint pain and mobility. She seems to be feeling better on current regimen with aldactone 25 BID and torsemide -- will continue meds and follow labs closely (she will cut out bananas). If Cr or K+ rises further, may need to change diuretic regimen.      2) HTN and leg swelling   - Clonidine helps for BP but it makes her tired so can only take it at night.  - On 7/27/20 - she is on total 540 mg cardizem daily and HR running in 40's at home. Liv Franklin just continue cardizem 240 mg once daily and reassess in a couple of weeks. follow sodium levels closely (has had hyponatremia)   - On 8/31/20 - leg swelling persists. Labs recently were OK overall. Will go ahead and stop chlorthalidone and try the torsemide alone daily to help with leg swelling. Will try aldactone 25 mg BID. She has had a lot of neck pain and that could make BP worse too. Follow labs closely. - On 9/18/20 - still has leg swelling, but not as bad as it was - continue meds - getting wound care treatment on legs      3) Mild CAD      4) Dyslipidemia  - Given age and lack of significant CAD and presence of joint pain, statin benefit questionable.    - can continue crestor if she wants (Endocrinology Dr. Candace Israel restarted a statin)       5) Chronic LE edema  - has TEDS  - see above       6) See NP in 4 weeks.       65+ Rossi Guzman with her .  She was a nurse for 37 years.        Cassidy Haines MD, [address] [facility]  [address], [address] [address]      [phone] [id number] [place] [address], [address] [place] [address]  Ph: [phone]                                [phone]        ADDENDUM   2/9/2021  7 day Holter 1/21/21 - sinus, HR 32-96 with average 55 bpm.  No pauses > 2 sec. HR reached 34 bpm at 3 AM one night.       Will have NP call

## 2020-10-29 ENCOUNTER — OFFICE VISIT (OUTPATIENT)
Dept: CARDIOLOGY CLINIC | Age: 85
End: 2020-10-29
Payer: MEDICARE

## 2020-10-29 VITALS
HEIGHT: 63 IN | SYSTOLIC BLOOD PRESSURE: 150 MMHG | BODY MASS INDEX: 35.44 KG/M2 | DIASTOLIC BLOOD PRESSURE: 58 MMHG | WEIGHT: 200 LBS

## 2020-10-29 DIAGNOSIS — I10 ESSENTIAL HYPERTENSION: Primary | ICD-10-CM

## 2020-10-29 DIAGNOSIS — M79.89 LEG SWELLING: ICD-10-CM

## 2020-10-29 DIAGNOSIS — E78.5 DYSLIPIDEMIA: ICD-10-CM

## 2020-10-29 DIAGNOSIS — E87.5 HYPERKALEMIA: ICD-10-CM

## 2020-10-29 PROCEDURE — 99214 OFFICE O/P EST MOD 30 MIN: CPT | Performed by: NURSE PRACTITIONER

## 2020-10-29 NOTE — PROGRESS NOTES
Chief Complaint   Patient presents with    Shortness of Breath    Hypertension    Other     RBBB     Visit Vitals  Ht 5' 3\" (1.6 m)   BMI 35.78 kg/m²     Chest pain denied   SOB denied  Swelling in hands/feet wears compression devices  Dizziness when she woke up this morning, short lived  Recent hospital stays in July for Bradycardia

## 2020-10-29 NOTE — PATIENT INSTRUCTIONS
No changes today. I will call you tomorrow after reviewing labs. I need to get these from home health. Follow-up in 3 months or as needed Increase hydration while having diarrhea.

## 2020-10-29 NOTE — PROGRESS NOTES
Ambika Walker, ANDRE  [address], [address]  [place], [address] [facility]    Office [phone]  Fax [phone]      Patient: Roz Barajas  : 1935      Today's Date: 10/29/2020          HISTORY OF PRESENT ILLNESS:     History of Present Illness:  She is doing OK. Biggest complaint is diarrhea x 3 weeks. Feels weak as result; working with PCP. In wheelchair today. Trying to hydrate. Eating mostly BRAT diet including lots of bananas. Leg swelling much improved. Pt has leg wraps on today but states you can see her bones now. BP has been OK at home 120-140's/60s-70s most often. BP log reviewed  to 10/28. Some dizziness in AM which is brief. No falls, presyncope, or syncope. Patient denies any exertional chest pain, breathing changes, palpitations, syncope, or paroxysmal nocturnal dyspnea. Diarrhea: per pt hx of similar episodes but usually resolves after 1-2 weeks. Working with PCP. If ongoing next week, PCP plans to order stool samples. PAST MEDICAL HISTORY:     Past Medical History:   Diagnosis Date    Arthritis     Asthma     Breast cancer ([address]     Chest pain, unspecified     atypical chest discomfort with normal stress cardiolite , and normal stress echoes ,  and . Negative dobutamine cardiolite 2009 for ischemia with an EF=63%. Cardiac Cath 9/3/2010 showed mild CAD.  Diabetes mellitus ([address]     Diverticulosis     Dyslipidemia     Frequent PVCs     GERD (gastroesophageal reflux disease)     Hiatal hernia     HTN (hypertension)     Hypothyroid     Neuropathy     Obesity     LA on CPAP     RBBB (right bundle branch block)     Spinal stenosis          Past Surgical History:   Procedure Laterality Date    HX BUNIONECTOMY      HX CHOLECYSTECTOMY      HX HEART CATHETERIZATION      Cardiac Cath 9/3/2010 showed mild CAD.       HX HIP REPLACEMENT      HX ORTHOPAEDIC      HX OTHER SURGICAL      Echo 10/5/12 - LVEF 60-65%, mod-marked LAE, MAC, grade 1 diastolic dysfunction     HX OTHER SURGICAL      Lexiscan cardiolite 1/25/13 - normal MPI, LVEF 74%    HX OTHER SURGICAL      Aorta duplex 1/6/14 - aorta size 1.7 cm, no dilation          MEDICATIONS:     Current Outpatient Medications   Medication Sig Dispense Refill    spironolactone (ALDACTONE) 25 mg tablet Take 1 Tab by mouth two (2) times a day. 180 Tab 3    torsemide (DEMADEX) 10 mg tablet Take 1 Tab by mouth daily. 90 Tab 3    dilTIAZem ER (CARDIZEM CD) 240 mg capsule Take 1 Cap by mouth daily. 30 Cap 0    hydrALAZINE (APRESOLINE) 25 mg tablet Take 1 Tab by mouth two (2) times a day. 60 Tab 1    calcium-cholecalciferol, D3, (CALTRATE 600+D) tablet Take 1 Tab by mouth two (2) times daily (with meals).  cetirizine (ZyrTEC) 10 mg tablet Take 10 mg by mouth daily.  cloNIDine HCL (CATAPRES) 0.1 mg tablet Take 2 Tabs by mouth nightly. 270 Tab 3    rosuvastatin (CRESTOR) 10 mg tablet Take 10 mg by mouth nightly.  vit C/E/Zn/coppr/lutein/zeaxan (PRESERVISION AREDS 2 PO) Take 1 Cap by mouth two (2) times a day.  acetaminophen (TYLENOL EXTRA STRENGTH) 500 mg tablet Take  by mouth every six (6) hours as needed for Pain.  losartan (COZAAR) 100 mg tablet Take 1 Tab by mouth nightly. 90 Tab 3    gabapentin (NEURONTIN) 100 mg capsule Take 200 mg by mouth nightly.  OTHER zyflamend 2 caps daily      levothyroxine (SYNTHROID) 100 mcg tablet Take 100 mcg by mouth Daily (before breakfast).  omeprazole (PRILOSEC) 20 mg capsule Take 20 mg by mouth two (2) times a day.  montelukast (SINGULAIR) 10 mg tablet Take 10 mg by mouth nightly.          Allergies   Allergen Reactions    Tolectin [Tolmetin] Anaphylaxis    Aldactone [Spironolactone] Other (comments)     hyperkalemia    Carvedilol Other (comments)     \"slow heart rate down to 44\"    Ceclor [Cefaclor] Hives    Ciprofloxacin Hives and Swelling    Darvon [Propoxyphene] Swelling     Swelling lips and hands    Macrobid [Nitrofurantoin Monohyd/M-Cryst] Other (comments)     Does not recall    Norvasc [Amlodipine] Swelling     Swelling ankles/legs    Tetracycline Swelling     Swelling lips/hands    Toprol Xl [Metoprolol Succinate] Swelling     Swellings ankles/legs    Valsartan Other (comments)     Also \"slow heart rate down to 44\"    Zestril [Lisinopril] Hives           SOCIAL HISTORY:     Social History     Tobacco Use    Smoking status: Never Smoker    Smokeless tobacco: Never Used   Substance Use Topics    Alcohol use: No     Alcohol/week: 0.0 standard drinks    Drug use: No         FAMILY HISTORY:     Family History   Problem Relation Age of Onset    Coronary Artery Disease Father     Heart Disease Father     Coronary Artery Disease Paternal Aunt     Cancer Mother     Parkinson's Disease Mother     Stroke Mother     Thyroid Disease Mother     Seizures Brother             REVIEW OF SYMPTOMS:       Review of Symptoms:  Constitutional: Negative for fever, chills  HEENT: Negative for nosebleeds, tinnitus, and vision changes. Respiratory: Negative for cough, wheezing  Cardiovascular: Negative for syncope, and PND.  +CHRISTIAN  Gastrointestinal: Negative for abdominal pain, melena. +diarrhea  Genitourinary: Negative for dysuria  Musculoskeletal: + back pain, joint pain   Skin: Negative for rash  Heme: No problems bleeding. Neurological: Negative for speech change and focal weakness.           PHYSICAL EXAM:        Physical Exam:  Visit Vitals  BP (!) 150/58 (BP 1 Location: Left arm, BP Patient Position: Sitting)   Ht 5' 3\" (1.6 m)   Wt 200 lb (90.7 kg)   BMI 35.43 kg/m²     Patient appears generally well, mood and affect are appropriate and pleasant.   HEENT: Hearing intact, non-icteric, normocephalic, atraumatic.    Neck Exam: Supple, No JVD  Lung Exam: Clear to auscultation, even breath sounds.    Cardiac Exam: Regular rate and rhythm with no murmur  Abdomen: Soft, non-tender, normal bowel sounds. + obese   Extremities: Moves all ext well. Trace lower extremity edema. +leg wraps   Psych: Appropriate affect  Neuro - Grossly intact            LABS / OTHER STUDIES:      10/16/20 - BUN 47, Cr 1.53, Na 137, K 5.4, CO2 20, AST 14, ALT 12  , , HDL 41, LDL 84  TSH 3.1, proBNP 825, HgbA1c 5.7, Vit D 38    Lab Results   Component Value Date/Time    Sodium 140 09/10/2020 02:59 PM    Potassium 5.4 (H) 09/10/2020 02:59 PM    Chloride 104 09/10/2020 02:59 PM    CO2 22 09/10/2020 02:59 PM    Anion gap 7 07/22/2020 10:04 AM    Glucose 107 (H) 09/10/2020 02:59 PM    BUN 47 (H) 09/10/2020 02:59 PM    Creatinine 1.51 (H) 09/10/2020 02:59 PM    BUN/Creatinine ratio 31 (H) 09/10/2020 02:59 PM    GFR est AA 36 (L) 09/10/2020 02:59 PM    GFR est non-AA 31 (L) 09/10/2020 02:59 PM    Calcium 10.0 09/10/2020 02:59 PM          CARDIAC DIAGNOSTICS:        Cardiac Evaluation Includes:  Cath 2010: LAD 25%, RCA 20%, Mid RCA 20%. Stress Nuc 2013: Normal perfusion. EF 74. V/Q 5/1/16 - low prob  Echo 5/2/16 - LVEF 60%  Lexiscan Cardiolite 5/2/16 - 1) No ischemic EKG changes with Lexiscan.  Frequent PVC's during stress.  Mild chest pain noted during Lexiscan Injection.    2) Normal Lexiscan gated SPECT myocardial perfusion study. 3) LVEF 55%     [facility] Screening 10/17/15 - mild carotid disease, normal PALAK and AAA     Carotid Doppler 8/25/17 - 10-49% stenosis JONATAN and 9-8% LICA,     Renal Artery Dopplers - 4/19/19 - normal       CXR 7/20/20 - no acute process      Chest CTA 7/20/20 - No PE.  No acute disease in the chest. + coronary calcifications      Echo 7/21/20 - LVEF 60-65%, grade 1 diastology, MAC     EKG 8/25/17 - NSR, RBBB  EKG 10/12/18 - NSR, RBBB, LAFB  EKG 12/10/19 - NSR, RBBB,   EKG 7/21/20 - NSR, RBBB, LAD       ASSESSMENT AND PLAN:        Assessment and Plan:  1) Nancy Diaz  - She has along history of CHRISTIAN and atypical chest pain   - Prior cath, stress test, echo and BNP level have been unremarkable (see above)   - She saw Pulmonary and felt better with Advair in past   - On 7/20 -  Admission CXR and Chest CTA.  ProBNP just minimally elevated (and not diagnostic for CHF).  No JVD or crackles on exam.  Echo with normal LV systolic function and grade 1 diastology   - Her chronic CHRISTIAN is multifactorial (deconditioning, obesity, asthma, diastolic dysfunction, etc).    - today she is doing well. Says breathing is OK. She seems to be feeling better on current regimen with aldactone 25 BID and torsemide -- will continue meds and follow labs closely  - recent K 5.4, Cr 1.53 - eating lots of bananas , + diarrhea  - discussed at length; she will cut out bananas. If Cr or K+ rise further or if diarrhea ongoing, may need to adjust meds; repeat in 2 BMP in 2 weeks  - if heavy diarrhea and/or little PO intake, would hold torsemide for the day     2) HTN and leg swelling   - Clonidine helps for BP but it makes her tired so can only take it at night.  - On 7/27/20 - she is on total 540 mg cardizem daily and HR running in 40's at home. Svitlana Dow just continue cardizem 240 mg once daily and reassess in a couple of weeks. follow sodium levels closely (has had hyponatremia)   - On 8/31/20 - leg swelling persists. Labs recently were OK overall. Will go ahead and stop chlorthalidone and try the torsemide alone daily to help with leg swelling. Will try aldactone 25 mg BID. She has had a lot of neck pain and that could make BP worse too. Follow labs closely. - On 9/18/20 - still has leg swelling, but not as bad as it was - continue meds - getting wound care treatment on legs   - on 10/29/20 - swelling much better; hm BP normotensive      3) Mild CAD -no anginal c/o today       4) Dyslipidemia  - Given age and lack of significant CAD and presence of joint pain, statin benefit questionable. - management per Endocrinology; recent lipids with LDL 84.   TGL elevated at 307.        5) Chronic LE edema  - has TEDS  - see above - improved today        6) f/u in 3 mo or PRN   65+ Dianna Hampton with her .  She was a nurse for 43 years.     Chetan Schmitt, ANP

## 2020-10-29 NOTE — LETTER
10/30/20 Patient: Walter Childs YOB: 1935 Date of Visit: 10/29/2020 David Doshi MD 
[address] [place]  [address] VIA Facsimile: [phone] Dear David Doshi MD, Thank you for referring Ms. Alphia Boeck to [address] for evaluation. My notes for this consultation are attached. If you have questions, please do not hesitate to call me. I look forward to following your patient along with you.  
 
 
Sincerely, 
 
Hui Rojas NP

## 2020-10-30 ENCOUNTER — TELEPHONE (OUTPATIENT)
Dept: CARDIOLOGY CLINIC | Age: 85
End: 2020-10-30

## 2020-10-30 DIAGNOSIS — R06.02 SOB (SHORTNESS OF BREATH): ICD-10-CM

## 2020-10-30 DIAGNOSIS — I10 ESSENTIAL HYPERTENSION: Primary | ICD-10-CM

## 2021-01-19 ENCOUNTER — TELEPHONE (OUTPATIENT)
Dept: CARDIOLOGY CLINIC | Age: 86
End: 2021-01-19

## 2021-01-19 DIAGNOSIS — R00.1 BRADYCARDIA: ICD-10-CM

## 2021-01-19 DIAGNOSIS — R06.02 SOB (SHORTNESS OF BREATH): Primary | ICD-10-CM

## 2021-01-19 NOTE — TELEPHONE ENCOUNTER
[place] nurse called,  [facility] with [facility]  Resting HR 47 today. Walking in kitchen, barely got up to 60. SOB with exertion, fatigue. CHRISTIAN off and on all month. Feels that she has lost a few pounds. No new edema. Past week, HR has been in 50's. BP today 138/62.     Pt's home BP's reported as:  130/57  115/74  143/56  134/73    [facility] [phone]

## 2021-01-19 NOTE — TELEPHONE ENCOUNTER
Spoke to [facility] nurse,  Per Dr. Chevy Biswas: Yasir Covert you please get her a 3 day Holter and have her see Manan Bishop next week,   HR 50's is not bad and not sure that is the cause of her CHRISTIAN. \"    Abigail Haney,  Please call pt to schedule nick Caicedo next week. Thanks! Sue,  Please order 3 day holter.  Dx: Cristiana Norman

## 2021-01-28 ENCOUNTER — OFFICE VISIT (OUTPATIENT)
Dept: CARDIOLOGY CLINIC | Age: 86
End: 2021-01-28
Payer: MEDICARE

## 2021-01-28 VITALS
BODY MASS INDEX: 35.86 KG/M2 | HEIGHT: 63 IN | DIASTOLIC BLOOD PRESSURE: 68 MMHG | WEIGHT: 202.4 LBS | HEART RATE: 52 BPM | OXYGEN SATURATION: 97 % | SYSTOLIC BLOOD PRESSURE: 132 MMHG

## 2021-01-28 DIAGNOSIS — E87.5 HYPERKALEMIA: ICD-10-CM

## 2021-01-28 DIAGNOSIS — I25.10 CORONARY ARTERY DISEASE INVOLVING NATIVE CORONARY ARTERY OF NATIVE HEART WITHOUT ANGINA PECTORIS: ICD-10-CM

## 2021-01-28 DIAGNOSIS — R00.1 BRADYCARDIA: ICD-10-CM

## 2021-01-28 DIAGNOSIS — R06.09 DOE (DYSPNEA ON EXERTION): Primary | ICD-10-CM

## 2021-01-28 DIAGNOSIS — E78.5 DYSLIPIDEMIA: ICD-10-CM

## 2021-01-28 DIAGNOSIS — I10 ESSENTIAL HYPERTENSION: ICD-10-CM

## 2021-01-28 DIAGNOSIS — R53.83 FATIGUE, UNSPECIFIED TYPE: ICD-10-CM

## 2021-01-28 DIAGNOSIS — G62.9 NEUROPATHY: ICD-10-CM

## 2021-01-28 PROCEDURE — G8399 PT W/DXA RESULTS DOCUMENT: HCPCS | Performed by: NURSE PRACTITIONER

## 2021-01-28 PROCEDURE — G8417 CALC BMI ABV UP PARAM F/U: HCPCS | Performed by: NURSE PRACTITIONER

## 2021-01-28 PROCEDURE — G8754 DIAS BP LESS 90: HCPCS | Performed by: NURSE PRACTITIONER

## 2021-01-28 PROCEDURE — 99214 OFFICE O/P EST MOD 30 MIN: CPT | Performed by: NURSE PRACTITIONER

## 2021-01-28 PROCEDURE — G8432 DEP SCR NOT DOC, RNG: HCPCS | Performed by: NURSE PRACTITIONER

## 2021-01-28 PROCEDURE — 1100F PTFALLS ASSESS-DOCD GE2>/YR: CPT | Performed by: NURSE PRACTITIONER

## 2021-01-28 PROCEDURE — G8752 SYS BP LESS 140: HCPCS | Performed by: NURSE PRACTITIONER

## 2021-01-28 PROCEDURE — G8536 NO DOC ELDER MAL SCRN: HCPCS | Performed by: NURSE PRACTITIONER

## 2021-01-28 PROCEDURE — G8427 DOCREV CUR MEDS BY ELIG CLIN: HCPCS | Performed by: NURSE PRACTITIONER

## 2021-01-28 PROCEDURE — 1090F PRES/ABSN URINE INCON ASSESS: CPT | Performed by: NURSE PRACTITIONER

## 2021-01-28 PROCEDURE — 3288F FALL RISK ASSESSMENT DOCD: CPT | Performed by: NURSE PRACTITIONER

## 2021-01-28 NOTE — PROGRESS NOTES
Chief Complaint   Patient presents with    Breathing Problem     Patient presents today for increased SOB & Fatigue has been going on since before Christmas    Fatigue     Patient is wearing a Holter monitor it  Was applied last Thursday  Visit Vitals  /68 (BP 1 Location: Right arm, BP Patient Position: Sitting)   Pulse (!) 52   Ht 5' 3\" (1.6 m)   Wt 202 lb 6.4 oz (91.8 kg)   SpO2 97%   BMI 35.85 kg/m²       Chest pain denied  SOB -with activity pt has Asthma and has some coughing spells recently  Dizziness denied  Swelling/Edema - BL-LE  Recent hospital visit denied  Refills denied

## 2021-01-28 NOTE — LETTER
1/29/2021 Patient: Brandy Enrique YOB: 1935 Date of Visit: 1/28/2021 Javier Mcnamara MD 
[address] [facility]  [address] Via Fax: [phone] Dear Javier Mcnamara MD, Thank you for referring Ms. Ce Lepe to [address]  [facility] for evaluation. My notes for this consultation are attached. If you have questions, please do not hesitate to call me. I look forward to following your patient along with you.  
 
 
Sincerely, 
 
Adria Easley NP

## 2021-01-28 NOTE — PROGRESS NOTES
Nafisa Bullock, [facility]  [address]  [place], [address] [facility]    Office [phone]  Fax [phone]      Patient: An Garcia  : 1935      Today's Date: 2021      HISTORY OF PRESENT ILLNESS:     History of Present Illness:  Pt here for fu of dyspnea. Feels very SOB and fatigued. Can barely walk from kitchen to living room. Symptoms ongoing since before x-mas. Noted home HR to be routinely in the 46s. High 40s when at rest.  Called and wondered if this was the issue - wearing holter monitor which she will mail back later today. 3 months ago could walk from entrance to [facility] up to office for appts. Sleeps great at night and without PND. Sleeps with CPAP machine and 2 pillows. Now using LE wraps for leg swelling. Swelling improved from previous. Has history of high potassium on bloodwork - was eating a ton of bananas. Has since adjusted her diet but never got repeat labs as ordered. Takes ele BID. Hm BP 110s-130s/50s-70s. Recently increased gabapentin to 400mg at night - plan to increase further to 600mg. Weight stable. Wt Readings from Last 3 Encounters:   21 202 lb 6.4 oz (91.8 kg)   10/29/20 200 lb (90.7 kg)   20 202 lb (91.6 kg)       PAST MEDICAL HISTORY:     Past Medical History:   Diagnosis Date    Arthritis     Asthma     Breast cancer ([address]     Chest pain, unspecified     atypical chest discomfort with normal stress cardiolite , and normal stress echoes ,  and . Negative dobutamine cardiolite 2009 for ischemia with an EF=63%. Cardiac Cath 9/3/2010 showed mild CAD.       Diabetes mellitus ([address]     Diverticulosis     Dyslipidemia     Frequent PVCs     GERD (gastroesophageal reflux disease)     Hiatal hernia     HTN (hypertension)     Hypothyroid     Neuropathy     Obesity     LA on CPAP     RBBB (right bundle branch block)     Spinal stenosis          Past Surgical History:   Procedure Laterality Date    HX BUNIONECTOMY      HX CHOLECYSTECTOMY      HX HEART CATHETERIZATION      Cardiac Cath 9/3/2010 showed mild CAD.  HX HIP REPLACEMENT      HX ORTHOPAEDIC      HX OTHER SURGICAL      Echo 10/5/12 - LVEF 60-65%, mod-marked LAE, MAC, grade 1 diastolic dysfunction     HX OTHER SURGICAL      Lexiscan cardiolite 1/25/13 - normal MPI, LVEF 74%    HX OTHER SURGICAL      Aorta duplex 1/6/14 - aorta size 1.7 cm, no dilation          MEDICATIONS:     Current Outpatient Medications   Medication Sig Dispense Refill    gabapentin (NEURONTIN) 400 mg capsule Take 1 Cap by mouth nightly. Max Daily Amount: 400 mg. 30 Cap 0    spironolactone (ALDACTONE) 25 mg tablet Take 1 Tab by mouth two (2) times a day. 180 Tab 3    torsemide (DEMADEX) 10 mg tablet Take 1 Tab by mouth daily. 90 Tab 3    dilTIAZem ER (CARDIZEM CD) 240 mg capsule Take 1 Cap by mouth daily. 30 Cap 0    hydrALAZINE (APRESOLINE) 25 mg tablet Take 1 Tab by mouth two (2) times a day. 60 Tab 1    calcium-cholecalciferol, D3, (CALTRATE 600+D) tablet Take 1 Tab by mouth two (2) times daily (with meals).  cetirizine (ZyrTEC) 10 mg tablet Take 10 mg by mouth daily.  cloNIDine HCL (CATAPRES) 0.1 mg tablet Take 2 Tabs by mouth nightly. 270 Tab 3    rosuvastatin (CRESTOR) 10 mg tablet Take 10 mg by mouth nightly.  vit C/E/Zn/coppr/lutein/zeaxan (PRESERVISION AREDS 2 PO) Take 1 Cap by mouth two (2) times a day.  acetaminophen (TYLENOL EXTRA STRENGTH) 500 mg tablet Take  by mouth every six (6) hours as needed for Pain.  losartan (COZAAR) 100 mg tablet Take 1 Tab by mouth nightly. 90 Tab 3    OTHER zyflamend 2 caps daily      levothyroxine (SYNTHROID) 100 mcg tablet Take 100 mcg by mouth Daily (before breakfast).  omeprazole (PRILOSEC) 20 mg capsule Take 20 mg by mouth two (2) times a day.  montelukast (SINGULAIR) 10 mg tablet Take 10 mg by mouth nightly.          Allergies   Allergen Reactions    Tolectin [Tolmetin] Anaphylaxis    Aldactone [Spironolactone] Other (comments)     hyperkalemia    Carvedilol Other (comments)     \"slow heart rate down to 44\"    Ceclor [Cefaclor] Hives    Ciprofloxacin Hives and Swelling    Darvon [Propoxyphene] Swelling     Swelling lips and hands    Macrobid [Nitrofurantoin Monohyd/M-Cryst] Other (comments)     Does not recall    Norvasc [Amlodipine] Swelling     Swelling ankles/legs    Tetracycline Swelling     Swelling lips/hands    Toprol Xl [Metoprolol Succinate] Swelling     Swellings ankles/legs    Valsartan Other (comments)     Also \"slow heart rate down to 44\"    Zestril [Lisinopril] Hives           SOCIAL HISTORY:     Social History     Tobacco Use    Smoking status: Never Smoker    Smokeless tobacco: Never Used   Substance Use Topics    Alcohol use: No     Alcohol/week: 0.0 standard drinks    Drug use: No         FAMILY HISTORY:     Family History   Problem Relation Age of Onset    Coronary Artery Disease Father     Heart Disease Father     Coronary Artery Disease Paternal Aunt     Cancer Mother     Parkinson's Disease Mother     Stroke Mother     Thyroid Disease Mother     Seizures Brother             REVIEW OF SYMPTOMS:       Review of Symptoms:  Constitutional: Negative for fever, chills  HEENT: Negative for nosebleeds, tinnitus, and vision changes. Respiratory: Negative for cough, wheezing  Cardiovascular: Negative for syncope, and PND.  +CHRISTIAN  Gastrointestinal: Negative for abdominal pain, melena. +diarrhea  Genitourinary: Negative for dysuria  Musculoskeletal: + back pain, joint pain   Skin: Negative for rash  Heme: No problems bleeding.   Neurological: Negative for speech change and focal weakness.           PHYSICAL EXAM:        Physical Exam:  Visit Vitals  /68 (BP 1 Location: Right arm, BP Patient Position: Sitting)   Pulse (!) 52   Ht 5' 3\" (1.6 m)   Wt 202 lb 6.4 oz (91.8 kg)   SpO2 97%   BMI 35.85 kg/m² Patient appears generally well, mood and affect are appropriate and pleasant. HEENT: Hearing intact, non-icteric, normocephalic, atraumatic.    Neck Exam: Supple, No JVD  Lung Exam: Clear to auscultation, even breath sounds.    Cardiac Exam: Regular rate and rhythm with no murmur  Abdomen: Soft, non-tender, normal bowel sounds. + obese   Extremities: Moves all ext well. Trace lower extremity edema. +leg wraps   Psych: Appropriate affect  Neuro - Grossly intact            LABS / OTHER STUDIES:      10/16/20 - BUN 47, Cr 1.53, Na 137, K 5.4, CO2 20, AST 14, ALT 12  , , HDL 41, LDL 84  TSH 3.1, proBNP 825, HgbA1c 5.7, Vit D 38          CARDIAC DIAGNOSTICS:        Cardiac Evaluation Includes:  Cath 2010: LAD 25%, RCA 20%, Mid RCA 20%. Stress Nuc 2013: Normal perfusion. EF 74. V/Q 5/1/16 - low prob  Echo 5/2/16 - LVEF 60%  Lexiscan Cardiolite 5/2/16 - 1) No ischemic EKG changes with Lexiscan.  Frequent PVC's during stress.  Mild chest pain noted during Lexiscan Injection.    2) Normal Lexiscan gated SPECT myocardial perfusion study. 3) LVEF 55%     [facility] Screening 10/17/15 - mild carotid disease, normal PALAK and AAA     Carotid Doppler 8/25/17 - 10-49% stenosis JONATAN and 3-7% LICA,     Renal Artery Dopplers - 4/19/19 - normal       CXR 7/20/20 - no acute process      Chest CTA 7/20/20 - No PE.  No acute disease in the chest. + coronary calcifications      Echo 7/21/20 - LVEF 60-65%, grade 1 diastology, MAC     EKG 8/25/17 - NSR, RBBB  EKG 10/12/18 - NSR, RBBB, LAFB  EKG 12/10/19 - NSR, RBBB,   EKG 7/21/20 - NSR, RBBB, LAD       ASSESSMENT AND PLAN:        Assessment and Plan:  1) Smitha Kimble  - She has along history of CHRISTIAN and atypical chest pain   - Prior cath, stress test, echo and BNP level have been unremarkable (see above)   - She saw Pulmonary and felt better with Advair in past   - On 7/20 -  Admission CXR and Chest CTA.  ProBNP just minimally elevated (and not diagnostic for CHF).  No JVD or crackles on exam.  Echo with normal LV systolic function and grade 1 diastology   - Her chronic CHRISTIAN is multifactorial (deconditioning, obesity, asthma, diastolic dysfunction, etc).    - today she is doing well. Says breathing is OK. She seems to be feeling better on current regimen with aldactone 25 BID and torsemide -- will continue meds and follow labs closely  - 10/29/20: recent K 5.4, Cr 1.53 - eating lots of bananas , + diarrhea on BRAT DIET; advised to cut out bananas. If Cr or K+ rise further or if diarrhea ongoing, will adjust meds; repeat in 2 BMP in 2 wks  - 1/28/21: worsening dyspnea/fatigue - no gross vol on exam - check CBC, CMP, proBNP, echo in July with nml LVEF      2) HTN and leg swelling   - Clonidine helps for BP but it makes her tired so can only take it at night.  - On 7/27/20 - she is on total 540 mg cardizem daily and HR running in 40's at home. King French just continue cardizem 240 mg once daily and reassess in a couple of weeks. follow sodium levels closely (has had hyponatremia)   - On 8/31/20 - leg swelling persists. Labs recently were OK overall. Will go ahead and stop chlorthalidone and try the torsemide alone daily to help with leg swelling. Will try aldactone 25 mg BID. She has had a lot of neck pain and that could make BP worse too. Follow labs closely. - On 9/18/20 - still has leg swelling, but not as bad as it was - continue meds - getting wound care treatment on legs   - on 10/29/20 - swelling much better; hm BP normotensive   - 1/28/21 - swelling stable, BP normotensive      3) Mild CAD -no anginal c/o but worsening fatigue/dyspena, pending labs may consider repeat ischemic eval  - cont aspirin/statin      4) Dyslipidemia  - Given age and lack of significant CAD and presence of joint pain, statin benefit questionable. - management per Endocrinology; recent lipids with LDL 84.   TGL elevated at 307.        5) Chronic LE edema  - wears bilateral leg wraps   - see above - improved today      6) bradycardia  - HR mostly 50s (high 40s at rest) - on Dilt and clonidine  - wearing [employer] monitor - prelim results not available, pt will mail back today       6) fu in 1mo with Dr. Beronica Wooten  - phone fu to review labs and heart monitor results     65+ Michelle Lopez with her .  She was a nurse for 43 years.     Keith Bates, ANP

## 2021-01-28 NOTE — PATIENT INSTRUCTIONS
Please go get blood work and mail your heart monitor back to company I will call you with results See Dr. Emmett Brandt in 1 month.

## 2021-01-29 ENCOUNTER — TELEPHONE (OUTPATIENT)
Dept: CARDIOLOGY CLINIC | Age: 86
End: 2021-01-29

## 2021-01-29 DIAGNOSIS — N17.9 AKI (ACUTE KIDNEY INJURY) (HCC): Primary | ICD-10-CM

## 2021-01-29 RX ORDER — GABAPENTIN 400 MG/1
400 CAPSULE ORAL
Qty: 30 CAP | Refills: 0
Start: 2021-01-29 | End: 2022-06-02

## 2021-01-29 NOTE — TELEPHONE ENCOUNTER
Spoke with pt -  K 6, BUN 59, Cr 2.26 - suspect pt vol depleted. proBNP elevated possibly due to KAREEN. No gross vol on exam. Pt advised to dc torsemide and ele for now. Hold losartan x1d then restart. Fu labs Monday in [place] with fu appt Tues at 12:40pm in office. If feeling poorly over weekend advised to go to ED.

## 2021-01-29 NOTE — PROGRESS NOTES
Addendum: K 6, BUN 59, Cr 2.26 - suspect pt vol depleted. proBNP elevated possibly due to KAREEN. No gross vol on exam. Pt advised to dc torsemide and ele for now. Hold losartan x1d then restart. Fu labs Monday in [place] with fu appt Tues at 12:40pm in office.  If feeling poorly over weekend advised to go to ED.   _____________________________________________________________

## 2021-02-01 ENCOUNTER — TELEPHONE (OUTPATIENT)
Dept: CARDIOLOGY CLINIC | Age: 86
End: 2021-02-01

## 2021-02-01 NOTE — TELEPHONE ENCOUNTER
Patient calling in regards to lab orders, states she couldn't go today but will tomorrow. Requesting orders to be sent.     Phone: [phone]

## 2021-02-02 ENCOUNTER — APPOINTMENT (OUTPATIENT)
Dept: GENERAL RADIOLOGY | Age: 86
End: 2021-02-02
Attending: PHYSICIAN ASSISTANT
Payer: MEDICARE

## 2021-02-02 ENCOUNTER — OFFICE VISIT (OUTPATIENT)
Dept: CARDIOLOGY CLINIC | Age: 86
End: 2021-02-02
Payer: MEDICARE

## 2021-02-02 ENCOUNTER — HOSPITAL ENCOUNTER (OUTPATIENT)
Age: 86
Setting detail: OBSERVATION
Discharge: HOME HEALTH | End: 2021-02-04
Attending: EMERGENCY MEDICINE | Admitting: INTERNAL MEDICINE
Payer: MEDICARE

## 2021-02-02 VITALS
DIASTOLIC BLOOD PRESSURE: 62 MMHG | OXYGEN SATURATION: 96 % | WEIGHT: 200 LBS | HEART RATE: 54 BPM | SYSTOLIC BLOOD PRESSURE: 140 MMHG | BODY MASS INDEX: 35.44 KG/M2 | HEIGHT: 63 IN

## 2021-02-02 DIAGNOSIS — I25.10 CORONARY ARTERY DISEASE INVOLVING NATIVE CORONARY ARTERY OF NATIVE HEART WITHOUT ANGINA PECTORIS: ICD-10-CM

## 2021-02-02 DIAGNOSIS — I10 ESSENTIAL HYPERTENSION: ICD-10-CM

## 2021-02-02 DIAGNOSIS — N17.9 AKI (ACUTE KIDNEY INJURY) (HCC): ICD-10-CM

## 2021-02-02 DIAGNOSIS — I49.5 SICK SINUS SYNDROME (HCC): Primary | ICD-10-CM

## 2021-02-02 DIAGNOSIS — I20.0 UNSTABLE ANGINA (HCC): ICD-10-CM

## 2021-02-02 DIAGNOSIS — R00.1 BRADYCARDIA: ICD-10-CM

## 2021-02-02 DIAGNOSIS — R06.09 DOE (DYSPNEA ON EXERTION): ICD-10-CM

## 2021-02-02 DIAGNOSIS — R06.09 DOE (DYSPNEA ON EXERTION): Primary | ICD-10-CM

## 2021-02-02 DIAGNOSIS — E78.5 DYSLIPIDEMIA: ICD-10-CM

## 2021-02-02 DIAGNOSIS — I50.33 DIASTOLIC CHF, ACUTE ON CHRONIC (HCC): ICD-10-CM

## 2021-02-02 DIAGNOSIS — R00.1 SYMPTOMATIC BRADYCARDIA: ICD-10-CM

## 2021-02-02 DIAGNOSIS — E87.5 HYPERKALEMIA: ICD-10-CM

## 2021-02-02 LAB
ALBUMIN SERPL-MCNC: 3.6 G/DL (ref 3.5–5)
ALBUMIN/GLOB SERPL: 0.9 {RATIO} (ref 1.1–2.2)
ALP SERPL-CCNC: 62 U/L (ref 45–117)
ALT SERPL-CCNC: 26 U/L (ref 12–78)
ANION GAP SERPL CALC-SCNC: 7 MMOL/L (ref 5–15)
AST SERPL-CCNC: 28 U/L (ref 15–37)
BASOPHILS # BLD: 0.1 K/UL (ref 0–0.1)
BASOPHILS NFR BLD: 1 % (ref 0–1)
BILIRUB SERPL-MCNC: 0.2 MG/DL (ref 0.2–1)
BNP SERPL-MCNC: 4942 PG/ML
BUN SERPL-MCNC: 37 MG/DL (ref 6–20)
BUN/CREAT SERPL: 29 (ref 12–20)
CALCIUM SERPL-MCNC: 8.7 MG/DL (ref 8.5–10.1)
CHLORIDE SERPL-SCNC: 113 MMOL/L (ref 97–108)
CO2 SERPL-SCNC: 21 MMOL/L (ref 21–32)
COMMENT, HOLDF: NORMAL
CREAT SERPL-MCNC: 1.29 MG/DL (ref 0.55–1.02)
DIFFERENTIAL METHOD BLD: ABNORMAL
EOSINOPHIL # BLD: 0.2 K/UL (ref 0–0.4)
EOSINOPHIL NFR BLD: 2 % (ref 0–7)
ERYTHROCYTE [DISTWIDTH] IN BLOOD BY AUTOMATED COUNT: 14.2 % (ref 11.5–14.5)
GLOBULIN SER CALC-MCNC: 4 G/DL (ref 2–4)
GLUCOSE SERPL-MCNC: 95 MG/DL (ref 65–100)
HCT VFR BLD AUTO: 36 % (ref 35–47)
HGB BLD-MCNC: 11.2 G/DL (ref 11.5–16)
IMM GRANULOCYTES # BLD AUTO: 0 K/UL (ref 0–0.04)
IMM GRANULOCYTES NFR BLD AUTO: 0 % (ref 0–0.5)
LYMPHOCYTES # BLD: 1 K/UL (ref 0.8–3.5)
LYMPHOCYTES NFR BLD: 11 % (ref 12–49)
MAGNESIUM SERPL-MCNC: 2.2 MG/DL (ref 1.6–2.4)
MCH RBC QN AUTO: 31.6 PG (ref 26–34)
MCHC RBC AUTO-ENTMCNC: 31.1 G/DL (ref 30–36.5)
MCV RBC AUTO: 101.7 FL (ref 80–99)
MONOCYTES # BLD: 0.8 K/UL (ref 0–1)
MONOCYTES NFR BLD: 8 % (ref 5–13)
NEUTS SEG # BLD: 7.2 K/UL (ref 1.8–8)
NEUTS SEG NFR BLD: 78 % (ref 32–75)
NRBC # BLD: 0 K/UL (ref 0–0.01)
NRBC BLD-RTO: 0 PER 100 WBC
PLATELET # BLD AUTO: 248 K/UL (ref 150–400)
PMV BLD AUTO: 10.3 FL (ref 8.9–12.9)
POTASSIUM SERPL-SCNC: 5.4 MMOL/L (ref 3.5–5.1)
PROT SERPL-MCNC: 7.6 G/DL (ref 6.4–8.2)
RBC # BLD AUTO: 3.54 M/UL (ref 3.8–5.2)
SAMPLES BEING HELD,HOLD: NORMAL
SODIUM SERPL-SCNC: 141 MMOL/L (ref 136–145)
TROPONIN I SERPL-MCNC: <0.05 NG/ML
TSH SERPL DL<=0.05 MIU/L-ACNC: 3.46 UIU/ML (ref 0.36–3.74)
WBC # BLD AUTO: 9.2 K/UL (ref 3.6–11)

## 2021-02-02 PROCEDURE — 99285 EMERGENCY DEPT VISIT HI MDM: CPT

## 2021-02-02 PROCEDURE — 83880 ASSAY OF NATRIURETIC PEPTIDE: CPT

## 2021-02-02 PROCEDURE — 65660000000 HC RM CCU STEPDOWN

## 2021-02-02 PROCEDURE — 3288F FALL RISK ASSESSMENT DOCD: CPT | Performed by: NURSE PRACTITIONER

## 2021-02-02 PROCEDURE — G8417 CALC BMI ABV UP PARAM F/U: HCPCS | Performed by: NURSE PRACTITIONER

## 2021-02-02 PROCEDURE — 1100F PTFALLS ASSESS-DOCD GE2>/YR: CPT | Performed by: NURSE PRACTITIONER

## 2021-02-02 PROCEDURE — 96372 THER/PROPH/DIAG INJ SC/IM: CPT

## 2021-02-02 PROCEDURE — 84443 ASSAY THYROID STIM HORMONE: CPT

## 2021-02-02 PROCEDURE — 93005 ELECTROCARDIOGRAM TRACING: CPT

## 2021-02-02 PROCEDURE — G8753 SYS BP > OR = 140: HCPCS | Performed by: NURSE PRACTITIONER

## 2021-02-02 PROCEDURE — G8754 DIAS BP LESS 90: HCPCS | Performed by: NURSE PRACTITIONER

## 2021-02-02 PROCEDURE — 83735 ASSAY OF MAGNESIUM: CPT

## 2021-02-02 PROCEDURE — 96374 THER/PROPH/DIAG INJ IV PUSH: CPT

## 2021-02-02 PROCEDURE — 99218 HC RM OBSERVATION: CPT

## 2021-02-02 PROCEDURE — 80053 COMPREHEN METABOLIC PANEL: CPT

## 2021-02-02 PROCEDURE — 74011250637 HC RX REV CODE- 250/637: Performed by: INTERNAL MEDICINE

## 2021-02-02 PROCEDURE — 36415 COLL VENOUS BLD VENIPUNCTURE: CPT

## 2021-02-02 PROCEDURE — 99214 OFFICE O/P EST MOD 30 MIN: CPT | Performed by: NURSE PRACTITIONER

## 2021-02-02 PROCEDURE — G8432 DEP SCR NOT DOC, RNG: HCPCS | Performed by: NURSE PRACTITIONER

## 2021-02-02 PROCEDURE — 74011000250 HC RX REV CODE- 250: Performed by: INTERNAL MEDICINE

## 2021-02-02 PROCEDURE — 84484 ASSAY OF TROPONIN QUANT: CPT

## 2021-02-02 PROCEDURE — 85025 COMPLETE CBC W/AUTO DIFF WBC: CPT

## 2021-02-02 PROCEDURE — G8536 NO DOC ELDER MAL SCRN: HCPCS | Performed by: NURSE PRACTITIONER

## 2021-02-02 PROCEDURE — G8427 DOCREV CUR MEDS BY ELIG CLIN: HCPCS | Performed by: NURSE PRACTITIONER

## 2021-02-02 PROCEDURE — 1090F PRES/ABSN URINE INCON ASSESS: CPT | Performed by: NURSE PRACTITIONER

## 2021-02-02 PROCEDURE — G8399 PT W/DXA RESULTS DOCUMENT: HCPCS | Performed by: NURSE PRACTITIONER

## 2021-02-02 PROCEDURE — 71046 X-RAY EXAM CHEST 2 VIEWS: CPT

## 2021-02-02 RX ORDER — LANOLIN ALCOHOL/MO/W.PET/CERES
1000 CREAM (GRAM) TOPICAL
COMMUNITY

## 2021-02-02 RX ORDER — ACETAMINOPHEN 500 MG
1000 TABLET ORAL ONCE
Status: COMPLETED | OUTPATIENT
Start: 2021-02-03 | End: 2021-02-03

## 2021-02-02 RX ORDER — BUMETANIDE 0.25 MG/ML
0.5 INJECTION INTRAMUSCULAR; INTRAVENOUS EVERY 12 HOURS
Status: DISCONTINUED | OUTPATIENT
Start: 2021-02-02 | End: 2021-02-04 | Stop reason: HOSPADM

## 2021-02-02 RX ORDER — SODIUM POLYSTYRENE SULFONATE 15 G/60ML
30 SUSPENSION ORAL; RECTAL
Status: COMPLETED | OUTPATIENT
Start: 2021-02-02 | End: 2021-02-02

## 2021-02-02 RX ADMIN — SODIUM POLYSTYRENE SULFONATE 30 G: 15 SUSPENSION ORAL; RECTAL at 20:13

## 2021-02-02 RX ADMIN — BUMETANIDE 0.5 MG: 0.25 INJECTION INTRAMUSCULAR; INTRAVENOUS at 22:58

## 2021-02-02 NOTE — ED PROVIDER NOTES
The history is provided by the patient. Shortness of Breath  This is a recurrent problem. The average episode lasts 1 month. The problem occurs continuously. The problem has not changed since onset. Associated symptoms include leg swelling (chronic). Pertinent negatives include no fever, no chest pain and no syncope. Associated symptoms comments: Activity intolerance of minimal exertion. It is unknown what precipitated the problem. She has tried nothing for the symptoms. Associated medical issues include CAD (mild). Past Medical History:   Diagnosis Date    Arthritis     Asthma     Breast cancer ([address]     Chest pain, unspecified     atypical chest discomfort with normal stress cardiolite 2002, and normal stress echoes 2004, 2006 and 2009. Negative dobutamine cardiolite March 2009 for ischemia with an EF=63%. Cardiac Cath 9/3/2010 showed mild CAD.  Diabetes mellitus ([address]     Diverticulosis     Dyslipidemia     Frequent PVCs     GERD (gastroesophageal reflux disease)     Hiatal hernia     HTN (hypertension)     Hypothyroid     Neuropathy     Obesity     LA on CPAP     RBBB (right bundle branch block)     Spinal stenosis        Past Surgical History:   Procedure Laterality Date    HX BUNIONECTOMY      HX CHOLECYSTECTOMY      HX HEART CATHETERIZATION      Cardiac Cath 9/3/2010 showed mild CAD.       HX HIP REPLACEMENT      HX ORTHOPAEDIC      HX OTHER SURGICAL      Echo 10/5/12 - LVEF 60-65%, mod-marked LAE, MAC, grade 1 diastolic dysfunction     HX OTHER SURGICAL      Lexiscan cardiolite 1/25/13 - normal MPI, LVEF 74%    HX OTHER SURGICAL      Aorta duplex 1/6/14 - aorta size 1.7 cm, no dilation          Family History:   Problem Relation Age of Onset    Coronary Artery Disease Father     Heart Disease Father     Coronary Artery Disease Paternal Aunt     Cancer Mother     Parkinson's Disease Mother     Stroke Mother     Thyroid Disease Mother     Seizures Brother Social History     Socioeconomic History    Marital status:      Spouse name: Not on file    Number of children: Not on file    Years of education: Not on file    Highest education level: Not on file   Occupational History    Not on file   Social Needs    Financial resource strain: Not on file    Food insecurity     Worry: Not on file     Inability: Not on file    Transportation needs     Medical: Not on file     Non-medical: Not on file   Tobacco Use    Smoking status: Never Smoker    Smokeless tobacco: Never Used   Substance and Sexual Activity    Alcohol use: No     Alcohol/week: 0.0 standard drinks    Drug use: No    Sexual activity: Never   Lifestyle    Physical activity     Days per week: Not on file     Minutes per session: Not on file    Stress: Not on file   Relationships    Social connections     Talks on phone: Not on file     Gets together: Not on file     Attends Bahai service: Not on file     Active member of club or organization: Not on file     Attends meetings of clubs or organizations: Not on file     Relationship status: Not on file    Intimate partner violence     Fear of current or ex partner: Not on file     Emotionally abused: Not on file     Physically abused: Not on file     Forced sexual activity: Not on file   Other Topics Concern    Not on file   Social History Narrative    Not on file         ALLERGIES: Tolectin [tolmetin], Aldactone [spironolactone], Carvedilol, Ceclor [cefaclor], Ciprofloxacin, Darvon [propoxyphene], Macrobid [nitrofurantoin monohyd/m-cryst], Norvasc [amlodipine], Tetracycline, Toprol xl [metoprolol succinate], Valsartan, and Zestril [lisinopril]    Review of Systems   Constitutional: Negative for fever. Respiratory: Positive for shortness of breath. Cardiovascular: Positive for leg swelling (chronic). Negative for chest pain and syncope. All other systems reviewed and are negative.       Vitals:    02/02/21 1354 02/02/21 1725 21 1729   BP: (!) 170/78 (!) 147/115    Pulse: (!) 52 (!) 54    Resp: 18 24    Temp: 97.1 °F (36.2 °C)     SpO2: 98% 97% 99%   Weight: 90.7 kg (200 lb)     Height: 5' 3\" (1.6 m)              Physical Exam  Vitals signs and nursing note reviewed. Constitutional:       General: She is not in acute distress. Appearance: She is well-developed. Comments: Frail, elderly appearing   HENT:      Head: Normocephalic and atraumatic. Eyes:      Conjunctiva/sclera: Conjunctivae normal.   Neck:      Musculoskeletal: Neck supple. Cardiovascular:      Rate and Rhythm: Regular rhythm. Bradycardia present. Pulmonary:      Effort: Pulmonary effort is normal. No respiratory distress. Abdominal:      General: There is no distension. Musculoskeletal: Normal range of motion. General: No deformity. Right lower le+ Edema present. Left lower le+ Edema present. Skin:     General: Skin is warm and dry. Neurological:      Mental Status: She is alert. Cranial Nerves: No cranial nerve deficit. Psychiatric:         Behavior: Behavior normal.        MDM     80 y.o. female presents with progressive fatigue, exertion intolerance and overall weakness. She is unable to do simple activities such as tie her shoes. She noticed a reduction in HR over the last 2 months, EKG without p-waves here which could be medication induced from dilt or secondary to SSS. Dr Yuliya Merlos recommending holding diltiazem and consider switching to procardia to raise HR and they will see in consultation. Procedures    EKG 1443: Rate 54, junctional bradycardia. LAD, RBBB. Since last tracing p-waves absent and rate slower. Otherwise no significant change. 6:28 PM  Consult called to Dr Yuliya Merlos, consult for cardiology. I reviewed available results and clinical information. They recommend overnight observation, holding diltiazem and consider starting procardia to raise HR in interim. Kevyn Wilder Serve Consult for Admission  6:34 PM    ED Room Number: ER08/08  Patient Name and age:  Dea Gunter 85 y.o.  female  Working Diagnosis:   1. Sick sinus syndrome (HCC)    2. Symptomatic bradycardia        COVID-19 Suspicion:  no  Sepsis present:  no  Reassessment needed: no  Code Status:  Full Code  Readmission: no  Isolation Requirements:  no  Recommended Level of Care:  telemetry  Department:[facility] - [phone]

## 2021-02-02 NOTE — PROGRESS NOTES
Kaycee Hutchinson, ANDRE  [id number]  [place], [address] [facility]    Office [phone]  Fax [phone]      Patient: Masood Pal  : 1935      Today's Date: 2021      HISTORY OF PRESENT ILLNESS:     History of Present Illness:  Pt here for fu of dyspnea. Found to have KAREEN and hyperkalemia on labs last week. Diuretics were held. Pt continues to feel very weak with severe CHRISTIAN. Doesn't feel any better. Had cough with clear sputum production Saturday night; was up all night coughing. CPAP machine wasn't working. Slept OK back in bed  and Monday. Had some expiratory wheeze this morning. Taking on phone yesterday and became very SOB. Chest felt tight until breathing improved. Denies fever and chills. Doesn't feel swollen; has LE swelling which is baseline. States weight is stable. PAST MEDICAL HISTORY:     Past Medical History:   Diagnosis Date    Arthritis     Asthma     Breast cancer ([address]     Chest pain, unspecified     atypical chest discomfort with normal stress cardiolite , and normal stress echoes ,  and . Negative dobutamine cardiolite 2009 for ischemia with an EF=63%. Cardiac Cath 9/3/2010 showed mild CAD.  Diabetes mellitus ([address]     Diverticulosis     Dyslipidemia     Frequent PVCs     GERD (gastroesophageal reflux disease)     Hiatal hernia     HTN (hypertension)     Hypothyroid     Neuropathy     Obesity     LA on CPAP     RBBB (right bundle branch block)     Spinal stenosis          Past Surgical History:   Procedure Laterality Date    HX BUNIONECTOMY      HX CHOLECYSTECTOMY      HX HEART CATHETERIZATION      Cardiac Cath 9/3/2010 showed mild CAD.       HX HIP REPLACEMENT      HX ORTHOPAEDIC      HX OTHER SURGICAL      Echo 10/5/12 - LVEF 60-65%, mod-marked LAE, MAC, grade 1 diastolic dysfunction     HX OTHER SURGICAL      Lexiscan cardiolite 13 - normal MPI, LVEF 74%    HX OTHER SURGICAL      Aorta duplex 1/6/14 - aorta size 1.7 cm, no dilation          MEDICATIONS:     Current Outpatient Medications   Medication Sig Dispense Refill    gabapentin (NEURONTIN) 400 mg capsule Take 1 Cap by mouth nightly. Max Daily Amount: 400 mg. 30 Cap 0    dilTIAZem ER (CARDIZEM CD) 240 mg capsule Take 1 Cap by mouth daily. 30 Cap 0    hydrALAZINE (APRESOLINE) 25 mg tablet Take 1 Tab by mouth two (2) times a day. 60 Tab 1    calcium-cholecalciferol, D3, (CALTRATE 600+D) tablet Take 1 Tab by mouth two (2) times daily (with meals).  cetirizine (ZyrTEC) 10 mg tablet Take 10 mg by mouth daily.  cloNIDine HCL (CATAPRES) 0.1 mg tablet Take 2 Tabs by mouth nightly. 270 Tab 3    rosuvastatin (CRESTOR) 10 mg tablet Take 10 mg by mouth nightly.  vit C/E/Zn/coppr/lutein/zeaxan (PRESERVISION AREDS 2 PO) Take 1 Cap by mouth two (2) times a day.  acetaminophen (TYLENOL EXTRA STRENGTH) 500 mg tablet Take  by mouth every six (6) hours as needed for Pain.  losartan (COZAAR) 100 mg tablet Take 1 Tab by mouth nightly. 90 Tab 3    OTHER zyflamend 2 caps daily      levothyroxine (SYNTHROID) 100 mcg tablet Take 100 mcg by mouth Daily (before breakfast).  omeprazole (PRILOSEC) 20 mg capsule Take 20 mg by mouth two (2) times a day.  montelukast (SINGULAIR) 10 mg tablet Take 10 mg by mouth nightly.  spironolactone (ALDACTONE) 25 mg tablet Take 1 Tab by mouth two (2) times a day. 180 Tab 3    torsemide (DEMADEX) 10 mg tablet Take 1 Tab by mouth daily.  90 Tab 3       Allergies   Allergen Reactions    Tolectin [Tolmetin] Anaphylaxis    Aldactone [Spironolactone] Other (comments)     hyperkalemia    Carvedilol Other (comments)     \"slow heart rate down to 44\"    Ceclor [Cefaclor] Hives    Ciprofloxacin Hives and Swelling    Darvon [Propoxyphene] Swelling     Swelling lips and hands    Macrobid [Nitrofurantoin Monohyd/M-Cryst] Other (comments)     Does not recall    Norvasc [Amlodipine] Swelling     Swelling ankles/legs    Tetracycline Swelling     Swelling lips/hands    Toprol Xl [Metoprolol Succinate] Swelling     Swellings ankles/legs    Valsartan Other (comments)     Also \"slow heart rate down to 44\"    Zestril [Lisinopril] Hives           SOCIAL HISTORY:     Social History     Tobacco Use    Smoking status: Never Smoker    Smokeless tobacco: Never Used   Substance Use Topics    Alcohol use: No     Alcohol/week: 0.0 standard drinks    Drug use: No         FAMILY HISTORY:     Family History   Problem Relation Age of Onset    Coronary Artery Disease Father     Heart Disease Father     Coronary Artery Disease Paternal Aunt     Cancer Mother     Parkinson's Disease Mother     Stroke Mother     Thyroid Disease Mother     Seizures Brother             REVIEW OF SYMPTOMS:       Review of Symptoms:  Constitutional: Negative for fever, chills  HEENT: Negative for nosebleeds, tinnitus, and vision changes. Respiratory: +cough, +wheezing  Cardiovascular: Negative for syncope, and PND.  +CHRISTIAN  Gastrointestinal: Negative for abdominal pain, melena. Genitourinary: Negative for dysuria  Musculoskeletal: + back pain, joint pain   Skin: Negative for rash  Heme: No problems bleeding. Neurological: Negative for speech change and focal weakness.           PHYSICAL EXAM:        Physical Exam:  Visit Vitals  BP (!) 140/62 (BP 1 Location: Left upper arm, BP Patient Position: Sitting)   Pulse (!) 54   Ht 5' 3\" (1.6 m)   Wt 200 lb (90.7 kg)   SpO2 96%   BMI 35.43 kg/m²     Patient appears generally well, mood and affect are appropriate and pleasant. HEENT: Hearing intact, non-icteric, normocephalic, atraumatic,   Lung Exam: Clear to auscultation, even breath sounds.    Cardiac Exam: RRR, slightly dianna  Abdomen: Soft, non-tender, normal bowel sounds. + obese   Extremities: Moves all ext well. Trace lower extremity edema.   +leg wraps   Psych: Appropriate affect  Neuro - Grossly intact         LABS / OTHER STUDIES:      10/16/20 - BUN 47, Cr 1.53, Na 137, K 5.4, CO2 20, AST 14, ALT 12  , , HDL 41, LDL 84  TSH 3.1, proBNP 825, HgbA1c 5.7, Vit D 38    Lab Results   Component Value Date/Time    Sodium 139 01/28/2021 02:16 PM    Potassium 6.0 (H) 01/28/2021 02:16 PM    Chloride 110 (H) 01/28/2021 02:16 PM    CO2 20 (L) 01/28/2021 02:16 PM    Anion gap 9 01/28/2021 02:16 PM    Glucose 130 (H) 01/28/2021 02:16 PM    BUN 59 (H) 01/28/2021 02:16 PM    Creatinine 2.26 (H) 01/28/2021 02:16 PM    BUN/Creatinine ratio 26 (H) 01/28/2021 02:16 PM    GFR est AA 25 (L) 01/28/2021 02:16 PM    GFR est non-AA 21 (L) 01/28/2021 02:16 PM    Calcium 9.3 01/28/2021 02:16 PM    Bilirubin, total 0.2 01/28/2021 02:16 PM    Alk. phosphatase 60 01/28/2021 02:16 PM    Protein, total 7.4 01/28/2021 02:16 PM    Albumin 3.9 01/28/2021 02:16 PM    Globulin 3.5 01/28/2021 02:16 PM    A-G Ratio 1.1 01/28/2021 02:16 PM    ALT (SGPT) 24 01/28/2021 02:16 PM    AST (SGOT) 18 01/28/2021 02:16 PM     Lab Results   Component Value Date/Time    WBC 7.9 01/28/2021 02:16 PM    HGB 11.1 (L) 01/28/2021 02:16 PM    HCT 36.2 01/28/2021 02:16 PM    PLATELET 297 37/53/0027 02:16 PM    .0 (H) 01/28/2021 02:16 PM     No results found for: CHOL, CHOLPOCT, CHOLX, CHLST, CHOLV, HDL, HDLPOC, HDLP, LDL, LDLCPOC, LDLC, DLDLP, VLDLC, VLDL, TGLX, TRIGL, TRIGP, TGLPOCT, CHHD, CHHDX          CARDIAC DIAGNOSTICS:        Cardiac Evaluation Includes:  Cath 2010: LAD 25%, RCA 20%, Mid RCA 20%. Stress Nuc 2013: Normal perfusion. EF 74. V/Q 5/1/16 - low prob  Echo 5/2/16 - LVEF 60%  Lexiscan Cardiolite 5/2/16 - 1) No ischemic EKG changes with Lexiscan.  Frequent PVC's during stress.  Mild chest pain noted during Lexiscan Injection.    2) Normal Lexiscan gated SPECT myocardial perfusion study.  3) LVEF 55%     [facility] Screening 10/17/15 - mild carotid disease, normal PALAK and AAA     Carotid Doppler 8/25/17 - 10-49% stenosis JONATAN and 0-9% LICA,     Renal Artery Dopplers - 4/19/19 - normal       CXR 7/20/20 - no acute process      Chest CTA 7/20/20 - No PE. No acute disease in the chest. + coronary calcifications      Echo 7/21/20 - LVEF 60-65%, grade 1 diastology, MAC     EKG 8/25/17 - NSR, RBBB  EKG 10/12/18 - NSR, RBBB, LAFB  EKG 12/10/19 - NSR, RBBB,   EKG 7/21/20 - NSR, RBBB, LAD       ASSESSMENT AND PLAN:        Assessment and Plan:  1) CHRISTIAN / HFpEF  - She has a long history of CHRISTIAN and atypical chest pain   - chronic CHRISTIAN is multifactorial (deconditioning, obesity, asthma, diastolic dysfunction, etc).    - 1/28/21: worsening dyspnea/fatigue since around Xmas -  no gross vol on exam, lungs clear, weight stable - found to have +KAREEN with K 6.0 on labs, held ele and torsemide, proBNP 5,680 thought d/t KAREEN   - 2/2/21: pt feeling worse, PND over the weekend along with exertional wheeze / CP - will send to ED for further eval  - check updated labs including CMP, proBNP, CXR, etc.     2) HTN and leg swelling   - LE swelling appears stable (mild) - may benefit from d-dimer given worsening dyspnea   - BP OK - now off ele which pt previously took BID     3) Mild CAD   - cont aspirin/statin  - needs updated ECG - obtain in ED - pending clinical course, may benefit from ischemic eval; cath in 2010 with mild CAD       4) Dyslipidemia  - Given age and lack of significant CAD and presence of joint pain, statin benefit questionable. - management per Endocrinology; recent lipids with LDL 84.   TGL elevated at 307.        5) Chronic LE edema  - wears bilateral leg wraps   - see above - improved today      6) bradycardia  - HR mostly 50s (high 40s at rest) - on Dilt and clonidine  - recently completed Biotel monitor - results pending (completed last week)    Send to ED for further eval     65+ Joby Pena with her .  She was a nurse for 43 years.     Jairo Gunter, ANP

## 2021-02-02 NOTE — Clinical Note
Lesion: Located in the Distal RCA. Stent deployed. Single technique and multiple inflations used. First inflation pressure = 12 alli; inflation time: 25 sec. Second inflation pressure: 14 alli; inflation time: 20 sec.

## 2021-02-02 NOTE — ED NOTES

## 2021-02-02 NOTE — Clinical Note
Lesion located in the Distal RCA. Balloon inflated using single inflation technique. Lesion #1: Pressure = 14 alli; Duration = 22 sec.

## 2021-02-02 NOTE — Clinical Note
Status[de-identified] INPATIENT [101]   Type of Bed: Telemetry [19]   Inpatient Hospitalization Certified Necessary for the Following Reasons: 3.  Patient receiving treatment that can only be provided in an inpatient setting (further clarification in H&P documentation)   Admitting Diagnosis: Bradycardia [144773]   Admitting Physician: Pennie Meza, [address]   Attending Physician: Lula Hernandez   Estimated Length of Stay: 2 Midnights   Discharge Plan[de-identified] Home with Office Follow-up

## 2021-02-02 NOTE — PROGRESS NOTES
Chief Complaint   Patient presents with    Cholesterol Problem    Coronary Artery Disease    Hypertension    Follow-up     Visit Vitals  BP (!) 140/62 (BP 1 Location: Left upper arm, BP Patient Position: Sitting)   Pulse (!) 54   Ht 5' 3\" (1.6 m)   Wt 200 lb (90.7 kg)   SpO2 96%   BMI 35.43 kg/m²     Chest pain denied   SOB WITH MOVEMENT AND TALKING, EVEN WITHOUT MASK, HAS TO CATCH HER BREATH IN THE MIDDLE OF SENTENCES. Dizziness COMES WITH THE SOB  Swelling in hands/feet denied BOTH ANKLES, ALSO HAS SWELLING IN HER FACE. HH FOUND A POCKET OF FLUID ON LEFT CALF  Recent hospital stays denied

## 2021-02-02 NOTE — ED TRIAGE NOTES
Patient arrives with complaint of SOB, worse with exertion, headache, productive cough and fatigue. Further reports feeling \"light-headed\" with ambulation. States has had symptoms since end of December. Patient was seen by Dr. Constance Zuñiga this morning and sent to ED. Denies chest pain, N/V, fever.

## 2021-02-02 NOTE — Clinical Note
Sheath #1: Sheath: inserted. Sheath inserted/placed in the right femoral  artery. Hemostasis achieved. Upon evaluation of the common femoral artery stick using fluoroscopy, the access site puncture was within the safe zone.

## 2021-02-02 NOTE — Clinical Note
TRANSFER - OUT REPORT:     Verbal report given to: [address]. Report consisted of patient's Situation, Background, Assessment and   Recommendations(SBAR). Opportunity for questions and clarification was provided. Patient transported with a Registered Nurse. Patient transported to: [facility].

## 2021-02-03 ENCOUNTER — APPOINTMENT (OUTPATIENT)
Dept: NON INVASIVE DIAGNOSTICS | Age: 86
End: 2021-02-03
Attending: INTERNAL MEDICINE
Payer: MEDICARE

## 2021-02-03 LAB
ACT BLD: 312 SECS (ref 79–138)
ANION GAP SERPL CALC-SCNC: 9 MMOL/L (ref 5–15)
ATRIAL RATE: 57 BPM
BUN SERPL-MCNC: 30 MG/DL (ref 6–20)
BUN/CREAT SERPL: 28 (ref 12–20)
CALCIUM SERPL-MCNC: 8.5 MG/DL (ref 8.5–10.1)
CALCULATED R AXIS, ECG10: -43 DEGREES
CALCULATED T AXIS, ECG11: -11 DEGREES
CHLORIDE SERPL-SCNC: 112 MMOL/L (ref 97–108)
CO2 SERPL-SCNC: 20 MMOL/L (ref 21–32)
COMMENT, HOLDF: NORMAL
CREAT SERPL-MCNC: 1.08 MG/DL (ref 0.55–1.02)
DIAGNOSIS, 93000: NORMAL
ECHO AO ROOT DIAM: 2.97 CM
ECHO AV AREA PEAK VELOCITY: 1.43 CM2
ECHO AV AREA/BSA PEAK VELOCITY: 0.7 CM2/M2
ECHO AV PEAK GRADIENT: 12.21 MMHG
ECHO AV PEAK VELOCITY: 174.69 CM/S
ECHO EST RA PRESSURE: 3 MMHG
ECHO LA AREA 4C: 22.19 CM2
ECHO LA MAJOR AXIS: 3.18 CM
ECHO LA MINOR AXIS: 1.66 CM
ECHO LA VOL 2C: 109.57 ML (ref 22–52)
ECHO LA VOL 4C: 65.13 ML (ref 22–52)
ECHO LA VOL BP: 92.59 ML (ref 22–52)
ECHO LA VOL/BSA BIPLANE: 48.19 ML/M2 (ref 16–28)
ECHO LA VOLUME INDEX A2C: 57.03 ML/M2 (ref 16–28)
ECHO LA VOLUME INDEX A4C: 33.9 ML/M2 (ref 16–28)
ECHO LV E' LATERAL VELOCITY: 5.02 CENTIMETER/SECOND
ECHO LV E' SEPTAL VELOCITY: 6.31 CENTIMETER/SECOND
ECHO LV INTERNAL DIMENSION DIASTOLIC: 5.22 CM (ref 3.9–5.3)
ECHO LV INTERNAL DIMENSION SYSTOLIC: 3.25 CM
ECHO LV IVSD: 0.85 CM (ref 0.6–0.9)
ECHO LV MASS 2D: 164 G (ref 67–162)
ECHO LV MASS INDEX 2D: 85.3 G/M2 (ref 43–95)
ECHO LV POSTERIOR WALL DIASTOLIC: 0.9 CM (ref 0.6–0.9)
ECHO LVOT DIAM: 1.78 CM
ECHO LVOT PEAK GRADIENT: 4.07 MMHG
ECHO LVOT PEAK VELOCITY: 100.9 CM/S
ECHO MV A VELOCITY: 123.51 CENTIMETER/SECOND
ECHO MV E DECELERATION TIME (DT): 242.91 MS
ECHO MV E VELOCITY: 116.9 CENTIMETER/SECOND
ECHO MV MAX VELOCITY: 160.25 CM/S
ECHO MV MEAN GRADIENT: 4.85 MMHG
ECHO MV PEAK GRADIENT: 10.27 MMHG
ECHO MV VTI: 55.81 CM
ECHO PV PEAK INSTANTANEOUS GRADIENT SYSTOLIC: 3.5 MMHG
ECHO PV REGURGITANT MAX VELOCITY: 93.46 CM/S
ECHO RIGHT VENTRICULAR SYSTOLIC PRESSURE (RVSP): 36.7 MMHG
ECHO RV INTERNAL DIMENSION: 3.72 CM
ECHO RV TAPSE: 1.87 CM (ref 1.5–2)
ECHO TV REGURGITANT MAX VELOCITY: 290.28 CM/S
ECHO TV REGURGITANT PEAK GRADIENT: 33.7 MMHG
ERYTHROCYTE [DISTWIDTH] IN BLOOD BY AUTOMATED COUNT: 13.9 % (ref 11.5–14.5)
GLUCOSE SERPL-MCNC: 92 MG/DL (ref 65–100)
HCT VFR BLD AUTO: 31.9 % (ref 35–47)
HGB BLD-MCNC: 9.9 G/DL (ref 11.5–16)
LA VOL DISK BP: 85.13 ML (ref 22–52)
MCH RBC QN AUTO: 31.4 PG (ref 26–34)
MCHC RBC AUTO-ENTMCNC: 31 G/DL (ref 30–36.5)
MCV RBC AUTO: 101.3 FL (ref 80–99)
NRBC # BLD: 0 K/UL (ref 0–0.01)
NRBC BLD-RTO: 0 PER 100 WBC
PLATELET # BLD AUTO: 230 K/UL (ref 150–400)
PMV BLD AUTO: 10.3 FL (ref 8.9–12.9)
POTASSIUM SERPL-SCNC: 4.4 MMOL/L (ref 3.5–5.1)
Q-T INTERVAL, ECG07: 446 MS
QRS DURATION, ECG06: 128 MS
QTC CALCULATION (BEZET), ECG08: 422 MS
RBC # BLD AUTO: 3.15 M/UL (ref 3.8–5.2)
SAMPLES BEING HELD,HOLD: NORMAL
SODIUM SERPL-SCNC: 141 MMOL/L (ref 136–145)
VENTRICULAR RATE, ECG03: 54 BPM
WBC # BLD AUTO: 7.4 K/UL (ref 3.6–11)

## 2021-02-03 PROCEDURE — 97116 GAIT TRAINING THERAPY: CPT

## 2021-02-03 PROCEDURE — 99223 1ST HOSP IP/OBS HIGH 75: CPT | Performed by: INTERNAL MEDICINE

## 2021-02-03 PROCEDURE — 99218 HC RM OBSERVATION: CPT

## 2021-02-03 PROCEDURE — 92928 PRQ TCAT PLMT NTRAC ST 1 LES: CPT | Performed by: INTERNAL MEDICINE

## 2021-02-03 PROCEDURE — 77030004532 HC CATH ANGI DX IMP BSC -A: Performed by: INTERNAL MEDICINE

## 2021-02-03 PROCEDURE — 77030008543 HC TBNG MON PRSS MRTM -A: Performed by: INTERNAL MEDICINE

## 2021-02-03 PROCEDURE — 74011000258 HC RX REV CODE- 258: Performed by: INTERNAL MEDICINE

## 2021-02-03 PROCEDURE — 74011000250 HC RX REV CODE- 250: Performed by: INTERNAL MEDICINE

## 2021-02-03 PROCEDURE — C1887 CATHETER, GUIDING: HCPCS | Performed by: INTERNAL MEDICINE

## 2021-02-03 PROCEDURE — 93458 L HRT ARTERY/VENTRICLE ANGIO: CPT | Performed by: INTERNAL MEDICINE

## 2021-02-03 PROCEDURE — 77030013715 HC INFL SYS MRTM -B: Performed by: INTERNAL MEDICINE

## 2021-02-03 PROCEDURE — 93306 TTE W/DOPPLER COMPLETE: CPT | Performed by: INTERNAL MEDICINE

## 2021-02-03 PROCEDURE — 94760 N-INVAS EAR/PLS OXIMETRY 1: CPT

## 2021-02-03 PROCEDURE — 74011250636 HC RX REV CODE- 250/636: Performed by: INTERNAL MEDICINE

## 2021-02-03 PROCEDURE — C1760 CLOSURE DEV, VASC: HCPCS | Performed by: INTERNAL MEDICINE

## 2021-02-03 PROCEDURE — 85347 COAGULATION TIME ACTIVATED: CPT

## 2021-02-03 PROCEDURE — 85027 COMPLETE CBC AUTOMATED: CPT

## 2021-02-03 PROCEDURE — 93005 ELECTROCARDIOGRAM TRACING: CPT

## 2021-02-03 PROCEDURE — 74011250637 HC RX REV CODE- 250/637: Performed by: INTERNAL MEDICINE

## 2021-02-03 PROCEDURE — 74011000636 HC RX REV CODE- 636: Performed by: INTERNAL MEDICINE

## 2021-02-03 PROCEDURE — 77030012468 HC VLV BLEEDBK CNTRL ABBT -B: Performed by: INTERNAL MEDICINE

## 2021-02-03 PROCEDURE — 97161 PT EVAL LOW COMPLEX 20 MIN: CPT

## 2021-02-03 PROCEDURE — C1769 GUIDE WIRE: HCPCS | Performed by: INTERNAL MEDICINE

## 2021-02-03 PROCEDURE — 74011250637 HC RX REV CODE- 250/637: Performed by: FAMILY MEDICINE

## 2021-02-03 PROCEDURE — 99153 MOD SED SAME PHYS/QHP EA: CPT | Performed by: INTERNAL MEDICINE

## 2021-02-03 PROCEDURE — C1725 CATH, TRANSLUMIN NON-LASER: HCPCS | Performed by: INTERNAL MEDICINE

## 2021-02-03 PROCEDURE — 65660000000 HC RM CCU STEPDOWN

## 2021-02-03 PROCEDURE — 93306 TTE W/DOPPLER COMPLETE: CPT

## 2021-02-03 PROCEDURE — C1874 STENT, COATED/COV W/DEL SYS: HCPCS | Performed by: INTERNAL MEDICINE

## 2021-02-03 PROCEDURE — 96376 TX/PRO/DX INJ SAME DRUG ADON: CPT

## 2021-02-03 PROCEDURE — 99152 MOD SED SAME PHYS/QHP 5/>YRS: CPT | Performed by: INTERNAL MEDICINE

## 2021-02-03 PROCEDURE — 80048 BASIC METABOLIC PNL TOTAL CA: CPT

## 2021-02-03 PROCEDURE — 94618 PULMONARY STRESS TESTING: CPT

## 2021-02-03 PROCEDURE — 36415 COLL VENOUS BLD VENIPUNCTURE: CPT

## 2021-02-03 PROCEDURE — 96372 THER/PROPH/DIAG INJ SC/IM: CPT

## 2021-02-03 DEVICE — XIENCE SIERRA™ EVEROLIMUS ELUTING CORONARY STENT SYSTEM 2.75 MM X 18 MM / RAPID-EXCHANGE
Type: IMPLANTABLE DEVICE | Status: FUNCTIONAL
Brand: XIENCE SIERRA™

## 2021-02-03 RX ORDER — HYDRALAZINE HYDROCHLORIDE 20 MG/ML
10 INJECTION INTRAMUSCULAR; INTRAVENOUS
Status: DISCONTINUED | OUTPATIENT
Start: 2021-02-03 | End: 2021-02-03

## 2021-02-03 RX ORDER — PROMETHAZINE HYDROCHLORIDE 25 MG/1
12.5 TABLET ORAL
Status: DISCONTINUED | OUTPATIENT
Start: 2021-02-03 | End: 2021-02-04 | Stop reason: HOSPADM

## 2021-02-03 RX ORDER — FENTANYL CITRATE 50 UG/ML
INJECTION, SOLUTION INTRAMUSCULAR; INTRAVENOUS AS NEEDED
Status: DISCONTINUED | OUTPATIENT
Start: 2021-02-03 | End: 2021-02-03 | Stop reason: HOSPADM

## 2021-02-03 RX ORDER — HYDRALAZINE HYDROCHLORIDE 25 MG/1
25 TABLET, FILM COATED ORAL 2 TIMES DAILY
Status: DISCONTINUED | OUTPATIENT
Start: 2021-02-03 | End: 2021-02-03

## 2021-02-03 RX ORDER — SODIUM CHLORIDE 0.9 % (FLUSH) 0.9 %
5-40 SYRINGE (ML) INJECTION EVERY 8 HOURS
Status: DISCONTINUED | OUTPATIENT
Start: 2021-02-03 | End: 2021-02-04 | Stop reason: HOSPADM

## 2021-02-03 RX ORDER — SODIUM CHLORIDE 0.9 % (FLUSH) 0.9 %
5-40 SYRINGE (ML) INJECTION AS NEEDED
Status: DISCONTINUED | OUTPATIENT
Start: 2021-02-03 | End: 2021-02-04 | Stop reason: HOSPADM

## 2021-02-03 RX ORDER — ACETAMINOPHEN 650 MG/1
650 SUPPOSITORY RECTAL
Status: DISCONTINUED | OUTPATIENT
Start: 2021-02-03 | End: 2021-02-04 | Stop reason: HOSPADM

## 2021-02-03 RX ORDER — CLONIDINE HYDROCHLORIDE 0.1 MG/1
0.1 TABLET ORAL 2 TIMES DAILY
Status: DISCONTINUED | OUTPATIENT
Start: 2021-02-03 | End: 2021-02-04 | Stop reason: HOSPADM

## 2021-02-03 RX ORDER — CLONIDINE HYDROCHLORIDE 0.1 MG/1
0.1 TABLET ORAL
Status: DISCONTINUED | OUTPATIENT
Start: 2021-02-03 | End: 2021-02-03

## 2021-02-03 RX ORDER — HYDRALAZINE HYDROCHLORIDE 25 MG/1
50 TABLET, FILM COATED ORAL 2 TIMES DAILY
Status: DISCONTINUED | OUTPATIENT
Start: 2021-02-03 | End: 2021-02-03

## 2021-02-03 RX ORDER — PANTOPRAZOLE SODIUM 40 MG/1
40 TABLET, DELAYED RELEASE ORAL
Status: DISCONTINUED | OUTPATIENT
Start: 2021-02-03 | End: 2021-02-04 | Stop reason: HOSPADM

## 2021-02-03 RX ORDER — CLOPIDOGREL 300 MG/1
TABLET, FILM COATED ORAL AS NEEDED
Status: DISCONTINUED | OUTPATIENT
Start: 2021-02-03 | End: 2021-02-03 | Stop reason: HOSPADM

## 2021-02-03 RX ORDER — DIPHENHYDRAMINE HYDROCHLORIDE 50 MG/ML
INJECTION, SOLUTION INTRAMUSCULAR; INTRAVENOUS AS NEEDED
Status: DISCONTINUED | OUTPATIENT
Start: 2021-02-03 | End: 2021-02-03 | Stop reason: HOSPADM

## 2021-02-03 RX ORDER — HYDRALAZINE HYDROCHLORIDE 25 MG/1
25 TABLET, FILM COATED ORAL 3 TIMES DAILY
Status: DISCONTINUED | OUTPATIENT
Start: 2021-02-03 | End: 2021-02-04 | Stop reason: HOSPADM

## 2021-02-03 RX ORDER — ONDANSETRON 2 MG/ML
4 INJECTION INTRAMUSCULAR; INTRAVENOUS
Status: DISCONTINUED | OUTPATIENT
Start: 2021-02-03 | End: 2021-02-04 | Stop reason: HOSPADM

## 2021-02-03 RX ORDER — CLOPIDOGREL BISULFATE 75 MG/1
75 TABLET ORAL DAILY
Status: DISCONTINUED | OUTPATIENT
Start: 2021-02-04 | End: 2021-02-04 | Stop reason: HOSPADM

## 2021-02-03 RX ORDER — ASPIRIN 325 MG
TABLET ORAL AS NEEDED
Status: DISCONTINUED | OUTPATIENT
Start: 2021-02-03 | End: 2021-02-03 | Stop reason: HOSPADM

## 2021-02-03 RX ORDER — ISOSORBIDE MONONITRATE 30 MG/1
30 TABLET, EXTENDED RELEASE ORAL DAILY
Status: DISCONTINUED | OUTPATIENT
Start: 2021-02-04 | End: 2021-02-04 | Stop reason: HOSPADM

## 2021-02-03 RX ORDER — HYDRALAZINE HYDROCHLORIDE 25 MG/1
25 TABLET, FILM COATED ORAL ONCE
Status: DISCONTINUED | OUTPATIENT
Start: 2021-02-03 | End: 2021-02-03

## 2021-02-03 RX ORDER — HEPARIN SODIUM 200 [USP'U]/100ML
INJECTION, SOLUTION INTRAVENOUS
Status: COMPLETED | OUTPATIENT
Start: 2021-02-03 | End: 2021-02-03

## 2021-02-03 RX ORDER — ENOXAPARIN SODIUM 100 MG/ML
40 INJECTION SUBCUTANEOUS DAILY
Status: DISCONTINUED | OUTPATIENT
Start: 2021-02-03 | End: 2021-02-04 | Stop reason: HOSPADM

## 2021-02-03 RX ORDER — ACETAMINOPHEN 325 MG/1
650 TABLET ORAL
Status: DISCONTINUED | OUTPATIENT
Start: 2021-02-03 | End: 2021-02-04 | Stop reason: HOSPADM

## 2021-02-03 RX ORDER — DILTIAZEM HYDROCHLORIDE 30 MG/1
30 TABLET, FILM COATED ORAL 3 TIMES DAILY
Status: DISCONTINUED | OUTPATIENT
Start: 2021-02-03 | End: 2021-02-04

## 2021-02-03 RX ORDER — MONTELUKAST SODIUM 10 MG/1
10 TABLET ORAL
Status: DISCONTINUED | OUTPATIENT
Start: 2021-02-03 | End: 2021-02-04 | Stop reason: HOSPADM

## 2021-02-03 RX ORDER — LEVOTHYROXINE SODIUM 100 UG/1
100 TABLET ORAL
Status: DISCONTINUED | OUTPATIENT
Start: 2021-02-03 | End: 2021-02-04 | Stop reason: HOSPADM

## 2021-02-03 RX ORDER — POLYETHYLENE GLYCOL 3350 17 G/17G
17 POWDER, FOR SOLUTION ORAL DAILY PRN
Status: DISCONTINUED | OUTPATIENT
Start: 2021-02-03 | End: 2021-02-04 | Stop reason: HOSPADM

## 2021-02-03 RX ORDER — SODIUM CHLORIDE 9 MG/ML
50 INJECTION, SOLUTION INTRAVENOUS CONTINUOUS
Status: DISPENSED | OUTPATIENT
Start: 2021-02-03 | End: 2021-02-04

## 2021-02-03 RX ORDER — MIDAZOLAM HYDROCHLORIDE 1 MG/ML
INJECTION, SOLUTION INTRAMUSCULAR; INTRAVENOUS AS NEEDED
Status: DISCONTINUED | OUTPATIENT
Start: 2021-02-03 | End: 2021-02-03 | Stop reason: HOSPADM

## 2021-02-03 RX ORDER — LOSARTAN POTASSIUM 50 MG/1
100 TABLET ORAL
Status: DISCONTINUED | OUTPATIENT
Start: 2021-02-03 | End: 2021-02-04 | Stop reason: HOSPADM

## 2021-02-03 RX ORDER — LIDOCAINE HYDROCHLORIDE 10 MG/ML
INJECTION INFILTRATION; PERINEURAL AS NEEDED
Status: DISCONTINUED | OUTPATIENT
Start: 2021-02-03 | End: 2021-02-03 | Stop reason: HOSPADM

## 2021-02-03 RX ORDER — CLONIDINE HYDROCHLORIDE 0.1 MG/1
0.2 TABLET ORAL
Status: DISCONTINUED | OUTPATIENT
Start: 2021-02-03 | End: 2021-02-03

## 2021-02-03 RX ORDER — ROSUVASTATIN CALCIUM 10 MG/1
10 TABLET, COATED ORAL
Status: DISCONTINUED | OUTPATIENT
Start: 2021-02-03 | End: 2021-02-04

## 2021-02-03 RX ORDER — HYDRALAZINE HYDROCHLORIDE 20 MG/ML
10 INJECTION INTRAMUSCULAR; INTRAVENOUS
Status: DISCONTINUED | OUTPATIENT
Start: 2021-02-03 | End: 2021-02-04 | Stop reason: HOSPADM

## 2021-02-03 RX ADMIN — PANTOPRAZOLE SODIUM 40 MG: 40 TABLET, DELAYED RELEASE ORAL at 06:22

## 2021-02-03 RX ADMIN — CLONIDINE HYDROCHLORIDE 0.2 MG: 0.1 TABLET ORAL at 00:37

## 2021-02-03 RX ADMIN — HYDRALAZINE HYDROCHLORIDE 25 MG: 25 TABLET, FILM COATED ORAL at 21:30

## 2021-02-03 RX ADMIN — ROSUVASTATIN CALCIUM 10 MG: 10 TABLET, COATED ORAL at 21:30

## 2021-02-03 RX ADMIN — ENOXAPARIN SODIUM 40 MG: 40 INJECTION SUBCUTANEOUS at 08:50

## 2021-02-03 RX ADMIN — Medication 10 ML: at 06:24

## 2021-02-03 RX ADMIN — BUMETANIDE 0.5 MG: 0.25 INJECTION INTRAMUSCULAR; INTRAVENOUS at 21:30

## 2021-02-03 RX ADMIN — Medication 10 ML: at 21:35

## 2021-02-03 RX ADMIN — ROSUVASTATIN CALCIUM 10 MG: 10 TABLET, COATED ORAL at 00:38

## 2021-02-03 RX ADMIN — MONTELUKAST 10 MG: 10 TABLET, FILM COATED ORAL at 21:30

## 2021-02-03 RX ADMIN — HYDRALAZINE HYDROCHLORIDE 25 MG: 25 TABLET, FILM COATED ORAL at 08:50

## 2021-02-03 RX ADMIN — LOSARTAN POTASSIUM 100 MG: 50 TABLET, FILM COATED ORAL at 00:37

## 2021-02-03 RX ADMIN — BUMETANIDE 0.5 MG: 0.25 INJECTION INTRAMUSCULAR; INTRAVENOUS at 08:50

## 2021-02-03 RX ADMIN — GABAPENTIN 400 MG: 300 CAPSULE ORAL at 21:30

## 2021-02-03 RX ADMIN — HYDRALAZINE HYDROCHLORIDE 20 MG: 20 INJECTION INTRAMUSCULAR; INTRAVENOUS at 16:13

## 2021-02-03 RX ADMIN — CLONIDINE HYDROCHLORIDE 0.1 MG: 0.1 TABLET ORAL at 17:33

## 2021-02-03 RX ADMIN — SODIUM CHLORIDE 50 ML/HR: 9 INJECTION, SOLUTION INTRAVENOUS at 17:16

## 2021-02-03 RX ADMIN — HYDRALAZINE HYDROCHLORIDE 25 MG: 25 TABLET, FILM COATED ORAL at 17:33

## 2021-02-03 RX ADMIN — GABAPENTIN 400 MG: 300 CAPSULE ORAL at 00:37

## 2021-02-03 RX ADMIN — ACETAMINOPHEN 1000 MG: 500 TABLET ORAL at 00:37

## 2021-02-03 RX ADMIN — MONTELUKAST 10 MG: 10 TABLET, FILM COATED ORAL at 00:38

## 2021-02-03 RX ADMIN — LEVOTHYROXINE SODIUM 100 MCG: 0.1 TABLET ORAL at 06:22

## 2021-02-03 NOTE — ADVANCED PRACTICE NURSE
Plans for left heart cath this afternoon. Discussed with pt and sister in law. Agree to proceed. The risks (including but not limited to death, myocardial infarction, cerebrovascular accident, dysrhythmia, renal failure, vascular complication, allergy, and/or need for emergency surgery), benefits, and alternatives have been explained. Verbal informed consent has been obtained.

## 2021-02-03 NOTE — H&P
[facility]  [address] [place] [address]  [phone]    Admission History and Physical      NAME:  Noemi Horvath   :   1935   MRN:  [id number]     PCP:  Amberly Harris MD     Date of service:  2021         Subjective:     CHIEF COMPLAINT: Exertional dyspnea, fatigue    HISTORY OF PRESENT ILLNESS:     Ms. Jes Pickering is a 80 y.o.  female who is admitted with acute on chronic diastolic CHF. Ms. Jes Pickering with past medical history of diastolic CHF, asthma, COPD, breast cancer, CAD, DM, hyperlipidemia hypertension, hypothyroidism presented to ER complaining of exertional dyspnea and fatigue, which started about [place] time. It has been progressively worse and was seen by her cardiologist today, who sent her to ER for further evaluation. Patient stated that minimal exertion makes her short of breath and has been easily fatigable. Has cough which is productive of white chest.  Denies fever. Due to KAREEN, her diuretic was on hold. Past Medical History:   Diagnosis Date    Arthritis     Asthma     Breast cancer ([address]     Chest pain, unspecified     atypical chest discomfort with normal stress cardiolite , and normal stress echoes ,  and . Negative dobutamine cardiolite 2009 for ischemia with an EF=63%. Cardiac Cath 9/3/2010 showed mild CAD.  Diabetes mellitus ([address]     Diverticulosis     Dyslipidemia     Frequent PVCs     GERD (gastroesophageal reflux disease)     Hiatal hernia     HTN (hypertension)     Hypothyroid     Neuropathy     Obesity     LA on CPAP     RBBB (right bundle branch block)     Spinal stenosis         Past Surgical History:   Procedure Laterality Date    HX BUNIONECTOMY      HX CHOLECYSTECTOMY      HX HEART CATHETERIZATION      Cardiac Cath 9/3/2010 showed mild CAD.       HX HIP REPLACEMENT      HX ORTHOPAEDIC      HX OTHER SURGICAL      Echo 10/5/12 - LVEF 60-65%, mod-marked LAE, MAC, grade 1 diastolic dysfunction     HX OTHER SURGICAL      Lexiscan cardiolite 1/25/13 - normal MPI, LVEF 74%    HX OTHER SURGICAL      Aorta duplex 1/6/14 - aorta size 1.7 cm, no dilation        Social History     Tobacco Use    Smoking status: Never Smoker    Smokeless tobacco: Never Used   Substance Use Topics    Alcohol use: No     Alcohol/week: 0.0 standard drinks        Family History   Problem Relation Age of Onset    Coronary Artery Disease Father     Heart Disease Father     Coronary Artery Disease Paternal Aunt     Cancer Mother     Parkinson's Disease Mother     Stroke Mother     Thyroid Disease Mother     Seizures Brother         Allergies   Allergen Reactions    Tolectin [Tolmetin] Anaphylaxis    Aldactone [Spironolactone] Other (comments)     hyperkalemia    Carvedilol Other (comments)     \"slow heart rate down to 44\"    Ceclor [Cefaclor] Hives    Ciprofloxacin Hives and Swelling    Darvon [Propoxyphene] Swelling     Swelling lips and hands    Macrobid [Nitrofurantoin Monohyd/M-Cryst] Other (comments)     Does not recall    Norvasc [Amlodipine] Swelling     Swelling ankles/legs    Tetracycline Swelling     Swelling lips/hands    Toprol Xl [Metoprolol Succinate] Swelling     Swellings ankles/legs    Valsartan Other (comments)     Also \"slow heart rate down to 44\"    Zestril [Lisinopril] Hives        Prior to Admission medications    Medication Sig Start Date End Date Taking? Authorizing Provider   gabapentin (NEURONTIN) 400 mg capsule Take 1 Cap by mouth nightly. Max Daily Amount: 400 mg. 1/29/21   Rebekah CHARLES, NP   dilTIAZem ER (CARDIZEM CD) 240 mg capsule Take 1 Cap by mouth daily. 7/23/20   Marysol Huratdo MD   hydrALAZINE (APRESOLINE) 25 mg tablet Take 1 Tab by mouth two (2) times a day. 7/22/20   Marysol Hurtado MD   calcium-cholecalciferol, D3, (CALTRATE 600+D) tablet Take 1 Tab by mouth two (2) times daily (with meals).     Provider, Historical cetirizine (ZyrTEC) 10 mg tablet Take 10 mg by mouth daily. Provider, Historical   cloNIDine HCL (CATAPRES) 0.1 mg tablet Take 2 Tabs by mouth nightly. 6/15/20   Katelyn Curtis MD   rosuvastatin (CRESTOR) 10 mg tablet Take 10 mg by mouth nightly. 12/3/19   Provider, Historical   vit C/E/Zn/coppr/lutein/zeaxan (PRESERVISION AREDS 2 PO) Take 1 Cap by mouth two (2) times a day. Provider, Historical   acetaminophen (TYLENOL EXTRA STRENGTH) 500 mg tablet Take  by mouth every six (6) hours as needed for Pain. Provider, Historical   losartan (COZAAR) 100 mg tablet Take 1 Tab by mouth nightly. 3/8/18   Katelyn Curtis MD   OTHER zyflamend 2 caps daily    Provider, Historical   levothyroxine (SYNTHROID) 100 mcg tablet Take 100 mcg by mouth Daily (before breakfast). Provider, Historical   omeprazole (PRILOSEC) 20 mg capsule Take 20 mg by mouth two (2) times a day. Provider, Historical   montelukast (SINGULAIR) 10 mg tablet Take 10 mg by mouth nightly.  12/22/10   Provider, Historical         Review of Systems:  (bold if positive, if negative)    Gen:  Eyes:  ENT:  CVS:  Pulm:  Cough, dyspnea, sputum,GI:  :  MS:  Skin:  Psych:  Endo:  Hem:  Renal:  Neuro:            Objective:      VITALS:    Vital signs reviewed; most recent are:    Visit Vitals  BP (!) 160/78   Pulse (!) 54   Temp 97.1 °F (36.2 °C)   Resp 24   Ht 5' 3\" (1.6 m)   Wt 90.7 kg (200 lb)   SpO2 100%   BMI 35.43 kg/m²     SpO2 Readings from Last 6 Encounters:   02/02/21 100%   02/02/21 96%   01/28/21 97%   09/18/20 99%   08/31/20 97%   07/27/20 98%        No intake or output data in the 24 hours ending 02/02/21 1930         Exam:     Physical Exam:    Gen:  Well-developed, well-nourished, in no acute distress  HEENT:  Pink conjunctivae, PERRL, hearing intact to voice, moist mucous membranes  Neck:  Supple, without masses, thyroid non-tender  Resp:  No accessory muscle use, clear breath sounds without wheezes rales or rhonchi  Card:  No murmurs, normal S1, S2 without thrills, bruits or peripheral edema  Abd:  Soft, non-tender, non-distended, normoactive bowel sounds are present, no palpable organomegaly and no detectable hernias  Lymph:  No cervical or inguinal adenopathy  Musc:  No cyanosis or clubbing  Skin:  No rashes or ulcers, skin turgor is good  Neuro:  Cranial nerves are grossly intact, no focal motor weakness, follows commands appropriately  Psych:  Good insight, oriented to person, place and time, alert       Labs:    Recent Labs     02/02/21  1432   WBC 9.2   HGB 11.2*   HCT 36.0        Recent Labs     02/02/21  1432      K 5.4*   *   CO2 21   GLU 95   BUN 37*   CREA 1.29*   CA 8.7   MG 2.2   ALB 3.6   TBILI 0.2   ALT 26     Lab Results   Component Value Date/Time    Glucose (POC) 116 (H) 07/22/2020 11:22 AM    Glucose (POC) 110 (H) 07/22/2020 06:38 AM     No results for input(s): PH, PCO2, PO2, HCO3, FIO2 in the last 72 hours. No results for input(s): INR, INREXT in the last 72 hours. Telemetry reviewed:   Right bundle branch block       Assessment/Plan:    1. Diastolic CHF, acute on chronic (HCC) (2/2/2021)/ Exertional dyspnea (2/2/2021)/high proBNP. Her Lasix was on hold last week due to worsening kidney function. Will admit to telemetry. Check echocardiogram and start low-dose of IV Bumex. Check daily weights, I&O. Consult cardiology    2. Bradycardia (2/2/2021)/ ? SSS. Hold calcium channel blockers. Monitor on telemetry    3. Hyperkalemia (2/2/2021). Hold spironolactone. Kayexalate    4. Diabetes mellitus ([facility] [address] [facility] Diet controlled. Per patient recent A1c 5.6    5. LA on CPAP. May use on CPAP    6. Dyslipidemia. Continue statin    7. HTN (hypertension) (4/23/2013). Continue home medications    8. Hypothyroid. Continue Synthroid    9. GERD (gastroesophageal reflux disease). PPI if needed    10. CKD stage III.   Watch renal functions on diuretics          CODE STATUS: Full               Previous medical records reviewed     Risk of deterioration: high      Total time spent with patient: 79 [address] discussed with: Patient, Nursing Staff and >50% of time spent in counseling and coordination of care    Discussed:  Care Plan    Prophylaxis:  Lovenox    Probable Disposition:  Home w/Family           ___________________________________________________    Attending Physician: Matias Tom MD

## 2021-02-03 NOTE — PROGRESS NOTES
Reason for Admission:   Dyspnea, bradycardia, acute/chronic CHF. Hx CAD, diabetes, HTN, hypothyroid. RUR Score:   16%/ low risk                  Plan for utilizing home health:      Open to Molly, referral sent via [employer]. STEPH - walker, rollator. Patient ACCEPTED by DAVID Barnes updated. PCP: First and Last name:  Dr. Hitesh Clancy   Name of Practice:    Are you a current patient: Yes/No: yes   Approximate date of last visit:  1/5/21   Can you participate in a virtual visit with your PCP:  no                    Current Advanced Directive/Advance Care Plan:   Decision makers -  Phuc Barrett [phone], son Sy Frazier [phone], son Saloni Hernandez [phone]                         Transition of Care Plan:                    Chart reviewed, demographics verified. CM role and follow up discussed. Met with patient at bedside, face mask and goggles on. Patient lives with her  , 2 sons live nearby. Patient lives in a two story home with ramp to enter home and bedroom/bath on main level. Patient has prescription drug coverage, uses [employer]. Patient is independent, and provides self care. Does not drive but her sons assist with transportation needs. Current status:  Patient currently requiring medical management including Bumex IV, received kayexalate on admission. Patient is followed by home health for wound care to lower extremities. PLAN:  1. Monitor patient response to treatment and recommendations. 2. Medical management continues. 3. Patient is appropriate for resumption of home care placement, referral/order sent via [employer]. 4. Patient transport home per son at discharge. 5. CM to monitor clinical progress and disposition recommendations. Care Management Interventions  PCP Verified by CM: Yes(Dr. Hitesh Clancy)  Mode of Transport at Discharge:  Other (see comment)(per family (son))  Transition of Care Consult (CM Consult): [address]: No  Reason Outside [place]: Patient already serviced by other home care/hospice agency  Current Support Network: Lives with Spouse, Family Lives Nearby  Confirm Follow Up Transport: Family  The Plan for Transition of Care is Related to the Following Treatment Goals : return home at [facility] Location  Discharge Placement: Home with home health    Mathew Hoffman RN, MSN, Care manager

## 2021-02-03 NOTE — ED NOTES
TRANSFER - OUT REPORT:    Verbal report given to [facility] CHRISTY RN(name) on Brandy Gunter  being transferred to [facility](unit) for routine progression of care       Report consisted of patients Situation, Background, Assessment and   Recommendations(SBAR). Information from the following report(s) SBAR, ED Summary and MAR was reviewed with the receiving nurse. Lines:   Peripheral IV 02/02/21 Left Antecubital (Active)   Site Assessment Clean, dry, & intact 02/02/21 1431   Phlebitis Assessment 0 02/02/21 1431   Infiltration Assessment 0 02/02/21 1431   Dressing Status Clean, dry, & intact 02/02/21 1431   Dressing Type Tape;Transparent 02/02/21 1431   Hub Color/Line Status Pink;Flushed;Patent 02/02/21 1431   Action Taken Blood drawn 02/02/21 1431        Opportunity for questions and clarification was provided.       Patient transported with:   Registered Nurse  Tech

## 2021-02-03 NOTE — PROGRESS NOTES
Occupational Therapy Note:  Chart reviewed. Patient is currently off the floor for a procedure and unavailable for OT evaluation. Will continue to follow.   Rosalba Narayanan OTR/L

## 2021-02-03 NOTE — PROGRESS NOTES
Problem: Mobility Impaired (Adult and Pediatric)  Goal: *Acute Goals and Plan of Care (Insert Text)  Description: FUNCTIONAL STATUS PRIOR TO ADMISSION: Patient was modified independent using a rolling walker for functional mobility. HOME SUPPORT PRIOR TO ADMISSION: The patient lived with  but did not require assist.    Physical Therapy Goals  Initiated 2/3/2021  1. Patient will move from supine to sit and sit to supine  in bed with modified independence within 7 day(s). 2.  Patient will transfer from bed to chair and chair to bed with modified independence using the least restrictive device within 7 day(s). 3.  Patient will perform sit to stand with modified independence within 7 day(s). 4.  Patient will ambulate with modified independence for 100 feet with the least restrictive device within 7 day(s). Outcome: Progressing Towards Goal     PHYSICAL THERAPY EVALUATION  Patient: Masood Pal (48 y.o. female)  Date: 2/3/2021  Primary Diagnosis: Bradycardia [R00.1]  Exertional dyspnea [R06.00]  Procedure(s) (LRB):  LEFT HEART CATH / CORONARY ANGIOGRAPHY (N/A)     Precautions: Exertional dyspnea       ASSESSMENT  Based on the objective data described below, the patient presents with near baseline level of independence following recent onset of exertional dyspnea beginning approximately 2 months ago. Pt requires SBA-CGA for bed mobility, functional transfers, and ambulation currently. O2 saturation remained >93% on RA during ambulation and pt tolerated 75ft prior to requiring a rest break. Pt demonstrates good safety awareness during functional mobility tasks but activity tolerance remains limited d/t generalized deconditioning. Pulse Oximetry Assessment    97% at rest on room air  95% while ambulating on room air    Current Level of Function Impacting Discharge (mobility/balance): SBA-CGA bed mobility, transfers, ambulation    Functional Outcome Measure:   The patient scored 17/28 on the Tinetti outcome measure which is indicative of high falls risk. Other factors to consider for discharge: Strong family support at home and locally     Patient will benefit from skilled therapy intervention to address the above noted impairments. PLAN :  Recommendations and Planned Interventions: bed mobility training, transfer training, gait training, therapeutic exercises, neuromuscular re-education, patient and family training/education and therapeutic activities      Frequency/Duration: Patient will be followed by physical therapy:  5 times a week to address goals. Recommendation for discharge: (in order for the patient to meet his/her long term goals)  Physical therapy at least 2 days/week in the home     This discharge recommendation:  Has been made in collaboration with the attending provider and/or case management    IF patient discharges home will need the following DME: patient owns DME required for discharge         SUBJECTIVE:   Patient stated I was taking a nice nap.     OBJECTIVE DATA SUMMARY:   HISTORY:    Past Medical History:   Diagnosis Date    Arthritis     Asthma     Breast cancer ([facility] [address] [facility]     Diabetes mellitus ([facility] [address]     Diverticulosis     Dyslipidemia     GERD (gastroesophageal reflux disease)     Hiatal hernia     HTN (hypertension)     Hypothyroid     Neuropathy     Obesity     LA on CPAP     RBBB (right bundle branch block)     Spinal stenosis      Past Surgical History:   Procedure Laterality Date    HX BUNIONECTOMY      HX CHOLECYSTECTOMY      HX HEART CATHETERIZATION      Cardiac Cath 9/3/2010 showed mild CAD.       HX HIP REPLACEMENT      HX ORTHOPAEDIC      HX OTHER SURGICAL      Echo 10/5/12 - LVEF 60-65%, mod-marked LAE, MAC, grade 1 diastolic dysfunction     HX OTHER SURGICAL      Lexiscan cardiolite 1/25/13 - normal MPI, LVEF 74%    HX OTHER SURGICAL      Aorta duplex 1/6/14 - aorta size 1.7 cm, no dilation        Personal factors and/or comorbidities impacting plan of care: DM, Dyslipidemia, HTN, Obesity, RBBB    Home Situation  Home Environment: Private residence  Wheelchair Ramp: Yes  One/Two Story Residence: Two story, live on 1st floor  Living Alone: No  Support Systems: Spouse/Significant Other/Partner, Family member(s)  Patient Expects to be Discharged to[de-identified] Private residence  Current DME Used/Available at Home: Maebelle Cramp, rollator, Walker, rolling, Cane, straight(transport chair)  Tub or Shower Type: Tub/Shower combination(sink baths)    EXAMINATION/PRESENTATION/DECISION MAKING:   Critical Behavior:  Neurologic State: Alert  Orientation Level: Oriented X4  Cognition: Follows commands, Appropriate decision making     Hearing: Auditory  Auditory Impairment: None    Range Of Motion:  AROM: Within functional limits           PROM: Within functional limits           Strength:    Strength: Generally decreased, functional                    Tone & Sensation:   Tone: Normal              Sensation: Intact               Coordination:  Coordination: Within functional limits  Vision:      Functional Mobility:  Bed Mobility:  Rolling: Additional time;Contact guard assistance  Supine to Sit: Contact guard assistance; Additional time  Sit to Supine: Contact guard assistance; Additional time  Scooting: Stand-by assistance  Transfers:  Sit to Stand: Additional time;Contact guard assistance  Stand to Sit: Additional time;Contact guard assistance  Stand Pivot Transfers: Contact guard assistance     Bed to Chair: Contact guard assistance              Balance:   Sitting: Intact; Without support  Standing: Intact; With support  Ambulation/Gait Training:  Distance (ft): 75 Feet (ft)  Assistive Device: Walker, rolling;Gait belt  Ambulation - Level of Assistance: Contact guard assistance     Gait Description (WDL): Exceptions to WDL  Gait Abnormalities: Step to gait        Base of Support: Narrowed     Speed/Angie: Pace decreased (<100 feet/min); Slow  Step Length: Left shortened;Right shortened    Functional Measure:  Tinetti test:    Sitting Balance: 1  Arises: 1  Attempts to Rise: 1  Immediate Standing Balance: 1  Standing Balance: 1  Nudged: 1  Eyes Closed: 1  Turn 360 Degrees - Continuous/Discontinuous: 0  Turn 360 Degrees - Steady/Unsteady: 0  Sitting Down: 1  Balance Score: 8 Balance total score  Indication of Gait: 1  R Step Length/Height: 1  L Step Length/Height: 1  R Foot Clearance: 1  L Foot Clearance: 1  Step Symmetry: 1  Step Continuity: 0  Path: 1  Trunk: 1  Walking Time: 1  Gait Score: 9 Gait total score  Total Score: 17/28 Overall total score         Tinetti Tool Score Risk of Falls  <19 = High Fall Risk  19-24 = Moderate Fall Risk  25-28 = Low Fall Risk  Tinetti ME. Performance-Oriented Assessment of Mobility Problems in Elderly Patients. [address]; [id number].  (Scoring Description: PT Bulletin Feb. 10, 1993)    Older adults: eLe Gresham et al, 2009; n = 1000 [facility] elderly evaluated with ABC, RHONA, ADL, and IADL)  · Mean RHONA score for males aged 69-68 years = 26.21(3.40)  · Mean RHONA score for females age 69-68 years = 25.16(4.30)  · Mean RHONA score for males over 80 years = 23.29(6.02)  · Mean RHONA score for females over 80 years = 17.20(8.32)   ]       Physical Therapy Evaluation Charge Determination   History Examination Presentation Decision-Making   MEDIUM  Complexity : 1-2 comorbidities / personal factors will impact the outcome/ POC  LOW Complexity : 1-2 Standardized tests and measures addressing body structure, function, activity limitation and / or participation in recreation  LOW Complexity : Stable, uncomplicated  Other outcome measures Tinetti  LOW       Based on the above components, the patient evaluation is determined to be of the following complexity level: LOW     Pain Ratin/10    Activity Tolerance:   Fair, SpO2 stable on RA and requires rest breaks    After treatment patient left in no apparent distress:   Supine in bed, Heels elevated for pressure relief, Call bell within reach and Caregiver / family present    COMMUNICATION/EDUCATION:   The patients plan of care was discussed with: Registered nurse. Fall prevention education was provided and the patient/caregiver indicated understanding., Patient/family have participated as able in goal setting and plan of care. and Patient/family agree to work toward stated goals and plan of care.     Thank you for this referral.  Xin Torres, SPT   Time Calculation: 33 mins

## 2021-02-03 NOTE — PROGRESS NOTES
[facility]    Medical Progress Note      NAME: Magalys Lawson   :  1935  MRM:  [id number]    Date/Time of service 2/3/2021  7:10 AM          Assessment and Plan:     Exertional dyspnea - POA, unclear etiology, worsening for 2 months. Not hypoxic. No xray changes. May be due to bradycardia, vs general decondition due to age/obesity/pain etc.  Check 6 minute walk. Consulted cardiology. They plan ECHO and started some bumex. Bradycardia / RBBB (right bundle branch block) - POA, rate now better after holding her diltiazem. Cardiology to comment. I cut back nocturnal clonidine dose    Diastolic CHF, chronic / HTN (hypertension) - I am not sure she has acute CHF failure. No hypoxia, no xray changes. Check ECHO. Cardiology consulted. Na and fluid restriction. Continue hydralazine, losartan and bumex    LA on CPAP / Obesity - Advise weigh loss. Continue qhs CPAP    Asthma - I do not thingk she has an exacerbation. She is on singulair. Dyslipidemia - continue rosuvastatin    Hypothyroid - Continue synthroid. TSH normal    GERD (gastroesophageal reflux disease) - PPI    Arthritis / Spinal stenosis / Neuropathy - Continue gabapentin, Tylenol prn    Hyperkalemia - POA and now resolved. Diabetes mellitus without complications - Dx in chart, but I reject it. Normal BG. Not on meds. Subjective:     Chief Complaint:  CHRISTIAN    ROS:  (bold if positive, if negative)    DOETolerating some PT  Tolerating Diet        Objective:     Last 24hrs VS reviewed since prior progress note.  Most recent are:    Visit Vitals  BP (!) 165/65 (BP 1 Location: Right upper arm, BP Patient Position: At rest)   Pulse 71   Temp 97.8 °F (36.6 °C)   Resp 19   Ht 5' 3\" (1.6 m)   Wt 89.6 kg (197 lb 8.5 oz)   SpO2 92%   BMI 34.99 kg/m²     SpO2 Readings from Last 6 Encounters:   21 92%   21 96%   21 97%   20 99%   20 97%   20 98%        No intake or output data in the 24 hours ending 02/03/21 0710     Physical Exam:    Gen:  Obese, in no acute distress  HEENT:  Pink conjunctivae, PERRL, hearing intact to voice, moist mucous membranes  Neck:  Supple, without masses, thyroid non-tender  Resp:  No accessory muscle use, clear breath sounds without wheezes rales or rhonchi  Card:  No murmurs, normal S1, S2 without thrills, bruits or peripheral edema  Abd:  Soft, non-tender, non-distended, normoactive bowel sounds are present, no mass  Lymph:  No cervical or inguinal adenopathy  Musc:  No cyanosis or clubbing  Skin:  No rashes or ulcers, skin turgor is good  Neuro:  Cranial nerves are grossly intact, general motor weakness, follows commands appropriately  Psych:  Good insight, oriented to person, place and time, alert    Telemetry reviewed:   block  __________________________________________________________________  Medications Reviewed: (see below)  Medications:     Current Facility-Administered Medications   Medication Dose Route Frequency    sodium chloride (NS) flush 5-40 mL  5-40 mL IntraVENous Q8H    sodium chloride (NS) flush 5-40 mL  5-40 mL IntraVENous PRN    acetaminophen (TYLENOL) tablet 650 mg  650 mg Oral Q6H PRN    Or    acetaminophen (TYLENOL) suppository 650 mg  650 mg Rectal Q6H PRN    polyethylene glycol (MIRALAX) packet 17 g  17 g Oral DAILY PRN    promethazine (PHENERGAN) tablet 12.5 mg  12.5 mg Oral Q6H PRN    Or    ondansetron (ZOFRAN) injection 4 mg  4 mg IntraVENous Q6H PRN    enoxaparin (LOVENOX) injection 40 mg  40 mg SubCUTAneous DAILY    hydrALAZINE (APRESOLINE) 20 mg/mL injection 10 mg  10 mg IntraVENous Q6H PRN    gabapentin (NEURONTIN) capsule 400 mg  400 mg Oral QHS    hydrALAZINE (APRESOLINE) tablet 25 mg  25 mg Oral BID    levothyroxine (SYNTHROID) tablet 100 mcg  100 mcg Oral ACB    losartan (COZAAR) tablet 100 mg  100 mg Oral QHS    montelukast (SINGULAIR) tablet 10 mg  10 mg Oral QHS    pantoprazole (PROTONIX) tablet 40 mg  40 mg Oral ACB    rosuvastatin (CRESTOR) tablet 10 mg  10 mg Oral QHS    cloNIDine HCL (CATAPRES) tablet 0.1 mg  0.1 mg Oral QHS    bumetanide (BUMEX) injection 0.5 mg  0.5 mg IntraVENous Q12H        Lab Data Reviewed: (see below)  Lab Review:     Recent Labs     02/03/21  0402 02/02/21  1432   WBC 7.4 9.2   HGB 9.9* 11.2*   HCT 31.9* 36.0    248     Recent Labs     02/03/21  0402 02/02/21  1432 02/02/21  1200    141 140   K 4.4 5.4* 5.2*   * 113* 113*   CO2 20* 21 21   GLU 92 95 132*   BUN 30* 37* 39*   CREA 1.08* 1.29* 1.35*   CA 8.5 8.7 9.3   MG  --  2.2  --    ALB  --  3.6  --    TBILI  --  0.2  --    ALT  --  26  --      Lab Results   Component Value Date/Time    Glucose (POC) 116 (H) 07/22/2020 11:22 AM    Glucose (POC) 110 (H) 07/22/2020 06:38 AM    Glucose (POC) 118 (H) 07/21/2020 09:16 PM    Glucose (POC) 112 (H) 07/21/2020 04:44 PM    Glucose (POC) 107 (H) 07/21/2020 11:06 AM     No results for input(s): PH, PCO2, PO2, HCO3, FIO2 in the last 72 hours. No results for input(s): INR, INREXT in the last 72 hours. All Micro Results     None          Other pertinent lab: none    Total time spent with patient: 45 Minutes I personally reviewed chart, notes, data and current medications in the medical record. I have personally examined and treated the patient at bedside during this period.                  Care Plan discussed with: Patient, Care Manager, Nursing Staff, Consultant/Specialist and >50% of time spent in counseling and coordination of care    Discussed:  Care Plan and D/C Planning    Prophylaxis:  Lovenox and H2B/PPI    Disposition:  Home w/Family           ___________________________________________________    Attending Physician: Yajaira Rojas MD

## 2021-02-03 NOTE — CONSULTS
Maria M Estrada MD [facility] [address]  Office [phone]      Date of  Admission: 2/2/2021  1:56 PM       Assessment/Plan:   · HF p EF: diuresis on hold 2/2 KAREEN which is now resolved. Update ECHO for LV function: if LV function is depressed will need further ischemic work up. Start oral bumex today. · Chronic CHRISTIAN: multifactorial: ? ainginal equivalent as dyspnea has escalated the past 2 months, CHF, obesity, COPD, kyphosis, deconditioned. Would ck 6 MWT. She has seen pulmonary in the past and felt better with Advair. · Hx CAD: Cath 2010: LAD 25%, RCA 20%, Mid RCA 20%. · LA: uses CPAP at home. Missed Sat night 2/2 machine malfunction. · KAREEN: resolved  · HLD: cont crestor  · Multi med HTN: inc hydralazine to 50 mg BID. · COPD:  · Deconditioned: PT/OT, uses walker at home. Pt personally seen and examined. Chart reviewed. Agree with advanced NP's history, exam and  A/P with changes/additons. Visit Vitals  BP (!) 183/77 (BP 1 Location: Right upper arm, BP Patient Position: At rest)   Pulse 69   Temp 98.5 °F (36.9 °C)   Resp 18   Ht 5' 3\" (1.6 m)   Wt 197 lb 8.5 oz (89.6 kg)   SpO2 97%   BMI 34.99 kg/m²     BP not controlled    CVS - S1 S2 RRR; 2/6 sys murmur+    Will benefit from ischemia eval      Thank you for allowing us to participate in [facility] David Charlton care. We will be happy to follow along. Please call for any questions. Ever Mello MD, [facility]          Consult requested by Baron Saunders MD for *Bradycardia [R00.1]  Exertional dyspnea [R06.00]    Subjective:  Marifer Charlton is a 80 y.o.  female  with PMH of HF p EF, CAD, DM , HLD , HTN, asthma, COPD, breast ca who presented to ER complaining of exertional dyspnea and fatigue, which started about [place] time. It has been progressively worse and she was seen in our office yesterday and was sent her to ER for further evaluation. Patient stated that minimal exertion makes her short of breath and has been easily fatigable. Diuretics were held due to KAREEN. We, at end of October had changed diuretic from chlorthalidone to torsemide due to in cleg edema. Was taking aldactone. Hyperkalemic on admission, given kayexalate.          The patient denies chest pain, dependent edema, diaphoresis, orthopnea, palpitations, PND,  claudication or syncope. No bleeding. Cardiac risk factors    HTN   DM    HLD  Post menopausal female      LABS:   Troponin:   0.05     ProBNP:  4942     XR Results (most recent):  Results from Hospital Encounter encounter on 02/02/21   XR CHEST PA LAT    Narrative EXAM: XR CHEST PA LAT    INDICATION: shortness of breath    COMPARISON: 7/20/2020. FINDINGS: PA and lateral radiographs of the chest demonstrate chronic  interstitial opacities. Benedetta Ou Heart size is enlarged. No focal airspace disease. .  Osteopenia and degenerative changes with accentuated kyphosis. .       Impression No acute cardiopulmonary process. Cardiographics:   Telemetry:  SR occ PAC  EKG Results     Procedure 720 Value Units Date/Time    EKG, 12 LEAD, INITIAL [[id number]]     Order Status: Sent     EKG, 12 LEAD, INITIAL [[id number]] Collected: 02/02/21 1443    Order Status: Completed Updated: 02/02/21 1647     Ventricular Rate 54 BPM      Atrial Rate 57 BPM      QRS Duration 128 ms      Q-T Interval 446 ms      QTC Calculation (Bezet) 422 ms      Calculated R Axis -43 degrees      Calculated T Axis -11 degrees      Diagnosis --     Wide QRS rhythm  Left axis deviation  Right bundle branch block  Abnormal ECG  When compared with ECG of 20-JUL-2020 16:30,  Wide QRS rhythm has replaced Sinus rhythm              Cardiac Testing/ Procedures:   Cath 2010: LAD 25%, RCA 20%, Mid RCA 20%. Stress Nuc 2013: Normal perfusion. EF 74.   V/Q 5/1/16 - low prob  Echo 5/2/16 - LVEF 60%  Lexiscan Cardiolite 5/2/16 - 1) No ischemic EKG changes with Lexiscan.  Frequent PVC's during stress.  Mild chest pain noted during Lexiscan Injection.    2) Normal Lexiscan gated SPECT myocardial perfusion study. 3) LVEF 55%     [facility] Screening 10/17/15 - mild carotid disease, normal PALAK and AAA     Carotid Doppler 8/25/17 - 10-49% stenosis JONATAN and 8-0% LICA,     Renal Artery Dopplers - 4/19/19 - normal       CXR 7/20/20 - no acute process      Chest CTA 7/20/20 - No PE. No acute disease in the chest. + coronary calcifications      Echo 7/21/20 - LVEF 60-65%, grade 1 diastology, MAC     EKG 8/25/17 - NSR, RBBB  EKG 10/12/18 - NSR, RBBB, LAFB  EKG 12/10/19 - NSR, RBBB,   EKG 7/21/20 - NSR, RBBB, LAD         SOCIAL HX:  , lives with spouse        Patient Active Problem List    Diagnosis Date Noted    RBBB (right bundle branch block)     Spinal stenosis     Bradycardia 02/02/2021    Hyperkalemia 02/02/2021    Exertional dyspnea 74/32/8499    Diastolic CHF, acute on chronic (HCC) 02/02/2021    Hyponatremia 07/22/2020    CHRISTIAN (dyspnea on exertion) 07/20/2020    Obesity     Hypothyroid     GERD (gastroesophageal reflux disease)     Arthritis     Neuropathy     HTN (hypertension) 04/23/2013    Diabetes mellitus ([address]     Asthma     LA on CPAP     Dyslipidemia       Past Medical History:   Diagnosis Date    Arthritis     Asthma     [facility]     Diabetes mellitus [facility]     Diverticulosis     Dyslipidemia     GERD (gastroesophageal reflux disease)     Hiatal hernia     HTN (hypertension)     Hypothyroid     Neuropathy     Obesity     LA on CPAP     RBBB (right bundle branch block)     Spinal stenosis       Past Surgical History:   Procedure Laterality Date    HX BUNIONECTOMY      HX CHOLECYSTECTOMY      HX HEART CATHETERIZATION      Cardiac Cath 9/3/2010 showed mild CAD.       HX HIP REPLACEMENT      HX ORTHOPAEDIC      HX OTHER SURGICAL      Echo 10/5/12 - LVEF 60-65%, mod-marked LAE, MAC, grade 1 diastolic dysfunction     HX OTHER SURGICAL    Lexiscan cardiolite 1/25/13 - normal MPI, LVEF 74%   • HX OTHER SURGICAL      Aorta duplex 1/6/14 - aorta size 1.7 cm, no dilation      Allergies   Allergen Reactions   • Tolectin [Tolmetin] Anaphylaxis   • Aldactone [Spironolactone] Other (comments)     hyperkalemia   • Carvedilol Other (comments)     \"slow heart rate down to 44\"   • Ceclor [Cefaclor] Hives   • Ciprofloxacin Hives and Swelling   • Darvon [Propoxyphene] Swelling     Swelling lips and hands   • Macrobid [Nitrofurantoin Monohyd/M-Cryst] Other (comments)     Does not recall   • Norvasc [Amlodipine] Swelling     Swelling ankles/legs   • Tetracycline Swelling     Swelling lips/hands   • Toprol Xl [Metoprolol Succinate] Swelling     Swellings ankles/legs   • Valsartan Other (comments)     Also \"slow heart rate down to 44\"   • Zestril [Lisinopril] Hives      Current Facility-Administered Medications   Medication Dose Route Frequency   • sodium chloride (NS) flush 5-40 mL  5-40 mL IntraVENous Q8H   • sodium chloride (NS) flush 5-40 mL  5-40 mL IntraVENous PRN   • acetaminophen (TYLENOL) tablet 650 mg  650 mg Oral Q6H PRN    Or   • acetaminophen (TYLENOL) suppository 650 mg  650 mg Rectal Q6H PRN   • polyethylene glycol (MIRALAX) packet 17 g  17 g Oral DAILY PRN   • promethazine (PHENERGAN) tablet 12.5 mg  12.5 mg Oral Q6H PRN    Or   • ondansetron (ZOFRAN) injection 4 mg  4 mg IntraVENous Q6H PRN   • enoxaparin (LOVENOX) injection 40 mg  40 mg SubCUTAneous DAILY   • hydrALAZINE (APRESOLINE) 20 mg/mL injection 10 mg  10 mg IntraVENous Q6H PRN   • gabapentin (NEURONTIN) capsule 400 mg  400 mg Oral QHS   • hydrALAZINE (APRESOLINE) tablet 25 mg  25 mg Oral BID   • levothyroxine (SYNTHROID) tablet 100 mcg  100 mcg Oral ACB   • losartan (COZAAR) tablet 100 mg  100 mg Oral QHS   • montelukast (SINGULAIR) tablet 10 mg  10 mg Oral QHS   • pantoprazole (PROTONIX) tablet 40 mg  40 mg Oral ACB   • rosuvastatin (CRESTOR) tablet 10 mg  10 mg Oral QHS   • cloNIDine HCL  (CATAPRES) tablet 0.1 mg  0.1 mg Oral QHS    bumetanide (BUMEX) injection 0.5 mg  0.5 mg IntraVENous Q12H          Review of Symptoms:  Constitutional: positive for fatigue  ENT: negative   Respiratory: positive for dyspnea on exertion  Gastrointestinal: positive for diarrhea  Genitourinary: no dysuria, hematuria, frequency   Musculoskeletal:negative for back pain  Neurological: negative for memory problems  Other systems reviewed and negative except as above. Social History     Socioeconomic History    Marital status:      Spouse name: Not on file    Number of children: Not on file    Years of education: Not on file    Highest education level: Not on file   Tobacco Use    Smoking status: Never Smoker    Smokeless tobacco: Never Used   Substance and Sexual Activity    Alcohol use: No     Alcohol/week: 0.0 standard drinks    Drug use: No    Sexual activity: Never     Family History   Problem Relation Age of Onset    Coronary Artery Disease Father     Heart Disease Father     Coronary Artery Disease Paternal Aunt     Cancer Mother     Parkinson's Disease Mother     Stroke Mother     Thyroid Disease Mother     Seizures Brother          Physical Exam    Visit Vitals  BP (!) 165/65 (BP 1 Location: Right upper arm, BP Patient Position: At rest)   Pulse 74   Temp 97.8 °F (36.6 °C)   Resp 19   Ht 5' 3\" (1.6 m)   Wt 89.6 kg (197 lb 8.5 oz)   SpO2 92%   BMI 34.99 kg/m²     General: awake, alert, and oriented. MAEx4. No acute distress   HEENT Exam:     Normocephalic, atraumatic. Sclera anicteric . Neck: supple  Lung Exam:     Not  Dyspneic at rest . Respirations unlabored. O2 @92 % room air   Sat: . Lungs:       Heart Exam:       PMI nondisplaced,  Rate: Rhythm: regular, 2/6 systolic murmur. No JVD,      Abdomen Exam:      obese, soft, nontender. + Bowel sounds. No palpable masses.     : voiding     Extremities Exam:      No clubbing, cyanosis, edema, ulcers, varicose veins, rash, swelling, erythema. Dsgs LLE D/I. Vascular Exam:      radial, brachial, dorsalis pedis, posterior tibial, are equal and strong bilaterally     Neuro exam:  No focal deficits   Psy Exam: Normal affect, does not appear anxious or agitated        Recent Results (from the past 12 hour(s))   METABOLIC PANEL, BASIC    Collection Time: 02/03/21  4:02 AM   Result Value Ref Range    Sodium 141 136 - 145 mmol/L    Potassium 4.4 3.5 - 5.1 mmol/L    Chloride 112 (H) 97 - 108 mmol/L    CO2 20 (L) 21 - 32 mmol/L    Anion gap 9 5 - 15 mmol/L    Glucose 92 65 - 100 mg/dL    BUN 30 (H) 6 - 20 MG/DL    Creatinine 1.08 (H) 0.55 - 1.02 MG/DL    BUN/Creatinine ratio 28 (H) 12 - 20      GFR est AA 58 (L) >60 ml/min/1.73m2    GFR est non-AA 48 (L) >60 ml/min/1.73m2    Calcium 8.5 8.5 - 10.1 MG/DL   CBC W/O DIFF    Collection Time: 02/03/21  4:02 AM   Result Value Ref Range    WBC 7.4 3.6 - 11.0 K/uL    RBC 3.15 (L) 3.80 - 5.20 M/uL    HGB 9.9 (L) 11.5 - 16.0 g/dL    HCT 31.9 (L) 35.0 - 47.0 %    .3 (H) 80.0 - 99.0 FL    MCH 31.4 26.0 - 34.0 PG    MCHC 31.0 30.0 - 36.5 g/dL    RDW 13.9 11.5 - 14.5 %    PLATELET 892 804 - 262 K/uL    MPV 10.3 8.9 - 12.9 FL    NRBC 0.0 0  WBC    ABSOLUTE NRBC 0.00 0.00 - 0.01 K/uL   SAMPLES BEING HELD    Collection Time: 02/03/21  4:02 AM   Result Value Ref Range    SAMPLES BEING HELD 1SST     COMMENT        Add-on orders for these samples will be processed based on acceptable specimen integrity and analyte stability, which may vary by analyte.        Colletta Matar MS [facility]

## 2021-02-03 NOTE — CONSULTS
Comprehensive Nutrition Assessment    Type and Reason for Visit: Initial, Consult    Nutrition Recommendations/Plan:   1. Recommend Cardiac diet order after cath. Nutrition Assessment:      2/3: 79 yo female admitted for bradycardia. PMhx: breast CA, CHF, COPD, CAD, DM, HLD, HTN, diverticulosis. Class II Obese per BMI of 35. Weight has been stable over last 3 months per weight hx in EMR. Spoke with pt at bedside, she states her weight has been stable. NPO now for cardiac cath later today but previously was on a Consistent CHO diet. Pt states she ate >75% of her breakfast this morning. Reports good appetite at home, usually eating 2-3 meals/day, sometimes will only have a snack for lunch instead of a full meal.  Provided pt with education on protein-rich foods and getting adequate protein intake for wound healing. Labs reviewed. Meds: Bumex, statin. WOCN following for multiple leg wounds. Malnutrition Assessment:  Does not meet criteria at this time. Estimated Daily Nutrient Needs:  Energy (kcal): 1699(REE 1307 x AF 1.3); Weight Used for Energy Requirements: Current  Protein (g): 89-98(1-1.1 gm/kg/d); Weight Used for Protein Requirements: Current  Fluid (ml/day): 1699; Method Used for Fluid Requirements: 1 ml/kcal      Nutrition Related Findings:  Loose BM 2/3; No edema noted      Wounds:    Multiple       Current Nutrition Therapies:  DIET NPO Except Meds  DIET NPO Except Meds    Anthropometric Measures:  · Height:  5' 3\" (160 cm)  · Current Body Wt:  89.3 kg (196 lb 13.9 oz)   · Admission Body Wt:       · Usual Body Wt:        · Ideal Body Wt:   :      · Adjusted Body Weight:   ; Weight Adjustment for: No adjustment   · Adjusted BMI:       · BMI Category:  Obese class 1 (BMI 30.0-34. 9)       Nutrition Diagnosis:   · Increased nutrient needs related to increased demand for energy/nutrients as evidenced by wounds      Nutrition Interventions:   Food and/or Nutrient Delivery: Continue current diet  Nutrition Education and Counseling: Education completed  Coordination of Nutrition Care: Continue to monitor while inpatient    Goals:  Consume > 75% meals with one protein source/ meal within next 5-7 days       Nutrition Monitoring and Evaluation:   Behavioral-Environmental Outcomes:    Food/Nutrient Intake Outcomes: Food and nutrient intake  Physical Signs/Symptoms Outcomes: Biochemical data, GI status, Weight    Discharge Planning:    Continue current diet     Electronically signed by Pierce Handley RD on 2/3/2021     Contact: [phone]

## 2021-02-03 NOTE — PROGRESS NOTES
0400 Bedside and Verbal shift change report given to Mirela RN (oncoming nurse) by Blessing Beck RN (offgoing nurse). Report included the following information SBAR, Kardex, Intake/Output, MAR, Recent Results and Cardiac Rhythm nsr/bbb.    0730 Bedside and Verbal shift change report given to Jh Grant RN (oncoming nurse) by Gilson Recinos RN (offgoing nurse). Report included the following information SBAR, Kardex, Intake/Output, MAR, Recent Results and Cardiac Rhythm NSR.

## 2021-02-03 NOTE — PROGRESS NOTES
Shift Change:    Bedside and Verbal shift change report given to SUSAN Gar (oncoming nurse) by Eleazar Singh RN (offgoing nurse). Report included the following information SBAR, Kardex, Intake/Output, MAR, Recent Results and Cardiac Rhythm NSR to  .       1100: Informed patient she is NPO for possible cath this afternoon     1428: Called [facility] for update. Still not sure if they are going to cath the patient today. 1521: Patient transferred to [facility] for [facility]. 1830: Report received from Joey Jim, [address] [place] [address]. Patient arrived back from [facility]. Right groin side C/D/I, no bruising, bleeding or hematoma. No c/o pain. Bed rest until 8 PM       Shift Change:    Bedside and Verbal shift change report given to Kaye Lopez RN (oncoming nurse) by Lora Kamara RN (offgoing nurse). Report included the following information SBAR, Kardex, Intake/Output, MAR, Recent Results and Cardiac Rhythm NSR.

## 2021-02-03 NOTE — PROGRESS NOTES
Disaster charting in effect. TRANSFER - IN REPORT:    Verbal report received from [facility] SUSAN MORSE (name) on Kvng Lucas  being received from ED (unit) for routine progression of care      Report consisted of patients Situation, Background, Assessment and   Recommendations(SBAR). Information from the following report(s) SBAR, Kardex, Intake/Output, MAR, Recent Results, Med Rec Status and Cardiac Rhythm NSR was reviewed with the receiving nurse. Opportunity for questions and clarification was provided. 2140 Assessment completed upon patients arrival to unit and care assumed. Patient with frequent diarrhea d/t kayexalate. Patient having trouble with movements to [facility] d/t weakness and SOB. [address] to [address]. Yesenia Hsu requesting tylenol 1,000 mg that patient normally takes at bedtime for arthritis pain in her left knee. Tylenol ordered. 0345 Bedside and Verbal shift change report given to SUSAN Dietz (oncoming nurse) by Elly Clifford RN (offgoing nurse). Report included the following information SBAR, Kardex, Intake/Output, MAR, Recent Results, Med Rec Status and Cardiac Rhythm NSR.

## 2021-02-03 NOTE — PROGRESS NOTES
[facility]: MED RECONCILIATION    Comments/Recommendations:   Reviewed PTA medications with patient at bedside with appropriate PPE- patient is a good historian regarding home medications. Due to hyperkalemia her torsemide 10 mg daily and spironolactone 25 mg daily have been held by cardiology since last Thursday. She reports significant drowsiness with gabapentin and cut this dose down to 400 mg only at bedtime. Information obtained from: Patient, RxQuery, Home medication list, Outpatient cardiology notes    Allergies: Tolectin [tolmetin], Aldactone [spironolactone], Carvedilol, Ceclor [cefaclor], Ciprofloxacin, Darvon [propoxyphene], Macrobid [nitrofurantoin monohyd/m-cryst], Norvasc [amlodipine], Tetracycline, Toprol xl [metoprolol succinate], Valsartan, and Zestril [lisinopril]    Prior to Admission Medications:     Medication Documentation Review Audit       Reviewed by WALT WashingtonD (Pharmacist) on 02/02/21 at 2101      Medication Sig Documenting Provider Last Dose Status Taking?   acetaminophen (TYLENOL EXTRA STRENGTH) 500 mg tablet Take 500-1,000 mg by mouth three (3) times daily as needed for Pain (Generally takes 2-3 times daily). Provider, Historical 2/2/2021 AM Active Yes   calcium-cholecalciferol, D3, (CALTRATE 600+D) tablet Take 1 Tab by mouth two (2) times daily (with meals). Provider, Historical 2/2/2021 AM Active Yes   cetirizine (ZyrTEC) 10 mg tablet Take 10 mg by mouth daily. Provider, Historical 2/2/2021 AM Active Yes   cloNIDine HCL (CATAPRES) 0.1 mg tablet Take 2 Tabs by mouth nightly. Monica Zuleta MD 2/1/2021 PM Active Yes   cyanocobalamin 1,000 mcg tablet Take 1,000 mcg by mouth every Sunday. Provider, Historical 1/31/2021 Active Yes   dilTIAZem ER (CARDIZEM CD) 240 mg capsule Take 1 Cap by mouth daily. Joss Flowers MD 2/2/2021 AM Active Yes   gabapentin (NEURONTIN) 400 mg capsule Take 1 Cap by mouth nightly. Max Daily Amount: 400 mg.  Jaiden Lares NP 2/1/2021 PM Active Yes   hydrALAZINE (APRESOLINE) 25 mg tablet Take 1 Tab by mouth two (2) times a day. Scar Banuelos MD 2/2/2021 AM Active Yes   levothyroxine (SYNTHROID) 100 mcg tablet Take 100 mcg by mouth Daily (before breakfast). Provider, Historical 2/2/2021 AM Active Yes   losartan (COZAAR) 100 mg tablet Take 1 Tab by mouth nightly. Katelyn Curtis MD 2/1/2021 PM Active Yes   montelukast (SINGULAIR) 10 mg tablet Take 10 mg by mouth nightly. Provider, Historical 2/1/2021 PM Active Yes   omeprazole (PRILOSEC) 20 mg capsule Take 20 mg by mouth two (2) times a day. Provider, Historical 2/2/2021 AM Active Yes   OTHER zyflamend 2 caps daily Provider, Historical 2/2/2021 AM Active Yes   rosuvastatin (CRESTOR) 10 mg tablet Take 10 mg by mouth nightly. Provider, Historical 2/1/2021 PM Active Yes   vit C/E/Zn/coppr/lutein/zeaxan (PRESERVISION AREDS 2 PO) Take 1 Cap by mouth two (2) times a day.  Provider, Historical 2/2/2021 AM Active Yes                  Lea Mclain, PHARMD   Contact: [phone]

## 2021-02-03 NOTE — PROCEDURES
BRIEF PROCEDURE NOTE    Date of Procedure: 2/3/2021   Preoperative Diagnosis: Dyspnea/UA  Postoperative Diagnosis: Sig 1 V dz - MER to Distal RCA  Procedure: Left heart cath, LV angiography, coronary angiography  Interventional Cardiologist: Meredith Fowler MD  Assistant : none  Anesthesia: local + IV sedation  Estimated Blood Loss: Minimal  Findings:     R CFA access - ultrasound/fluroscopic/micropuncture access 6 F sheath    L Main: Med ; Nml    LAD: Ca+; Med; Tortuous; Mid - haziness ( at D1) - prob ca+; D1 - small to med; MLI; OLEGARIO 3 flow    LCflex: Med; MLI; OM1 - MLI    RCA: Dominant; Mid 50%; Distal 80% ; PLB - prox 80%; PDA - MLI    LVEDP: 14 mm Hg    LVEF: Not assessed    No significant gradient across aortic valve. PCI: AR1 guide; Distal RCA 80%    Pre dil with 2 by 12  MER 2.75 by 18  OLEGARIO 3 before and after    Pt had EKG changes ( ST depression) on monitor during stent placement - no CP; Mid lesion does not appear significant after distal stent deployment      Specimens Removed : None    Complications: None    Closure Device: R CFA - mynx        See full cath note.     Complications: none    Meredith Fowler MD

## 2021-02-03 NOTE — NURSE NAVIGATOR
Chart reviewed by Heart Failure Nurse Navigator. Heart Failure database completed. EF:  60 to 65% with mild grade 1 diastolic dysfunction, moderately dilated LA. ACEi/ARB/ARNi: Losartan 100 mg daily. BB: Contraindicated due to bradycardia. Aldosterone Antagonist: **    Obstructive Sleep Apnea Screening:  LA with home CPAP. CRT Not indicated. NYHA Functional Class **. Heart Failure Teach Back in Patient Education. Heart Failure Avoiding Triggers on Discharge Instructions. Cardiologist: Dr. Carlton Matamoros discharge follow up phone call to be made within 48-72 hours of discharge.

## 2021-02-03 NOTE — WOUND CARE
New Consult for left leg wounds. 3.01 Patient laying on [place] foam mattress. Patient awake alert orientedx4. Pleasant lady , good historian.stated goes to wound care in [place] every week and has [facility] 2x/week for wound care. wound care for left lower medial wound is endoform and foam dressing. Patient has requested this be continued. Patient sees a podiatrist and dermotologist as well. OOB in chair and able to move to bed with assist.continient. Patient states she scratches her wounds and reopens them. Multiple scabbbed area on left lower leg Amberly Dye All skin folds and bony prominences assessed Bilateral heels,boggy, blanchable redness. Buttocks and sacral skin intact and without erythema. Heels offloaded with pillows. Palpable DP pulses bilaterally . Generalized edema to lower legs bilaterally. POA left lower medial partial thickness healed and reopened =1.4x.3x0.1cm slight red bloody drainage. Blanchable erythema  Cleansed with NSS,  duoderm applied to wound. POA left lateral LE full thickness 1.6x1.0x0.3 pink wound bed 60% with 40% slough. Granulation noted. Small amount of Serous yellow drainage on dressing. Blanchable erythema. Cleansed with NSS. By patient request used patient products of endoform and foam dressing. Discussed with pateint will reevaluate on Monday. POA left lateral lower leg distal to above wound, 0.2x0.2x 100% slough. Slight serous sanguinous drainage blanchable erythema. cleansed with NSS and covered with foam dressing. POA right 5th toe blanchable redness tender to touch. patient states due to shoes. POA right foot plantar area of calus that had been previously shaved by podiatrist per patient. Area tender and blanchable red. POA right big toe blanchable redness. Patient stated old blood blister that podiatrist treated. POA left big toe dry skin and peeling, patient stated old blister. On left lower leg large area of blanchable redness. Patient stated had dermatitis which was treatedwith steroid cream. Skin dry and itchy for patient. Moisturized with purple tube and petroleum jelly. Recommendations: 
Cleanse all wounds with NSS. Left lower medial wound apply hydrocolloid dressing and change every 5 days. Left lateral wounds will continue dressings per patient request cover with foam and reevaluate on Monday. Turn reposition approximately every 2 hours and offload heels with pillows at all times in bed. Minimize layers of linen/-pads under patient to optimize support surface. Discussed with  and RN [facility] [place] Transition of Care: Plan to follow weekly and as needed while admitted to hospital.

## 2021-02-04 VITALS
RESPIRATION RATE: 18 BRPM | HEIGHT: 63 IN | WEIGHT: 198 LBS | HEART RATE: 103 BPM | DIASTOLIC BLOOD PRESSURE: 85 MMHG | OXYGEN SATURATION: 95 % | SYSTOLIC BLOOD PRESSURE: 170 MMHG | TEMPERATURE: 98.1 F | BODY MASS INDEX: 35.08 KG/M2

## 2021-02-04 LAB
ATRIAL RATE: 94 BPM
CALCULATED P AXIS, ECG09: 51 DEGREES
CALCULATED R AXIS, ECG10: -51 DEGREES
CALCULATED T AXIS, ECG11: 14 DEGREES
DIAGNOSIS, 93000: NORMAL
P-R INTERVAL, ECG05: 172 MS
Q-T INTERVAL, ECG07: 382 MS
QRS DURATION, ECG06: 130 MS
QTC CALCULATION (BEZET), ECG08: 477 MS
VENTRICULAR RATE, ECG03: 94 BPM

## 2021-02-04 PROCEDURE — 96372 THER/PROPH/DIAG INJ SC/IM: CPT

## 2021-02-04 PROCEDURE — 97530 THERAPEUTIC ACTIVITIES: CPT

## 2021-02-04 PROCEDURE — 97116 GAIT TRAINING THERAPY: CPT

## 2021-02-04 PROCEDURE — 97165 OT EVAL LOW COMPLEX 30 MIN: CPT

## 2021-02-04 PROCEDURE — 74011250637 HC RX REV CODE- 250/637: Performed by: INTERNAL MEDICINE

## 2021-02-04 PROCEDURE — 74011250637 HC RX REV CODE- 250/637: Performed by: NURSE PRACTITIONER

## 2021-02-04 PROCEDURE — 97535 SELF CARE MNGMENT TRAINING: CPT

## 2021-02-04 PROCEDURE — 74011000250 HC RX REV CODE- 250: Performed by: INTERNAL MEDICINE

## 2021-02-04 PROCEDURE — 99218 HC RM OBSERVATION: CPT

## 2021-02-04 PROCEDURE — 96376 TX/PRO/DX INJ SAME DRUG ADON: CPT

## 2021-02-04 PROCEDURE — 74011250636 HC RX REV CODE- 250/636: Performed by: INTERNAL MEDICINE

## 2021-02-04 RX ORDER — GUAIFENESIN 100 MG/5ML
81 LIQUID (ML) ORAL DAILY
Status: DISCONTINUED | OUTPATIENT
Start: 2021-02-04 | End: 2021-02-04 | Stop reason: HOSPADM

## 2021-02-04 RX ORDER — GUAIFENESIN 100 MG/5ML
81 LIQUID (ML) ORAL DAILY
Status: SHIPPED | COMMUNITY
Start: 2021-02-04

## 2021-02-04 RX ORDER — CLONIDINE HYDROCHLORIDE 0.1 MG/1
0.1 TABLET ORAL
Qty: 270 TAB | Refills: 3 | Status: SHIPPED | OUTPATIENT
Start: 2021-02-04 | End: 2022-03-10 | Stop reason: SDUPTHER

## 2021-02-04 RX ORDER — ROSUVASTATIN CALCIUM 10 MG/1
20 TABLET, COATED ORAL
Status: DISCONTINUED | OUTPATIENT
Start: 2021-02-04 | End: 2021-02-04 | Stop reason: HOSPADM

## 2021-02-04 RX ORDER — CLOPIDOGREL BISULFATE 75 MG/1
75 TABLET ORAL DAILY
Qty: 30 TAB | Refills: 0 | Status: SHIPPED | OUTPATIENT
Start: 2021-02-04 | End: 2021-02-11 | Stop reason: SDUPTHER

## 2021-02-04 RX ORDER — ROSUVASTATIN CALCIUM 20 MG/1
10 TABLET, COATED ORAL
Qty: 30 TAB | Refills: 1 | Status: SHIPPED | OUTPATIENT
Start: 2021-02-04 | End: 2021-03-04 | Stop reason: SDUPTHER

## 2021-02-04 RX ORDER — ISOSORBIDE MONONITRATE 30 MG/1
30 TABLET, EXTENDED RELEASE ORAL DAILY
Qty: 30 TAB | Refills: 0 | Status: SHIPPED | OUTPATIENT
Start: 2021-02-04 | End: 2021-02-11 | Stop reason: SDUPTHER

## 2021-02-04 RX ORDER — BUMETANIDE 1 MG/1
1 TABLET ORAL DAILY
Qty: 30 TAB | Refills: 1 | Status: SHIPPED | OUTPATIENT
Start: 2021-02-04 | End: 2021-03-04 | Stop reason: SDUPTHER

## 2021-02-04 RX ADMIN — BUMETANIDE 0.5 MG: 0.25 INJECTION INTRAMUSCULAR; INTRAVENOUS at 08:25

## 2021-02-04 RX ADMIN — Medication 10 ML: at 08:28

## 2021-02-04 RX ADMIN — LOSARTAN POTASSIUM 100 MG: 50 TABLET, FILM COATED ORAL at 00:43

## 2021-02-04 RX ADMIN — ENOXAPARIN SODIUM 40 MG: 40 INJECTION SUBCUTANEOUS at 08:26

## 2021-02-04 RX ADMIN — ASPIRIN 81 MG: 81 TABLET, CHEWABLE ORAL at 10:26

## 2021-02-04 RX ADMIN — PANTOPRAZOLE SODIUM 40 MG: 40 TABLET, DELAYED RELEASE ORAL at 07:26

## 2021-02-04 RX ADMIN — ISOSORBIDE MONONITRATE 30 MG: 30 TABLET, EXTENDED RELEASE ORAL at 08:27

## 2021-02-04 RX ADMIN — CLOPIDOGREL BISULFATE 75 MG: 75 TABLET ORAL at 08:28

## 2021-02-04 RX ADMIN — CLONIDINE HYDROCHLORIDE 0.1 MG: 0.1 TABLET ORAL at 08:27

## 2021-02-04 RX ADMIN — HYDRALAZINE HYDROCHLORIDE 25 MG: 25 TABLET, FILM COATED ORAL at 08:27

## 2021-02-04 RX ADMIN — LEVOTHYROXINE SODIUM 100 MCG: 0.1 TABLET ORAL at 07:26

## 2021-02-04 RX ADMIN — DILTIAZEM HYDROCHLORIDE 30 MG: 30 TABLET, FILM COATED ORAL at 08:27

## 2021-02-04 NOTE — DISCHARGE INSTRUCTIONS
Patient Discharge Instructions    Lucita Leyva / [id number] : 1935    Admitted 2021 Discharged: 2021     Primary Diagnoses  Problem List as of 2021 Date Reviewed: 2020           RBBB (right bundle branch block)   Spinal stenosis   Bradycardia   Hyperkalemia   Exertional dyspnea   * (Principal) Diastolic CHF, acute on chronic (HCC)   Hyponatremia   CHRISTIAN (dyspnea on exertion)   Obesity   Hypothyroid   GERD (gastroesophageal reflux disease)   Arthritis   Neuropathy   HTN (hypertension)   Diabetes mellitus (HCC)   Asthma   LA on CPAP   Dyslipidemia          Take Home Medications     · It is important that you take the medication exactly as they are prescribed. · Keep your medication in the bottles provided by the pharmacist and keep a list of the medication names, dosages, and times to be taken in your wallet. · Do not take other medications without consulting your doctor. What to do at Home    Recommended diet: Cardiac Diet and Low fat, Low cholesterol    Recommended activity: Activity as tolerated    If you experience worse symptoms, please follow up with cardiology. Follow-up with your PCP in a few weeks        Information obtained by :  I understand that if any problems occur once I am at home I am to contact my physician. I understand and acknowledge receipt of the instructions indicated above.                                                                                                                                            Physician's or R.N.'s Signature                                                                  Date/Time                                                                                                                                              Patient or Representative Signature                                                          Date/Time     Avoiding Triggers With Heart Failure: Care Instructions  Your Care Instructions Triggers are anything that make your heart failure flare up. A flare-up is also called \"sudden heart failure\" or \"acute heart failure. \" When you have a flare-up, fluid builds up in your lungs, and you have problems breathing. You might need to go to the hospital. By watching for changes in your condition and avoiding triggers, you can prevent heart failure flare-ups. Follow-up care is a key part of your treatment and safety. Be sure to make and go to all appointments, and call your doctor if you are having problems. It's also a good idea to know your test results and keep a list of the medicines you take. How can you care for yourself at home? Watch for changes in your weight and condition  · Weigh yourself without clothing at the same time each day. Record your weight. Call your doctor if you have sudden weight gain, such as more than 2 to 3 pounds in a day or 5 pounds in a week. (Your doctor may suggest a different range of weight gain.) A sudden weight gain may mean that your heart failure is getting worse. · Keep a daily record of your symptoms. Write down any changes in how you feel, such as new shortness of breath, cough, or problems eating. Also record if your ankles are more swollen than usual and if you feel more tired than usual. Note anything that you ate or did that could have triggered these changes. Limit sodium  Sodium causes your body to hold on to extra water. This may cause your heart failure symptoms to get worse. People get most of their sodium from processed foods. Fast food and restaurant meals also tend to be very high in sodium. · Your doctor may suggest that you limit sodium. Your doctor can tell you how much sodium is right for you. This includes limiting sodium in cooked and packaged foods. · Read food labels on cans and food packages. They tell you how much sodium you get in one serving. Check the serving size. If you eat more than one serving, you are getting more sodium.   · Be aware that sodium can come in forms other than salt, including monosodium glutamate (MSG), sodium citrate, and sodium bicarbonate (baking soda). MSG is often added to Asian food. You can sometimes ask for food without MSG or salt. · Slowly reducing salt will help you adjust to the taste. Take the salt shaker off the table. · Flavor your food with garlic, lemon juice, onion, vinegar, herbs, and spices instead of salt. Do not use soy sauce, steak sauce, onion salt, garlic salt, mustard, or ketchup on your food, unless it is labeled \"low-sodium\" or \"low-salt. \"  · Make your own salad dressings, sauces, and ketchup without adding salt. · Use fresh or frozen ingredients, instead of canned ones, whenever you can. Choose low-sodium canned goods. · Eat less processed food and food from restaurants, including fast food. Exercise as directed  Moderate, regular exercise is very good for your heart. It improves your blood flow and helps control your weight. But too much exercise can stress your heart and cause a heart failure flare-up. · Check with your doctor before you start an exercise program.  · Walking is an easy way to get exercise. Start out slowly. Gradually increase the length and pace of your walk. Swimming, riding a bike, and using a treadmill are also good forms of exercise. · When you exercise, watch for signs that your heart is working too hard. You are pushing yourself too hard if you cannot talk while you are exercising. If you become short of breath or dizzy or have chest pain, stop, sit down, and rest.  · Do not exercise when you do not feel well. Take medicines correctly  · Take your medicines exactly as prescribed. Call your doctor if you think you are having a problem with your medicine. · Make a list of all the medicines you take. Include those prescribed to you by other doctors and any over-the-counter medicines, vitamins, or supplements you take.  Take this list with you when you go to any doctor. · Take your medicines at the same time every day. It may help you to post a list of all the medicines you take every day and what time of day you take them. · Make taking your medicine as simple as you can. Plan times to take your medicines when you are doing other things, such as eating a meal or getting ready for bed. This will make it easier to remember to take your medicines. · Get organized. Use helpful tools, such as daily or weekly pill containers. When should you call for help? Call 911 if you have symptoms of sudden heart failure such as:    · You have severe trouble breathing.     · You cough up pink, foamy mucus.     · You have a new irregular or rapid heartbeat. Call your doctor now or seek immediate medical care if:    · You have new or increased shortness of breath.     · You are dizzy or lightheaded, or you feel like you may faint.     · You have sudden weight gain, such as more than 2 to 3 pounds in a day or 5 pounds in a week. (Your doctor may suggest a different range of weight gain.)     · You have increased swelling in your legs, ankles, or feet.     · You are suddenly so tired or weak that you cannot do your usual activities. Watch closely for changes in your health, and be sure to contact your doctor if you develop new symptoms. Where can you learn more? Go to http://www.gray.com/  Enter V089 in the search box to learn more about \"Avoiding Triggers With Heart Failure: Care Instructions. \"  Current as of: December 16, 2019               Content Version: 12.6  © 7298-6419 [employer]. Care instructions adapted under license by [employer] (which disclaims liability or warranty for this information). If you have questions about a medical condition or this instruction, always ask your healthcare professional. [place] [address] any warranty or liability for your use of this information.          Patient Discharge Instructions    Lucita Leyva / [id number] : 1935    Admitted 2021 Discharged: 2021           Cardiac Catheterization  Discharge Instructions     Do not drive, operate any machinery, or sign any legal documents for 24 hours after your procedure. You must have someone to drive you home.  You may take a shower 24 hours after your cardiac catheterization. Be sure to get the dressing wet and then remove it; gently wash the area with warm soapy water. Pat dry and leave open to air. To help prevent infections, be sure to keep the cath site clean and dry. No lotions, creams, powders, ointments, etc. in the cath site for approximately 1 week.  Do not take a tub bath, get in a hot tub or swimming pool for approximately 5 days or until the cath site is completely healed.  No strenuous activity or heavy lifting over 10 lbs. for 7 days.  Drink plenty of fluids for 24-48 hours after your cath to flush the contrast dye from your kidneys. No alcoholic beverages for 24 hours. You may resume your previous diet (low fat, low cholesterol) after your cath.  After your cath, some bruising or discomfort is common during the healing process. Tylenol, 1-2 tablets every 6 hours as needed, is recommended if you experience any discomfort. If you experience any signs or symptoms of infection such as fever, chills, or poorly healing incision, persistent tenderness or swelling in the groin, redness and/or warmth to the touch, numbness, significant tingling or pain at the groin site or affected extremity, rash, drainage from the cath site, or if the leg feels tight or swollen, call your physician right away.  If bleeding at the cath site occurs, take a clean gauze pad and apply direct pressure to the groin just above the puncture site. Call 911 immediately, and continue to apply direct pressure until an ambulance gets to your location.         Information obtained by :  I understand that if any problems occur once I am at home I am to contact my physician. I understand and acknowledge receipt of the instructions indicated above.                                                                                                                                            R.N.'s Signature                                                                  Date/Time                                                                                                                                              Patient or Representative Signature                                                          Date/Time

## 2021-02-04 NOTE — PROGRESS NOTES
[facility]    Medical Progress Note      NAME: Marysol Mckeon   :  1935  MRM:  [id number]    Date/Time of service 2021  7:10 AM          Assessment and Plan:     CAD / Exertional dyspnea - POA, unclear etiology, worsening for 2 months. Not hypoxic. No xray changes. CAD found on cath, and also dyspnea may be due to bradycardia, vs general decondition due to age/obesity/pain etc.  Consulted cardiology. They did cath and placed a stent in RCA. they have cleared her for DC this AM    Bradycardia / RBBB (right bundle branch block) - POA, rate now better after holding her diltiazem. Cardiology to comment. I cut back nocturnal clonidine dose    Diastolic CHF, chronic / HTN (hypertension) - I am not sure she has acute CHF failure. No hypoxia, no xray changes. Check ECHO. Cardiology consulted. Na and fluid restriction. Continue hydralazine, losartan and bumex    LA on CPAP / Obesity - Advise weigh loss. Continue qhs CPAP    Asthma - I do not thingk she has an exacerbation. She is on singulair. Dyslipidemia - continue rosuvastatin    Hypothyroid - Continue synthroid. TSH normal    GERD (gastroesophageal reflux disease) - PPI    Arthritis / Spinal stenosis / Neuropathy - Continue gabapentin, Tylenol prn    Hyperkalemia - POA and now resolved. Diabetes mellitus without complications - Dx in chart, but I reject it. Normal BG. Not on meds. Subjective:     Chief Complaint:  CHRISTIAN improved. ROS:  (bold if positive, if negative)    DOETolerating some PT  Tolerating Diet        Objective:     Last 24hrs VS reviewed since prior progress note.  Most recent are:    Visit Vitals  BP (!) 170/85 (BP 1 Location: Left upper arm, BP Patient Position: At rest)   Pulse (!) 103   Temp 98.1 °F (36.7 °C)   Resp 18   Ht 5' 3\" (1.6 m)   Wt 89.8 kg (198 lb)   SpO2 95%   BMI 35.07 kg/m²     SpO2 Readings from Last 6 Encounters:   21 95%   21 96%   21 97%   20 99% 08/31/20 97%   07/27/20 98%            Intake/Output Summary (Last 24 hours) at 2/4/2021 1129  Last data filed at 2/4/2021 0103  Gross per 24 hour   Intake    Output 400 ml   Net -400 ml        Physical Exam:    Gen:  Obese, in no acute distress  HEENT:  Pink conjunctivae, PERRL, hearing intact to voice, moist mucous membranes  Neck:  Supple, without masses, thyroid non-tender  Resp:  No accessory muscle use, clear breath sounds without wheezes rales or rhonchi  Card:  No murmurs, tachycardic S1, S2 without thrills, bruits or peripheral edema  Abd:  Soft, non-tender, non-distended, normoactive bowel sounds are present, no mass  Lymph:  No cervical or inguinal adenopathy  Musc:  No cyanosis or clubbing  Skin:  No rashes or ulcers, skin turgor is good  Neuro:  Cranial nerves are grossly intact, general motor weakness, follows commands appropriately  Psych:  Good insight, oriented to person, place and time, alert    Telemetry reviewed:   block  __________________________________________________________________  Medications Reviewed: (see below)  Medications:     Current Facility-Administered Medications   Medication Dose Route Frequency    rosuvastatin (CRESTOR) tablet 20 mg  20 mg Oral QHS    aspirin chewable tablet 81 mg  81 mg Oral DAILY    sodium chloride (NS) flush 5-40 mL  5-40 mL IntraVENous Q8H    sodium chloride (NS) flush 5-40 mL  5-40 mL IntraVENous PRN    acetaminophen (TYLENOL) tablet 650 mg  650 mg Oral Q6H PRN    Or    acetaminophen (TYLENOL) suppository 650 mg  650 mg Rectal Q6H PRN    polyethylene glycol (MIRALAX) packet 17 g  17 g Oral DAILY PRN    promethazine (PHENERGAN) tablet 12.5 mg  12.5 mg Oral Q6H PRN    Or    ondansetron (ZOFRAN) injection 4 mg  4 mg IntraVENous Q6H PRN    enoxaparin (LOVENOX) injection 40 mg  40 mg SubCUTAneous DAILY    gabapentin (NEURONTIN) capsule 400 mg  400 mg Oral QHS    levothyroxine (SYNTHROID) tablet 100 mcg  100 mcg Oral ACB    losartan (COZAAR) tablet 100 mg  100 mg Oral QHS    montelukast (SINGULAIR) tablet 10 mg  10 mg Oral QHS    pantoprazole (PROTONIX) tablet 40 mg  40 mg Oral ACB    sodium chloride (NS) flush 5-40 mL  5-40 mL IntraVENous Q8H    sodium chloride (NS) flush 5-40 mL  5-40 mL IntraVENous PRN    clopidogreL (PLAVIX) tablet 75 mg  75 mg Oral DAILY    hydrALAZINE (APRESOLINE) 20 mg/mL injection 10 mg  10 mg IntraVENous Q4H PRN    isosorbide mononitrate ER (IMDUR) tablet 30 mg  30 mg Oral DAILY    hydrALAZINE (APRESOLINE) tablet 25 mg  25 mg Oral TID    cloNIDine HCL (CATAPRES) tablet 0.1 mg  0.1 mg Oral BID    bumetanide (BUMEX) injection 0.5 mg  0.5 mg IntraVENous Q12H        Lab Data Reviewed: (see below)  Lab Review:     Recent Labs     02/03/21  0402 02/02/21  1432   WBC 7.4 9.2   HGB 9.9* 11.2*   HCT 31.9* 36.0    248     Recent Labs     02/03/21  0402 02/02/21  1432 02/02/21  1200    141 140   K 4.4 5.4* 5.2*   * 113* 113*   CO2 20* 21 21   GLU 92 95 132*   BUN 30* 37* 39*   CREA 1.08* 1.29* 1.35*   CA 8.5 8.7 9.3   MG  --  2.2  --    ALB  --  3.6  --    TBILI  --  0.2  --    ALT  --  26  --      Lab Results   Component Value Date/Time    Glucose (POC) 116 (H) 07/22/2020 11:22 AM    Glucose (POC) 110 (H) 07/22/2020 06:38 AM    Glucose (POC) 118 (H) 07/21/2020 09:16 PM    Glucose (POC) 112 (H) 07/21/2020 04:44 PM    Glucose (POC) 107 (H) 07/21/2020 11:06 AM     No results for input(s): PH, PCO2, PO2, HCO3, FIO2 in the last 72 hours. No results for input(s): INR, INREXT, INREXT in the last 72 hours. All Micro Results     None          Other pertinent lab: none    Total time spent with patient: 39 Minutes I personally reviewed chart, notes, data and current medications in the medical record. I have personally examined and treated the patient at bedside during this period.                  Care Plan discussed with: Patient, Care Manager, Nursing Staff, Consultant/Specialist and >50% of time spent in counseling and coordination of care    Discussed:  Care Plan and D/C Planning    Prophylaxis:  Lovenox and H2B/PPI    Disposition:  Home w/Family           ___________________________________________________    Attending Physician: Jodie Charles MD

## 2021-02-04 NOTE — DISCHARGE SUMMARY
Physician Discharge Summary     Patient ID:  Anjel Tierney  [id number]  80 y.o.  1935    Admit date: 2/2/2021    Discharge date of service and time: 2/4/2021    Admission Diagnoses: Bradycardia [R00.1]  Exertional dyspnea [R06.00]    Discharge Diagnoses:    Principal Diagnosis     Coronary artery disease                                             Hospital Course and other diagnoses  CAD / Exertional dyspnea - POA, unclear etiology, worsening for 2 months. Not hypoxic. No xray changes. CAD found on cath, and also dyspnea may be due to bradycardia, vs general decondition due to age/obesity/pain etc.  Consulted cardiology. They did cath and placed a stent in RCA. they have cleared her for DC this AM     Bradycardia / RBBB (right bundle branch block) - POA, rate now better after holding her diltiazem. Cardiology to comment. I cut back nocturnal clonidine dose     Diastolic CHF, chronic / HTN (hypertension) - I am not sure she has acute CHF failure. No hypoxia, no xray changes. Check ECHO. Cardiology consulted. Na and fluid restriction. Continue hydralazine, losartan and bumex     LA on CPAP / Obesity - Advise weigh loss. Continue qhs CPAP     Asthma - I do not thingk she has an exacerbation. She is on singulair.     Dyslipidemia - continue rosuvastatin     Hypothyroid - Continue synthroid. TSH normal     GERD (gastroesophageal reflux disease) - PPI     Arthritis / Spinal stenosis / Neuropathy - Continue gabapentin, Tylenol prn     Hyperkalemia - POA and now resolved.     Diabetes mellitus without complications - Dx in chart, but I reject it. Normal BG. Not on meds.     PCP: Flaquita Nguyen MD    Consults: Cardiology    Significant Diagnostic Studies: See Hospital Course    Discharged home in improved condition.     Discharge Exam:  BP (!) 170/85 (BP 1 Location: Left upper arm, BP Patient Position: At rest)   Pulse (!) 103   Temp 98.1 °F (36.7 °C)   Resp 18   Ht 5' 3\" (1.6 m)   Wt 89.8 kg (198 lb)   SpO2 95%   BMI 35.07 kg/m²      Gen:  Obese, in no acute distress  HEENT:  Pink conjunctivae, PERRL, hearing intact to voice, moist mucous membranes  Neck:  Supple, without masses, thyroid non-tender  Resp:  No accessory muscle use, clear breath sounds without wheezes rales or rhonchi  Card:  No murmurs, tachycardic S1, S2 without thrills, bruits or peripheral edema  Abd:  Soft, non-tender, non-distended, normoactive bowel sounds are present, no mass  Lymph:  No cervical or inguinal adenopathy  Musc:  No cyanosis or clubbing  Skin:  No rashes or ulcers, skin turgor is good  Neuro:  Cranial nerves are grossly intact, general motor weakness, follows commands appropriately  Psych:  Good insight, oriented to person, place and time, alert    Patient Instructions:   Current Discharge Medication List      START taking these medications    Details   clopidogreL (PLAVIX) 75 mg tab Take 1 Tab by mouth daily for 30 days. Qty: 30 Tab, Refills: 0      isosorbide mononitrate ER (IMDUR) 30 mg tablet Take 1 Tab by mouth daily for 30 days. Qty: 30 Tab, Refills: 0      bumetanide (BUMEX) 1 mg tablet Take 1 Tab by mouth daily. Qty: 30 Tab, Refills: 1      aspirin 81 mg chewable tablet Take 1 Tab by mouth daily. Qty:           CONTINUE these medications which have CHANGED    Details   rosuvastatin (CRESTOR) 20 mg tablet Take 1 Tab by mouth nightly. Qty: 30 Tab, Refills: 1      cloNIDine HCL (CATAPRES) 0.1 mg tablet Take 1 Tab by mouth nightly. Qty: 270 Tab, Refills: 3         CONTINUE these medications which have NOT CHANGED    Details   cyanocobalamin 1,000 mcg tablet Take 1,000 mcg by mouth every Sunday. gabapentin (NEURONTIN) 400 mg capsule Take 1 Cap by mouth nightly. Max Daily Amount: 400 mg. Qty: 30 Cap, Refills: 0    Associated Diagnoses: Neuropathy      dilTIAZem ER (CARDIZEM CD) 240 mg capsule Take 1 Cap by mouth daily.   Qty: 30 Cap, Refills: 0      hydrALAZINE (APRESOLINE) 25 mg tablet Take 1 Tab by mouth two (2) times a day. Qty: 60 Tab, Refills: 1      calcium-cholecalciferol, D3, (CALTRATE 600+D) tablet Take 1 Tab by mouth two (2) times daily (with meals). cetirizine (ZyrTEC) 10 mg tablet Take 10 mg by mouth daily. vit C/E/Zn/coppr/lutein/zeaxan (PRESERVISION AREDS 2 PO) Take 1 Cap by mouth two (2) times a day. acetaminophen (TYLENOL EXTRA STRENGTH) 500 mg tablet Take 500-1,000 mg by mouth three (3) times daily as needed for Pain (Generally takes 2-3 times daily). losartan (COZAAR) 100 mg tablet Take 1 Tab by mouth nightly. Qty: 90 Tab, Refills: 3    Associated Diagnoses: Essential hypertension; Dyslipidemia      OTHER zyflamend 2 caps daily      levothyroxine (SYNTHROID) 100 mcg tablet Take 100 mcg by mouth Daily (before breakfast). omeprazole (PRILOSEC) 20 mg capsule Take 20 mg by mouth two (2) times a day. montelukast (SINGULAIR) 10 mg tablet Take 10 mg by mouth nightly. STOP taking these medications       spironolactone (ALDACTONE) 25 mg tablet Comments:   Reason for Stopping:         torsemide (DEMADEX) 10 mg tablet Comments:   Reason for Stopping:             Activity: Activity as tolerated  Diet: Cardiac Diet and Low fat, Low cholesterol  Wound Care: Keep wound clean and dry, Reinforce dressing PRN and As directed    Follow-up with your PCP and cardiology  in a few weeks.   Follow-up tests/labs - none    Signed:  Yola Burger MD  2/4/2021  11:34 AM

## 2021-02-04 NOTE — PROGRESS NOTES
Cardiology Progress Note         NAME:  Eunice Montejo   :   1935   MRN:   [id number]     Assessment/Plan:   1. CAD: Sig 1 V dz - MER to Distal RCA. Antiplt: plavix. No BB 2/2 bradycardia. Start imdur 30 mg today. Increase crestor to 20 mg every day. Pt states she is not allergic to ASA and has taken it before  2. HF p EF: bumex 1 mg po for discharge  3. HTN: stable with current adjustments  4. COPD:   5. KAREEN: resolved   6. LA: complaint with CPAP at home. Reviewed plan with pt and sister in law. Home today and will arrange VV for next week. Sees Dr Apurva Avilez 3/4 . Pt personally seen and examined. Chart reviewed. Agree with advanced NP's history, exam and  A/P with changes/additons. Visit Vitals  BP (!) 170/85 (BP 1 Location: Left upper arm, BP Patient Position: At rest)   Pulse (!) 103   Temp 98.1 °F (36.7 °C)   Resp 18   Ht 5' 3\" (1.6 m)   Wt 198 lb (89.8 kg)   SpO2 95%   BMI 35.07 kg/m²     Adjust BP meds  Close OP f/u. Has jakub scheduled    Lissette Rendon MD, [facility]      Subjective:   Admitted with progressive dyspnea . Dyspnea has improved since admission. Cardiac ROS: Patient denies any exertional chest pain, dyspnea, palpitations, syncope, orthopnea, edema or paroxysmal nocturnal dyspnea. Previous Cardiac Eval  21   ECHO ADULT COMPLETE 2021 2/3/2021    Narrative · LV: Estimated LVEF is 60 - 65%. Normal cavity size, wall thickness and   systolic function (ejection fraction normal). Mild (grade 1) left   ventricular diastolic dysfunction. · LA: Moderately dilated left atrium. · MV: Mitral valve thickening. Mitral valve leaflet calcification. Moderate mitral annular calcification. Mild mitral valve stenosis is   present. · TV: Mild tricuspid valve regurgitation is present. · PA: Mild pulmonary hypertension.         Signed by: Jose Alejandro Bey MD         Review of Systems: No nausea, indigestion, vomiting, pain, cough, sputum. No bleeding. Taking po. Appetite good. OOB with assist, walks with walker. Objective:     Visit Vitals  BP (!) 159/70 (BP 1 Location: Left upper arm, BP Patient Position: At rest)   Pulse 96   Temp 98.3 °F (36.8 °C)   Resp 16   Ht 5' 3\" (1.6 m)   Wt 89.8 kg (198 lb)   SpO2 94%   BMI 35.07 kg/m²      O2 Device: Room air    Temp (24hrs), Av.9 °F (37.2 °C), Min:98.3 °F (36.8 °C), Max:100 °F (37.8 °C)      No intake/output data recorded.  1901 -  0700  In: 360 [P.O.:360]  Out: 1400 [Urine:1400]  TELE: SB/SR    General: AAOx3 cooperative, no acute distress. HEENT: Atraumatic. Pink and moist.  Anicteric sclerae. Neck : Supple  Lungs: CTA bilaterally. Heart: Regular rhythm, no murmur, no rubs, no gallops. No JVD. No carotid bruits. Abdomen: Soft, non-distended, non-tender. + Bowel sounds. Extremities: R groin soft, no hematoma. Dsg D/I. No edema. Neurologic: Grossly intact. Alert and oriented X 3. No acute neurological distress. Psych: Good insight. Not anxious or agitated. Care Plan discussed with:    Comments   Patient x    Family  x Sister in law   RN x    Care Manager x                   Consultant:          Data Review:     No lab exists for component: ITNL   Recent Labs     21  1432   TROIQ <0.05     Recent Labs     21  0402 21  1432 21  1200    141 140   K 4.4 5.4* 5.2*   * 113* 113*   CO2 *    BUN 30* 37* 39*   CREA 1.08* 1.29* 1.35*   GLU 92 95 132*   MG  --  2.2  --    ALB  --  3.6  --    WBC 7.4 9.2  --    HGB 9.9* 11.2*  --    HCT 31.9* 36.0  --     248  --      No results for input(s): INR, PTP, APTT, INREXT in the last 72 hours.     Medications reviewed  Current Facility-Administered Medications   Medication Dose Route Frequency    sodium chloride (NS) flush 5-40 mL  5-40 mL IntraVENous Q8H    sodium chloride (NS) flush 5-40 mL  5-40 mL IntraVENous PRN    acetaminophen (TYLENOL) tablet 650 mg  650 mg Oral Q6H PRN    Or    acetaminophen (TYLENOL) suppository 650 mg  650 mg Rectal Q6H PRN    polyethylene glycol (MIRALAX) packet 17 g  17 g Oral DAILY PRN    promethazine (PHENERGAN) tablet 12.5 mg  12.5 mg Oral Q6H PRN    Or    ondansetron (ZOFRAN) injection 4 mg  4 mg IntraVENous Q6H PRN    enoxaparin (LOVENOX) injection 40 mg  40 mg SubCUTAneous DAILY    gabapentin (NEURONTIN) capsule 400 mg  400 mg Oral QHS    levothyroxine (SYNTHROID) tablet 100 mcg  100 mcg Oral ACB    losartan (COZAAR) tablet 100 mg  100 mg Oral QHS    montelukast (SINGULAIR) tablet 10 mg  10 mg Oral QHS    pantoprazole (PROTONIX) tablet 40 mg  40 mg Oral ACB    rosuvastatin (CRESTOR) tablet 10 mg  10 mg Oral QHS    sodium chloride (NS) flush 5-40 mL  5-40 mL IntraVENous Q8H    sodium chloride (NS) flush 5-40 mL  5-40 mL IntraVENous PRN    clopidogreL (PLAVIX) tablet 75 mg  75 mg Oral DAILY    dilTIAZem IR (CARDIZEM) tablet 30 mg  30 mg Oral TID    hydrALAZINE (APRESOLINE) 20 mg/mL injection 10 mg  10 mg IntraVENous Q4H PRN    isosorbide mononitrate ER (IMDUR) tablet 30 mg  30 mg Oral DAILY    hydrALAZINE (APRESOLINE) tablet 25 mg  25 mg Oral TID    cloNIDine HCL (CATAPRES) tablet 0.1 mg  0.1 mg Oral BID    bumetanide (BUMEX) injection 0.5 mg  0.5 mg IntraVENous Q12H         Melita Gold NP

## 2021-02-04 NOTE — PROGRESS NOTES
Problem: Mobility Impaired (Adult and Pediatric)  Goal: *Acute Goals and Plan of Care (Insert Text)  Description: FUNCTIONAL STATUS PRIOR TO ADMISSION: Patient was modified independent using a rolling walker for functional mobility. HOME SUPPORT PRIOR TO ADMISSION: The patient lived with  but did not require assist.    Physical Therapy Goals  Initiated 2/3/2021  1. Patient will move from supine to sit and sit to supine  in bed with modified independence within 7 day(s). 2.  Patient will transfer from bed to chair and chair to bed with modified independence using the least restrictive device within 7 day(s). 3.  Patient will perform sit to stand with modified independence within 7 day(s). 4.  Patient will ambulate with modified independence for 50 feet with the least restrictive device within 7 day(s). Note:   PHYSICAL THERAPY TREATMENT  Patient: Paula Chaparro (27 y.o. female)  Date: 2/4/2021  Diagnosis: Bradycardia [R00.1]  Exertional dyspnea [L04.99] Diastolic CHF, acute on chronic (HCC)  Procedure(s) (LRB):  LEFT HEART CATH / CORONARY ANGIOGRAPHY (N/A)  Ultrasound Guided Vascular Access (N/A)  Insert Stent Misael Coronary (N/A) 1 Day Post-Op  Precautions:    Chart, physical therapy assessment, plan of care and goals were reviewed. ASSESSMENT  Patient continues with skilled PT services and is progressing towards goals. Current Level of Function Impacting Discharge (mobility/balance): Pt is CGA to min assist supine to sit. Pt CGA sit to stand. Pt ambulated 50ft with RW CGA. Pts SPO2 was 94 to 97% on RA with mobility. Pt progressing well with mobility. Pt is cleared for  D/C by PT. PLAN :  Patient continues to benefit from skilled intervention to address the above impairments. Continue treatment per established plan of care. to address goals.     Recommendation for discharge: (in order for the patient to meet his/her long term goals)  Physical therapy at least 2 days/week in the home     This discharge recommendation:  Has been made in collaboration with the attending provider and/or case management    IF patient discharges home will need the following DME: rolling walker       SUBJECTIVE:       OBJECTIVE DATA SUMMARY:   Critical Behavior:  Neurologic State: Alert  Orientation Level: Oriented X4  Cognition: Appropriate decision making     Functional Mobility Training:  Bed Mobility:     Supine to Sit: Contact guard assistance;Minimum assistance  Sit to Supine: Contact guard assistance  Scooting: Stand-by assistance        Transfers:  Sit to Stand: Contact guard assistance  Stand to Sit: Contact guard assistance                             Balance:     Ambulation/Gait Training:  Distance (ft): 50 Feet (ft)  Assistive Device: Gait belt;Walker, rolling  Ambulation - Level of Assistance: Contact guard assistance        Gait Abnormalities: Trunk sway increased        Base of Support: Narrowed     Speed/Angie: Pace decreased (<100 feet/min)  Step Length: Left shortened;Right shortened           *    Activity Tolerance:   Good    After treatment patient left in no apparent distress:   Sitting in chair    COMMUNICATION/COLLABORATION:   The patients plan of care was discussed with: Physical therapist.     Bre Dunlap PTA   Time Calculation: 23 mins

## 2021-02-04 NOTE — PROGRESS NOTES
Bedside and Verbal shift change report given to Vernel Libman, RN (oncoming nurse) by Niesha Bland RN (offgoing nurse). Report included the following information SBAR, Kardex, ED Summary and Recent Results     2045 - Assisted patient to [facility] and back to bed. Cath site remains CDI. No bleeding or hematoma noted. 2125 - Received call from telemetry stating patient having periods of HR dropping to 40s-50s. Patient states this is not a new issue and has been dropping to as low as 47 at home. Bedside and Verbal shift change report given to Mendel Kayser, RN (oncoming nurse) by Vernel Libman, RN (offgoing nurse). Report included the following information SBAR, Kardex, ED Summary and Recent Results.

## 2021-02-04 NOTE — PROGRESS NOTES
Hospital follow-up PCP transitional care appointment has been scheduled with Dr. Pedro Wood for Monday, 2/8/21 at 2:45 p.m. Pending patient discharge.   Brody Damian, Care Management Specialist.

## 2021-02-04 NOTE — PROGRESS NOTES
OCCUPATIONAL THERAPY EVALUATION/DISCHARGE  Patient: Jeremiah Westfall (88 y.o. female)  Date: 2/4/2021  Primary Diagnosis: Bradycardia [R00.1]  Exertional dyspnea [R06.00]  Procedure(s) (LRB):  LEFT HEART CATH / CORONARY ANGIOGRAPHY (N/A)  Ultrasound Guided Vascular Access (N/A)  Insert Stent Misael Coronary (N/A) 1 Day Post-Op   Precautions: fall       ASSESSMENT  Based on the objective data described below, the patient presents with near return to [facility] for ADL tasks and related mobility. Patient reports additional time required for task completion, rest breaks required at baseline, though somewhat increased at this time. Patient demonstrates good safety awareness and insight into deficits. Current Level of Function (ADLs/self-care): stand by assist/set up generally     Functional Outcome Measure: The patient scored 65/100 on the Barthel Index outcome measure. Poor scoring on stair portion of assessment however patient does not ambulate stairs at baseline    Other factors to consider for discharge: family assistance is available     PLAN :  Recommend with staff: ADL participation, ambulation to bathroom      Recommendation for discharge: (in order for the patient to meet his/her long term goals)  No skilled occupational therapy/ follow up rehabilitation needs identified at this time. This discharge recommendation:  Has been made in collaboration with the attending provider and/or case management    IF patient discharges home will need the following DME: patient owns DME required for discharge       SUBJECTIVE:   Patient stated I feel so much better.     OBJECTIVE DATA SUMMARY:   HISTORY:   Past Medical History:   Diagnosis Date    Arthritis     Asthma     [facility]     Diabetes mellitus [facility]     Diverticulosis     Dyslipidemia     GERD (gastroesophageal reflux disease)     Hiatal hernia     HTN (hypertension)     Hypothyroid     Neuropathy     Obesity     LA on CPAP     RBBB (right bundle branch block)     Spinal stenosis      Past Surgical History:   Procedure Laterality Date    HX BUNIONECTOMY      HX CHOLECYSTECTOMY      HX HEART CATHETERIZATION      Cardiac Cath 9/3/2010 showed mild CAD.  HX HIP REPLACEMENT      HX ORTHOPAEDIC      HX OTHER SURGICAL      Echo 10/5/12 - LVEF 60-65%, mod-marked LAE, MAC, grade 1 diastolic dysfunction     HX OTHER SURGICAL      Lexiscan cardiolite 1/25/13 - normal MPI, LVEF 74%    HX OTHER SURGICAL      Aorta duplex 1/6/14 - aorta size 1.7 cm, no dilation        Prior Level of Function/Environment/Context: Patient lives with spouse, family nearby for additional assistance. Mod I PLOF using walker for all mobility   Expanded or extensive additional review of patient history:   Home Situation  Home Environment: Private residence  Wheelchair Ramp: Yes  One/Two Story Residence: Two story, live on 1st floor  Living Alone: No  Support Systems: Spouse/Significant Other/Partner, Family member(s)  Patient Expects to be Discharged to[de-identified] Private residence  Current DME Used/Available at Home: Dimitri Moros, rollator, Walker, rolling, Cane, straight(transport chair)  Tub or Shower Type: Tub/Shower combination    Hand dominance: Right    EXAMINATION OF PERFORMANCE DEFICITS:  Cognitive/Behavioral Status:  Neurologic State: Alert  Orientation Level: Oriented X4  Cognition: Appropriate decision making         Hearing: Auditory  Auditory Impairment: None    Vision/Perceptual:            glasses                         Range of Motion:  WFL              Strength:  Generally decreased, WFL         Coordination:     Fine Motor Skills-Upper: Left Intact; Right Intact    Gross Motor Skills-Upper: Left Intact; Right Intact    Tone & Sensation:  Neuropathy in BUE hand, BLE feet           Balance:   sitting balance intact  Static standing balance good, with support of walker  Dynamic standing balance fair with support of walker      Functional Mobility and Transfers for ADLs:  Bed Mobility:  Supine to Sit: Contact guard assistance  Sit to Supine: Contact guard assistance  Scooting: Stand-by assistance    Transfers:  Sit to Stand: Stand-by assistance  Stand to Sit: Stand-by assistance  Bathroom Mobility: Stand-by assistance  Toilet Transfer : Stand-by assistance    ADL Assessment:  Feeding: Independent    Oral Facial Hygiene/Grooming: Supervision    Bathing: Setup    Upper Body Dressing: Setup    Lower Body Dressing: Setup    Toileting: Stand by assistance                ADL Intervention and task modifications:     Patient educated on DME options including tub transfer bench and possible use of bed rail if needed. Patient educated on energy conservation, compensatory strategies for ADL/IADL completion, fall risk reduction strategies- verbalized good understanding with tech back        Functional Measure:  Barthel Index:    Bathin  Bladder: 10  Bowels: 10  Groomin  Dressin  Feeding: 10  Mobility: 10  Stairs: 0  Toilet Use: 5  Transfer (Bed to Chair and Back): 10  Total: 65/100        The Barthel ADL Index: Guidelines  1. The index should be used as a record of what a patient does, not as a record of what a patient could do. 2. The main aim is to establish degree of independence from any help, physical or verbal, however minor and for whatever reason. 3. The need for supervision renders the patient not independent. 4. A patient's performance should be established using the best available evidence. Asking the patient, friends/relatives and nurses are the usual sources, but direct observation and common sense are also important. However direct testing is not needed. 5. Usually the patient's performance over the preceding 24-48 hours is important, but occasionally longer periods will be relevant. 6. Middle categories imply that the patient supplies over 50 per cent of the effort. 7. Use of aids to be independent is allowed. Marla New., Barthel, D.W. (0398).  Functional evaluation: the Barthel Index. [facility] (14)2. CECY Kathleen, Catie Olivas., Threasa Hammans., Dianne, [address] (1999). Measuring the change indisability after inpatient rehabilitation; comparison of the responsiveness of the Barthel Index and Functional [place] Measure. Journal of Neurology, Neurosurgery, and Psychiatry, 66(4), 492-287. RIKA Evans, SUDARSHAN Bates, & Dayan Chow M.A. (2004.) Assessment of post-stroke quality of life in cost-effectiveness studies: The usefulness of the Barthel Index and the EuroQoL-5D. Quality of Life Research, 15, 358-78         Occupational Therapy Evaluation Charge Determination   History Examination Decision-Making   LOW Complexity : Brief history review  LOW Complexity : 1-3 performance deficits relating to physical, cognitive , or psychosocial skils that result in activity limitations and / or participation restrictions  LOW Complexity : No comorbidities that affect functional and no verbal or physical assistance needed to complete eval tasks       Based on the above components, the patient evaluation is determined to be of the following complexity level: LOW   Pain Rating:  No pain reported    Activity Tolerance:   Fair and requires rest breaks    After treatment patient left in no apparent distress:    Call bell within reach, Caregiver / family present and sitting EOB     COMMUNICATION/EDUCATION:   The patients plan of care was discussed with: Case management.      Thank you for this referral.  Ian Jacobs OT  Time Calculation: 29 mins

## 2021-02-04 NOTE — PROGRESS NOTES
0730 Bedside and Verbal shift change report given to Mary Carrasco RN (oncoming nurse) by Matty Heard RN (offgoing nurse). Report included the following information SBAR and Kardex. This patient was assisted with Intentional Toileting every 2 hours during this shift. Documentation of ambulation and output reflected on Flowsheet. 1205 Reviewed discharge instructions, verbalized understanding, no questions, left with instructions, belongings and scripts.

## 2021-02-10 ENCOUNTER — TELEPHONE (OUTPATIENT)
Dept: CARDIOLOGY CLINIC | Age: 86
End: 2021-02-10

## 2021-02-10 NOTE — TELEPHONE ENCOUNTER
----- Message from Lourdes Zapien MD sent at 2/9/2021 11:28 PM EST -----  Regarding: holter  Tk Valente - here is recent Holter result.     7 day Holter 1/21/21 - sinus, HR 32-96 with average 55 bpm.  No pauses > 2 sec. HR reached 34 bpm at 3 AM one night. Can you please pass results on to patient.      Thanks,  SK

## 2021-02-10 NOTE — TELEPHONE ENCOUNTER
Reviewed results with pt. HR low but overall OK. Pt is on dilt and clonidine. Pending BP, consider decreasing dilt in the future. Pt has fu appt tmw - discuss with pt in more detail at that time.

## 2021-02-11 ENCOUNTER — VIRTUAL VISIT (OUTPATIENT)
Dept: CARDIOLOGY CLINIC | Age: 86
End: 2021-02-11
Payer: MEDICARE

## 2021-02-11 DIAGNOSIS — I25.10 CORONARY ARTERY DISEASE INVOLVING NATIVE CORONARY ARTERY OF NATIVE HEART WITHOUT ANGINA PECTORIS: Primary | ICD-10-CM

## 2021-02-11 DIAGNOSIS — I10 ESSENTIAL HYPERTENSION: ICD-10-CM

## 2021-02-11 DIAGNOSIS — Z95.5 STATUS POST INSERTION OF DRUG ELUTING CORONARY ARTERY STENT: ICD-10-CM

## 2021-02-11 DIAGNOSIS — E78.5 DYSLIPIDEMIA: ICD-10-CM

## 2021-02-11 DIAGNOSIS — R00.1 BRADYCARDIA: ICD-10-CM

## 2021-02-11 PROCEDURE — 99442 PR PHYS/QHP TELEPHONE EVALUATION 11-20 MIN: CPT | Performed by: NURSE PRACTITIONER

## 2021-02-11 RX ORDER — HYDRALAZINE HYDROCHLORIDE 50 MG/1
50 TABLET, FILM COATED ORAL 2 TIMES DAILY
Qty: 60 TAB | Refills: 0 | Status: SHIPPED | OUTPATIENT
Start: 2021-02-11 | End: 2021-03-04 | Stop reason: SDUPTHER

## 2021-02-11 RX ORDER — CLOPIDOGREL BISULFATE 75 MG/1
75 TABLET ORAL DAILY
Qty: 90 TAB | Refills: 3 | Status: SHIPPED | OUTPATIENT
Start: 2021-02-11 | End: 2021-03-04 | Stop reason: SDUPTHER

## 2021-02-11 RX ORDER — ISOSORBIDE MONONITRATE 30 MG/1
30 TABLET, EXTENDED RELEASE ORAL DAILY
Qty: 90 TAB | Refills: 1 | Status: SHIPPED | OUTPATIENT
Start: 2021-02-11 | End: 2021-03-04 | Stop reason: SDUPTHER

## 2021-02-11 NOTE — PROGRESS NOTES
Bailey Solitario, ANDRE  [address] [place], [address] [facility]    Office [phone]  Fax [phone]          Patient: Amberly Ordonez  : 1935      Today's Date: 2021        TELEPHONE VISIT DOCUMENTATION     Pursuant to the emergency declaration under the [address] [place] [address] waiver authority and the [employer] Act, this Telephone Visit was conducted, with patient's consent, to reduce the patient's risk of exposure to COVID-19 and provide continuity of care for an established patient. She and/or health care decision maker is aware that that she may receive a bill for this telephone service, depending on her insurance coverage, and has provided verbal consent to proceed. This is a Patient Initiated Episode with an Established Patient who has not had a related appointment within my department in the past 7 days or scheduled within the next 24 hours. HISTORY OF PRESENT ILLNESS:     History of Present Illness:  Here for fu of dyspnea. inpt  to ; underwent cardiac cath found to have RCA disease - s/p PCI to dRCA with MER. Pt feels much better - breathing back to baseline. Pt taking all meds as prescribed. No bleeding issues on plavix / aspin. Cath site rt groin without obvious issue; pt denies rt leg pain or pain at site. Can't see due to abdominal obesity. Pt [facility] look later today. Hm BP 130s-160s/60s. HR improved now in the 80s on lower dose clonidine. No let swelling. No CP. Pt has fu with Dr. Gretchen Pierce early next month.       PAST MEDICAL HISTORY:     Past Medical History:   Diagnosis Date    Arthritis     Asthma     [facility]     Diabetes mellitus ([facility]     Diverticulosis     Dyslipidemia     GERD (gastroesophageal reflux disease)     Hiatal hernia     HTN (hypertension)     Hypothyroid     Neuropathy     Obesity     LA on CPAP     RBBB (right bundle branch block)     Spinal stenosis          Past Surgical History:   Procedure Laterality Date    HX BUNIONECTOMY      HX CHOLECYSTECTOMY      HX HEART CATHETERIZATION      Cardiac Cath 9/3/2010 showed mild CAD.  HX HIP REPLACEMENT      HX ORTHOPAEDIC      HX OTHER SURGICAL      Echo 10/5/12 - LVEF 60-65%, mod-marked LAE, MAC, grade 1 diastolic dysfunction     HX OTHER SURGICAL      Lexiscan cardiolite 1/25/13 - normal MPI, LVEF 74%    HX OTHER SURGICAL      Aorta duplex 1/6/14 - aorta size 1.7 cm, no dilation          MEDICATIONS:     Current Outpatient Medications   Medication Sig Dispense Refill    hydrALAZINE (APRESOLINE) 50 mg tablet Take 1 Tab by mouth two (2) times a day. 60 Tab 0    isosorbide mononitrate ER (IMDUR) 30 mg tablet Take 1 Tab by mouth daily. 90 Tab 1    clopidogreL (PLAVIX) 75 mg tab Take 1 Tab by mouth daily. 90 Tab 3    rosuvastatin (CRESTOR) 20 mg tablet Take 1 Tab by mouth nightly. 30 Tab 1    bumetanide (BUMEX) 1 mg tablet Take 1 Tab by mouth daily. 30 Tab 1    aspirin 81 mg chewable tablet Take 1 Tab by mouth daily.  cloNIDine HCL (CATAPRES) 0.1 mg tablet Take 1 Tab by mouth nightly. 270 Tab 3    cyanocobalamin 1,000 mcg tablet Take 1,000 mcg by mouth every Sunday.  gabapentin (NEURONTIN) 400 mg capsule Take 1 Cap by mouth nightly. Max Daily Amount: 400 mg. 30 Cap 0    dilTIAZem ER (CARDIZEM CD) 240 mg capsule Take 1 Cap by mouth daily. 30 Cap 0    calcium-cholecalciferol, D3, (CALTRATE 600+D) tablet Take 1 Tab by mouth two (2) times daily (with meals).  cetirizine (ZyrTEC) 10 mg tablet Take 10 mg by mouth daily.  vit C/E/Zn/coppr/lutein/zeaxan (PRESERVISION AREDS 2 PO) Take 1 Cap by mouth two (2) times a day.  acetaminophen (TYLENOL EXTRA STRENGTH) 500 mg tablet Take 500-1,000 mg by mouth three (3) times daily as needed for Pain (Generally takes 2-3 times daily).       losartan (COZAAR) 100 mg tablet Take 1 Tab by mouth nightly. 90 Tab 3    levothyroxine (SYNTHROID) 100 mcg tablet Take 100 mcg by mouth Daily (before breakfast).  omeprazole (PRILOSEC) 20 mg capsule Take 20 mg by mouth two (2) times a day.  montelukast (SINGULAIR) 10 mg tablet Take 10 mg by mouth nightly.  OTHER zyflamend 2 caps daily         Allergies   Allergen Reactions    Tolectin [Tolmetin] Anaphylaxis    Aldactone [Spironolactone] Other (comments)     hyperkalemia    Carvedilol Other (comments)     \"slow heart rate down to 44\"    Ceclor [Cefaclor] Hives    Ciprofloxacin Hives and Swelling    Darvon [Propoxyphene] Swelling     Swelling lips and hands    Macrobid [Nitrofurantoin Monohyd/M-Cryst] Other (comments)     Does not recall    Norvasc [Amlodipine] Swelling     Swelling ankles/legs    Tetracycline Swelling     Swelling lips/hands    Toprol Xl [Metoprolol Succinate] Swelling     Swellings ankles/legs    Valsartan Other (comments)     Also \"slow heart rate down to 44\"    Zestril [Lisinopril] Hives           SOCIAL HISTORY:     Social History     Tobacco Use    Smoking status: Never Smoker    Smokeless tobacco: Never Used   Substance Use Topics    Alcohol use: No     Alcohol/week: 0.0 standard drinks    Drug use: No         FAMILY HISTORY:     Family History   Problem Relation Age of Onset    Coronary Artery Disease Father     Heart Disease Father     Coronary Artery Disease Paternal Aunt     Cancer Mother     Parkinson's Disease Mother     Stroke Mother     Thyroid Disease Mother     Seizures Brother             REVIEW OF SYMPTOMS:       Review of Symptoms:  (Positive findings above)  Constitutional: Negative for fever, chills, and diaphoresis. Respiratory: Negative for cough, hemoptysis, sputum production, breath and wheezing. Cardiovascular: Negative for chest pain, palpitations,  and PND. Gastrointestinal: Negative for  vomiting, blood in stool and melena.   Genitourinary: Negative for dysuria and flank pain. Neurological: Negative for focal weakness, seizures, loss of consciousness  Endo/Heme/Allergies: Negative for abnormal bleeding. Psychiatric/Behavioral: Negative for memory loss. LABS / OTHER STUDIES:      10/16/20 - BUN 47, Cr 1.53, Na 137, K 5.4, CO2 20, AST 14, ALT 12  , , HDL 41, LDL 84  TSH 3.1, proBNP 825, HgbA1c 5.7, Vit D 38    Lab Results   Component Value Date/Time    Sodium 141 02/03/2021 04:02 AM    Potassium 4.4 02/03/2021 04:02 AM    Chloride 112 (H) 02/03/2021 04:02 AM    CO2 20 (L) 02/03/2021 04:02 AM    Anion gap 9 02/03/2021 04:02 AM    Glucose 92 02/03/2021 04:02 AM    BUN 30 (H) 02/03/2021 04:02 AM    Creatinine 1.08 (H) 02/03/2021 04:02 AM    BUN/Creatinine ratio 28 (H) 02/03/2021 04:02 AM    GFR est AA 58 (L) 02/03/2021 04:02 AM    GFR est non-AA 48 (L) 02/03/2021 04:02 AM    Calcium 8.5 02/03/2021 04:02 AM    Bilirubin, total 0.2 02/02/2021 02:32 PM    Alk. phosphatase 62 02/02/2021 02:32 PM    Protein, total 7.6 02/02/2021 02:32 PM    Albumin 3.6 02/02/2021 02:32 PM    Globulin 4.0 02/02/2021 02:32 PM    A-G Ratio 0.9 (L) 02/02/2021 02:32 PM    ALT (SGPT) 26 02/02/2021 02:32 PM    AST (SGOT) 28 02/02/2021 02:32 PM     Lab Results   Component Value Date/Time    WBC 7.4 02/03/2021 04:02 AM    HGB 9.9 (L) 02/03/2021 04:02 AM    HCT 31.9 (L) 02/03/2021 04:02 AM    PLATELET 508 88/55/1732 04:02 AM    .3 (H) 02/03/2021 04:02 AM     No results found for: CHOL, CHOLPOCT, CHOLX, CHLST, CHOLV, HDL, HDLPOC, HDLP, LDL, LDLCPOC, LDLC, DLDLP, VLDLC, VLDL, TGLX, TRIGL, TRIGP, TGLPOCT, CHHD, CHHDX        CARDIAC DIAGNOSTICS:        Cardiac Evaluation Includes:  Cath 2010: LAD 25%, RCA 20%, Mid RCA 20%. Stress Nuc 2013: Normal perfusion. EF 74.   V/Q 5/1/16 - low prob  Echo 5/2/16 - LVEF 60%  Lexiscan Cardiolite 5/2/16 - 1) No ischemic EKG changes with Lexiscan.  Frequent PVC's during stress.  Mild chest pain noted during Lexiscan Injection.    2) Normal Lexiscan gated SPECT myocardial perfusion study. 3) LVEF 55%     [facility] Screening 10/17/15 - mild carotid disease, normal PALAK and AAA     Carotid Doppler 8/25/17 - 10-49% stenosis JONATAN and 4-3% LICA,     Renal Artery Dopplers - 4/19/19 - normal       CXR 7/20/20 - no acute process      Chest CTA 7/20/20 - No PE. No acute disease in the chest. + coronary calcifications     7 day Holter 1/21/21 - sinus, HR 32-96 with average 55 bpm.  No pauses > 2 sec. HR reached 34 bpm at 3 AM one night.       Echo 7/21/20 - LVEF 60-65%, grade 1 diastology, MAC    Echo 2/3/21 - LVEF 60-65%, G1DD, Dilated LA, mod MAC with mild MS, mild TR    LHC 2/3/21 - RCA: Dominant; Mid 60%; Distal 80% ; PLB - prox 80%; s/p MER to dRCA. EKG 8/25/17 - NSR, RBBB  EKG 10/12/18 - NSR, RBBB, LAFB  EKG 12/10/19 - NSR, RBBB,   EKG 7/21/20 - NSR, RBBB, LAD       ASSESSMENT AND PLAN:        Assessment and Plan:  CAD s/p MER RCA 2/3/21  - on DAPT with plavix/asp and high intensity statin  - no anginal c/o - breathing back to baseline  - has HH; not strong enough for cRehab yet but will revisit appt    HFpEF, LVEF 60-65%  - breathing / swelling stable  - over diuresed previously on torsemide and spironolactone with KAREEN which improved off meds  - now on Bumex 1mg/d; recheck BMP in the near future      HTN   - elevated, increase hydralazine to 50mg BID  - keep home log x2 weeks     Dyslipidemia  - lipids per Endocrinology; recent lipids with LDL 84. TGL elevated at 307.    - crestor recently increased to 20mg/d;       Chronic LE edema  - wears bilateral leg wraps; currently well controlled    bradycardia  - 7 day Holter 1/21/21 - sinus, HR 32-96 with average 55 bpm.  No pauses > 2 sec. HR reached 34 bpm at 3 AM one night. - clonidine decreased inpt - per pt HR now in the 80s.       Keep fu as previously scheduled in 3-4 wks with Dr. 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     [facility], ANP    Total Time: minutes: 11-20 minutes

## 2021-03-04 ENCOUNTER — OFFICE VISIT (OUTPATIENT)
Dept: CARDIOLOGY CLINIC | Age: 86
End: 2021-03-04
Payer: MEDICARE

## 2021-03-04 VITALS
BODY MASS INDEX: 34.38 KG/M2 | SYSTOLIC BLOOD PRESSURE: 148 MMHG | WEIGHT: 194 LBS | OXYGEN SATURATION: 97 % | RESPIRATION RATE: 16 BRPM | HEIGHT: 63 IN | DIASTOLIC BLOOD PRESSURE: 64 MMHG | HEART RATE: 78 BPM

## 2021-03-04 DIAGNOSIS — I50.33 DIASTOLIC CHF, ACUTE ON CHRONIC (HCC): ICD-10-CM

## 2021-03-04 DIAGNOSIS — I25.10 CORONARY ARTERY DISEASE INVOLVING NATIVE HEART WITHOUT ANGINA PECTORIS, UNSPECIFIED VESSEL OR LESION TYPE: Primary | ICD-10-CM

## 2021-03-04 DIAGNOSIS — E78.5 DYSLIPIDEMIA: ICD-10-CM

## 2021-03-04 DIAGNOSIS — I10 ESSENTIAL HYPERTENSION: ICD-10-CM

## 2021-03-04 PROCEDURE — 1100F PTFALLS ASSESS-DOCD GE2>/YR: CPT | Performed by: SPECIALIST

## 2021-03-04 PROCEDURE — G8536 NO DOC ELDER MAL SCRN: HCPCS | Performed by: SPECIALIST

## 2021-03-04 PROCEDURE — G8510 SCR DEP NEG, NO PLAN REQD: HCPCS | Performed by: SPECIALIST

## 2021-03-04 PROCEDURE — G8754 DIAS BP LESS 90: HCPCS | Performed by: SPECIALIST

## 2021-03-04 PROCEDURE — G8399 PT W/DXA RESULTS DOCUMENT: HCPCS | Performed by: SPECIALIST

## 2021-03-04 PROCEDURE — 99215 OFFICE O/P EST HI 40 MIN: CPT | Performed by: SPECIALIST

## 2021-03-04 PROCEDURE — 1090F PRES/ABSN URINE INCON ASSESS: CPT | Performed by: SPECIALIST

## 2021-03-04 PROCEDURE — G8427 DOCREV CUR MEDS BY ELIG CLIN: HCPCS | Performed by: SPECIALIST

## 2021-03-04 PROCEDURE — 3288F FALL RISK ASSESSMENT DOCD: CPT | Performed by: SPECIALIST

## 2021-03-04 PROCEDURE — G8753 SYS BP > OR = 140: HCPCS | Performed by: SPECIALIST

## 2021-03-04 PROCEDURE — G8417 CALC BMI ABV UP PARAM F/U: HCPCS | Performed by: SPECIALIST

## 2021-03-04 RX ORDER — ISOSORBIDE MONONITRATE 30 MG/1
30 TABLET, EXTENDED RELEASE ORAL DAILY
Qty: 90 TAB | Refills: 3 | Status: SHIPPED | OUTPATIENT
Start: 2021-03-04 | End: 2022-03-10 | Stop reason: SDUPTHER

## 2021-03-04 RX ORDER — BUMETANIDE 1 MG/1
1 TABLET ORAL DAILY
Qty: 90 TAB | Refills: 3 | Status: SHIPPED | OUTPATIENT
Start: 2021-03-04 | End: 2021-07-07

## 2021-03-04 RX ORDER — CLOPIDOGREL BISULFATE 75 MG/1
75 TABLET ORAL DAILY
Qty: 90 TAB | Refills: 3 | Status: SHIPPED | OUTPATIENT
Start: 2021-03-04 | End: 2022-02-08

## 2021-03-04 RX ORDER — HYDRALAZINE HYDROCHLORIDE 50 MG/1
50 TABLET, FILM COATED ORAL 2 TIMES DAILY
Qty: 180 TAB | Refills: 3 | Status: SHIPPED | OUTPATIENT
Start: 2021-03-04 | End: 2022-01-25

## 2021-03-04 RX ORDER — ROSUVASTATIN CALCIUM 20 MG/1
20 TABLET, COATED ORAL
Qty: 90 TAB | Refills: 3 | Status: SHIPPED | OUTPATIENT
Start: 2021-03-04 | End: 2022-03-10 | Stop reason: SDUPTHER

## 2021-03-04 RX ORDER — LOSARTAN POTASSIUM 100 MG/1
100 TABLET ORAL
Qty: 90 TAB | Refills: 3 | Status: SHIPPED | OUTPATIENT
Start: 2021-03-04 | End: 2022-03-10 | Stop reason: SDUPTHER

## 2021-03-04 NOTE — PROGRESS NOTES
Juan Fountain MD. [facility]              Patient: Enrigue Alpers  : 1935      Today's Date: 3/4/2021          HISTORY OF PRESENT ILLNESS:     History of Present Illness:    Records reviewed --> she was admitted  with CHRISTIAN - she was cathed and received MER to RCA    Feeling better after PCI - no more SOB; No CP - energy is a little better but not good yet. Is to get PT. PAST MEDICAL HISTORY:     Past Medical History:   Diagnosis Date    Arthritis     Asthma     [facility]     CAD (coronary artery disease)     MER to RCA in      Diabetes mellitus ([facility]     Diverticulosis     Dyslipidemia     GERD (gastroesophageal reflux disease)     Hiatal hernia     HTN (hypertension)     Hypothyroid     Neuropathy     Obesity     LA on CPAP     RBBB (right bundle branch block)     Spinal stenosis          Past Surgical History:   Procedure Laterality Date    HX BUNIONECTOMY      HX CHOLECYSTECTOMY      HX CORONARY STENT PLACEMENT      HX HEART CATHETERIZATION      Cardiac Cath 9/3/2010 showed mild CAD.  HX HIP REPLACEMENT      HX ORTHOPAEDIC      HX OTHER SURGICAL      Echo 10/5/12 - LVEF 60-65%, mod-marked LAE, MAC, grade 1 diastolic dysfunction     HX OTHER SURGICAL      Lexiscan cardiolite 13 - normal MPI, LVEF 74%    HX OTHER SURGICAL      Aorta duplex 14 - aorta size 1.7 cm, no dilation          MEDICATIONS:     Current Outpatient Medications   Medication Sig Dispense Refill    calcium carbonate (CALCIUM 600 PO) two (2) times a day.  loratadine 10 mg cap Take 10 mg by mouth.  hydrALAZINE (APRESOLINE) 50 mg tablet Take 1 Tab by mouth two (2) times a day. 180 Tab 3    isosorbide mononitrate ER (IMDUR) 30 mg tablet Take 1 Tab by mouth daily. 90 Tab 3    clopidogreL (PLAVIX) 75 mg tab Take 1 Tab by mouth daily. 90 Tab 3    rosuvastatin (CRESTOR) 20 mg tablet Take 1 Tab by mouth nightly.  90 Tab 3    bumetanide (BUMEX) 1 mg tablet Take 1 Tab by mouth daily. 90 Tab 3    losartan (COZAAR) 100 mg tablet Take 1 Tab by mouth nightly. 90 Tab 3    aspirin 81 mg chewable tablet Take 1 Tab by mouth daily.  cloNIDine HCL (CATAPRES) 0.1 mg tablet Take 1 Tab by mouth nightly. 270 Tab 3    cyanocobalamin 1,000 mcg tablet Take 1,000 mcg by mouth every Sunday.  gabapentin (NEURONTIN) 400 mg capsule Take 1 Cap by mouth nightly. Max Daily Amount: 400 mg. (Patient taking differently: Take 600 mg by mouth nightly.) 30 Cap 0    dilTIAZem ER (CARDIZEM CD) 240 mg capsule Take 1 Cap by mouth daily. 30 Cap 0    calcium-cholecalciferol, D3, (CALTRATE 600+D) tablet Take 1 Tab by mouth two (2) times daily (with meals).  vit C/E/Zn/coppr/lutein/zeaxan (PRESERVISION AREDS 2 PO) Take 1 Cap by mouth two (2) times a day.  acetaminophen (TYLENOL EXTRA STRENGTH) 500 mg tablet Take 500-1,000 mg by mouth three (3) times daily as needed for Pain (Generally takes 2-3 times daily).  OTHER zyflamend 2 caps daily      levothyroxine (SYNTHROID) 100 mcg tablet Take 100 mcg by mouth Daily (before breakfast).  omeprazole (PRILOSEC) 20 mg capsule Take 20 mg by mouth two (2) times a day.  montelukast (SINGULAIR) 10 mg tablet Take 10 mg by mouth nightly.  cetirizine (ZyrTEC) 10 mg tablet Take 10 mg by mouth daily.          Allergies   Allergen Reactions    Tolectin [Tolmetin] Anaphylaxis    Aldactone [Spironolactone] Other (comments)     hyperkalemia    Carvedilol Other (comments)     \"slow heart rate down to 44\"    Ceclor [Cefaclor] Hives    Ciprofloxacin Hives and Swelling    Darvon [Propoxyphene] Swelling     Swelling lips and hands    Macrobid [Nitrofurantoin Monohyd/M-Cryst] Other (comments)     Does not recall    Norvasc [Amlodipine] Swelling     Swelling ankles/legs    Tetracycline Swelling     Swelling lips/hands    Toprol Xl [Metoprolol Succinate] Swelling     Swellings ankles/legs    Valsartan Other (comments)     Also \"slow heart rate down to 44\"    Zestril [Lisinopril] Hives           SOCIAL HISTORY:     Social History     Tobacco Use    Smoking status: Never Smoker    Smokeless tobacco: Never Used   Substance Use Topics    Alcohol use: No     Alcohol/week: 0.0 standard drinks    Drug use: No         FAMILY HISTORY:     Family History   Problem Relation Age of Onset    Coronary Artery Disease Father     Heart Disease Father     Coronary Artery Disease Paternal Aunt     Cancer Mother     Parkinson's Disease Mother     Stroke Mother     Thyroid Disease Mother     Seizures Brother             REVIEW OF SYMPTOMS:       Review of Symptoms:  Constitutional: Negative for fever, chills  HEENT: Negative for nosebleeds, tinnitus, and vision changes. Respiratory: Negative for cough, wheezing  Cardiovascular: Negative for syncope, and PND.  +CHRISTIAN  Gastrointestinal: Negative for abdominal pain, diarrhea, melena. Genitourinary: Negative for dysuria  Musculoskeletal: + back pain, joint pain   Skin: Negative for rash  Heme: No problems bleeding. Neurological: Negative for speech change and focal weakness.                   PHYSICAL EXAM:        Physical Exam:  Visit Vitals  BP (!) 148/64 (BP 1 Location: Left upper arm, BP Patient Position: Sitting)   Pulse 78   Resp 16   Ht 5' 3\" (1.6 m)   Wt 194 lb (88 kg)   SpO2 97%   BMI 34.37 kg/m²          Patient appears generally well, mood and affect are appropriate and pleasant. HEENT: Hearing intact, non-icteric, normocephalic, atraumatic.    Neck Exam: Supple, No JVD sitting up   Lung Exam: Clear to auscultation, even breath sounds.    Cardiac Exam: Regular rate and rhythm with no murmur  Abdomen: Soft, non-tender, normal bowel sounds. + obese   Extremities: Moves all ext well. Mild lower extremity edema (legs wrapped).     Psych: Appropriate affect  Neuro - Grossly intact             LABS / OTHER STUDIES reviewed:       Lab Results   Component Value Date/Time    Sodium 141 02/03/2021 04:02 AM    Potassium 4.4 02/03/2021 04:02 AM    Chloride 112 (H) 02/03/2021 04:02 AM    CO2 20 (L) 02/03/2021 04:02 AM    Anion gap 9 02/03/2021 04:02 AM    Glucose 92 02/03/2021 04:02 AM    BUN 30 (H) 02/03/2021 04:02 AM    Creatinine 1.08 (H) 02/03/2021 04:02 AM    BUN/Creatinine ratio 28 (H) 02/03/2021 04:02 AM    GFR est AA 58 (L) 02/03/2021 04:02 AM    GFR est non-AA 48 (L) 02/03/2021 04:02 AM    Calcium 8.5 02/03/2021 04:02 AM    Bilirubin, total 0.2 02/02/2021 02:32 PM    Alk. phosphatase 62 02/02/2021 02:32 PM    Protein, total 7.6 02/02/2021 02:32 PM    Albumin 3.6 02/02/2021 02:32 PM    Globulin 4.0 02/02/2021 02:32 PM    A-G Ratio 0.9 (L) 02/02/2021 02:32 PM    ALT (SGPT) 26 02/02/2021 02:32 PM    AST (SGOT) 28 02/02/2021 02:32 PM     Lab Results   Component Value Date/Time    WBC 7.4 02/03/2021 04:02 AM    HGB 9.9 (L) 02/03/2021 04:02 AM    HCT 31.9 (L) 02/03/2021 04:02 AM    PLATELET 194 32/66/6131 04:02 AM    .3 (H) 02/03/2021 04:02 AM       Lab Results   Component Value Date/Time    TSH 3.46 02/02/2021 02:32 PM               CARDIAC DIAGNOSTICS:     Cardiac Evaluation Includes:  I reviewed the results below. Cath 2010: LAD 25%, RCA 20%, Mid RCA 20%. Stress Nuc 2013: Normal perfusion. EF 74. V/Q 5/1/16 - low prob  Echo 5/2/16 - LVEF 60%  Lexiscan Cardiolite 5/2/16 - 1) No ischemic EKG changes with Lexiscan.  Frequent PVC's during stress.  Mild chest pain noted during Lexiscan Injection.    2) Normal Lexiscan gated SPECT myocardial perfusion study. 3) LVEF 55%     [facility] Screening 10/17/15 - mild carotid disease, normal PALKA and AAA     Carotid Doppler 8/25/17 - 10-49% stenosis JONATAN and 1-4% LICA,     Renal Artery Dopplers - 4/19/19 - normal       CXR 7/20/20 - no acute process      Chest CTA 7/20/20 - No PE.  No acute disease in the chest. + coronary calcifications      Echo 7/21/20 - LVEF 60-65%, grade 1 diastology, MAC     7 day Holter 1/21/21 - sinus, HR 32-96 with average 55 bpm.  No pauses > 2 sec. HR reached 34 bpm at 3 AM one night.         Echo 2/3/21 - LVEF 60-65%, G1DD, Dilated LA, mod MAC with mild MS, mild TR     LHC 2/3/21 - RCA: Dominant; Mid 60%; Distal 80% ; PLB - prox 80%; s/p MER to dRCA.         EKG 8/25/17 - NSR, RBBB  EKG 10/12/18 - NSR, RBBB, LAFB  EKG 12/10/19 - NSR, RBBB,   EKG 7/21/20 - NSR, RBBB, LAD  EKG 2/3/21 - NSR, RBBB, LAFB                    ASSESSMENT AND PLAN:        Assessment and Plan:    CAD s/p MER RCA 2/3/21  - on DAPT with plavix/asp and high intensity statin  - no anginal c/o - breathing improved   - has HH; not strong enough for cRehab yet but will revisit after she completes home PT     HFpEF, LVEF 60-65%  - breathing / swelling improved   - over diuresed previously on torsemide and spironolactone with KAREEN which improved off meds  - now on Bumex 1mg/d; follow BMP       HTN   - continue meds   - keep home log x2 weeks     Dyslipidemia  - continue crestor   - lipids checked by Endocrinology; recent lipids with LDL 84. TGL elevated at 307.        Chronic LE edema  - wears bilateral leg wraps; currently well controlled    LE cellulitis   - On Abx and seen in wound clinic      Bradycardia  - 7 day Holter 1/21/21 - sinus, HR 32-96 with average 55 bpm.  No pauses > 2 sec.  HR reached 34 bpm at 3 AM one night.    - clonidine decreased inpt - per pt HR now in the 80s.    See NP in 3 months.        65+ Rogers Valles with her .  She was a nurse for 43 years.     Total time (preparing to see the patient, reviewing data, seeing patient face to fine, counseling patient, documenting information, etc) was 45 min.         Amanuel Boyd MD, [address] [place], [address] [id number] [address]  [address]  [address]  Ph: [phone]                                [phone]

## 2021-03-04 NOTE — PROGRESS NOTES
Zohaib Shipley is a 80 y.o. female    Chief Complaint   Patient presents with    Coronary Artery Disease    Hypertension    Cholesterol Problem    Slow Heart Rate   pt had stent placed 02/03/2021  She would like to know when she should get covid vaccine     Chest pain : no  SOB : no  Dizziness : no  Edema : yes right foot at night  Refills :     Visit Vitals  BP (!) 148/64 (BP 1 Location: Left upper arm, BP Patient Position: Sitting)   Pulse 78   Resp 16   Ht 5' 3\" (1.6 m)   Wt 194 lb (88 kg)   SpO2 97%   BMI 34.37 kg/m²       1. Have you been to the ER, urgent care clinic since your last visit? Hospitalized since your last visit?no    2. Have you seen or consulted any other health care providers outside of the [address] [place] [address] since your last visit? Include any pap smears or colon screening.  Yes wound specialist and PCP

## 2021-03-06 ENCOUNTER — IMMUNIZATION (OUTPATIENT)
Dept: INTERNAL MEDICINE CLINIC | Age: 86
End: 2021-03-06
Payer: MEDICARE

## 2021-03-06 DIAGNOSIS — Z23 ENCOUNTER FOR IMMUNIZATION: Primary | ICD-10-CM

## 2021-03-06 PROCEDURE — 0001A COVID-19, MRNA, LNP-S, PF, 30MCG/0.3ML DOSE(PFIZER): CPT | Performed by: FAMILY MEDICINE

## 2021-03-06 PROCEDURE — 91300 COVID-19, MRNA, LNP-S, PF, 30MCG/0.3ML DOSE(PFIZER): CPT | Performed by: FAMILY MEDICINE

## 2021-03-25 ENCOUNTER — TRANSCRIBE ORDER (OUTPATIENT)
Dept: SCHEDULING | Age: 86
End: 2021-03-25

## 2021-03-25 DIAGNOSIS — M81.0 AGE-RELATED OSTEOPOROSIS WITHOUT CURRENT PATHOLOGICAL FRACTURE: Primary | ICD-10-CM

## 2021-03-27 ENCOUNTER — IMMUNIZATION (OUTPATIENT)
Dept: INTERNAL MEDICINE CLINIC | Age: 86
End: 2021-03-27
Payer: MEDICARE

## 2021-03-27 DIAGNOSIS — Z23 ENCOUNTER FOR IMMUNIZATION: Primary | ICD-10-CM

## 2021-03-27 PROCEDURE — 91300 COVID-19, MRNA, LNP-S, PF, 30MCG/0.3ML DOSE(PFIZER): CPT | Performed by: FAMILY MEDICINE

## 2021-03-27 PROCEDURE — 0002A COVID-19, MRNA, LNP-S, PF, 30MCG/0.3ML DOSE(PFIZER): CPT | Performed by: FAMILY MEDICINE

## 2021-04-26 ENCOUNTER — TRANSCRIBE ORDER (OUTPATIENT)
Dept: SCHEDULING | Age: 86
End: 2021-04-26

## 2021-04-26 DIAGNOSIS — M81.0 AGE-RELATED OSTEOPOROSIS WITHOUT CURRENT PATHOLOGICAL FRACTURE: Primary | ICD-10-CM

## 2021-06-07 ENCOUNTER — HOSPITAL ENCOUNTER (OUTPATIENT)
Dept: MAMMOGRAPHY | Age: 86
Discharge: HOME | End: 2021-06-07
Attending: INTERNAL MEDICINE
Payer: MEDICARE

## 2021-06-07 ENCOUNTER — OFFICE VISIT (OUTPATIENT)
Dept: CARDIOLOGY CLINIC | Age: 86
End: 2021-06-07
Payer: MEDICARE

## 2021-06-07 VITALS
HEART RATE: 80 BPM | WEIGHT: 192 LBS | OXYGEN SATURATION: 98 % | BODY MASS INDEX: 34.02 KG/M2 | DIASTOLIC BLOOD PRESSURE: 82 MMHG | HEIGHT: 63 IN | SYSTOLIC BLOOD PRESSURE: 150 MMHG | RESPIRATION RATE: 16 BRPM

## 2021-06-07 DIAGNOSIS — I25.10 CORONARY ARTERY DISEASE INVOLVING NATIVE HEART WITHOUT ANGINA PECTORIS, UNSPECIFIED VESSEL OR LESION TYPE: Primary | ICD-10-CM

## 2021-06-07 DIAGNOSIS — M81.0 AGE-RELATED OSTEOPOROSIS WITHOUT CURRENT PATHOLOGICAL FRACTURE: ICD-10-CM

## 2021-06-07 PROCEDURE — 99214 OFFICE O/P EST MOD 30 MIN: CPT | Performed by: NURSE PRACTITIONER

## 2021-06-07 PROCEDURE — 1100F PTFALLS ASSESS-DOCD GE2>/YR: CPT | Performed by: NURSE PRACTITIONER

## 2021-06-07 PROCEDURE — 1090F PRES/ABSN URINE INCON ASSESS: CPT | Performed by: NURSE PRACTITIONER

## 2021-06-07 PROCEDURE — G8536 NO DOC ELDER MAL SCRN: HCPCS | Performed by: NURSE PRACTITIONER

## 2021-06-07 PROCEDURE — 77080 DXA BONE DENSITY AXIAL: CPT

## 2021-06-07 PROCEDURE — G8427 DOCREV CUR MEDS BY ELIG CLIN: HCPCS | Performed by: NURSE PRACTITIONER

## 2021-06-07 PROCEDURE — G8754 DIAS BP LESS 90: HCPCS | Performed by: NURSE PRACTITIONER

## 2021-06-07 PROCEDURE — G8753 SYS BP > OR = 140: HCPCS | Performed by: NURSE PRACTITIONER

## 2021-06-07 PROCEDURE — 3288F FALL RISK ASSESSMENT DOCD: CPT | Performed by: NURSE PRACTITIONER

## 2021-06-07 PROCEDURE — G8417 CALC BMI ABV UP PARAM F/U: HCPCS | Performed by: NURSE PRACTITIONER

## 2021-06-07 PROCEDURE — G8432 DEP SCR NOT DOC, RNG: HCPCS | Performed by: NURSE PRACTITIONER

## 2021-06-07 NOTE — PATIENT INSTRUCTIONS
See Dr. Colin Faith again in [facility] AND [facility] [place] for follow-up. Continue to watch home BP - goal <135/85 (most of the time) - if home BP consistently elevated, call us sooner. Get labs in the next few weeks.

## 2021-06-07 NOTE — LETTER
6/8/2021 Patient: Funmilayo Canela YOB: 1935 Date of Visit: 6/7/2021 Gardenia Burnett, [address] [address] [address] Fax: [phone] Dear Gardenia Burnett MD, Thank you for referring Ms. Agus Muniz to [address]  for evaluation. My notes for this consultation are attached. If you have questions, please do not hesitate to call me. I look forward to following your patient along with you.  
 
 
Sincerely, 
 
Christel Szymanski NP

## 2021-06-07 NOTE — PROGRESS NOTES
Chief Complaint   Patient presents with    Follow-up     3mo     Visit Vitals  BP (!) 150/82 (BP 1 Location: Right arm, BP Patient Position: Sitting, BP Cuff Size: Large adult)   Pulse 80   Resp 16   Ht 5' 3\" (1.6 m)   Wt 192 lb (87.1 kg)   SpO2 98%   BMI 34.01 kg/m²     Patient presents in office today without complaint. She states she had a high temperature of \"100\" over the weekend. Temp check today is 97.9. She states that she usually \"runs fever at night for a while on and off. \" She has been checked out by infectious disease as well with \"no answers. \" Denies fevers chills SOB n/v diarrhea. No recent hospital visits.      Needs refill on Bumetanide sent to [facility] 90 day supply

## 2021-06-07 NOTE — PROGRESS NOTES
Randall Doll, [facility]  [address], [address] [place]  [place], [id number] [place] [address]    Office [phone]  Fax [phone]            Patient: Noel Rosado  : 1935      Today's Date: 2021          HISTORY OF PRESENT ILLNESS:     History of Present Illness:    Here for f/u; s/p MER to RCA . Seen last by Dr. Barlow Part 3/4/21. Feeling feverish in the evening the past few days with increased fatigue. States this is a problem she had years ago without clear cause. No associated symptoms - no cough, congestion, pain, breathing changes, or dysuria. Recent cellulitis on the LE s/p antibiotics and improved. Has f/u with [facility] center next week. Breathing much better since stent placement. No chest pain. HR improved. Overall feels well (minus evening fatigue). Home BP 130s/70s normally. Was 142/70 with HH on Friday. NO bleeding issues. PAST MEDICAL HISTORY:     Past Medical History:   Diagnosis Date    Arthritis     Asthma     [facility] [address]     CAD (coronary artery disease)     MER to RCA in      Diabetes mellitus ([facility] [address]     Diverticulosis     Dyslipidemia     GERD (gastroesophageal reflux disease)     Hiatal hernia     HTN (hypertension)     Hypothyroid     Neuropathy     Obesity     LA on CPAP     RBBB (right bundle branch block)     Spinal stenosis          Past Surgical History:   Procedure Laterality Date    HX BUNIONECTOMY      HX CHOLECYSTECTOMY      HX CORONARY STENT PLACEMENT      HX HEART CATHETERIZATION      Cardiac Cath 9/3/2010 showed mild CAD.       HX HIP REPLACEMENT      HX ORTHOPAEDIC      HX OTHER SURGICAL      Echo 10/5/12 - LVEF 60-65%, mod-marked LAE, MAC, grade 1 diastolic dysfunction     HX OTHER SURGICAL      Lexiscan cardiolite 13 - normal MPI, LVEF 74%    HX OTHER SURGICAL      Aorta duplex 14 - aorta size 1.7 cm, no dilation          MEDICATIONS:     Current Outpatient Medications   Medication Sig Dispense Refill    calcium carbonate (CALCIUM 600 PO) two (2) times a day.  loratadine 10 mg cap Take 10 mg by mouth.  hydrALAZINE (APRESOLINE) 50 mg tablet Take 1 Tab by mouth two (2) times a day. 180 Tab 3    isosorbide mononitrate ER (IMDUR) 30 mg tablet Take 1 Tab by mouth daily. 90 Tab 3    clopidogreL (PLAVIX) 75 mg tab Take 1 Tab by mouth daily. 90 Tab 3    rosuvastatin (CRESTOR) 20 mg tablet Take 1 Tab by mouth nightly. 90 Tab 3    bumetanide (BUMEX) 1 mg tablet Take 1 Tab by mouth daily. 90 Tab 3    losartan (COZAAR) 100 mg tablet Take 1 Tab by mouth nightly. 90 Tab 3    aspirin 81 mg chewable tablet Take 1 Tab by mouth daily.  cloNIDine HCL (CATAPRES) 0.1 mg tablet Take 1 Tab by mouth nightly. 270 Tab 3    gabapentin (NEURONTIN) 400 mg capsule Take 1 Cap by mouth nightly. Max Daily Amount: 400 mg. (Patient taking differently: Take 600 mg by mouth nightly.) 30 Cap 0    dilTIAZem ER (CARDIZEM CD) 240 mg capsule Take 1 Cap by mouth daily. 30 Cap 0    vit C/E/Zn/coppr/lutein/zeaxan (PRESERVISION AREDS 2 PO) Take 1 Cap by mouth two (2) times a day.  acetaminophen (TYLENOL EXTRA STRENGTH) 500 mg tablet Take 500-1,000 mg by mouth three (3) times daily as needed for Pain (Generally takes 2-3 times daily).  OTHER zyflamend 2 caps daily      levothyroxine (SYNTHROID) 100 mcg tablet Take 100 mcg by mouth Daily (before breakfast).  omeprazole (PRILOSEC) 20 mg capsule Take 20 mg by mouth two (2) times a day.  montelukast (SINGULAIR) 10 mg tablet Take 10 mg by mouth nightly.  cyanocobalamin 1,000 mcg tablet Take 1,000 mcg by mouth every Monday, Wednesday, Friday.  calcium-cholecalciferol, D3, (CALTRATE 600+D) tablet Take 1 Tab by mouth two (2) times daily (with meals). (Patient not taking: Reported on 6/7/2021)      cetirizine (ZyrTEC) 10 mg tablet Take 10 mg by mouth daily.  (Patient not taking: Reported on 6/7/2021)         Allergies   Allergen Reactions    Tolectin [Tolmetin] Anaphylaxis    Aldactone [Spironolactone] Other (comments)     hyperkalemia    Carvedilol Other (comments)     \"slow heart rate down to 44\"    Ceclor [Cefaclor] Hives    Ciprofloxacin Hives and Swelling    Darvon [Propoxyphene] Swelling     Swelling lips and hands    Macrobid [Nitrofurantoin Monohyd/M-Cryst] Other (comments)     Does not recall    Norvasc [Amlodipine] Swelling     Swelling ankles/legs    Tetracycline Swelling     Swelling lips/hands    Toprol Xl [Metoprolol Succinate] Swelling     Swellings ankles/legs    Valsartan Other (comments)     Also \"slow heart rate down to 44\"    Zestril [Lisinopril] Hives           SOCIAL HISTORY:     Social History     Tobacco Use    Smoking status: Never Smoker    Smokeless tobacco: Never Used   Substance Use Topics    Alcohol use: No     Alcohol/week: 0.0 standard drinks    Drug use: No         FAMILY HISTORY:     Family History   Problem Relation Age of Onset    Coronary Artery Disease Father     Heart Disease Father     Coronary Artery Disease Paternal Aunt     Cancer Mother     Parkinson's Disease Mother     Stroke Mother     Thyroid Disease Mother     Seizures Brother             REVIEW OF SYMPTOMS:       Review of Symptoms:  (Positive findings above)  Constitutional: Negative for  diaphoresis. Respiratory: Negative for cough, hemoptysis, sputum production, shortness of breath and wheezing. Cardiovascular: Negative for chest pain, palpitations,  and PND. Gastrointestinal: Negative for vomiting, blood in stool and melena. Genitourinary: Negative for dysuria and flank pain. Neurological: Negative for focal weakness, seizures, loss of consciousness  Endo/Heme/Allergies: Negative for abnormal bleeding.     Psychiatric/Behavioral: Negative for memory loss.             PHYSICAL EXAM:        Physical Exam:  Visit Vitals  BP (!) 150/82 (BP 1 Location: Right arm, BP Patient Position: Sitting, BP Cuff Size: Large adult)   Pulse 80   Resp 16   Ht 5' 3\" (1.6 m)   Wt 192 lb (87.1 kg)   SpO2 98%   BMI 34.01 kg/m²     Patient appears generally well, mood and affect are appropriate and pleasant. HEENT: Hearing intact, non-icteric, normocephalic, atraumatic.    Neck Exam: Supple, No JVD sitting up   Lung Exam: Clear to auscultation, even breath sounds.    Cardiac Exam: Regular rate and rhythm with no murmur  Abdomen: Soft, non-tender, normal bowel sounds. + obese   Extremities: Moves all ext well. Mild lower extremity edema (legs wrapped).     Psych: Appropriate affect  Neuro - Grossly intact        LABS / OTHER STUDIES reviewed:     Lab Results   Component Value Date/Time    Sodium 141 02/03/2021 04:02 AM    Potassium 4.4 02/03/2021 04:02 AM    Chloride 112 (H) 02/03/2021 04:02 AM    CO2 20 (L) 02/03/2021 04:02 AM    Anion gap 9 02/03/2021 04:02 AM    Glucose 92 02/03/2021 04:02 AM    BUN 30 (H) 02/03/2021 04:02 AM    Creatinine 1.08 (H) 02/03/2021 04:02 AM    BUN/Creatinine ratio 28 (H) 02/03/2021 04:02 AM    GFR est AA 58 (L) 02/03/2021 04:02 AM    GFR est non-AA 48 (L) 02/03/2021 04:02 AM    Calcium 8.5 02/03/2021 04:02 AM    Bilirubin, total 0.2 02/02/2021 02:32 PM    Alk. phosphatase 62 02/02/2021 02:32 PM    Protein, total 7.6 02/02/2021 02:32 PM    Albumin 3.6 02/02/2021 02:32 PM    Globulin 4.0 02/02/2021 02:32 PM    A-G Ratio 0.9 (L) 02/02/2021 02:32 PM    ALT (SGPT) 26 02/02/2021 02:32 PM    AST (SGOT) 28 02/02/2021 02:32 PM     Lab Results   Component Value Date/Time    WBC 7.4 02/03/2021 04:02 AM    HGB 9.9 (L) 02/03/2021 04:02 AM    HCT 31.9 (L) 02/03/2021 04:02 AM    PLATELET 368 55/91/9295 04:02 AM    .3 (H) 02/03/2021 04:02 AM     No results found for: CHOL, CHOLPOCT, CHOLX, CHLST, CHOLV, HDL, HDLPOC, HDLP, LDL, LDLCPOC, LDLC, DLDLP, VLDLC, VLDL, TGLX, TRIGL, TRIGP, TGLPOCT, CHHD, CHHDX  Lab Results   Component Value Date/Time    TSH 3.46 02/02/2021 02:32 PM         CARDIAC DIAGNOSTICS:     Cardiac Evaluation Includes:   I reviewed the results below. Cath 2010: LAD 25%, RCA 20%, Mid RCA 20%. Stress Nuc 2013: Normal perfusion. EF 74. V/Q 5/1/16 - low prob  Echo 5/2/16 - LVEF 60%  Lexiscan Cardiolite 5/2/16 - 1) No ischemic EKG changes with Lexiscan.  Frequent PVC's during stress.  Mild chest pain noted during Lexiscan Injection.    2) Normal Lexiscan gated SPECT myocardial perfusion study. 3) LVEF 55%     [facility] Screening 10/17/15 - mild carotid disease, normal PALAK and AAA     Carotid Doppler 8/25/17 - 10-49% stenosis JONATAN and 7-1% LICA,     Renal Artery Dopplers - 4/19/19 - normal       CXR 7/20/20 - no acute process      Chest CTA 7/20/20 - No PE. No acute disease in the chest. + coronary calcifications      Echo 7/21/20 - LVEF 60-65%, grade 1 diastology, MAC     7 day Holter 1/21/21 - sinus, HR 32-96 with average 55 bpm.  No pauses > 2 sec. HR reached 34 bpm at 3 AM one night.         Echo 2/3/21 - LVEF 60-65%, G1DD, Dilated LA, mod MAC with mild MS, mild TR     LHC 2/3/21 - RCA: Dominant; Mid 60%; Distal 80% ; PLB - prox 80%; s/p MER to dRCA.         EKG 8/25/17 - NSR, RBBB  EKG 10/12/18 - NSR, RBBB, LAFB  EKG 12/10/19 - NSR, RBBB,   EKG 7/21/20 - NSR, RBBB, LAD  EKG 2/3/21 - NSR, RBBB, LAFB          ASSESSMENT AND PLAN:        Assessment and Plan:  CAD s/p MER RCA 2/3/21  - on DAPT with plavix/asp and high intensity statin  - no anginal c/o - breathing improved;   - has HH; not strong enough for cRehab yet but will revisit after she completes home PT  - check CBC in near future      HFpEF, LVEF 60-65%  - breathing / swelling improved   - over diuresed previously on torsemide and spironolactone with KAREEN which improved off meds  - now on Bumex 1mg/d; check updated BMP       HTN   - continue meds   - home log 130s/70s, infreq 140s/70s     Dyslipidemia  - continue crestor   - lipids checked by Endocrinology; recent lipids with LDL 84.   TGL elevated at 307.        Chronic LE edema  - wears bilateral leg wraps; currently well controlled    LE cellulitis   - s/p Abx with f/u in wound clinic next week       Bradycardia  - 7 day Holter 1/21/21 - sinus, HR 32-96 with average 55 bpm.  No pauses > 2 sec.  HR reached 34 bpm at 3 AM one night.    - clonidine decreased inpt - per pt HR now in the 80s.       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     Kayley Alexander, ANP

## 2021-06-11 LAB
BASOPHILS # BLD AUTO: 0.1 X10E3/UL (ref 0–0.2)
BASOPHILS NFR BLD AUTO: 1 %
BUN SERPL-MCNC: 26 MG/DL (ref 8–27)
BUN/CREAT SERPL: 22 (ref 12–28)
CALCIUM SERPL-MCNC: 9.3 MG/DL (ref 8.7–10.3)
CHLORIDE SERPL-SCNC: 103 MMOL/L (ref 96–106)
CO2 SERPL-SCNC: 22 MMOL/L (ref 20–29)
CREAT SERPL-MCNC: 1.2 MG/DL (ref 0.57–1)
EOSINOPHIL # BLD AUTO: 0.4 X10E3/UL (ref 0–0.4)
EOSINOPHIL NFR BLD AUTO: 5 %
ERYTHROCYTE [DISTWIDTH] IN BLOOD BY AUTOMATED COUNT: 13 % (ref 11.7–15.4)
GLUCOSE SERPL-MCNC: 108 MG/DL (ref 65–99)
HCT VFR BLD AUTO: 32.1 % (ref 34–46.6)
HGB BLD-MCNC: 10.5 G/DL (ref 11.1–15.9)
IMM GRANULOCYTES # BLD AUTO: 0 X10E3/UL (ref 0–0.1)
IMM GRANULOCYTES NFR BLD AUTO: 0 %
INTERPRETATION: NORMAL
LYMPHOCYTES # BLD AUTO: 1.1 X10E3/UL (ref 0.7–3.1)
LYMPHOCYTES NFR BLD AUTO: 12 %
MCH RBC QN AUTO: 30.5 PG (ref 26.6–33)
MCHC RBC AUTO-ENTMCNC: 32.7 G/DL (ref 31.5–35.7)
MCV RBC AUTO: 93 FL (ref 79–97)
MONOCYTES # BLD AUTO: 0.8 X10E3/UL (ref 0.1–0.9)
MONOCYTES NFR BLD AUTO: 9 %
NEUTROPHILS # BLD AUTO: 6.6 X10E3/UL (ref 1.4–7)
NEUTROPHILS NFR BLD AUTO: 73 %
PLATELET # BLD AUTO: 247 X10E3/UL (ref 150–450)
POTASSIUM SERPL-SCNC: 4.5 MMOL/L (ref 3.5–5.2)
RBC # BLD AUTO: 3.44 X10E6/UL (ref 3.77–5.28)
SODIUM SERPL-SCNC: 143 MMOL/L (ref 134–144)
WBC # BLD AUTO: 9 X10E3/UL (ref 3.4–10.8)

## 2021-07-07 RX ORDER — BUMETANIDE 1 MG/1
TABLET ORAL
Qty: 30 TABLET | Refills: 5 | Status: SHIPPED | OUTPATIENT
Start: 2021-07-07 | End: 2022-03-08 | Stop reason: SDUPTHER

## 2021-10-06 ENCOUNTER — OFFICE VISIT (OUTPATIENT)
Dept: CARDIOLOGY CLINIC | Age: 86
End: 2021-10-06
Payer: MEDICARE

## 2021-10-06 VITALS
DIASTOLIC BLOOD PRESSURE: 70 MMHG | HEIGHT: 63 IN | WEIGHT: 190 LBS | SYSTOLIC BLOOD PRESSURE: 155 MMHG | HEART RATE: 74 BPM | BODY MASS INDEX: 33.66 KG/M2 | OXYGEN SATURATION: 97 %

## 2021-10-06 DIAGNOSIS — I50.33 DIASTOLIC CHF, ACUTE ON CHRONIC (HCC): ICD-10-CM

## 2021-10-06 DIAGNOSIS — I10 PRIMARY HYPERTENSION: ICD-10-CM

## 2021-10-06 DIAGNOSIS — I25.10 CORONARY ARTERY DISEASE INVOLVING NATIVE HEART WITHOUT ANGINA PECTORIS, UNSPECIFIED VESSEL OR LESION TYPE: Primary | ICD-10-CM

## 2021-10-06 PROCEDURE — G8432 DEP SCR NOT DOC, RNG: HCPCS | Performed by: SPECIALIST

## 2021-10-06 PROCEDURE — 1090F PRES/ABSN URINE INCON ASSESS: CPT | Performed by: SPECIALIST

## 2021-10-06 PROCEDURE — 1101F PT FALLS ASSESS-DOCD LE1/YR: CPT | Performed by: SPECIALIST

## 2021-10-06 PROCEDURE — G8427 DOCREV CUR MEDS BY ELIG CLIN: HCPCS | Performed by: SPECIALIST

## 2021-10-06 PROCEDURE — G8536 NO DOC ELDER MAL SCRN: HCPCS | Performed by: SPECIALIST

## 2021-10-06 PROCEDURE — G8417 CALC BMI ABV UP PARAM F/U: HCPCS | Performed by: SPECIALIST

## 2021-10-06 PROCEDURE — 99214 OFFICE O/P EST MOD 30 MIN: CPT | Performed by: SPECIALIST

## 2021-10-06 NOTE — PROGRESS NOTES
Ameena Garcia is a 80 y.o. female    Visit Vitals  BP (!) 160/56 (BP 1 Location: Left upper arm, BP Patient Position: Sitting, BP Cuff Size: Adult)   Pulse 74   Ht 5' 3\" (1.6 m)   Wt 190 lb (86.2 kg)   SpO2 97%   BMI 33.66 kg/m²       Chief Complaint   Patient presents with    Coronary Artery Disease    Hypertension    CHF    Other     DLD       Chest pain NO  SOB NO  Dizziness NO  Swelling RIGHT ANKLE  Recent hospital visit NO  Refills NO  COVID VACCINE STATUS YES

## 2021-10-06 NOTE — PROGRESS NOTES
Alexandre Sandoval MD. [facility]              Patient: Eunice Montejo  : 1935      Today's Date: 10/6/2021          HISTORY OF PRESENT ILLNESS:     History of Present Illness:    Records reviewed --> she was admitted  with CHRISTIAN - she was cathed and received MER to RCA    Feeling better after PCI - no more SOB; No CP - energy is a little better but not good yet. Is to get PT for her back. Doing well cardiac wise. Biggest issue is back pain. PAST MEDICAL HISTORY:     Past Medical History:   Diagnosis Date    Arthritis     Asthma     [facility] ([facility] [address]     CAD (coronary artery disease)     MER to RCA in      Diabetes mellitus ([facility] [address]     Diverticulosis     Dyslipidemia     GERD (gastroesophageal reflux disease)     Hiatal hernia     HTN (hypertension)     Hypothyroid     Neuropathy     Obesity     LA on CPAP     RBBB (right bundle branch block)     Spinal stenosis          Past Surgical History:   Procedure Laterality Date    HX BUNIONECTOMY      HX CHOLECYSTECTOMY      HX CORONARY STENT PLACEMENT      HX HEART CATHETERIZATION      Cardiac Cath 9/3/2010 showed mild CAD.  HX HIP REPLACEMENT      HX ORTHOPAEDIC      HX OTHER SURGICAL      Echo 10/5/12 - LVEF 60-65%, mod-marked LAE, MAC, grade 1 diastolic dysfunction     HX OTHER SURGICAL      Lexiscan cardiolite 13 - normal MPI, LVEF 74%    HX OTHER SURGICAL      Aorta duplex 14 - aorta size 1.7 cm, no dilation          MEDICATIONS:     Current Outpatient Medications   Medication Sig Dispense Refill    bumetanide (BUMEX) 1 mg tablet TAKE 1 TABLET BY MOUTH DAILY 30 Tablet 5    calcium carbonate (CALCIUM 600 PO) two (2) times a day.  loratadine 10 mg cap Take 10 mg by mouth.  hydrALAZINE (APRESOLINE) 50 mg tablet Take 1 Tab by mouth two (2) times a day. 180 Tab 3    isosorbide mononitrate ER (IMDUR) 30 mg tablet Take 1 Tab by mouth daily.  90 Tab 3    clopidogreL (PLAVIX) 75 mg tab Take 1 Tab by mouth daily. 90 Tab 3    rosuvastatin (CRESTOR) 20 mg tablet Take 1 Tab by mouth nightly. 90 Tab 3    losartan (COZAAR) 100 mg tablet Take 1 Tab by mouth nightly. 90 Tab 3    aspirin 81 mg chewable tablet Take 1 Tab by mouth daily.  cloNIDine HCL (CATAPRES) 0.1 mg tablet Take 1 Tab by mouth nightly. 270 Tab 3    cyanocobalamin 1,000 mcg tablet Take 1,000 mcg by mouth every Monday, Wednesday, Friday.  gabapentin (NEURONTIN) 400 mg capsule Take 1 Cap by mouth nightly. Max Daily Amount: 400 mg. (Patient taking differently: Take 600 mg by mouth nightly.) 30 Cap 0    dilTIAZem ER (CARDIZEM CD) 240 mg capsule Take 1 Cap by mouth daily. 30 Cap 0    vit C/E/Zn/coppr/lutein/zeaxan (PRESERVISION AREDS 2 PO) Take 1 Cap by mouth two (2) times a day.  acetaminophen (TYLENOL EXTRA STRENGTH) 500 mg tablet Take 500-1,000 mg by mouth three (3) times daily as needed for Pain (Generally takes 2-3 times daily).  OTHER zyflamend 2 caps daily      levothyroxine (SYNTHROID) 100 mcg tablet Take 100 mcg by mouth Daily (before breakfast).  omeprazole (PRILOSEC) 20 mg capsule Take 20 mg by mouth two (2) times a day.  montelukast (SINGULAIR) 10 mg tablet Take 10 mg by mouth nightly.          Allergies   Allergen Reactions    Tolectin [Tolmetin] Anaphylaxis    Aldactone [Spironolactone] Other (comments)     hyperkalemia    Carvedilol Other (comments)     \"slow heart rate down to 44\"    Ceclor [Cefaclor] Hives    Ciprofloxacin Hives and Swelling    Darvon [Propoxyphene] Swelling     Swelling lips and hands    Macrobid [Nitrofurantoin Monohyd/M-Cryst] Other (comments)     Does not recall    Norvasc [Amlodipine] Swelling     Swelling ankles/legs    Tetracycline Swelling     Swelling lips/hands    Toprol Xl [Metoprolol Succinate] Swelling     Swellings ankles/legs    Valsartan Other (comments)     Also \"slow heart rate down to 44\"    Zestril [Lisinopril] Hives           SOCIAL HISTORY: Social History     Tobacco Use    Smoking status: Never Smoker    Smokeless tobacco: Never Used   Substance Use Topics    Alcohol use: No     Alcohol/week: 0.0 standard drinks    Drug use: No         FAMILY HISTORY:     Family History   Problem Relation Age of Onset    Coronary Artery Disease Father     Heart Disease Father     Coronary Artery Disease Paternal Aunt     Cancer Mother     Parkinson's Disease Mother     Stroke Mother     Thyroid Disease Mother     Seizures Brother             REVIEW OF SYMPTOMS:       Review of Symptoms:  Constitutional: Negative for fever, chills  HEENT: Negative for nosebleeds, tinnitus, and vision changes. Respiratory: Negative for cough, wheezing  Cardiovascular: Negative for syncope, and PND.  +CHRISTIAN  Gastrointestinal: Negative for abdominal pain, diarrhea, melena. Genitourinary: Negative for dysuria  Musculoskeletal: + back pain, joint pain   Skin: Negative for rash  Heme: No problems bleeding. Neurological: Negative for speech change and focal weakness.                   PHYSICAL EXAM:        Physical Exam:  Visit Vitals  BP (!) 160/56 (BP 1 Location: Left upper arm, BP Patient Position: Sitting, BP Cuff Size: Adult)   Pulse 74   Ht 5' 3\" (1.6 m)   Wt 190 lb (86.2 kg)   SpO2 97%   BMI 33.66 kg/m²          Patient appears generally well, mood and affect are appropriate and pleasant. HEENT: Hearing intact, non-icteric, normocephalic, atraumatic.    Neck Exam: Supple, No JVD sitting up   Lung Exam: Clear to auscultation, even breath sounds.    Cardiac Exam: Regular rate and rhythm with no murmur  Abdomen: Soft, non-tender, normal bowel sounds. + obese   Extremities: Moves all ext well. Mild lower extremity edema (legs wrapped).     Psych: Appropriate affect  Neuro - Grossly intact - in a wheelchair              LABS / OTHER STUDIES reviewed:       Lab Results   Component Value Date/Time    Sodium 143 06/10/2021 02:41 PM    Potassium 4.5 06/10/2021 02:41 PM Chloride 103 06/10/2021 02:41 PM    CO2 22 06/10/2021 02:41 PM    Anion gap 9 02/03/2021 04:02 AM    Glucose 108 (H) 06/10/2021 02:41 PM    BUN 26 06/10/2021 02:41 PM    Creatinine 1.20 (H) 06/10/2021 02:41 PM    BUN/Creatinine ratio 22 06/10/2021 02:41 PM    GFR est AA 48 (L) 06/10/2021 02:41 PM    GFR est non-AA 41 (L) 06/10/2021 02:41 PM    Calcium 9.3 06/10/2021 02:41 PM    Bilirubin, total 0.2 02/02/2021 02:32 PM    Alk. phosphatase 62 02/02/2021 02:32 PM    Protein, total 7.6 02/02/2021 02:32 PM    Albumin 3.6 02/02/2021 02:32 PM    Globulin 4.0 02/02/2021 02:32 PM    A-G Ratio 0.9 (L) 02/02/2021 02:32 PM    ALT (SGPT) 26 02/02/2021 02:32 PM    AST (SGOT) 28 02/02/2021 02:32 PM     Lab Results   Component Value Date/Time    WBC 9.0 06/10/2021 02:41 PM    HGB 10.5 (L) 06/10/2021 02:41 PM    HCT 32.1 (L) 06/10/2021 02:41 PM    PLATELET 102 36/12/2984 02:41 PM    MCV 93 06/10/2021 02:41 PM       Lab Results   Component Value Date/Time    TSH 3.46 02/02/2021 02:32 PM               CARDIAC DIAGNOSTICS:     Cardiac Evaluation Includes:  I reviewed the results below. Cath 2010: LAD 25%, RCA 20%, Mid RCA 20%. Stress Nuc 2013: Normal perfusion. EF 74. V/Q 5/1/16 - low prob  Echo 5/2/16 - LVEF 60%  Lexiscan Cardiolite 5/2/16 - 1) No ischemic EKG changes with Lexiscan.  Frequent PVC's during stress.  Mild chest pain noted during Lexiscan Injection.    2) Normal Lexiscan gated SPECT myocardial perfusion study. 3) LVEF 55%     [facility] Screening 10/17/15 - mild carotid disease, normal PALAK and AAA     Carotid Doppler 8/25/17 - 10-49% stenosis JONATAN and 7-6% LICA,     Renal Artery Dopplers - 4/19/19 - normal       CXR 7/20/20 - no acute process      Chest CTA 7/20/20 - No PE. No acute disease in the chest. + coronary calcifications      Echo 7/21/20 - LVEF 60-65%, grade 1 diastology, MAC     7 day Holter 1/21/21 - sinus, HR 32-96 with average 55 bpm.  No pauses > 2 sec.   HR reached 34 bpm at 3 AM one night.         Echo 2/3/21 - LVEF 60-65%, G1DD, Dilated LA, mod MAC with mild MS, mild TR     [facility] 2/3/21 - RCA: Dominant; Mid 60%; Distal 80% ; PLB - prox 80%; s/p MER to dRCA.         EKG 8/25/17 - NSR, RBBB  EKG 10/12/18 - NSR, RBBB, LAFB  EKG 12/10/19 - NSR, RBBB,   EKG 7/21/20 - NSR, RBBB, LAD  EKG 2/3/21 - NSR, RBBB, LAFB                    ASSESSMENT AND PLAN:        Assessment and Plan:    CAD s/p MER RCA 2/3/21  - no anginal c/o - breathing improved;   - Cont DAPT with plavix/asp and high intensity statin ---> plan to stop plavix 2/22     HFpEF, LVEF 60-65%  - breathing / swelling improved   - over diuresed previously on torsemide and spironolactone with KAREEN which improved off meds  - now on Bumex 1mg/d      HTN   - continue multi med regimen   - home log 120-130s/70s -- followed by home health nurse       Dyslipidemia  - continue crestor   - Dr. Whittaker Rhianna follows lipids       Chronic LE edema and prior cellulitis   - wears bilateral leg wraps; currently well controlled  - Is in wound clinic [facility])      Bradycardia  - 7 day Holter 1/21/21 - sinus, HR 32-96 with average 55 bpm.  No pauses > 2 sec.  HR reached 34 bpm at 3 AM one night.    - clonidine decreased inpt - per pt HR now in the 80s.    See NP in 4 months.       65+ years.  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        Johnson Olvera MD, [address] [place]  [address] [facility], [id number] [address]  [address]  [place], [address] [place] [address], [address] [place] [address]  Ph: [phone]                               [phone]

## 2022-01-25 RX ORDER — HYDRALAZINE HYDROCHLORIDE 50 MG/1
TABLET, FILM COATED ORAL
Qty: 180 TABLET | Refills: 3 | Status: SHIPPED | OUTPATIENT
Start: 2022-01-25 | End: 2022-02-14

## 2022-01-25 NOTE — TELEPHONE ENCOUNTER
Refill per VO of Dr. Leelee Perez  Last appt: 10/6/2021  Future Appointments   Date Time Provider Shanon Iqbal   2/8/2022  1:20 PM Kameron Riggins MD [facility]       Requested Prescriptions     Pending Prescriptions Disp Refills    hydrALAZINE (APRESOLINE) 50 mg tablet [Pharmacy Med Name: HYDRALAZINE  TAB 50MG] 180 Tablet 3     Sig: TAKE 1 TABLET TWICE A DAY

## 2022-02-08 ENCOUNTER — OFFICE VISIT (OUTPATIENT)
Dept: CARDIOLOGY CLINIC | Age: 87
End: 2022-02-08
Payer: MEDICARE

## 2022-02-08 VITALS
DIASTOLIC BLOOD PRESSURE: 78 MMHG | HEIGHT: 63 IN | WEIGHT: 191.2 LBS | HEART RATE: 78 BPM | OXYGEN SATURATION: 97 % | SYSTOLIC BLOOD PRESSURE: 144 MMHG | BODY MASS INDEX: 33.88 KG/M2

## 2022-02-08 DIAGNOSIS — I10 PRIMARY HYPERTENSION: ICD-10-CM

## 2022-02-08 DIAGNOSIS — I50.33 DIASTOLIC CHF, ACUTE ON CHRONIC (HCC): Primary | ICD-10-CM

## 2022-02-08 PROCEDURE — 1101F PT FALLS ASSESS-DOCD LE1/YR: CPT | Performed by: SPECIALIST

## 2022-02-08 PROCEDURE — G8536 NO DOC ELDER MAL SCRN: HCPCS | Performed by: SPECIALIST

## 2022-02-08 PROCEDURE — G8427 DOCREV CUR MEDS BY ELIG CLIN: HCPCS | Performed by: SPECIALIST

## 2022-02-08 PROCEDURE — 1090F PRES/ABSN URINE INCON ASSESS: CPT | Performed by: SPECIALIST

## 2022-02-08 PROCEDURE — 99214 OFFICE O/P EST MOD 30 MIN: CPT | Performed by: SPECIALIST

## 2022-02-08 PROCEDURE — G8417 CALC BMI ABV UP PARAM F/U: HCPCS | Performed by: SPECIALIST

## 2022-02-08 PROCEDURE — G8432 DEP SCR NOT DOC, RNG: HCPCS | Performed by: SPECIALIST

## 2022-02-08 RX ORDER — SPIRONOLACTONE 25 MG/1
12.5 TABLET ORAL DAILY
Qty: 45 TABLET | Refills: 3 | Status: SHIPPED | OUTPATIENT
Start: 2022-02-08 | End: 2022-02-08 | Stop reason: SDUPTHER

## 2022-02-08 RX ORDER — SPIRONOLACTONE 25 MG/1
12.5 TABLET ORAL DAILY
Qty: 15 TABLET | Refills: 3 | Status: SHIPPED | OUTPATIENT
Start: 2022-02-08

## 2022-02-08 NOTE — PROGRESS NOTES
Ingrid Epstein MD. [facility] [place]              Patient: Vinicio Knowles  : 1935      Today's Date: 2022          HISTORY OF PRESENT ILLNESS:     History of Present Illness:    Doing OK. /76 - high in AM often. 's in evening. PAST MEDICAL HISTORY:     Past Medical History:   Diagnosis Date    Arthritis     Asthma     [facility]     CAD (coronary artery disease)     MER to RCA in      Diabetes mellitus ([facility]     Diverticulosis     Dyslipidemia     GERD (gastroesophageal reflux disease)     Hiatal hernia     HTN (hypertension)     Hypothyroid     Neuropathy     Obesity     LA on CPAP     RBBB (right bundle branch block)     Spinal stenosis          Past Surgical History:   Procedure Laterality Date    HX BUNIONECTOMY      HX CHOLECYSTECTOMY      HX CORONARY STENT PLACEMENT      HX HEART CATHETERIZATION      Cardiac Cath 9/3/2010 showed mild CAD.  HX HIP REPLACEMENT      HX ORTHOPAEDIC      HX OTHER SURGICAL      Echo 10/5/12 - LVEF 60-65%, mod-marked LAE, MAC, grade 1 diastolic dysfunction     HX OTHER SURGICAL      Lexiscan cardiolite 13 - normal MPI, LVEF 74%    HX OTHER SURGICAL      Aorta duplex 14 - aorta size 1.7 cm, no dilation          MEDICATIONS:     Current Outpatient Medications   Medication Sig Dispense Refill    hydrALAZINE (APRESOLINE) 50 mg tablet TAKE 1 TABLET TWICE A  Tablet 3    bumetanide (BUMEX) 1 mg tablet TAKE 1 TABLET BY MOUTH DAILY 30 Tablet 5    calcium carbonate (CALCIUM 600 PO) two (2) times a day.  loratadine 10 mg cap Take 10 mg by mouth.  isosorbide mononitrate ER (IMDUR) 30 mg tablet Take 1 Tab by mouth daily. 90 Tab 3    clopidogreL (PLAVIX) 75 mg tab Take 1 Tab by mouth daily. 90 Tab 3    rosuvastatin (CRESTOR) 20 mg tablet Take 1 Tab by mouth nightly. 90 Tab 3    losartan (COZAAR) 100 mg tablet Take 1 Tab by mouth nightly.  90 Tab 3    aspirin 81 mg chewable tablet Take 1 Tab by mouth daily.  cloNIDine HCL (CATAPRES) 0.1 mg tablet Take 1 Tab by mouth nightly. 270 Tab 3    cyanocobalamin 1,000 mcg tablet Take 1,000 mcg by mouth every Monday, Wednesday, Friday.  gabapentin (NEURONTIN) 400 mg capsule Take 1 Cap by mouth nightly. Max Daily Amount: 400 mg. (Patient taking differently: Take 600 mg by mouth nightly.) 30 Cap 0    dilTIAZem ER (CARDIZEM CD) 240 mg capsule Take 1 Cap by mouth daily. 30 Cap 0    vit C/E/Zn/coppr/lutein/zeaxan (PRESERVISION AREDS 2 PO) Take 1 Cap by mouth two (2) times a day.  acetaminophen (TYLENOL EXTRA STRENGTH) 500 mg tablet Take 500-1,000 mg by mouth three (3) times daily as needed for Pain (Generally takes 2-3 times daily).  levothyroxine (SYNTHROID) 100 mcg tablet Take 100 mcg by mouth Daily (before breakfast).  omeprazole (PRILOSEC) 20 mg capsule Take 20 mg by mouth two (2) times a day.  montelukast (SINGULAIR) 10 mg tablet Take 10 mg by mouth nightly.          Allergies   Allergen Reactions    Tolectin [Tolmetin] Anaphylaxis    Aldactone [Spironolactone] Other (comments)     hyperkalemia    Carvedilol Other (comments)     \"slow heart rate down to 44\"    Ceclor [Cefaclor] Hives    Ciprofloxacin Hives and Swelling    Darvon [Propoxyphene] Swelling     Swelling lips and hands    Macrobid [Nitrofurantoin Monohyd/M-Cryst] Other (comments)     Does not recall    Norvasc [Amlodipine] Swelling     Swelling ankles/legs    Tetracycline Swelling     Swelling lips/hands    Toprol Xl [Metoprolol Succinate] Swelling     Swellings ankles/legs    Valsartan Other (comments)     Also \"slow heart rate down to 44\"    Zestril [Lisinopril] Hives           SOCIAL HISTORY:     Social History     Tobacco Use    Smoking status: Never Smoker    Smokeless tobacco: Never Used   Substance Use Topics    Alcohol use: No     Alcohol/week: 0.0 standard drinks    Drug use: No         FAMILY HISTORY:     Family History   Problem Relation Age of Onset    Coronary Art Dis Father     Heart Disease Father     Coronary Art Dis Paternal Aunt     Cancer Mother     Parkinson's Disease Mother     Stroke Mother     Thyroid Disease Mother     Seizures Brother             REVIEW OF SYMPTOMS:       Review of Symptoms:  Constitutional: Negative for fever, chills  HEENT: Negative for nosebleeds, tinnitus, and vision changes. Respiratory: Negative for cough, wheezing  Cardiovascular: Negative for syncope, and PND.  +CHRISTIAN  Gastrointestinal: Negative for abdominal pain, diarrhea, melena. Genitourinary: Negative for dysuria  Musculoskeletal: + back pain, joint pain   Skin: Negative for rash  Heme: No problems bleeding. Neurological: Negative for speech change and focal weakness.                   PHYSICAL EXAM:        Physical Exam:  Visit Vitals  BP (!) 144/78 (BP 1 Location: Left upper arm, BP Patient Position: Sitting)   Pulse 78   Ht 5' 3\" (1.6 m)   Wt 191 lb 3.2 oz (86.7 kg)   SpO2 97%   BMI 33.87 kg/m²          Patient appears generally well, mood and affect are appropriate and pleasant. HEENT: Hearing intact, non-icteric, normocephalic, atraumatic.    Neck Exam: Supple, No JVD sitting up   Lung Exam: Clear to auscultation, even breath sounds.    Cardiac Exam: Regular rate and rhythm with no murmur  Abdomen: Soft, non-tender, normal bowel sounds. + obese   Extremities: Moves all ext well. Mild lower extremity edema (legs wrapped).     Psych: Appropriate affect  Neuro - Grossly intact - in a wheelchair              LABS / OTHER STUDIES reviewed:       Lab Results   Component Value Date/Time    Sodium 143 06/10/2021 02:41 PM    Potassium 4.5 06/10/2021 02:41 PM    Chloride 103 06/10/2021 02:41 PM    CO2 22 06/10/2021 02:41 PM    Anion gap 9 02/03/2021 04:02 AM    Glucose 108 (H) 06/10/2021 02:41 PM    BUN 26 06/10/2021 02:41 PM    Creatinine 1.20 (H) 06/10/2021 02:41 PM    BUN/Creatinine ratio 22 06/10/2021 02:41 PM    GFR est AA 48 (L) 06/10/2021 02:41 PM GFR est non-AA 41 (L) 06/10/2021 02:41 PM    Calcium 9.3 06/10/2021 02:41 PM    Bilirubin, total 0.2 02/02/2021 02:32 PM    Alk. phosphatase 62 02/02/2021 02:32 PM    Protein, total 7.6 02/02/2021 02:32 PM    Albumin 3.6 02/02/2021 02:32 PM    Globulin 4.0 02/02/2021 02:32 PM    A-G Ratio 0.9 (L) 02/02/2021 02:32 PM    ALT (SGPT) 26 02/02/2021 02:32 PM    AST (SGOT) 28 02/02/2021 02:32 PM     Lab Results   Component Value Date/Time    WBC 9.0 06/10/2021 02:41 PM    HGB 10.5 (L) 06/10/2021 02:41 PM    HCT 32.1 (L) 06/10/2021 02:41 PM    PLATELET 142 25/97/5660 02:41 PM    MCV 93 06/10/2021 02:41 PM       Lab Results   Component Value Date/Time    TSH 3.46 02/02/2021 02:32 PM               CARDIAC DIAGNOSTICS:     Cardiac Evaluation Includes:  I reviewed the results below. Cath 2010: LAD 25%, RCA 20%, Mid RCA 20%. Stress Nuc 2013: Normal perfusion. EF 74. V/Q 5/1/16 - low prob  Echo 5/2/16 - LVEF 60%  Lexiscan Cardiolite 5/2/16 - 1) No ischemic EKG changes with Lexiscan.  Frequent PVC's during stress.  Mild chest pain noted during Lexiscan Injection.    2) Normal Lexiscan gated SPECT myocardial perfusion study. 3) LVEF 55%     [facility] Screening 10/17/15 - mild carotid disease, normal PALAK and AAA     Carotid Doppler 8/25/17 - 10-49% stenosis JONATAN and 1-0% LICA,     Renal Artery Dopplers - 4/19/19 - normal       CXR 7/20/20 - no acute process      Chest CTA 7/20/20 - No PE. No acute disease in the chest. + coronary calcifications      Echo 7/21/20 - LVEF 60-65%, grade 1 diastology, MAC     7 day Holter 1/21/21 - sinus, HR 32-96 with average 55 bpm.  No pauses > 2 sec. HR reached 34 bpm at 3 AM one night.         Echo 2/3/21 - LVEF 60-65%, G1DD, Dilated LA, mod MAC with mild MS, mild TR     LHC 2/3/21 - RCA: Dominant; Mid 60%;  Distal 80% ; PLB - prox 80%; s/p MER to dRCA.         EKG 8/25/17 - NSR, RBBB  EKG 10/12/18 - NSR, RBBB, LAFB  EKG 12/10/19 - NSR, RBBB,   EKG 7/21/20 - NSR, RBBB, LAD  EKG 2/3/21 - NSR, RBBB, LAFB                    ASSESSMENT AND PLAN:        Assessment and Plan:    CAD s/p MER RCA 2/3/21  - no anginal c/o - breathing improved;   - Cont Aspirin and high intensity statin   ---> Will stop plavix     HFpEF, LVEF 60-65%  - breathing / swelling improved   - over diuresed previously on torsemide and spironolactone with KAREEN which improved off meds  - now on Bumex 1mg/d      HTN   - continue multi med regimen   - BP runs high at home ----> will add aldactone 12.5 mg daily to multimed regimen --. Follow BP at home and see --> she had KAREEN and hyperkalemia on 25 mg aldactone in past so will try a lower dose and follow closely - see NP in one month       Dyslipidemia  - continue crestor   - Dr. Ce Lr follows lipids       Chronic LE edema and prior cellulitis   - wears bilateral leg wraps; currently well controlled  - Is in wound clinic [facility])      Bradycardia  - 7 day Holter 1/21/21 - sinus, HR 32-96 with average 55 bpm.  No pauses > 2 sec.  HR reached 34 bpm at 3 AM one night.    - clonidine decreased inpt - per pt HR now in the 80s.    See NP in 1 month.       65+ years.  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        Ary Menon MD, [address], [address] [id number] [address]  [address]  [facility], [address] [place] [address]  Ph: [phone]                                [phone]

## 2022-02-08 NOTE — PROGRESS NOTES
Maddie Rebolledo is a 80 y.o. female    Chief Complaint   Patient presents with    Follow-up     4 months    Coronary Artery Disease    CHF    Hypertension     Patient is [facility] in the [facility] [place]     Chest pain No     SOB No    Dizziness No    Swelling patient states some swelling in her ankles     Refills No    Visit Vitals  BP (!) 144/78 (BP 1 Location: Left upper arm, BP Patient Position: Sitting)   Pulse 78   Ht 5' 3\" (1.6 m)   Wt 191 lb 3.2 oz (86.7 kg)   SpO2 97%   BMI 33.87 kg/m²       1. Have you been to the ER, urgent care clinic since your last visit? Hospitalized since your last visit? No    2. Have you seen or consulted any other health care providers outside of the [address] [place] [address] since your last visit? Include any pap smears or colon screening.   No

## 2022-02-09 ENCOUNTER — TELEPHONE (OUTPATIENT)
Dept: CARDIOLOGY CLINIC | Age: 87
End: 2022-02-09

## 2022-02-09 NOTE — TELEPHONE ENCOUNTER
Kiarra calling from the [facility] [place] [facility]. States that the patient was seen on yesterday and was prescribed Spironolactone and the patient is allergic to that medication. Requests a call back at [phone] ext [phone].      Thanks,  Soniya Chung'

## 2022-02-10 NOTE — TELEPHONE ENCOUNTER
R/t call to [facility],  Per Dr. Sukhjinder Layton: \"It's not an allergy - she had high K+ on higher dose aldactone when she get dehydrated.  I am aware of the levels and doses.  Am trying a lower dose of aldactone again. \"  Jaime Wright works [employer].   Spoke to nurse's station, will fax info to: [phone]

## 2022-02-14 RX ORDER — HYDRALAZINE HYDROCHLORIDE 50 MG/1
TABLET, FILM COATED ORAL
Qty: 180 TABLET | Refills: 1 | Status: SHIPPED | OUTPATIENT
Start: 2022-02-14 | End: 2022-03-08 | Stop reason: SDUPTHER

## 2022-02-14 NOTE — TELEPHONE ENCOUNTER
Refill per VO of Dr. Tania Steven  Last appt: 2/8/2022  Future Appointments   Date Time Provider Shanon Iqbal   3/8/2022  2:00 PM Isela Hodge NP [facility] AMB       Requested Prescriptions     Signed Prescriptions Disp Refills    hydrALAZINE (APRESOLINE) 50 mg tablet 180 Tablet 1     Sig: TAKE 1 TABLET BY MOUTH TWICE DAILY     Authorizing Provider: Esvin Marie     Ordering User: Lula Mac

## 2022-03-08 ENCOUNTER — OFFICE VISIT (OUTPATIENT)
Dept: CARDIOLOGY CLINIC | Age: 87
End: 2022-03-08
Payer: MEDICARE

## 2022-03-08 VITALS
OXYGEN SATURATION: 96 % | HEART RATE: 54 BPM | DIASTOLIC BLOOD PRESSURE: 62 MMHG | SYSTOLIC BLOOD PRESSURE: 160 MMHG | RESPIRATION RATE: 18 BRPM | WEIGHT: 194 LBS | HEIGHT: 63 IN | BODY MASS INDEX: 34.38 KG/M2

## 2022-03-08 DIAGNOSIS — E78.5 DYSLIPIDEMIA: ICD-10-CM

## 2022-03-08 DIAGNOSIS — I25.10 CORONARY ARTERY DISEASE INVOLVING NATIVE CORONARY ARTERY OF NATIVE HEART WITHOUT ANGINA PECTORIS: ICD-10-CM

## 2022-03-08 DIAGNOSIS — I10 PRIMARY HYPERTENSION: Primary | ICD-10-CM

## 2022-03-08 DIAGNOSIS — R00.1 BRADYCARDIA: ICD-10-CM

## 2022-03-08 PROCEDURE — 1090F PRES/ABSN URINE INCON ASSESS: CPT | Performed by: NURSE PRACTITIONER

## 2022-03-08 PROCEDURE — 1101F PT FALLS ASSESS-DOCD LE1/YR: CPT | Performed by: NURSE PRACTITIONER

## 2022-03-08 PROCEDURE — 99214 OFFICE O/P EST MOD 30 MIN: CPT | Performed by: NURSE PRACTITIONER

## 2022-03-08 PROCEDURE — G8536 NO DOC ELDER MAL SCRN: HCPCS | Performed by: NURSE PRACTITIONER

## 2022-03-08 PROCEDURE — G8427 DOCREV CUR MEDS BY ELIG CLIN: HCPCS | Performed by: NURSE PRACTITIONER

## 2022-03-08 PROCEDURE — G8417 CALC BMI ABV UP PARAM F/U: HCPCS | Performed by: NURSE PRACTITIONER

## 2022-03-08 PROCEDURE — G8432 DEP SCR NOT DOC, RNG: HCPCS | Performed by: NURSE PRACTITIONER

## 2022-03-08 RX ORDER — HYDRALAZINE HYDROCHLORIDE 50 MG/1
50 TABLET, FILM COATED ORAL EVERY 8 HOURS
Qty: 270 TABLET | Refills: 1 | Status: SHIPPED | OUTPATIENT
Start: 2022-03-08 | End: 2022-04-05 | Stop reason: SDUPTHER

## 2022-03-08 RX ORDER — BUMETANIDE 1 MG/1
1 TABLET ORAL EVERY OTHER DAY
Qty: 45 TABLET | Refills: 1 | Status: SHIPPED | OUTPATIENT
Start: 2022-03-08 | End: 2022-04-05 | Stop reason: SDUPTHER

## 2022-03-08 NOTE — PROGRESS NOTES
Room # 6  Lives at the [address] [place] [address] St is a 80 y.o. female    Chief Complaint   Patient presents with    Hypertension    Cholesterol Problem    Coronary Artery Disease    Follow-up     * Wants year supply of refills sent to [facility]  Chest pain No    SOB NO    Dizziness No    Swelling NO    Refills NO    Visit Vitals  BP (!) 160/62   Pulse (!) 54   Resp 18   Ht 5' 3\" (1.6 m)   Wt 194 lb (88 kg)   SpO2 96%   BMI 34.37 kg/m²       1. Have you been to the ER, urgent care clinic since your last visit? Hospitalized since your last visit? NO    2. Have you seen or consulted any other health care providers outside of the [address] [place] [address] since your last visit? Include any pap smears or colon screening.   NO

## 2022-03-08 NOTE — PROGRESS NOTES
Kamar Peters, ANDRE  [address]  [place], [address] [facility]    Office [phone]  Fax [phone]          Patient: Da Bridges  : 1935      Today's Date: 3/8/2022          HISTORY OF PRESENT ILLNESS:     History of Present Illness:  Doing OK. Notes BP elevated recently, particularly in the morning. Log reviewed 120s-160s/60s-70s. Taking all meds as rx. Recent labs 22 - Na 138, K 4.3, CO2 26, BUN 45, Cr 1.5, eGFR non AA 32     Lives in assisted living which is new. Has some chronic CHRISTIAN but feels this is stable / unchanged from baseline. Patient denies any exertional chest pain, palpitations, syncope, edema, or paroxysmal nocturnal dyspnea. PAST MEDICAL HISTORY:     Past Medical History:   Diagnosis Date    Arthritis     Asthma     [facility] ([facility]     CAD (coronary artery disease)     MER to RCA in      Diabetes mellitus ([facility]     Diverticulosis     Dyslipidemia     GERD (gastroesophageal reflux disease)     Hiatal hernia     HTN (hypertension)     Hypothyroid     Neuropathy     Obesity     LA on CPAP     RBBB (right bundle branch block)     Spinal stenosis          Past Surgical History:   Procedure Laterality Date    HX BUNIONECTOMY      HX CHOLECYSTECTOMY      HX CORONARY STENT PLACEMENT      HX HEART CATHETERIZATION      Cardiac Cath 9/3/2010 showed mild CAD.       HX HIP REPLACEMENT      HX ORTHOPAEDIC      HX OTHER SURGICAL      Echo 10/5/12 - LVEF 60-65%, mod-marked LAE, MAC, grade 1 diastolic dysfunction     HX OTHER SURGICAL      Lexiscan cardiolite 13 - normal MPI, LVEF 74%    HX OTHER SURGICAL      Aorta duplex 14 - aorta size 1.7 cm, no dilation          MEDICATIONS:     Current Outpatient Medications   Medication Sig Dispense Refill    hydrALAZINE (APRESOLINE) 50 mg tablet TAKE 1 TABLET BY MOUTH TWICE DAILY 180 Tablet 1    spironolactone (ALDACTONE) 25 mg tablet Take 0.5 Tablets by mouth daily. 15 Tablet 3    bumetanide (BUMEX) 1 mg tablet TAKE 1 TABLET BY MOUTH DAILY 30 Tablet 5    calcium carbonate (CALCIUM 600 PO) two (2) times a day.  loratadine 10 mg cap Take 10 mg by mouth.  isosorbide mononitrate ER (IMDUR) 30 mg tablet Take 1 Tab by mouth daily. 90 Tab 3    rosuvastatin (CRESTOR) 20 mg tablet Take 1 Tab by mouth nightly. 90 Tab 3    losartan (COZAAR) 100 mg tablet Take 1 Tab by mouth nightly. 90 Tab 3    aspirin 81 mg chewable tablet Take 1 Tab by mouth daily.  cloNIDine HCL (CATAPRES) 0.1 mg tablet Take 1 Tab by mouth nightly. 270 Tab 3    cyanocobalamin 1,000 mcg tablet Take 1,000 mcg by mouth every Monday, Wednesday, Friday.  gabapentin (NEURONTIN) 400 mg capsule Take 1 Cap by mouth nightly. Max Daily Amount: 400 mg. (Patient taking differently: Take 600 mg by mouth nightly.) 30 Cap 0    dilTIAZem ER (CARDIZEM CD) 240 mg capsule Take 1 Cap by mouth daily. 30 Cap 0    vit C/E/Zn/coppr/lutein/zeaxan (PRESERVISION AREDS 2 PO) Take 1 Cap by mouth two (2) times a day.  acetaminophen (TYLENOL EXTRA STRENGTH) 500 mg tablet Take 500-1,000 mg by mouth three (3) times daily as needed for Pain (Generally takes 2-3 times daily).  levothyroxine (SYNTHROID) 100 mcg tablet Take 100 mcg by mouth Daily (before breakfast).  omeprazole (PRILOSEC) 20 mg capsule Take 20 mg by mouth two (2) times a day.  montelukast (SINGULAIR) 10 mg tablet Take 10 mg by mouth nightly.          Allergies   Allergen Reactions    Tolectin [Tolmetin] Anaphylaxis    Aldactone [Spironolactone] Other (comments)     hyperkalemia    Carvedilol Other (comments)     \"slow heart rate down to 44\"    Ceclor [Cefaclor] Hives    Ciprofloxacin Hives and Swelling    Darvon [Propoxyphene] Swelling     Swelling lips and hands    Macrobid [Nitrofurantoin Monohyd/M-Cryst] Other (comments)     Does not recall    Norvasc [Amlodipine] Swelling     Swelling ankles/legs    Tetracycline Swelling     Swelling lips/hands    Toprol Xl [Metoprolol Succinate] Swelling     Swellings ankles/legs    Valsartan Other (comments)     Also \"slow heart rate down to 44\"    Zestril [Lisinopril] Hives           SOCIAL HISTORY:     Social History     Tobacco Use    Smoking status: Never Smoker    Smokeless tobacco: Never Used   Substance Use Topics    Alcohol use: No     Alcohol/week: 0.0 standard drinks    Drug use: No         FAMILY HISTORY:     Family History   Problem Relation Age of Onset    Coronary Art Dis Father     Heart Disease Father     Coronary Art Dis Paternal Aunt     Cancer Mother     Parkinson's Disease Mother     Stroke Mother     Thyroid Disease Mother     Seizures Brother             REVIEW OF SYMPTOMS:       Review of Symptoms:  Constitutional: Negative for fever, chills  HEENT: Negative for nosebleeds, tinnitus, and vision changes. Respiratory: Negative for cough, wheezing  Cardiovascular: Negative for syncope, and PND.  +CHRISTIAN  Gastrointestinal: Negative for abdominal pain, diarrhea, melena. Genitourinary: Negative for dysuria  Musculoskeletal: + back pain, joint pain   Skin: Negative for rash  Heme: No problems bleeding. Neurological: Negative for speech change and focal weakness.             PHYSICAL EXAM:        Physical Exam:  Visit Vitals  BP (!) 160/62   Pulse (!) 54   Resp 18   Ht 5' 3\" (1.6 m)   Wt 194 lb (88 kg)   SpO2 96%   BMI 34.37 kg/m²     Patient appears generally well, mood and affect are appropriate and pleasant. HEENT: Hearing intact, non-icteric, normocephalic, atraumatic.    Neck Exam: Supple  Lung Exam: CTAB, respirations relaxed on RA  Cardiac Exam: Regular rate and rhythm with no murmur  Abdomen: Soft, non-tender, normal bowel sounds. + obese   Extremities: Moves all ext well. Mild lower extremity edema (legs wrapped).     Psych: Appropriate affect  Neuro - Grossly intact - in a wheelchair        LABS / OTHER STUDIES reviewed:       Lab Results Component Value Date/Time    Sodium 143 06/10/2021 02:41 PM    Potassium 4.5 06/10/2021 02:41 PM    Chloride 103 06/10/2021 02:41 PM    CO2 22 06/10/2021 02:41 PM    Anion gap 9 02/03/2021 04:02 AM    Glucose 108 (H) 06/10/2021 02:41 PM    BUN 26 06/10/2021 02:41 PM    Creatinine 1.20 (H) 06/10/2021 02:41 PM    BUN/Creatinine ratio 22 06/10/2021 02:41 PM    GFR est AA 48 (L) 06/10/2021 02:41 PM    GFR est non-AA 41 (L) 06/10/2021 02:41 PM    Calcium 9.3 06/10/2021 02:41 PM    Bilirubin, total 0.2 02/02/2021 02:32 PM    Alk. phosphatase 62 02/02/2021 02:32 PM    Protein, total 7.6 02/02/2021 02:32 PM    Albumin 3.6 02/02/2021 02:32 PM    Globulin 4.0 02/02/2021 02:32 PM    A-G Ratio 0.9 (L) 02/02/2021 02:32 PM    ALT (SGPT) 26 02/02/2021 02:32 PM    AST (SGOT) 28 02/02/2021 02:32 PM     Lab Results   Component Value Date/Time    WBC 9.0 06/10/2021 02:41 PM    HGB 10.5 (L) 06/10/2021 02:41 PM    HCT 32.1 (L) 06/10/2021 02:41 PM    PLATELET 877 86/99/0298 02:41 PM    MCV 93 06/10/2021 02:41 PM       Lab Results   Component Value Date/Time    TSH 3.46 02/02/2021 02:32 PM       CARDIAC DIAGNOSTICS:     Cardiac Evaluation Includes:  I reviewed the results below. Cath 2010: LAD 25%, RCA 20%, Mid RCA 20%. Stress Nuc 2013: Normal perfusion. EF 74. V/Q 5/1/16 - low prob  Echo 5/2/16 - LVEF 60%  Lexiscan Cardiolite 5/2/16 - 1) No ischemic EKG changes with Lexiscan.  Frequent PVC's during stress.  Mild chest pain noted during Lexiscan Injection.    2) Normal Lexiscan gated SPECT myocardial perfusion study. 3) LVEF 55%     [facility] Screening 10/17/15 - mild carotid disease, normal PALAK and AAA     Carotid Doppler 8/25/17 - 10-49% stenosis JONATAN and 6-0% LICA,     Renal Artery Dopplers - 4/19/19 - normal       CXR 7/20/20 - no acute process      Chest CTA 7/20/20 - No PE.  No acute disease in the chest. + coronary calcifications      Echo 7/21/20 - LVEF 60-65%, grade 1 diastology, MAC     7 day Holter 1/21/21 - sinus, HR 32-96 with average 55 bpm.  No pauses > 2 sec. HR reached 34 bpm at 3 AM one night. Echo 2/3/21 - LVEF 60-65%, G1DD, Dilated LA, mod MAC with mild MS, mild TR     LHC 2/3/21 - RCA: Dominant; Mid 60%; Distal 80% ; PLB - prox 80%; s/p MER to dRCA.      EKG 8/25/17 - NSR, RBBB  EKG 10/12/18 - NSR, RBBB, LAFB  EKG 12/10/19 - NSR, RBBB,   EKG 7/21/20 - NSR, RBBB, LAD  EKG 2/3/21 - NSR, RBBB, LAFB               ASSESSMENT AND PLAN:        Assessment and Plan:  CAD s/p MER RCA 2/3/21  - no anginal c/o - breathing stable today ;   - Cont Aspirin and high intensity statin      HFpEF, LVEF 60-65%  - breathing / swelling improved   - over diuresed previously on torsemide and spironolactone with KAREEN which improved off meds  - on Bumex 1mg/d - Cr again up trending (1.5), was 1.2 last June - will try to cut back Bumex to every other day      HTN   - continue multi med regimen   - BP cont to run high - will titrate hydralazine to 50mg q8hrs.    - advised to keep hm log x2 weeks - call if cont to run high      Dyslipidemia  - continue crestor   - Dr. Dell Barbour follows lipids - on crestor 20mg/d       Chronic LE edema and prior cellulitis   - wears bilateral leg wraps; currently well controlled     Bradycardia  - 7 day Holter 1/21/21 - sinus, HR 32-96 with average 55 bpm.  No pauses > 2 sec.  HR reached 34 bpm at 3 AM one night.    - clonidine decreased prev with improvement, also on dilt    - HR today in the 50s - pt advised to monitor at hm; call if running <50      F/u in 6wks or PRN      65+ years.  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     Enedelia Parker, ANP

## 2022-03-08 NOTE — PATIENT INSTRUCTIONS
Change hydralazine to 50mg every 8 hours  Change Bumex to 1mg every other day. Follow-up in 6 wks or as needed  Keep home home BP log - check 1-2 x per day for 2 weeks.

## 2022-03-10 DIAGNOSIS — I10 ESSENTIAL HYPERTENSION: ICD-10-CM

## 2022-03-10 DIAGNOSIS — E78.5 DYSLIPIDEMIA: ICD-10-CM

## 2022-03-10 NOTE — TELEPHONE ENCOUNTER
Kimberly Chapa from [place] [address] is calling to check the status of the medication that was ordered or for the patient.     [phone]

## 2022-03-16 RX ORDER — ROSUVASTATIN CALCIUM 20 MG/1
20 TABLET, COATED ORAL
Qty: 90 TABLET | Refills: 1 | Status: SHIPPED | OUTPATIENT
Start: 2022-03-16 | End: 2022-08-23

## 2022-03-16 RX ORDER — ISOSORBIDE MONONITRATE 30 MG/1
30 TABLET, EXTENDED RELEASE ORAL DAILY
Qty: 90 TABLET | Refills: 1 | Status: SHIPPED | OUTPATIENT
Start: 2022-03-16

## 2022-03-16 RX ORDER — CLONIDINE HYDROCHLORIDE 0.1 MG/1
0.1 TABLET ORAL
Qty: 90 TABLET | Refills: 1 | Status: SHIPPED | OUTPATIENT
Start: 2022-03-16

## 2022-03-16 RX ORDER — DILTIAZEM HYDROCHLORIDE 240 MG/1
240 CAPSULE, COATED, EXTENDED RELEASE ORAL DAILY
Qty: 90 CAPSULE | Refills: 1 | Status: SHIPPED | OUTPATIENT
Start: 2022-03-16 | End: 2022-05-26

## 2022-03-16 RX ORDER — LOSARTAN POTASSIUM 100 MG/1
100 TABLET ORAL
Qty: 90 TABLET | Refills: 1 | Status: SHIPPED | OUTPATIENT
Start: 2022-03-16

## 2022-03-16 NOTE — TELEPHONE ENCOUNTER
Per VO of Dr. Wendi Franz: 3/8/2022    Future Appointments   Date Time Provider Shanon Lynnei   4/18/2022 11:20 AM Johnson Man NP [facility] BS AMB       Requested Prescriptions     Signed Prescriptions Disp Refills    rosuvastatin (CRESTOR) 20 mg tablet 90 Tablet 1     Sig: Take 1 Tablet by mouth nightly. Authorizing Provider: Alicia Sanchez     Ordering User: Robyn Disla losartan (COZAAR) 100 mg tablet 90 Tablet 1     Sig: Take 1 Tablet by mouth nightly. Authorizing Provider: Alicia Sanchez     Ordering User: Robyn Disla isosorbide mononitrate ER (IMDUR) 30 mg tablet 90 Tablet 1     Sig: Take 1 Tablet by mouth daily. Authorizing Provider: Alicia Sanchez     Ordering User: Robyn Disla cloNIDine HCL (CATAPRES) 0.1 mg tablet 90 Tablet 1     Sig: Take 1 Tablet by mouth nightly. Authorizing Provider: Alicia Sanchez     Ordering User: Eb Hoover    dilTIAZem ER (CARDIZEM CD) 240 mg capsule 90 Capsule 1     Sig: Take 1 Capsule by mouth daily.      Authorizing Provider: Alicia Sanchez     Ordering User: Eb Hoover

## 2022-03-18 PROBLEM — E87.1 HYPONATREMIA: Status: ACTIVE | Noted: 2020-07-22

## 2022-03-19 PROBLEM — I50.33 DIASTOLIC CHF, ACUTE ON CHRONIC (HCC): Status: ACTIVE | Noted: 2021-02-02

## 2022-03-19 PROBLEM — R06.09 EXERTIONAL DYSPNEA: Status: ACTIVE | Noted: 2021-02-02

## 2022-03-19 PROBLEM — E87.5 HYPERKALEMIA: Status: ACTIVE | Noted: 2021-02-02

## 2022-03-20 PROBLEM — R06.09 DOE (DYSPNEA ON EXERTION): Status: ACTIVE | Noted: 2020-07-20

## 2022-03-20 PROBLEM — R00.1 BRADYCARDIA: Status: ACTIVE | Noted: 2021-02-02

## 2022-04-05 ENCOUNTER — OFFICE VISIT (OUTPATIENT)
Dept: CARDIOLOGY CLINIC | Age: 87
End: 2022-04-05
Payer: MEDICARE

## 2022-04-05 ENCOUNTER — HOSPITAL ENCOUNTER (OUTPATIENT)
Dept: GENERAL RADIOLOGY | Age: 87
Discharge: HOME | End: 2022-04-05
Payer: MEDICARE

## 2022-04-05 ENCOUNTER — TELEPHONE (OUTPATIENT)
Dept: CARDIOLOGY CLINIC | Age: 87
End: 2022-04-05

## 2022-04-05 ENCOUNTER — DOCUMENTATION ONLY (OUTPATIENT)
Dept: CARDIOLOGY CLINIC | Age: 87
End: 2022-04-05

## 2022-04-05 VITALS
DIASTOLIC BLOOD PRESSURE: 20 MMHG | BODY MASS INDEX: 32.43 KG/M2 | HEART RATE: 57 BPM | HEIGHT: 63 IN | OXYGEN SATURATION: 98 % | SYSTOLIC BLOOD PRESSURE: 146 MMHG | WEIGHT: 183 LBS

## 2022-04-05 DIAGNOSIS — I10 PRIMARY HYPERTENSION: ICD-10-CM

## 2022-04-05 DIAGNOSIS — R06.02 SOB (SHORTNESS OF BREATH): ICD-10-CM

## 2022-04-05 DIAGNOSIS — I25.10 CORONARY ARTERY DISEASE INVOLVING NATIVE HEART WITHOUT ANGINA PECTORIS, UNSPECIFIED VESSEL OR LESION TYPE: ICD-10-CM

## 2022-04-05 DIAGNOSIS — R53.83 FATIGUE, UNSPECIFIED TYPE: ICD-10-CM

## 2022-04-05 DIAGNOSIS — R06.02 SOB (SHORTNESS OF BREATH): Primary | ICD-10-CM

## 2022-04-05 PROCEDURE — G8432 DEP SCR NOT DOC, RNG: HCPCS | Performed by: SPECIALIST

## 2022-04-05 PROCEDURE — G8536 NO DOC ELDER MAL SCRN: HCPCS | Performed by: SPECIALIST

## 2022-04-05 PROCEDURE — G8427 DOCREV CUR MEDS BY ELIG CLIN: HCPCS | Performed by: SPECIALIST

## 2022-04-05 PROCEDURE — 99214 OFFICE O/P EST MOD 30 MIN: CPT | Performed by: SPECIALIST

## 2022-04-05 PROCEDURE — 71046 X-RAY EXAM CHEST 2 VIEWS: CPT

## 2022-04-05 PROCEDURE — 93000 ELECTROCARDIOGRAM COMPLETE: CPT | Performed by: SPECIALIST

## 2022-04-05 PROCEDURE — 1090F PRES/ABSN URINE INCON ASSESS: CPT | Performed by: SPECIALIST

## 2022-04-05 PROCEDURE — 1101F PT FALLS ASSESS-DOCD LE1/YR: CPT | Performed by: SPECIALIST

## 2022-04-05 PROCEDURE — G8417 CALC BMI ABV UP PARAM F/U: HCPCS | Performed by: SPECIALIST

## 2022-04-05 RX ORDER — HYDRALAZINE HYDROCHLORIDE 50 MG/1
50 TABLET, FILM COATED ORAL EVERY 8 HOURS
Qty: 270 TABLET | Refills: 3 | Status: SHIPPED | OUTPATIENT
Start: 2022-04-05 | End: 2022-05-02 | Stop reason: SDUPTHER

## 2022-04-05 RX ORDER — HYDRALAZINE HYDROCHLORIDE 100 MG/1
100 TABLET, FILM COATED ORAL EVERY 8 HOURS
Qty: 270 TABLET | Refills: 3 | Status: SHIPPED | OUTPATIENT
Start: 2022-04-05 | End: 2022-04-05 | Stop reason: SDUPTHER

## 2022-04-05 RX ORDER — BUMETANIDE 1 MG/1
1 TABLET ORAL DAILY
Qty: 90 TABLET | Refills: 3 | Status: SHIPPED | OUTPATIENT
Start: 2022-04-05 | End: 2022-04-08

## 2022-04-05 RX ORDER — AMLODIPINE BESYLATE 5 MG/1
5 TABLET ORAL DAILY
Qty: 30 TABLET | Refills: 3 | Status: SHIPPED | OUTPATIENT
Start: 2022-04-05

## 2022-04-05 RX ORDER — AMLODIPINE BESYLATE 5 MG/1
5 TABLET ORAL DAILY
Qty: 90 TABLET | Refills: 3 | Status: SHIPPED | OUTPATIENT
Start: 2022-04-05 | End: 2022-04-05 | Stop reason: SDUPTHER

## 2022-04-05 NOTE — PROGRESS NOTES
Angelika Lilly MD. [facility]              Patient: Petrona Taveras  : 1935      Today's Date: 2022          HISTORY OF PRESENT ILLNESS:     History of Present Illness:    She is an add on today after seeing Dentist and noted to have high BP and SOB. Past few days, has CHRISTIAN. OK while sitting. No orthopnea. No CP. BP has been high --> /60-70's at home - better in evening. Has gained 7 lbs since .         PAST MEDICAL HISTORY:     Past Medical History:   Diagnosis Date    Arthritis     Asthma     Breast cancer ([facility] [place] [address]     CAD (coronary artery disease)     MER to RCA in      Diabetes mellitus ([facility] [place] [address]     Diverticulosis     Dyslipidemia     GERD (gastroesophageal reflux disease)     Hiatal hernia     HTN (hypertension)     Hypothyroid     Neuropathy     Obesity     LA on CPAP     RBBB (right bundle branch block)     Spinal stenosis          Past Surgical History:   Procedure Laterality Date    HX BUNIONECTOMY      HX CHOLECYSTECTOMY      HX CORONARY STENT PLACEMENT      HX HEART CATHETERIZATION      Cardiac Cath 9/3/2010 showed mild CAD.  HX HIP REPLACEMENT      HX ORTHOPAEDIC      HX OTHER SURGICAL      Echo 10/5/12 - LVEF 60-65%, mod-marked LAE, MAC, grade 1 diastolic dysfunction     HX OTHER SURGICAL      Lexiscan cardiolite 13 - normal MPI, LVEF 74%    HX OTHER SURGICAL      Aorta duplex 14 - aorta size 1.7 cm, no dilation          MEDICATIONS:     Current Outpatient Medications   Medication Sig Dispense Refill    rosuvastatin (CRESTOR) 20 mg tablet Take 1 Tablet by mouth nightly. 90 Tablet 1    losartan (COZAAR) 100 mg tablet Take 1 Tablet by mouth nightly. 90 Tablet 1    isosorbide mononitrate ER (IMDUR) 30 mg tablet Take 1 Tablet by mouth daily. 90 Tablet 1    cloNIDine HCL (CATAPRES) 0.1 mg tablet Take 1 Tablet by mouth nightly. 90 Tablet 1    dilTIAZem ER (CARDIZEM CD) 240 mg capsule Take 1 Capsule by mouth daily.  90 Capsule 1    hydrALAZINE (APRESOLINE) 50 mg tablet Take 1 Tablet by mouth every eight (8) hours. (Patient taking differently: Take 50 mg by mouth three (3) times daily.) 270 Tablet 1    bumetanide (BUMEX) 1 mg tablet Take 1 Tablet by mouth every other day. 45 Tablet 1    spironolactone (ALDACTONE) 25 mg tablet Take 0.5 Tablets by mouth daily. 15 Tablet 3    calcium carbonate (CALCIUM 600 PO) daily.  loratadine 10 mg cap Take 10 mg by mouth.  aspirin 81 mg chewable tablet Take 1 Tab by mouth daily.  cyanocobalamin 1,000 mcg tablet Take 1,000 mcg by mouth every Monday, Wednesday, Friday.  gabapentin (NEURONTIN) 400 mg capsule Take 1 Cap by mouth nightly. Max Daily Amount: 400 mg. (Patient taking differently: Take 600 mg by mouth nightly.) 30 Cap 0    vit C/E/Zn/coppr/lutein/zeaxan (PRESERVISION AREDS 2 PO) Take 1 Cap by mouth two (2) times a day.  acetaminophen (TYLENOL EXTRA STRENGTH) 500 mg tablet Take 500-1,000 mg by mouth three (3) times daily as needed for Pain (Generally takes 2-3 times daily).  levothyroxine (SYNTHROID) 100 mcg tablet Take 100 mcg by mouth Daily (before breakfast).  omeprazole (PRILOSEC) 20 mg capsule Take 20 mg by mouth two (2) times a day.  montelukast (SINGULAIR) 10 mg tablet Take 10 mg by mouth nightly.          Allergies   Allergen Reactions    Tolectin [Tolmetin] Anaphylaxis    Aldactone [Spironolactone] Other (comments)     hyperkalemia    Carvedilol Other (comments)     \"slow heart rate down to 44\"    Ceclor [Cefaclor] Hives    Ciprofloxacin Hives and Swelling    Darvon [Propoxyphene] Swelling     Swelling lips and hands    Macrobid [Nitrofurantoin Monohyd/M-Cryst] Other (comments)     Does not recall    Norvasc [Amlodipine] Swelling     Swelling ankles/legs    Tetracycline Swelling     Swelling lips/hands    Toprol Xl [Metoprolol Succinate] Swelling     Swellings ankles/legs    Valsartan Other (comments)     Also \"slow heart rate down to 44\"    Zestril [Lisinopril] Hives           SOCIAL HISTORY:     Social History     Tobacco Use    Smoking status: Never Smoker    Smokeless tobacco: Never Used   Substance Use Topics    Alcohol use: No     Alcohol/week: 0.0 standard drinks    Drug use: No         FAMILY HISTORY:     Family History   Problem Relation Age of Onset    Coronary Art Dis Father     Heart Disease Father     Coronary Art Dis Paternal Aunt     Cancer Mother     Parkinson's Disease Mother     Stroke Mother     Thyroid Disease Mother     Seizures Brother             REVIEW OF SYMPTOMS:       Review of Symptoms:  Constitutional: Negative for fever, chills  HEENT: Negative for nosebleeds, tinnitus, and vision changes. Respiratory: + CHRISTIAN  Cardiovascular: Negative for syncope, and PND.  +CHRISTIAN  Gastrointestinal: Negative for abdominal pain, diarrhea, melena. Genitourinary: Negative for dysuria  Musculoskeletal: + back pain, joint pain   Skin: Negative for rash  Heme: No problems bleeding. Neurological: Negative for speech change and focal weakness.                   PHYSICAL EXAM:        Physical Exam:  Visit Vitals  BP (!) 146/20 (BP 1 Location: Right upper arm, BP Patient Position: Sitting)   Pulse (!) 57   Ht 5' 3\" (1.6 m)   Wt 183 lb (83 kg)   SpO2 98%   BMI 32.42 kg/m²          Patient appears generally well, mood and affect are appropriate and pleasant. HEENT: Hearing intact, non-icteric, normocephalic, atraumatic.    Neck Exam: Supple  Lung Exam: + crackles at left base   Cardiac Exam: Regular rate and rhythm with no murmur  Abdomen: Soft, non-tender, normal bowel sounds. + obese   Extremities: Moves all ext well. Mild lower extremity edema (legs wrapped).     Psych: Appropriate affect  Neuro - Grossly intact - in a wheelchair              LABS / OTHER STUDIES reviewed:       Lab Results   Component Value Date/Time    Sodium 143 06/10/2021 02:41 PM    Potassium 4.5 06/10/2021 02:41 PM    Chloride 103 06/10/2021 02:41 PM    CO2 22 06/10/2021 02:41 PM    Anion gap 9 02/03/2021 04:02 AM    Glucose 108 (H) 06/10/2021 02:41 PM    BUN 26 06/10/2021 02:41 PM    Creatinine 1.20 (H) 06/10/2021 02:41 PM    BUN/Creatinine ratio 22 06/10/2021 02:41 PM    GFR est AA 48 (L) 06/10/2021 02:41 PM    GFR est non-AA 41 (L) 06/10/2021 02:41 PM    Calcium 9.3 06/10/2021 02:41 PM    Bilirubin, total 0.2 02/02/2021 02:32 PM    Alk. phosphatase 62 02/02/2021 02:32 PM    Protein, total 7.6 02/02/2021 02:32 PM    Albumin 3.6 02/02/2021 02:32 PM    Globulin 4.0 02/02/2021 02:32 PM    A-G Ratio 0.9 (L) 02/02/2021 02:32 PM    ALT (SGPT) 26 02/02/2021 02:32 PM    AST (SGOT) 28 02/02/2021 02:32 PM     Lab Results   Component Value Date/Time    WBC 9.0 06/10/2021 02:41 PM    HGB 10.5 (L) 06/10/2021 02:41 PM    HCT 32.1 (L) 06/10/2021 02:41 PM    PLATELET 909 65/83/4426 02:41 PM    MCV 93 06/10/2021 02:41 PM       Lab Results   Component Value Date/Time    TSH 3.46 02/02/2021 02:32 PM               CARDIAC DIAGNOSTICS:     Cardiac Evaluation Includes:  I reviewed the results below. Cath 2010: LAD 25%, RCA 20%, Mid RCA 20%. Stress Nuc 2013: Normal perfusion. EF 74. V/Q 5/1/16 - low prob  Echo 5/2/16 - LVEF 60%  Lexiscan Cardiolite 5/2/16 - 1) No ischemic EKG changes with Lexiscan.  Frequent PVC's during stress.  Mild chest pain noted during Lexiscan Injection.    2) Normal Lexiscan gated SPECT myocardial perfusion study. 3) LVEF 55%     [facility] Screening 10/17/15 - mild carotid disease, normal PALAK and AAA     Carotid Doppler 8/25/17 - 10-49% stenosis JONATAN and 0-7% LICA,     Renal Artery Dopplers - 4/19/19 - normal       CXR 7/20/20 - no acute process      Chest CTA 7/20/20 - No PE. No acute disease in the chest. + coronary calcifications      Echo 7/21/20 - LVEF 60-65%, grade 1 diastology, MAC     7 day Holter 1/21/21 - sinus, HR 32-96 with average 55 bpm.  No pauses > 2 sec.   HR reached 34 bpm at 3 AM one night.         Echo 2/3/21 - LVEF 60-65%, G1DD, Dilated LA, mod MAC with mild MS, mild TR     [facility] 2/3/21 - RCA: Dominant; Mid 60%;  Distal 80% ; PLB - prox 80%; s/p MER to dRCA.         EKG 8/25/17 - NSR, RBBB  EKG 10/12/18 - NSR, RBBB, LAFB  EKG 12/10/19 - NSR, RBBB,   EKG 7/21/20 - NSR, RBBB, LAD  EKG 2/3/21 - NSR, RBBB, LAFB   EKG 4/5/22 - sinus dianna, RBBB                   ASSESSMENT AND PLAN:        Assessment and Plan:    CHRISTIAN 4/5/22  - she is an add on for CHRISTIAN past few days   - Will check basic labs  - check an echo   - check a CXR (crackles at left base)   - she feels finding similar to what she had prior to her PCI ---> consider repeat cath if labs OK and symptoms persist   - She had Bumex cut back to every other day on 3/8/22 ---> can try having her take it again daily    - will recheck a 2 week event monitor for bradycardia    - Due to bradycardia, switch from Cardizem to Norvasc     CAD s/p MER RCA 2/3/21  - no anginal c/o - breathing improved;   - Cont Aspirin and high intensity statin      HFpEF, LVEF 60-65%  - over diuresed previously on torsemide and spironolactone with KAREEN which improved off meds  - See above       HTN   - continue multi med regimen   - she had KAREEN and hyperkalemia on 25 mg aldactone in past (thus had to lower doses)   - see changes above       Dyslipidemia  - continue crestor   - Dr. Sebas Steiner follows lipids       Chronic LE edema and prior cellulitis   - wears bilateral leg wraps; currently well controlled  - Is in wound clinic [facility])      Bradycardia  - 7 day Holter 1/21/21 - sinus, HR 32-96 with average 55 bpm.  No pauses > 2 sec.  HR reached 34 bpm at 3 AM one night.    - clonidine decreased previously inpt due to bradycardia   - Due to bradycardia, switched from cardizem to Norvasc  - will recheck a 2 week event monitor for bradycardia      See NP in 2 weeks       65+ years.  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        Griselda Mowers, MD, [facility] [facility]  [address] [place], [address] [facility] [address]      [id number] [address]  [place], [address], [address] [place] [address]  Ph: [phone]                               [phone]         ADDENDUM   4/8/2022  I called her     ProBNP lower than before and CXR are clear. Also Cr is elevated. Will have her cut Bumex back to 3x a week and stay hydrated. Consider a cardiac cath (RHC and LHC) as she had similar symptoms prior to PCI. I discussed having her admitted, but she would prefer not to do that and get cath in a couple of weeks (having some diverticular issues right now she says). Recheck BMP in 1 week and will set up cath (RHC and LHC) in 2 weeks. Component      Latest Ref Rng & Units 4/5/2022 2/2/2021           4:04 PM  2:32 PM   NT pro-BNP      <450 PG/ML 1,906 (H) 4,942 (H)     Lab Results   Component Value Date/Time    Sodium 138 04/05/2022 04:04 PM    Potassium 5.0 04/05/2022 04:04 PM    Chloride 107 04/05/2022 04:04 PM    CO2 24 04/05/2022 04:04 PM    Anion gap 7 04/05/2022 04:04 PM    Glucose 116 (H) 04/05/2022 04:04 PM    BUN 50 (H) 04/05/2022 04:04 PM    Creatinine 1.68 (H) 04/05/2022 04:04 PM    BUN/Creatinine ratio 30 (H) 04/05/2022 04:04 PM    GFR est AA 35 (L) 04/05/2022 04:04 PM    GFR est non-AA 29 (L) 04/05/2022 04:04 PM    Calcium 7.8 (L) 04/05/2022 04:04 PM       ADDENDUM   5/4/2022  Echo 5/4/22 - LVEF 60-65%, grade 2 diastology,  Mitral Valve: Thickened leaflet. Moderate annular calcification of the mitral valve. Mild regurgitation. Mild stenosis noted. MV mean gradient is 4 mmHg. Severe LAE, RVSP 42. Echo is stable - will send a message       ADDENDUM   5/4/2022  Event Monitor 4/12/22-4/25/22 - NSR, HR , Avg HR 70 bpm.  Longest pause 2.0 sec. She sees NP in 3 weeks.   HR monitor looks OK

## 2022-04-05 NOTE — TELEPHONE ENCOUNTER
Dr. Yue Lawrence, dentist calling,     Pt is SOB, HTN    SOB similar to when stent was placed about a year ago. Not distressed with SOB. /74, 180/76    About an hour away. Asked them to head this way and we will work her in when she gets here.

## 2022-04-05 NOTE — PROGRESS NOTES
Chief Complaint   Patient presents with    Follow-up     1 month    Shortness of Breath     per Dr. Adama Gutierrez, dentist    Hypertension     Visit Vitals  Ht 5' 3\" (1.6 m)   BMI 34.37 kg/m²     Chest pain denied   SOB for last couple days   Palpitations denied   Swelling in hands/feet denied   Dizziness yes this morning,   Recent hospital stays denied   Refills denied   Vitals:    04/05/22 1358 04/05/22 1410   BP: (!) 150/40 (!) 146/20   BP 1 Location: Left upper arm Right upper arm   BP Patient Position: Sitting Sitting   Pulse: (!) 57    Height: 5' 3\" (1.6 m)    Weight: 183 lb (83 kg)    SpO2: 98%

## 2022-04-06 LAB
ALBUMIN SERPL-MCNC: 3.9 G/DL (ref 3.5–5)
ALBUMIN/GLOB SERPL: 1.2 {RATIO} (ref 1.1–2.2)
ALP SERPL-CCNC: 78 U/L (ref 45–117)
ALT SERPL-CCNC: 21 U/L (ref 12–78)
ANION GAP SERPL CALC-SCNC: 7 MMOL/L (ref 5–15)
AST SERPL-CCNC: 20 U/L (ref 15–37)
BILIRUB SERPL-MCNC: 0.3 MG/DL (ref 0.2–1)
BNP SERPL-MCNC: 1906 PG/ML
BUN SERPL-MCNC: 50 MG/DL (ref 6–20)
BUN/CREAT SERPL: 30 (ref 12–20)
CALCIUM SERPL-MCNC: 7.8 MG/DL (ref 8.5–10.1)
CHLORIDE SERPL-SCNC: 107 MMOL/L (ref 97–108)
CO2 SERPL-SCNC: 24 MMOL/L (ref 21–32)
CREAT SERPL-MCNC: 1.68 MG/DL (ref 0.55–1.02)
ERYTHROCYTE [DISTWIDTH] IN BLOOD BY AUTOMATED COUNT: 14.2 % (ref 11.5–14.5)
GLOBULIN SER CALC-MCNC: 3.3 G/DL (ref 2–4)
GLUCOSE SERPL-MCNC: 116 MG/DL (ref 65–100)
HCT VFR BLD AUTO: 33.9 % (ref 35–47)
HGB BLD-MCNC: 10.4 G/DL (ref 11.5–16)
MCH RBC QN AUTO: 30.6 PG (ref 26–34)
MCHC RBC AUTO-ENTMCNC: 30.7 G/DL (ref 30–36.5)
MCV RBC AUTO: 99.7 FL (ref 80–99)
NRBC # BLD: 0 K/UL (ref 0–0.01)
NRBC BLD-RTO: 0 PER 100 WBC
PLATELET # BLD AUTO: 237 K/UL (ref 150–400)
PMV BLD AUTO: 11.2 FL (ref 8.9–12.9)
POTASSIUM SERPL-SCNC: 5 MMOL/L (ref 3.5–5.1)
PROT SERPL-MCNC: 7.2 G/DL (ref 6.4–8.2)
RBC # BLD AUTO: 3.4 M/UL (ref 3.8–5.2)
SODIUM SERPL-SCNC: 138 MMOL/L (ref 136–145)
TSH SERPL DL<=0.05 MIU/L-ACNC: 3.27 UIU/ML (ref 0.36–3.74)
WBC # BLD AUTO: 9 K/UL (ref 3.6–11)

## 2022-04-06 NOTE — PROGRESS NOTES
Orders for See Montse Varela and do echo same day in 2 weeks or so    per Dr. Catina Rokc VO.   Dx: seth, chf, le edema, dianna

## 2022-04-08 ENCOUNTER — TELEPHONE (OUTPATIENT)
Dept: CARDIOLOGY CLINIC | Age: 87
End: 2022-04-08

## 2022-04-08 DIAGNOSIS — E87.1 HYPONATREMIA: ICD-10-CM

## 2022-04-08 DIAGNOSIS — N17.9 AKI (ACUTE KIDNEY INJURY) (HCC): Primary | ICD-10-CM

## 2022-04-08 DIAGNOSIS — R06.09 DOE (DYSPNEA ON EXERTION): ICD-10-CM

## 2022-04-08 RX ORDER — BUMETANIDE 1 MG/1
1 TABLET ORAL
Qty: 45 TABLET | Refills: 3 | Status: SHIPPED | OUTPATIENT
Start: 2022-04-08

## 2022-04-08 NOTE — TELEPHONE ENCOUNTER
Per Dr. Johanna Pace: Cherelle Jovel - I reviewed lab results and plan with Ms. Julio Dumont. She may be dry (Cr elevated).  Since symptoms are similar to prior to PCI, want to cath.  But would like renal function better before cath.       Plan   1) I asked her to cut Bumex to 3 x a week   2) Can you please send her lab request to recheck BMP in about a week or so   3) Can you please also have JT do RHC and LHC in 2 weeks or so. Tiffani Stevens may need to hydrate pre-cath if Cr still elevated.    Not sure of volume status so RHC would be helpful. 4) If she feels worse, may need to admit her to sort things out prior to cath. \"  Lab order placed. Lives in assisted living and they can draw labs there. Jeff Mackey  Ph [phone]  Order for Bumex 3 x week.

## 2022-04-08 NOTE — TELEPHONE ENCOUNTER
The [facility] calling for update on bumex medication cut. They need that faxed to them. They also asked about the cath labs and whether that will be done here.      Phone: [phone] ext [phone]  Fax: [phone]

## 2022-04-08 NOTE — TELEPHONE ENCOUNTER
Spoke to pt,  Per Dr. Rice Notice: Joao Mueller you please let her know CXR looks OK. Thanks\"    Pt was asking about lab results. I let her know that I do see that they were resulted but I don't see that MD has commented on them. Informed her that MD is out of the office today, but I would discuss with him on Monday and get back with her if any medication changes needed to be made.

## 2022-04-28 DIAGNOSIS — I10 PRIMARY HYPERTENSION: ICD-10-CM

## 2022-05-02 ENCOUNTER — TELEPHONE (OUTPATIENT)
Dept: CARDIOLOGY CLINIC | Age: 87
End: 2022-05-02

## 2022-05-02 ENCOUNTER — ANCILLARY PROCEDURE (OUTPATIENT)
Dept: CARDIOLOGY CLINIC | Age: 87
End: 2022-05-02
Payer: MEDICARE

## 2022-05-02 ENCOUNTER — OFFICE VISIT (OUTPATIENT)
Dept: CARDIOLOGY CLINIC | Age: 87
End: 2022-05-02

## 2022-05-02 VITALS
SYSTOLIC BLOOD PRESSURE: 152 MMHG | HEIGHT: 63 IN | DIASTOLIC BLOOD PRESSURE: 84 MMHG | WEIGHT: 198 LBS | BODY MASS INDEX: 35.08 KG/M2

## 2022-05-02 VITALS
OXYGEN SATURATION: 98 % | HEART RATE: 77 BPM | DIASTOLIC BLOOD PRESSURE: 78 MMHG | BODY MASS INDEX: 35.08 KG/M2 | SYSTOLIC BLOOD PRESSURE: 148 MMHG | HEIGHT: 63 IN | WEIGHT: 198 LBS

## 2022-05-02 DIAGNOSIS — E78.5 DYSLIPIDEMIA: ICD-10-CM

## 2022-05-02 DIAGNOSIS — R06.02 SOB (SHORTNESS OF BREATH): ICD-10-CM

## 2022-05-02 DIAGNOSIS — I25.10 CORONARY ARTERY DISEASE INVOLVING NATIVE CORONARY ARTERY OF NATIVE HEART WITHOUT ANGINA PECTORIS: ICD-10-CM

## 2022-05-02 DIAGNOSIS — R53.83 FATIGUE, UNSPECIFIED TYPE: ICD-10-CM

## 2022-05-02 DIAGNOSIS — I25.10 CORONARY ARTERY DISEASE INVOLVING NATIVE HEART WITHOUT ANGINA PECTORIS, UNSPECIFIED VESSEL OR LESION TYPE: ICD-10-CM

## 2022-05-02 DIAGNOSIS — R06.09 DOE (DYSPNEA ON EXERTION): ICD-10-CM

## 2022-05-02 DIAGNOSIS — I50.32 CHRONIC DIASTOLIC HEART FAILURE (HCC): ICD-10-CM

## 2022-05-02 DIAGNOSIS — I10 PRIMARY HYPERTENSION: Primary | ICD-10-CM

## 2022-05-02 PROCEDURE — G8417 CALC BMI ABV UP PARAM F/U: HCPCS | Performed by: NURSE PRACTITIONER

## 2022-05-02 PROCEDURE — 1090F PRES/ABSN URINE INCON ASSESS: CPT | Performed by: NURSE PRACTITIONER

## 2022-05-02 PROCEDURE — 99214 OFFICE O/P EST MOD 30 MIN: CPT | Performed by: NURSE PRACTITIONER

## 2022-05-02 PROCEDURE — 93306 TTE W/DOPPLER COMPLETE: CPT | Performed by: SPECIALIST

## 2022-05-02 PROCEDURE — 1123F ACP DISCUSS/DSCN MKR DOCD: CPT | Performed by: NURSE PRACTITIONER

## 2022-05-02 PROCEDURE — G8536 NO DOC ELDER MAL SCRN: HCPCS | Performed by: NURSE PRACTITIONER

## 2022-05-02 PROCEDURE — 1101F PT FALLS ASSESS-DOCD LE1/YR: CPT | Performed by: NURSE PRACTITIONER

## 2022-05-02 PROCEDURE — G8432 DEP SCR NOT DOC, RNG: HCPCS | Performed by: NURSE PRACTITIONER

## 2022-05-02 PROCEDURE — G8427 DOCREV CUR MEDS BY ELIG CLIN: HCPCS | Performed by: NURSE PRACTITIONER

## 2022-05-02 RX ORDER — HYDRALAZINE HYDROCHLORIDE 100 MG/1
100 TABLET, FILM COATED ORAL EVERY 8 HOURS
Qty: 270 TABLET | Refills: 2 | Status: SHIPPED | OUTPATIENT
Start: 2022-05-02

## 2022-05-02 NOTE — TELEPHONE ENCOUNTER
[facility] returning a call, believes it had something to do with lab results. Lab results were faxed on the 19th. Please advise.      [phone]

## 2022-05-02 NOTE — PROGRESS NOTES
Chief Complaint   Patient presents with    Cardiac Testing      TODAY WITH [facility]    Hypertension    Cholesterol Problem    CHF    Other     RBBB     Visit Vitals  BP (!) 148/78 (BP 1 Location: Left upper arm, BP Patient Position: Sitting)   Pulse 77   Ht 5' 3\" (1.6 m)   Wt 198 lb (89.8 kg)   SpO2 98%   BMI 35.07 kg/m²     Chest pain denied     Palpations denied    SOB denied    Dizziness denied    Swelling in hands/feet denied     Recent hospital stays denied   Vitals:    05/02/22 1036 05/02/22 1103   BP: (!) 152/84 (!) 148/78   BP 1 Location: Left upper arm Left upper arm   BP Patient Position: Sitting Sitting   Pulse: 77    Height: 5' 3\" (1.6 m)    Weight: 198 lb (89.8 kg)    SpO2:  98%

## 2022-05-02 NOTE — PROGRESS NOTES
Norma Inman, [facility]  [id number]  [place], [address] [facility]    Office [phone]  Fax [phone]              Patient: Linda Montero  : 1935      Today's Date: 2022          HISTORY OF PRESENT ILLNESS:     History of Present Illness:  Here for fu of BP.  log reviewed - pressures mostly 140s-160s/60s-80s at home. Pulse  60s-70s. Feels OK - has CHRISTIAN chronic which has been worsening and NBA (stable). Patient denies any exertional chest pain, palpitations, syncope, or paroxysmal nocturnal dyspnea. PAST MEDICAL HISTORY:     Past Medical History:   Diagnosis Date    Arthritis     Asthma     [facility] ([facility] [address]     CAD (coronary artery disease)     MER to RCA in      Diabetes mellitus ([facility] [address]     Diverticulosis     Dyslipidemia     GERD (gastroesophageal reflux disease)     Hiatal hernia     HTN (hypertension)     Hypothyroid     Neuropathy     Obesity     LA on CPAP     RBBB (right bundle branch block)     Spinal stenosis          Past Surgical History:   Procedure Laterality Date    HX BUNIONECTOMY      HX CHOLECYSTECTOMY      HX CORONARY STENT PLACEMENT      HX HEART CATHETERIZATION      Cardiac Cath 9/3/2010 showed mild CAD.  HX HIP REPLACEMENT      HX ORTHOPAEDIC      HX OTHER SURGICAL      Echo 10/5/12 - LVEF 60-65%, mod-marked LAE, MAC, grade 1 diastolic dysfunction     HX OTHER SURGICAL      Lexiscan cardiolite 13 - normal MPI, LVEF 74%    HX OTHER SURGICAL      Aorta duplex 14 - aorta size 1.7 cm, no dilation          MEDICATIONS:     Current Outpatient Medications   Medication Sig Dispense Refill    bumetanide (BUMEX) 1 mg tablet Take 1 Tablet by mouth every Monday, Wednesday, Friday. 45 Tablet 3    hydrALAZINE (APRESOLINE) 50 mg tablet Take 1 Tablet by mouth every eight (8) hours. 270 Tablet 3    amLODIPine (NORVASC) 5 mg tablet Take 1 Tablet by mouth daily.  30 Tablet 3    rosuvastatin (CRESTOR) 20 mg tablet Take 1 Tablet by mouth nightly. 90 Tablet 1    losartan (COZAAR) 100 mg tablet Take 1 Tablet by mouth nightly. 90 Tablet 1    isosorbide mononitrate ER (IMDUR) 30 mg tablet Take 1 Tablet by mouth daily. 90 Tablet 1    cloNIDine HCL (CATAPRES) 0.1 mg tablet Take 1 Tablet by mouth nightly. 90 Tablet 1    spironolactone (ALDACTONE) 25 mg tablet Take 0.5 Tablets by mouth daily. 15 Tablet 3    calcium carbonate (CALCIUM 600 PO) daily.  loratadine 10 mg cap Take 10 mg by mouth.  aspirin 81 mg chewable tablet Take 1 Tab by mouth daily.  cyanocobalamin 1,000 mcg tablet Take 1,000 mcg by mouth every Monday, Wednesday, Friday.  gabapentin (NEURONTIN) 400 mg capsule Take 1 Cap by mouth nightly. Max Daily Amount: 400 mg. (Patient taking differently: Take 600 mg by mouth nightly.) 30 Cap 0    vit C/E/Zn/coppr/lutein/zeaxan (PRESERVISION AREDS 2 PO) Take 1 Cap by mouth two (2) times a day.  acetaminophen (TYLENOL EXTRA STRENGTH) 500 mg tablet Take 500-1,000 mg by mouth three (3) times daily as needed for Pain (Generally takes 2-3 times daily).  levothyroxine (SYNTHROID) 100 mcg tablet Take 100 mcg by mouth Daily (before breakfast).  omeprazole (PRILOSEC) 20 mg capsule Take 20 mg by mouth two (2) times a day.  montelukast (SINGULAIR) 10 mg tablet Take 10 mg by mouth nightly.  dilTIAZem ER (CARDIZEM CD) 240 mg capsule Take 1 Capsule by mouth daily.  (Patient not taking: Reported on 5/2/2022) 90 Capsule 1       Allergies   Allergen Reactions    Tolectin [Tolmetin] Anaphylaxis    Aldactone [Spironolactone] Other (comments)     hyperkalemia    Carvedilol Other (comments)     \"slow heart rate down to 44\"    Ceclor [Cefaclor] Hives    Ciprofloxacin Hives and Swelling    Darvon [Propoxyphene] Swelling     Swelling lips and hands    Macrobid [Nitrofurantoin Monohyd/M-Cryst] Other (comments)     Does not recall    Norvasc [Amlodipine] Swelling     Swelling ankles/legs  Tetracycline Swelling     Swelling lips/hands    Toprol Xl [Metoprolol Succinate] Swelling     Swellings ankles/legs    Valsartan Other (comments)     Also \"slow heart rate down to 44\"    Zestril [Lisinopril] Hives           SOCIAL HISTORY:     Social History     Tobacco Use    Smoking status: Never Smoker    Smokeless tobacco: Never Used   Substance Use Topics    Alcohol use: No     Alcohol/week: 0.0 standard drinks    Drug use: No         FAMILY HISTORY:     Family History   Problem Relation Age of Onset    Coronary Art Dis Father     Heart Disease Father     Coronary Art Dis Paternal Aunt     Cancer Mother     Parkinson's Disease Mother     Stroke Mother     Thyroid Disease Mother     Seizures Brother             REVIEW OF SYMPTOMS:       Review of Symptoms:  Constitutional: Negative for fever, chills  HEENT: Negative for nosebleeds, tinnitus, and vision changes. Respiratory: + CHRISTIAN  Cardiovascular: Negative for syncope, and PND.  +CHRISTIAN  Gastrointestinal: Negative for abdominal pain, diarrhea, melena. Genitourinary: Negative for dysuria  Musculoskeletal: + back pain, joint pain   Skin: Negative for rash  Heme: No problems bleeding. Neurological: Negative for speech change and focal weakness.                   PHYSICAL EXAM:        Physical Exam:  Visit Vitals  BP (!) 148/78 (BP 1 Location: Left upper arm, BP Patient Position: Sitting)   Pulse 77   Ht 5' 3\" (1.6 m)   Wt 198 lb (89.8 kg)   SpO2 98%   BMI 35.07 kg/m²     General - NAD, elderly, in wheelchair   Neck - neck supple, no JVD  Cardiac - normal S1, S2, RRR, no murmurs, rubs or gallops.  No clicks  Vascular - radials pulses equal bilateral  Lungs - clear to auscultation bilaterals, no rales, wheezing or rhonchi  Abd - soft nontender, non-distended, +BS  Extremities - mild NBA,   Neuro - nonfocal  Psych - normal mood and affect      LABS / OTHER STUDIES reviewed:       Lab Results   Component Value Date/Time    Sodium 138 04/05/2022 04:04 PM    Potassium 5.0 04/05/2022 04:04 PM    Chloride 107 04/05/2022 04:04 PM    CO2 24 04/05/2022 04:04 PM    Anion gap 7 04/05/2022 04:04 PM    Glucose 116 (H) 04/05/2022 04:04 PM    BUN 50 (H) 04/05/2022 04:04 PM    Creatinine 1.68 (H) 04/05/2022 04:04 PM    BUN/Creatinine ratio 30 (H) 04/05/2022 04:04 PM    GFR est AA 35 (L) 04/05/2022 04:04 PM    GFR est non-AA 29 (L) 04/05/2022 04:04 PM    Calcium 7.8 (L) 04/05/2022 04:04 PM    Bilirubin, total 0.3 04/05/2022 04:04 PM    Alk. phosphatase 78 04/05/2022 04:04 PM    Protein, total 7.2 04/05/2022 04:04 PM    Albumin 3.9 04/05/2022 04:04 PM    Globulin 3.3 04/05/2022 04:04 PM    A-G Ratio 1.2 04/05/2022 04:04 PM    ALT (SGPT) 21 04/05/2022 04:04 PM    AST (SGOT) 20 04/05/2022 04:04 PM     Lab Results   Component Value Date/Time    WBC 9.0 04/05/2022 04:04 PM    HGB 10.4 (L) 04/05/2022 04:04 PM    HCT 33.9 (L) 04/05/2022 04:04 PM    PLATELET 973 89/15/1813 04:04 PM    MCV 99.7 (H) 04/05/2022 04:04 PM       Lab Results   Component Value Date/Time    TSH 3.27 04/05/2022 04:04 PM     Results for Arcelia Bartholomew (MRN [id number]) as of 5/26/2022 11:40   Ref. Range 1/28/2021 14:16 2/2/2021 14:32 4/5/2022 16:04   NT pro-BNP Latest Ref Range: <450 PG/ML 5,680 (H) 4,942 (H) 1,906 (H)     Results for Arcelia Bartholomew (MRN [id number]) as of 5/26/2022 11:40   Ref. Range 2/3/2021 04:02 6/10/2021 14:41 4/5/2022 16:04   Creatinine Latest Ref Range: 0.55 - 1.02 MG/DL 1.08 (H) 1.20 (H) 1.68 (H)       CARDIAC DIAGNOSTICS:     Cardiac Evaluation Includes:  I reviewed the results below. Cath 2010: LAD 25%, RCA 20%, Mid RCA 20%. Stress Nuc 2013: Normal perfusion. EF 74. V/Q 5/1/16 - low prob  Echo 5/2/16 - LVEF 60%  Lexiscan Cardiolite 5/2/16 - 1) No ischemic EKG changes with Lexiscan.  Frequent PVC's during stress.  Mild chest pain noted during Lexiscan Injection.    2) Normal Lexiscan gated SPECT myocardial perfusion study.  3) LVEF 55%     [facility] Screening 10/17/15 - mild carotid disease, normal PALAK and AAA     Carotid Doppler 8/25/17 - 10-49% stenosis JONATAN and 4-1% LICA,     Renal Artery Dopplers - 4/19/19 - normal       CXR 7/20/20 - no acute process      Chest CTA 7/20/20 - No PE. No acute disease in the chest. + coronary calcifications      Echo 7/21/20 - LVEF 60-65%, grade 1 diastology, MAC     7 day Holter 1/21/21 - sinus, HR 32-96 with average 55 bpm.  No pauses > 2 sec. HR reached 34 bpm at 3 AM one night. Echo 2/3/21 - LVEF 60-65%, G1DD, Dilated LA, mod MAC with mild MS, mild TR     LHC 2/3/21 - RCA: Dominant; Mid 60%; Distal 80% ; PLB - prox 80%; s/p MER to dRCA.      EKG 8/25/17 - NSR, RBBB  EKG 10/12/18 - NSR, RBBB, LAFB  EKG 12/10/19 - NSR, RBBB,   EKG 7/21/20 - NSR, RBBB, LAD  EKG 2/3/21 - NSR, RBBB, LAFB   EKG 4/5/22 - sinus dianna, RBBB         ASSESSMENT AND PLAN:        Assessment and Plan:  CHRISTIAN 4/5/22  - she feels finding similar to what she had prior to her PCI ---> consider repeat cath if symptoms persist   - She had Bumex cut back to every other day on 3/8/22 ---> Dr. Mcintyre Grates increased to daily; however, ProBNP lower than before and CXR was clear. Cr elevated- now back on 3x a week    - Due to bradycardia, prev switched from Cardizem to Norvasc  - Event Monitor 4/12/22-4/25/22 - NSR, HR , Avg HR 70 bpm.  Longest pause 2.0 sec. - breathing improved today but will watch closely     CAD s/p MER RCA 2/3/21  - no anginal c/o - dyspnea per above (improving)  - Cont Aspirin and high intensity statin   - updated echo today (results pending)     HFpEF, LVEF 60-65%  - over diuresed previously on torsemide and spironolactone with KAREEN which improved off meds  - now on Bumex and ele - echo today per above       HTN   - continue multi med regimen   - she had KAREEN and hyperkalemia on 25 mg aldactone in past (thus had to lower doses)   - still elevated but slowly improving - will further titrate hydralazine to 100mg q8hrs.   Updated BMP pending      Dyslipidemia  - continue crestor   - Dr. Dell Barbour follows lipids       Chronic LE edema and prior cellulitis   - wears bilateral leg wraps;  controlled  - Is in wound clinic [facility])      Bradycardia  - clonidine decreased previously inpt due to bradycardia   - Due to bradycardia, switched from cardizem to Norvasc  - Event Monitor 4/12/22-4/25/22 - NSR, HR , Avg HR 70 bpm.  Longest pause 2.0 sec.        F/u in 1mo or PRN      65+ years.  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   Enedelia Parker, ANP

## 2022-05-04 LAB
ECHO AO ROOT DIAM: 3.1 CM
ECHO AO ROOT INDEX: 1.61 CM/M2
ECHO AV AREA PEAK VELOCITY: 2.1 CM2
ECHO AV AREA VTI: 2 CM2
ECHO AV AREA/BSA PEAK VELOCITY: 1.1 CM2/M2
ECHO AV AREA/BSA VTI: 1 CM2/M2
ECHO AV MEAN GRADIENT: 8 MMHG
ECHO AV MEAN VELOCITY: 1.4 M/S
ECHO AV PEAK GRADIENT: 16 MMHG
ECHO AV PEAK VELOCITY: 2 M/S
ECHO AV VELOCITY RATIO: 0.55
ECHO AV VTI: 45.6 CM
ECHO EST RA PRESSURE: 15 MMHG
ECHO LA DIAMETER INDEX: 2.28 CM/M2
ECHO LA DIAMETER: 4.4 CM
ECHO LA TO AORTIC ROOT RATIO: 1.42
ECHO LA VOL 2C: 92 ML (ref 22–52)
ECHO LA VOL 4C: 103 ML (ref 22–52)
ECHO LA VOL BP: 100 ML (ref 22–52)
ECHO LA VOL/BSA BIPLANE: 52 ML/M2 (ref 16–34)
ECHO LA VOLUME AREA LENGTH: 107 ML
ECHO LA VOLUME INDEX A2C: 48 ML/M2 (ref 16–34)
ECHO LA VOLUME INDEX A4C: 53 ML/M2 (ref 16–34)
ECHO LA VOLUME INDEX AREA LENGTH: 55 ML/M2 (ref 16–34)
ECHO LV E' LATERAL VELOCITY: 6 CM/S
ECHO LV E' SEPTAL VELOCITY: 5 CM/S
ECHO LV FRACTIONAL SHORTENING: 31 % (ref 28–44)
ECHO LV INTERNAL DIMENSION DIASTOLE INDEX: 3.01 CM/M2
ECHO LV INTERNAL DIMENSION DIASTOLIC: 5.8 CM (ref 3.9–5.3)
ECHO LV INTERNAL DIMENSION SYSTOLIC INDEX: 2.07 CM/M2
ECHO LV INTERNAL DIMENSION SYSTOLIC: 4 CM
ECHO LV IVSD: 0.8 CM (ref 0.6–0.9)
ECHO LV MASS 2D: 175.4 G (ref 67–162)
ECHO LV MASS INDEX 2D: 90.9 G/M2 (ref 43–95)
ECHO LV POSTERIOR WALL DIASTOLIC: 0.8 CM (ref 0.6–0.9)
ECHO LV RELATIVE WALL THICKNESS RATIO: 0.28
ECHO LVOT AREA: 3.8 CM2
ECHO LVOT AV VTI INDEX: 0.55
ECHO LVOT DIAM: 2.2 CM
ECHO LVOT MEAN GRADIENT: 2 MMHG
ECHO LVOT PEAK GRADIENT: 5 MMHG
ECHO LVOT PEAK VELOCITY: 1.1 M/S
ECHO LVOT STROKE VOLUME INDEX: 49.6 ML/M2
ECHO LVOT SV: 95.7 ML
ECHO LVOT VTI: 25.2 CM
ECHO MV A VELOCITY: 1.36 M/S
ECHO MV AREA PHT: 3.7 CM2
ECHO MV AREA VTI: 2.4 CM2
ECHO MV E DECELERATION TIME (DT): 206.5 MS
ECHO MV E VELOCITY: 1.18 M/S
ECHO MV E/A RATIO: 0.87
ECHO MV E/E' LATERAL: 19.67
ECHO MV E/E' RATIO (AVERAGED): 21.63
ECHO MV E/E' SEPTAL: 23.6
ECHO MV LVOT VTI INDEX: 1.56
ECHO MV MAX VELOCITY: 1.6 M/S
ECHO MV MEAN GRADIENT: 4 MMHG
ECHO MV MEAN VELOCITY: 0.9 M/S
ECHO MV PEAK GRADIENT: 10 MMHG
ECHO MV PRESSURE HALF TIME (PHT): 59.9 MS
ECHO MV VTI: 39.3 CM
ECHO RIGHT VENTRICULAR SYSTOLIC PRESSURE (RVSP): 42 MMHG
ECHO RV FREE WALL PEAK S': 11 CM/S
ECHO RV INTERNAL DIMENSION: 3.1 CM
ECHO RV TAPSE: 1.7 CM (ref 1.7–?)
ECHO TV REGURGITANT MAX VELOCITY: 2.58 M/S
ECHO TV REGURGITANT PEAK GRADIENT: 27 MMHG

## 2022-05-04 NOTE — TELEPHONE ENCOUNTER
R/t call,  Spoke to nurse on her unit. She stated she would fax results attn [facility] to [phone].

## 2022-05-06 ENCOUNTER — TELEPHONE (OUTPATIENT)
Dept: CARDIOLOGY CLINIC | Age: 87
End: 2022-05-06

## 2022-05-06 NOTE — TELEPHONE ENCOUNTER
----- Message from Shanique Resendiz MD sent at 5/4/2022  9:08 PM EDT -----  Mady Hull - can you please let her know that echo is stable.  Thanks

## 2022-05-06 NOTE — TELEPHONE ENCOUNTER
Spoke with pt - reviewed echo below. Overall stable - nml pumping fx, G2DD (which has progressed from last echo), mild valvular dz. Also reviewed labs from 4/18/22 - BMP stable. 05/02/22    ECHO ADULT COMPLETE 05/04/2022 5/4/2022    Interpretation Summary    Left Ventricle: Normal left ventricular systolic function with a visually estimated EF of 60 - 65%. Left ventricle size is normal. Normal wall thickness. Normal wall motion. Grade II diastolic dysfunction with increased LAP.   Mitral Valve: Thickened leaflet. Moderate annular calcification of the mitral valve. Mild regurgitation. Mild stenosis noted. MV mean gradient is 4 mmHg.   Left Atrium: Left atrium is severely dilated. LA Vol Index A/L is 55 mL/m2.   Tricuspid Valve: Trace regurgitation. The estimated RVSP is 42 mmHg.   Tricuspid Valve: Mildly elevated RVSP. The estimated RVSP is 42 mmHgmmHg.     Signed by: Devan Bose MD on 5/4/2022  1:16 PM

## 2022-05-31 ENCOUNTER — TELEPHONE (OUTPATIENT)
Dept: CARDIOLOGY CLINIC | Age: 87
End: 2022-05-31

## 2022-05-31 NOTE — TELEPHONE ENCOUNTER
Medical records has been requested from:  Dr. Shirley Briscoe  [address],  [place], [address]  Office Hours: Mon-Fri 8:00am - 4:30pm  Phone: [phone]  Fax: [phone]    Requested: all recent labs    Confirmation received.

## 2022-06-02 ENCOUNTER — OFFICE VISIT (OUTPATIENT)
Dept: CARDIOLOGY CLINIC | Age: 87
End: 2022-06-02
Payer: MEDICARE

## 2022-06-02 VITALS
HEART RATE: 82 BPM | DIASTOLIC BLOOD PRESSURE: 68 MMHG | HEIGHT: 63 IN | WEIGHT: 199 LBS | OXYGEN SATURATION: 97 % | BODY MASS INDEX: 35.26 KG/M2 | SYSTOLIC BLOOD PRESSURE: 126 MMHG

## 2022-06-02 DIAGNOSIS — I50.32 CHRONIC DIASTOLIC HEART FAILURE (HCC): ICD-10-CM

## 2022-06-02 DIAGNOSIS — I25.10 CORONARY ARTERY DISEASE INVOLVING NATIVE CORONARY ARTERY OF NATIVE HEART WITHOUT ANGINA PECTORIS: ICD-10-CM

## 2022-06-02 DIAGNOSIS — E78.5 DYSLIPIDEMIA: ICD-10-CM

## 2022-06-02 DIAGNOSIS — I10 PRIMARY HYPERTENSION: Primary | ICD-10-CM

## 2022-06-02 PROCEDURE — G8417 CALC BMI ABV UP PARAM F/U: HCPCS | Performed by: NURSE PRACTITIONER

## 2022-06-02 PROCEDURE — 1101F PT FALLS ASSESS-DOCD LE1/YR: CPT | Performed by: NURSE PRACTITIONER

## 2022-06-02 PROCEDURE — 1123F ACP DISCUSS/DSCN MKR DOCD: CPT | Performed by: NURSE PRACTITIONER

## 2022-06-02 PROCEDURE — G8536 NO DOC ELDER MAL SCRN: HCPCS | Performed by: NURSE PRACTITIONER

## 2022-06-02 PROCEDURE — 99214 OFFICE O/P EST MOD 30 MIN: CPT | Performed by: NURSE PRACTITIONER

## 2022-06-02 PROCEDURE — 1090F PRES/ABSN URINE INCON ASSESS: CPT | Performed by: NURSE PRACTITIONER

## 2022-06-02 PROCEDURE — G8432 DEP SCR NOT DOC, RNG: HCPCS | Performed by: NURSE PRACTITIONER

## 2022-06-02 PROCEDURE — G8427 DOCREV CUR MEDS BY ELIG CLIN: HCPCS | Performed by: NURSE PRACTITIONER

## 2022-06-02 RX ORDER — LEVOTHYROXINE SODIUM 112 UG/1
112 TABLET ORAL
COMMUNITY

## 2022-06-02 RX ORDER — DEXTROMETHORPHAN HYDROBROMIDE, GUAIFENESIN 5; 100 MG/5ML; MG/5ML
650 LIQUID ORAL
COMMUNITY

## 2022-06-02 RX ORDER — GABAPENTIN 300 MG/1
600 CAPSULE ORAL DAILY
COMMUNITY

## 2022-06-02 RX ORDER — LORATADINE 10 MG/1
10 TABLET ORAL
COMMUNITY

## 2022-06-02 RX ORDER — CALCIUM CARBONATE/VITAMIN D3 600 MG-125
TABLET ORAL
COMMUNITY

## 2022-06-02 NOTE — LETTER
6/30/2022    Patient: Bin Gonzalez   YOB: 1935   Date of Visit: 6/2/2022     Yoandy Hobbs, [address]  [address] [facility] [address]  Via Fax: [phone]    Dear Yoandy Hobbs MD,      Thank you for referring Ms. Jackie Ferguson to [address] [place] [address] for evaluation. My notes for this consultation are attached. If you have questions, please do not hesitate to call me. I look forward to following your patient along with you.       Sincerely,    Hector Simpson NP

## 2022-06-02 NOTE — PROGRESS NOTES
Ashish Shore, ANDRE  [id number]  [place], [address] [facility]    Office [phone]  Fax [phone]        Patient: Diego Ruff  : 1935      Today's Date: 2022          HISTORY OF PRESENT ILLNESS:     History of Present Illness:  Here for fu of BP.  log reviewed - pressures mostly 140s-160s/60s-80s at home. Pulse  60s-70s. Notes cuff is small. Feels OK - has CHRISTIAN chronic and NBA (stable). Patient denies any exertional chest pain, palpitations, syncope, or paroxysmal nocturnal dyspnea. PAST MEDICAL HISTORY:     Past Medical History:   Diagnosis Date    Arthritis     Asthma     [facility] [address]     CAD (coronary artery disease)     MER to RCA in      Diabetes mellitus ([facility] [address]     Diverticulosis     Dyslipidemia     GERD (gastroesophageal reflux disease)     Hiatal hernia     HTN (hypertension)     Hypothyroid     Neuropathy     Obesity     LA on CPAP     RBBB (right bundle branch block)     Spinal stenosis          Past Surgical History:   Procedure Laterality Date    HX BUNIONECTOMY      HX CHOLECYSTECTOMY      HX CORONARY STENT PLACEMENT      HX HEART CATHETERIZATION      Cardiac Cath 9/3/2010 showed mild CAD.  HX HIP REPLACEMENT      HX ORTHOPAEDIC      HX OTHER SURGICAL      Echo 10/5/12 - LVEF 60-65%, mod-marked LAE, MAC, grade 1 diastolic dysfunction     HX OTHER SURGICAL      Lexiscan cardiolite 13 - normal MPI, LVEF 74%    HX OTHER SURGICAL      Aorta duplex 14 - aorta size 1.7 cm, no dilation          MEDICATIONS:     Current Outpatient Medications   Medication Sig Dispense Refill    gabapentin (NEURONTIN) 300 mg capsule Take 600 mg by mouth daily.  acetaminophen (Tylenol Arthritis Pain) 650 mg TbER Take 650 mg by mouth.  glucosam/chond/hyalu/CF borate (MOVE FREE JOINT HEALTH PO) Take  by mouth.  calcium-cholecalciferol, d3, (CALCIUM 600 + D) 600-125 mg-unit tab Take  by mouth.  loratadine (CLARITIN) 10 mg tablet Take 10 mg by mouth.  levothyroxine (SYNTHROID) 112 mcg tablet Take 112 mcg by mouth Daily (before breakfast).  hydrALAZINE (APRESOLINE) 100 mg tablet Take 1 Tablet by mouth every eight (8) hours. 270 Tablet 2    bumetanide (BUMEX) 1 mg tablet Take 1 Tablet by mouth every Monday, Wednesday, Friday. 45 Tablet 3    amLODIPine (NORVASC) 5 mg tablet Take 1 Tablet by mouth daily. 30 Tablet 3    rosuvastatin (CRESTOR) 20 mg tablet Take 1 Tablet by mouth nightly. 90 Tablet 1    losartan (COZAAR) 100 mg tablet Take 1 Tablet by mouth nightly. 90 Tablet 1    isosorbide mononitrate ER (IMDUR) 30 mg tablet Take 1 Tablet by mouth daily. 90 Tablet 1    cloNIDine HCL (CATAPRES) 0.1 mg tablet Take 1 Tablet by mouth nightly. 90 Tablet 1    spironolactone (ALDACTONE) 25 mg tablet Take 0.5 Tablets by mouth daily. 15 Tablet 3    aspirin 81 mg chewable tablet Take 1 Tab by mouth daily.  cyanocobalamin 1,000 mcg tablet Take 1,000 mcg by mouth every Monday, Wednesday, Friday.  vit C/E/Zn/coppr/lutein/zeaxan (PRESERVISION AREDS 2 PO) Take 1 Cap by mouth two (2) times a day.  acetaminophen (TYLENOL EXTRA STRENGTH) 500 mg tablet Take 500-1,000 mg by mouth three (3) times daily as needed for Pain (Generally takes 2-3 times daily).  omeprazole (PRILOSEC) 20 mg capsule Take 20 mg by mouth two (2) times a day.  montelukast (SINGULAIR) 10 mg tablet Take 10 mg by mouth nightly.  calcium carbonate (CALCIUM 600 PO) daily.  loratadine 10 mg cap Take 10 mg by mouth.  gabapentin (NEURONTIN) 400 mg capsule Take 1 Cap by mouth nightly. Max Daily Amount: 400 mg. (Patient taking differently: Take 600 mg by mouth nightly.) 30 Cap 0    levothyroxine (SYNTHROID) 100 mcg tablet Take 100 mcg by mouth Daily (before breakfast).          Allergies   Allergen Reactions    Tolectin [Tolmetin] Anaphylaxis    Aldactone [Spironolactone] Other (comments)     hyperkalemia  Carvedilol Other (comments)     \"slow heart rate down to 44\"    Ceclor [Cefaclor] Hives    Ciprofloxacin Hives and Swelling    Darvon [Propoxyphene] Swelling     Swelling lips and hands    Macrobid [Nitrofurantoin Monohyd/M-Cryst] Other (comments)     Does not recall    Norvasc [Amlodipine] Swelling     Swelling ankles/legs    Tetracycline Swelling     Swelling lips/hands    Toprol Xl [Metoprolol Succinate] Swelling     Swellings ankles/legs    Valsartan Other (comments)     Also \"slow heart rate down to 44\"    Zestril [Lisinopril] Hives           SOCIAL HISTORY:     Social History     Tobacco Use    Smoking status: Never Smoker    Smokeless tobacco: Never Used   Substance Use Topics    Alcohol use: No     Alcohol/week: 0.0 standard drinks    Drug use: No         FAMILY HISTORY:     Family History   Problem Relation Age of Onset    Coronary Art Dis Father     Heart Disease Father     Coronary Art Dis Paternal Aunt     Cancer Mother     Parkinson's Disease Mother     Stroke Mother     Thyroid Disease Mother     Seizures Brother             REVIEW OF SYMPTOMS:       Review of Symptoms:  Constitutional: Negative for fever, chills  HEENT: Negative for nosebleeds, tinnitus, and vision changes. Respiratory: + CHRISTIAN  Cardiovascular: Negative for syncope, and PND.  +CHRISTIAN  Gastrointestinal: Negative for abdominal pain, diarrhea, melena. Genitourinary: Negative for dysuria  Musculoskeletal: + back pain, joint pain   Skin: Negative for rash  Heme: No problems bleeding.   Neurological: Negative for speech change and focal weakness.                   PHYSICAL EXAM:        Physical Exam:  Visit Vitals  /68 (BP 1 Location: Left upper arm, BP Patient Position: Sitting, BP Cuff Size: Large adult)   Pulse 82   Ht 5' 3\" (1.6 m)   Wt 199 lb (90.3 kg)   SpO2 97%   BMI 35.25 kg/m²     BP Readings from Last 3 Encounters:   06/02/22 126/68   05/02/22 (!) 148/78   05/02/22 (!) 152/84       General - NAD, elderly, in wheelchair   Neck - neck supple, no JVD  Cardiac - normal S1, S2, RRR, no murmurs, rubs or gallops. No clicks  Vascular - radials pulses equal bilateral  Lungs - clear to auscultation bilaterals, no rales, wheezing or rhonchi  Abd - soft nontender, non-distended, +BS  Extremities - mild NBA,   Neuro - nonfocal  Psych - normal mood and affect      LABS / OTHER STUDIES reviewed:       Lab Results   Component Value Date/Time    Sodium 138 04/05/2022 04:04 PM    Potassium 5.0 04/05/2022 04:04 PM    Chloride 107 04/05/2022 04:04 PM    CO2 24 04/05/2022 04:04 PM    Anion gap 7 04/05/2022 04:04 PM    Glucose 116 (H) 04/05/2022 04:04 PM    BUN 50 (H) 04/05/2022 04:04 PM    Creatinine 1.68 (H) 04/05/2022 04:04 PM    BUN/Creatinine ratio 30 (H) 04/05/2022 04:04 PM    GFR est AA 35 (L) 04/05/2022 04:04 PM    GFR est non-AA 29 (L) 04/05/2022 04:04 PM    Calcium 7.8 (L) 04/05/2022 04:04 PM    Bilirubin, total 0.3 04/05/2022 04:04 PM    Alk. phosphatase 78 04/05/2022 04:04 PM    Protein, total 7.2 04/05/2022 04:04 PM    Albumin 3.9 04/05/2022 04:04 PM    Globulin 3.3 04/05/2022 04:04 PM    A-G Ratio 1.2 04/05/2022 04:04 PM    ALT (SGPT) 21 04/05/2022 04:04 PM    AST (SGOT) 20 04/05/2022 04:04 PM     Lab Results   Component Value Date/Time    WBC 9.0 04/05/2022 04:04 PM    HGB 10.4 (L) 04/05/2022 04:04 PM    HCT 33.9 (L) 04/05/2022 04:04 PM    PLATELET 387 09/31/5747 04:04 PM    MCV 99.7 (H) 04/05/2022 04:04 PM       Lab Results   Component Value Date/Time    TSH 3.27 04/05/2022 04:04 PM     Results for Marcelene Juan (MRN [id number]) as of 5/26/2022 11:40   Ref. Range 1/28/2021 14:16 2/2/2021 14:32 4/5/2022 16:04   NT pro-BNP Latest Ref Range: <450 PG/ML 5,680 (H) 4,942 (H) 1,906 (H)     Results for Marcelene Juan (MRN [id number]) as of 5/26/2022 11:40   Ref.  Range 2/3/2021 04:02 6/10/2021 14:41 4/5/2022 16:04   Creatinine Latest Ref Range: 0.55 - 1.02 MG/DL 1.08 (H) 1.20 (H) 1.68 (H)       CARDIAC DIAGNOSTICS:     Cardiac Evaluation Includes:  I reviewed the results below. Cath 2010: LAD 25%, RCA 20%, Mid RCA 20%. Stress Nuc 2013: Normal perfusion. EF 74. V/Q 5/1/16 - low prob  Echo 5/2/16 - LVEF 60%  Lexiscan Cardiolite 5/2/16 - 1) No ischemic EKG changes with Lexiscan.  Frequent PVC's during stress.  Mild chest pain noted during Lexiscan Injection.    2) Normal Lexiscan gated SPECT myocardial perfusion study. 3) LVEF 55%     [facility] Screening 10/17/15 - mild carotid disease, normal PALAK and AAA     Carotid Doppler 8/25/17 - 10-49% stenosis JONATAN and 3-4% LICA,     Renal Artery Dopplers - 4/19/19 - normal       CXR 7/20/20 - no acute process      Chest CTA 7/20/20 - No PE. No acute disease in the chest. + coronary calcifications      Echo 7/21/20 - LVEF 60-65%, grade 1 diastology, MAC     7 day Holter 1/21/21 - sinus, HR 32-96 with average 55 bpm.  No pauses > 2 sec. HR reached 34 bpm at 3 AM one night. Echo 2/3/21 - LVEF 60-65%, G1DD, Dilated LA, mod MAC with mild MS, mild TR     LHC 2/3/21 - RCA: Dominant; Mid 60%; Distal 80% ; PLB - prox 80%; s/p MER to dRCA. 05/02/22    ECHO ADULT COMPLETE 05/04/2022 5/4/2022    Interpretation Summary    Left Ventricle: Normal left ventricular systolic function with a visually estimated EF of 60 - 65%. Left ventricle size is normal. Normal wall thickness. Normal wall motion. Grade II diastolic dysfunction with increased LAP.   Mitral Valve: Thickened leaflet. Moderate annular calcification of the mitral valve. Mild regurgitation. Mild stenosis noted. MV mean gradient is 4 mmHg.   Left Atrium: Left atrium is severely dilated. LA Vol Index A/L is 55 mL/m2.   Tricuspid Valve: Trace regurgitation. The estimated RVSP is 42 mmHg.   Tricuspid Valve: Mildly elevated RVSP. The estimated RVSP is 42 mmHgmmHg.     Signed by: Vivienne Bryan MD on 5/4/2022  1:16 PM    EKG 8/25/17 - NSR, RBBB  EKG 10/12/18 - NSR, RBBB, LAFB  EKG 12/10/19 - NSR, RBBB,   EKG 7/21/20 - NSR, RBBB, LAD  EKG 2/3/21 - NSR, RBBB, LAFB   EKG 4/5/22 - sinus dianna, RBBB         ASSESSMENT AND PLAN:        Assessment and Plan:  CHRISTIAN 4/5/22  - she feels finding similar to what she had prior to her PCI ---> consider repeat cath if symptoms persist   - She had Bumex cut back to every other day on 3/8/22 ---> Dr. De Los Santos Credit increased to daily; however, ProBNP lower than before and CXR was clear. Cr elevated- now back on 3x a week    - Due to bradycardia, prev switched from Cardizem to Norvasc  - Event Monitor 4/12/22-4/25/22 - NSR, HR , Avg HR 70 bpm.  Longest pause 2.0 sec. - breathing improved today but will watch closely     CAD s/p MER RCA 2/3/21  - no anginal c/o - dyspnea per above (improving)  - Cont Aspirin and high intensity statin   - Echo 5/4/22 - LVEF 60-65%, G2DD, mod MAC with mild MS.      HFpEF, LVEF 60-65%  - over diuresed previously on torsemide and spironolactone with KAREEN which improved off meds  - now on Bumex and ele - echo stable / G2DD, vol looks controlled on exam       HTN   - continue multi med regimen   - she had KAREEN and hyperkalemia on 25 mg aldactone in past (thus had to lower doses)   - still elevated at home but normotensive in office; concern hm cuff maybe too small. Advised to get correct cuff - call if still elevated. - cont current meds for now. Goal BP <130/80 (avg)      Dyslipidemia  - continue crestor   - Dr. Maria Fernanda Dick follows lipids       Chronic LE edema and prior cellulitis   - wears bilateral leg wraps;  controlled  - Is in wound clinic [facility]      Bradycardia  - clonidine decreased previously inpt due to bradycardia   - Due to bradycardia, switched from cardizem to Norvasc  - Event Monitor 4/12/22-4/25/22 - NSR, HR , Avg HR 70 bpm.  Longest pause 2.0 sec. - HR stable today      F/u in 6mo or PRN       65+ years.  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   Izola Kayser, ANP

## 2022-06-02 NOTE — PROGRESS NOTES
Room: EP5    Visit Vitals  /68 (BP 1 Location: Left upper arm, BP Patient Position: Sitting, BP Cuff Size: Large adult)   Pulse 82   Ht 5' 3\" (1.6 m)   Wt 199 lb (90.3 kg)   SpO2 97%   BMI 35.25 kg/m²       HTN   - continue multi med regimen   - she had KAREEN and hyperkalemia on 25 mg aldactone in past (thus had to lower doses)   - still elevated but slowly improving - will further titrate hydralazine to 100mg q8hrs. Updated BMP pending    New local pharmacy;  [facility]       158/68   Fatigue     Chest pain: no  Shortness of breath: no  Dizziness: no  Palpitations/Racing Heart: no  Swelling: YES    New diagnosis/Surgeries since your last visit: no    Hospitalizations since your last visit: no    Refills: no

## 2022-06-02 NOTE — PATIENT INSTRUCTIONS
No changes today   You should use a large cuff when checking your blood pressure - call us if it continues to be high at home    See Dr. Lakeshia Jimenez in 6mo or earlier if needed.

## 2022-08-23 RX ORDER — ROSUVASTATIN CALCIUM 20 MG/1
TABLET, COATED ORAL
Qty: 90 TABLET | Refills: 1 | Status: SHIPPED | OUTPATIENT
Start: 2022-08-23

## 2022-11-09 ENCOUNTER — TELEPHONE (OUTPATIENT)
Dept: CARDIOLOGY CLINIC | Age: 87
End: 2022-11-09

## 2022-11-09 NOTE — TELEPHONE ENCOUNTER
Pt calling c/o CHRISTIAN, drop in BP this weekend. Doing better today; BP dropped Saturday, increase in SOB Sunday 101/50, p 50; sluggish, SOB with any movement    Monday better;   Today 167/60-70's  Pulse 86    I asked pt if she was doing anything that could have caused her to be dehydrated? She stated she doesn't think so; she was passing about as much urine as normal but she did have a UTI; finished Bactrum Sat am for 5 day course. Confirmed taking Bumex M/W/F. Apt for 12/5 was r/s to 1/30 SF as next available. Made sooner appointment of 11/29/22. Asked her to keep BP log for a week or so, stay hydrated and c/b later this week or early next week once she has been off of antibiotics & infection gone. Pt expressed understanding.

## 2022-11-10 RX ORDER — SPIRONOLACTONE 25 MG/1
12.5 TABLET ORAL DAILY
Qty: 45 TABLET | Refills: 1 | Status: SHIPPED | OUTPATIENT
Start: 2022-11-10

## 2022-11-10 RX ORDER — ISOSORBIDE MONONITRATE 30 MG/1
30 TABLET, EXTENDED RELEASE ORAL DAILY
Qty: 90 TABLET | Refills: 1 | Status: SHIPPED | OUTPATIENT
Start: 2022-11-10

## 2022-11-10 NOTE — TELEPHONE ENCOUNTER
Refill per VO of Dr. Marquis Peace  Last appt: 6/2/2022  Future Appointments   Date Time Provider Shanon Iqbal   11/29/2022  3:40 PM Felix Sher MD [facility]       Requested Prescriptions     Signed Prescriptions Disp Refills    spironolactone (ALDACTONE) 25 mg tablet 45 Tablet 1     Sig: Take 0.5 Tablets by mouth daily. Authorizing Provider: Betsy Whittington     Ordering User: Jonn Valenzuela    isosorbide mononitrate ER (IMDUR) 30 mg tablet 90 Tablet 1     Sig: Take 1 Tablet by mouth daily.      Authorizing Provider: Betsy Whittington     Ordering User: Jonn Valenzuela   \"HFpEF, LVEF 60-65%  - over diuresed previously on torsemide and spironolactone with KAREEN which improved off meds  - now on Bumex and ele - echo stable / G2DD, vol looks controlled on exam \"

## 2022-11-10 NOTE — TELEPHONE ENCOUNTER
Patient is calling because she needs a refill on her isosorbide mononitrate ER 30 mg. Pharmacy is confirmed. Patient is low on her medicine.     [phone]

## 2022-11-14 RX ORDER — ISOSORBIDE MONONITRATE 30 MG/1
TABLET, EXTENDED RELEASE ORAL
Qty: 90 TABLET | Refills: 1 | OUTPATIENT
Start: 2022-11-14

## 2022-11-29 ENCOUNTER — OFFICE VISIT (OUTPATIENT)
Dept: CARDIOLOGY CLINIC | Age: 87
End: 2022-11-29
Payer: MEDICARE

## 2022-11-29 VITALS
SYSTOLIC BLOOD PRESSURE: 138 MMHG | WEIGHT: 199 LBS | HEIGHT: 63 IN | BODY MASS INDEX: 35.26 KG/M2 | OXYGEN SATURATION: 98 % | HEART RATE: 79 BPM | DIASTOLIC BLOOD PRESSURE: 88 MMHG

## 2022-11-29 DIAGNOSIS — R06.09 DOE (DYSPNEA ON EXERTION): ICD-10-CM

## 2022-11-29 DIAGNOSIS — I50.33 DIASTOLIC CHF, ACUTE ON CHRONIC (HCC): Primary | ICD-10-CM

## 2022-11-29 DIAGNOSIS — I25.118 CORONARY ARTERY DISEASE OF NATIVE HEART WITH STABLE ANGINA PECTORIS, UNSPECIFIED VESSEL OR LESION TYPE (HCC): ICD-10-CM

## 2022-11-29 PROCEDURE — 99214 OFFICE O/P EST MOD 30 MIN: CPT | Performed by: SPECIALIST

## 2022-11-29 PROCEDURE — 1090F PRES/ABSN URINE INCON ASSESS: CPT | Performed by: SPECIALIST

## 2022-11-29 PROCEDURE — 1123F ACP DISCUSS/DSCN MKR DOCD: CPT | Performed by: SPECIALIST

## 2022-11-29 PROCEDURE — G8417 CALC BMI ABV UP PARAM F/U: HCPCS | Performed by: SPECIALIST

## 2022-11-29 PROCEDURE — G8427 DOCREV CUR MEDS BY ELIG CLIN: HCPCS | Performed by: SPECIALIST

## 2022-11-29 PROCEDURE — G8536 NO DOC ELDER MAL SCRN: HCPCS | Performed by: SPECIALIST

## 2022-11-29 PROCEDURE — G8432 DEP SCR NOT DOC, RNG: HCPCS | Performed by: SPECIALIST

## 2022-11-29 PROCEDURE — 1101F PT FALLS ASSESS-DOCD LE1/YR: CPT | Performed by: SPECIALIST

## 2022-11-29 NOTE — PROGRESS NOTES
Areli Oshea MD. [facility]              Patient: Maddie Rebolledo  : 1935      Today's Date: 2022          HISTORY OF PRESENT ILLNESS:     History of Present Illness:  She had flu like symptoms in July and then COVID in August.    Doing much better since late August.  Energy is still not back to baseline since before summer. She is in wheelchair often - legs feel weak. Has chronic class 2-3 CHRISTIAN. Seems to be better since I last saw her. PAST MEDICAL HISTORY:     Past Medical History:   Diagnosis Date    Arthritis     Asthma     Breast cancer ([facility] [address]     CAD (coronary artery disease)     MER to RCA in      COVID-19     Aug 2022    Diabetes mellitus ([facility] [address]     Diverticulosis     Dyslipidemia     GERD (gastroesophageal reflux disease)     Hiatal hernia     HTN (hypertension)     Hypothyroid     Neuropathy     Obesity     LA on CPAP     RBBB (right bundle branch block)     Spinal stenosis          Past Surgical History:   Procedure Laterality Date    HX BUNIONECTOMY      HX CHOLECYSTECTOMY      HX CORONARY STENT PLACEMENT      HX HEART CATHETERIZATION      Cardiac Cath 9/3/2010 showed mild CAD. HX HIP REPLACEMENT      HX ORTHOPAEDIC      HX OTHER SURGICAL      Echo 10/5/12 - LVEF 60-65%, mod-marked LAE, MAC, grade 1 diastolic dysfunction     HX OTHER SURGICAL      Lexiscan cardiolite 13 - normal MPI, LVEF 74%    HX OTHER SURGICAL      Aorta duplex 14 - aorta size 1.7 cm, no dilation          MEDICATIONS:     Current Outpatient Medications   Medication Sig Dispense Refill    spironolactone (ALDACTONE) 25 mg tablet Take 0.5 Tablets by mouth daily. 45 Tablet 1    isosorbide mononitrate ER (IMDUR) 30 mg tablet Take 1 Tablet by mouth daily. 90 Tablet 1    rosuvastatin (CRESTOR) 20 mg tablet TAKE 1 TABLET NIGHTLY 90 Tablet 1    gabapentin (NEURONTIN) 300 mg capsule Take 600 mg by mouth daily.       calcium-cholecalciferol, d3, (CALCIUM 600 + D) 600-125 mg-unit tab Take by mouth.      loratadine (CLARITIN) 10 mg tablet Take 10 mg by mouth.      levothyroxine (SYNTHROID) 112 mcg tablet Take 112 mcg by mouth Daily (before breakfast). hydrALAZINE (APRESOLINE) 100 mg tablet Take 1 Tablet by mouth every eight (8) hours. 270 Tablet 2    bumetanide (BUMEX) 1 mg tablet Take 1 Tablet by mouth every Monday, Wednesday, Friday. 45 Tablet 3    amLODIPine (NORVASC) 5 mg tablet Take 1 Tablet by mouth daily. 30 Tablet 3    losartan (COZAAR) 100 mg tablet Take 1 Tablet by mouth nightly. 90 Tablet 1    cloNIDine HCL (CATAPRES) 0.1 mg tablet Take 1 Tablet by mouth nightly. 90 Tablet 1    aspirin 81 mg chewable tablet Take 1 Tab by mouth daily. cyanocobalamin 1,000 mcg tablet Take 1,000 mcg by mouth every Monday, Wednesday, Friday. vit C/E/Zn/coppr/lutein/zeaxan (PRESERVISION AREDS 2 PO) Take 1 Cap by mouth two (2) times a day. acetaminophen (TYLENOL) 500 mg tablet Take 500-1,000 mg by mouth three (3) times daily as needed for Pain (Generally takes 2-3 times daily). omeprazole (PRILOSEC) 20 mg capsule Take 20 mg by mouth two (2) times a day. acetaminophen (Tylenol Arthritis Pain) 650 mg TbER Take 650 mg by mouth. glucosam/chond/hyalu/CF borate (MOVE FREE JOINT HEALTH PO) Take  by mouth.      montelukast (SINGULAIR) 10 mg tablet Take 10 mg by mouth nightly.          Allergies   Allergen Reactions    Tolectin [Tolmetin] Anaphylaxis    Aldactone [Spironolactone] Other (comments)     hyperkalemia    Carvedilol Other (comments)     \"slow heart rate down to 44\"    Ceclor [Cefaclor] Hives    Ciprofloxacin Hives and Swelling    Darvon [Propoxyphene] Swelling     Swelling lips and hands    Macrobid [Nitrofurantoin Monohyd/M-Cryst] Other (comments)     Does not recall    Norvasc [Amlodipine] Swelling     Swelling ankles/legs    Tetracycline Swelling     Swelling lips/hands    Toprol Xl [Metoprolol Succinate] Swelling     Swellings ankles/legs    Valsartan Other (comments) Also \"slow heart rate down to 44\"    Zestril [Lisinopril] Hives           SOCIAL HISTORY:     Social History     Tobacco Use    Smoking status: Never    Smokeless tobacco: Never   Substance Use Topics    Alcohol use: No     Alcohol/week: 0.0 standard drinks    Drug use: No         FAMILY HISTORY:     Family History   Problem Relation Age of Onset    Coronary Art Dis Father     Heart Disease Father     Coronary Art Dis Paternal Aunt     Cancer Mother     Parkinson's Disease Mother     Stroke Mother     Thyroid Disease Mother     Seizures Brother             REVIEW OF SYMPTOMS:       Review of Symptoms:  Constitutional: Negative for fever, chills  HEENT: Negative for nosebleeds, tinnitus, and vision changes. Respiratory: + CHRISTIAN  Cardiovascular: Negative for syncope, and PND.  +CHRISTIAN  Gastrointestinal: Negative for abdominal pain, diarrhea, melena. Genitourinary: Negative for dysuria  Musculoskeletal: + back pain, joint pain   Skin: Negative for rash  Heme: No problems bleeding. Neurological: Negative for speech change and focal weakness. PHYSICAL EXAM:        Physical Exam:  Visit Vitals  /88 (BP 1 Location: Left upper arm, BP Patient Position: Sitting)   Pulse 79   Ht 5' 3\" (1.6 m)   Wt 199 lb (90.3 kg)   SpO2 98%   BMI 35.25 kg/m²            Patient appears generally well, mood and affect are appropriate and pleasant. HEENT: Hearing intact, non-icteric, normocephalic, atraumatic. Neck Exam: Supple  Lung Exam: + crackles at left base   Cardiac Exam: Regular rate and rhythm with no murmur  Abdomen: Soft, non-tender, normal bowel sounds. + obese   Extremities: Moves all ext well. Mild lower extremity edema (legs wrapped).     Psych: Appropriate affect  Neuro - Grossly intact - in a wheelchair              LABS / OTHER STUDIES reviewed:       Lab Results   Component Value Date/Time    Sodium 138 04/05/2022 04:04 PM    Potassium 5.0 04/05/2022 04:04 PM    Chloride 107 04/05/2022 04:04 PM CO2 24 04/05/2022 04:04 PM    Anion gap 7 04/05/2022 04:04 PM    Glucose 116 (H) 04/05/2022 04:04 PM    BUN 50 (H) 04/05/2022 04:04 PM    Creatinine 1.68 (H) 04/05/2022 04:04 PM    BUN/Creatinine ratio 30 (H) 04/05/2022 04:04 PM    GFR est AA 35 (L) 04/05/2022 04:04 PM    GFR est non-AA 29 (L) 04/05/2022 04:04 PM    Calcium 7.8 (L) 04/05/2022 04:04 PM    Bilirubin, total 0.3 04/05/2022 04:04 PM    Alk. phosphatase 78 04/05/2022 04:04 PM    Protein, total 7.2 04/05/2022 04:04 PM    Albumin 3.9 04/05/2022 04:04 PM    Globulin 3.3 04/05/2022 04:04 PM    A-G Ratio 1.2 04/05/2022 04:04 PM    ALT (SGPT) 21 04/05/2022 04:04 PM    AST (SGOT) 20 04/05/2022 04:04 PM     Lab Results   Component Value Date/Time    WBC 9.0 04/05/2022 04:04 PM    HGB 10.4 (L) 04/05/2022 04:04 PM    HCT 33.9 (L) 04/05/2022 04:04 PM    PLATELET 564 09/22/8449 04:04 PM    MCV 99.7 (H) 04/05/2022 04:04 PM       Lab Results   Component Value Date/Time    TSH 3.27 04/05/2022 04:04 PM               CARDIAC DIAGNOSTICS:     Cardiac Evaluation Includes:  I reviewed the results below. Cath 2010: LAD 25%, RCA 20%, Mid RCA 20%. Stress Nuc 2013: Normal perfusion. EF 74. V/Q 5/1/16 - low prob  Echo 5/2/16 - LVEF 60%  Lexiscan Cardiolite 5/2/16 - 1) No ischemic EKG changes with Lexiscan. Frequent PVC's during stress. Mild chest pain noted during Lexiscan Injection. 2) Normal Lexiscan gated SPECT myocardial perfusion study. 3) LVEF 55%     [facility] Screening 10/17/15 - mild carotid disease, normal PALAK and AAA     Carotid Doppler 8/25/17 - 10-49% stenosis JONATAN and 6-8% LICA,     Renal Artery Dopplers - 4/19/19 - normal       CXR 7/20/20 - no acute process      Chest CTA 7/20/20 - No PE. No acute disease in the chest. + coronary calcifications      Echo 7/21/20 - LVEF 60-65%, grade 1 diastology, MAC     7 day Holter 1/21/21 - sinus, HR 32-96 with average 55 bpm.  No pauses > 2 sec. HR reached 34 bpm at 3 AM one night.          Echo 2/3/21 - LVEF 60-65%, G1DD, Dilated LA, mod MAC with mild MS, mild TR     C 2/3/21 - RCA: Dominant; Mid 60%; Distal 80% ; PLB - prox 80%; s/p MER to dRCA. Echo 5/4/22 - LVEF 60-65%, grade 2 diastology,  Mitral Valve: Thickened leaflet. Moderate annular calcification of the mitral valve. Mild regurgitation. Mild stenosis noted. MV mean gradient is 4 mmHg. Severe LAE, RVSP 42. Event Monitor 4/12/22-4/25/22 - NSR, HR , Avg HR 70 bpm.  Longest pause 2.0 sec. EKG 8/25/17 - NSR, RBBB  EKG 10/12/18 - NSR, RBBB, LAFB  EKG 12/10/19 - NSR, RBBB,   EKG 7/21/20 - NSR, RBBB, LAD  EKG 2/3/21 - NSR, RBBB, LAFB   EKG 4/5/22 - sinus dianna, RBBB                   ASSESSMENT AND PLAN:        Assessment and Plan:    Generalized weakness since COVID Aug 2022  - Will order PT for her     CAD s/p MER RCA 2/3/21  - breathing improved - chronic class 2-3 CHRISTIAN   - Cont Aspirin and high intensity statin      HFpEF, LVEF 60-65%  - In past over diuresed previously on torsemide and spironolactone with KAREEN which improved off meds  - cont Bumex 3 days a week -- labs followed by Dr. Annmarie Bernstein       HTN   - she had KAREEN and hyperkalemia on 25 mg aldactone in past (thus had to lower doses)   - 's usually at assisted living -- she feels worse when SBP drops < 120 and better when 's  - Will continue multi med regimen       Dyslipidemia  - continue crestor   - Dr. Annmarie Bernstein follows lipids       Chronic LE edema and prior cellulitis   - wears bilateral leg wraps; currently well controlled  - Went through wound clinic [facility])      Bradycardia  - Event Monitor 4/12/22-4/25/22 - NSR, HR , Avg HR 70 bpm.  Longest pause 2.0 sec. - clonidine decreased previously due to bradycardia   - Due to bradycardia, switched from cardizem to Norvasc in past   - HR OK overall      See NP in 2 weeks       65+ years. Lives with her . She was a nurse for 43 years.         Davey Escudero MD, [facility] [address] [facility]  [address], [address]      [address]  [address]  [address]                 [place], [address] [place] [address]  Ph: [phone]                               Ph [phone]

## 2022-11-29 NOTE — PROGRESS NOTES
Chief Complaint   Patient presents with    Follow-up    Hypertension    CHF    Coronary Artery Disease     Vitals:    11/29/22 1544   BP: 138/88   BP 1 Location: Left upper arm   BP Patient Position: Sitting   Pulse: 79   Height: 5' 3\" (1.6 m)   Weight: 199 lb (90.3 kg)   SpO2: 98%       Chest pain denied     SOB - yes CHRISTIAN     Palpitations denied     Swelling in hands/feet denied     Dizziness denied     Recent hospital stays denied     Refills denied

## 2022-12-02 DIAGNOSIS — E78.5 DYSLIPIDEMIA: ICD-10-CM

## 2022-12-02 DIAGNOSIS — I10 ESSENTIAL HYPERTENSION: ICD-10-CM

## 2022-12-05 RX ORDER — LOSARTAN POTASSIUM 100 MG/1
TABLET ORAL
Qty: 90 TABLET | Refills: 1 | Status: SHIPPED | OUTPATIENT
Start: 2022-12-05

## 2022-12-06 RX ORDER — LOSARTAN POTASSIUM 100 MG/1
TABLET ORAL
Qty: 90 TABLET | Refills: 3 | OUTPATIENT
Start: 2022-12-06

## 2022-12-06 NOTE — TELEPHONE ENCOUNTER
Refill per VO of Dr. Vitaliy Sandhu  Last appt: 11/29/2022  Future Appointments   Date Time Provider Shanon Iqbal   6/1/2023 11:40 AM Zulma Torres MD [facility]       Requested Prescriptions     Refused Prescriptions Disp Refills    losartan (COZAAR) 100 mg tablet [Pharmacy Med Name: LOSARTAN TAB 100MG] 90 Tablet 3     Sig: TAKE 1 TABLET NIGHTLY     Refused By: Mahin Simon     Reason for Refusal: Request already responded to by other means (e.g. phone or fax)

## 2022-12-16 ENCOUNTER — TELEPHONE (OUTPATIENT)
Dept: CARDIOLOGY CLINIC | Age: 87
End: 2022-12-16

## 2022-12-16 RX ORDER — HYDRALAZINE HYDROCHLORIDE 100 MG/1
100 TABLET, FILM COATED ORAL EVERY 8 HOURS
Qty: 270 TABLET | Refills: 0 | Status: SHIPPED | OUTPATIENT
Start: 2022-12-16 | End: 2022-12-16 | Stop reason: SDUPTHER

## 2022-12-16 RX ORDER — HYDRALAZINE HYDROCHLORIDE 100 MG/1
100 TABLET, FILM COATED ORAL EVERY 8 HOURS
Qty: 270 TABLET | Refills: 2 | Status: SHIPPED | OUTPATIENT
Start: 2022-12-16

## 2022-12-16 NOTE — TELEPHONE ENCOUNTER
Pt needs a new prescription for hydrALAZINE (APRESOLINE) 100 mg tablet  [facility] sent in  for short term supply to be sent to pharmacy listed below.     Pt is completely out of medication     [facility]   [address] [facility]   Phone # [phone]  Fax # [phone]    [facility]   [phone]

## 2022-12-16 NOTE — TELEPHONE ENCOUNTER
Refill per VO of Dr. Aylin Albrecht  Last appt: 11/29/22  Future Appointments   Date Time Provider Shanon Lynnei   6/1/2023 11:40 AM Adelina Zaman MD [facility]       Requested Prescriptions     Signed Prescriptions Disp Refills    hydrALAZINE (APRESOLINE) 100 mg tablet 270 Tablet 2     Sig: Take 1 Tablet by mouth every eight (8) hours.      Authorizing Provider: Rica Claude     Ordering User: Darius De León

## 2023-03-09 RX ORDER — ROSUVASTATIN CALCIUM 20 MG/1
TABLET, COATED ORAL
Qty: 90 TABLET | Refills: 1 | Status: SHIPPED | OUTPATIENT
Start: 2023-03-09

## 2023-03-09 NOTE — TELEPHONE ENCOUNTER
Refill per VO of Dr. Danae Kramer  Last appt: 11/29/22  Future Appointments   Date Time Provider Shanon Iqbal   6/1/2023 11:40 AM Jeovanny Yepez MD [facility]       Requested Prescriptions     Signed Prescriptions Disp Refills    rosuvastatin (CRESTOR) 20 mg tablet 90 Tablet 1     Sig: TAKE 1 TABLET NIGHTLY     Authorizing Provider: Thomas Marroquin     Ordering User: Tamia Diaz

## 2023-04-17 ENCOUNTER — TRANSCRIBE ORDER (OUTPATIENT)
Dept: SCHEDULING | Age: 88
End: 2023-04-17

## 2023-04-17 DIAGNOSIS — M81.0 AGE-RELATED OSTEOPOROSIS WITHOUT CURRENT PATHOLOGICAL FRACTURE: Primary | ICD-10-CM

## 2023-04-17 RX ORDER — CLONIDINE HYDROCHLORIDE 0.1 MG/1
TABLET ORAL
Qty: 90 TABLET | Refills: 1 | Status: SHIPPED | OUTPATIENT
Start: 2023-04-17

## 2023-04-17 NOTE — TELEPHONE ENCOUNTER
Refill per VO of Dr. Sunny Kelly  Last appt: 11/29/22  Future Appointments   Date Time Provider Shanon Iqbal   6/1/2023 11:40 AM Jo Hunter MD [facility]       Requested Prescriptions     Pending Prescriptions Disp Refills    cloNIDine HCL (CATAPRES) 0.1 mg tablet [Pharmacy Med Name: CLONIDINE TAB 0.1MG] 90 Tablet 1     Sig: TAKE 1 TABLET NIGHTLY

## 2023-04-23 DIAGNOSIS — M81.0 AGE-RELATED OSTEOPOROSIS WITHOUT CURRENT PATHOLOGICAL FRACTURE: Primary | ICD-10-CM

## 2023-05-02 RX ORDER — AMLODIPINE BESYLATE 5 MG/1
TABLET ORAL
Qty: 90 TABLET | Refills: 1 | Status: SHIPPED | OUTPATIENT
Start: 2023-05-02

## 2023-06-01 ENCOUNTER — OFFICE VISIT (OUTPATIENT)
Age: 88
End: 2023-06-01
Payer: MEDICARE

## 2023-06-01 ENCOUNTER — HOSPITAL ENCOUNTER (OUTPATIENT)
Facility: HOSPITAL | Age: 88
Discharge: HOME | End: 2023-06-01
Payer: MEDICARE

## 2023-06-01 VITALS
HEART RATE: 73 BPM | WEIGHT: 210 LBS | HEIGHT: 63 IN | SYSTOLIC BLOOD PRESSURE: 128 MMHG | BODY MASS INDEX: 37.21 KG/M2 | DIASTOLIC BLOOD PRESSURE: 70 MMHG | OXYGEN SATURATION: 98 %

## 2023-06-01 DIAGNOSIS — I10 PRIMARY HYPERTENSION: ICD-10-CM

## 2023-06-01 DIAGNOSIS — M81.0 AGE-RELATED OSTEOPOROSIS WITHOUT CURRENT PATHOLOGICAL FRACTURE: ICD-10-CM

## 2023-06-01 DIAGNOSIS — I50.33 DIASTOLIC CHF, ACUTE ON CHRONIC (HCC): ICD-10-CM

## 2023-06-01 DIAGNOSIS — I25.118 CORONARY ARTERY DISEASE OF NATIVE ARTERY OF NATIVE HEART WITH STABLE ANGINA PECTORIS (HCC): Primary | ICD-10-CM

## 2023-06-01 DIAGNOSIS — I10 ESSENTIAL (PRIMARY) HYPERTENSION: ICD-10-CM

## 2023-06-01 PROCEDURE — G8427 DOCREV CUR MEDS BY ELIG CLIN: HCPCS | Performed by: SPECIALIST

## 2023-06-01 PROCEDURE — 1036F TOBACCO NON-USER: CPT | Performed by: SPECIALIST

## 2023-06-01 PROCEDURE — 1123F ACP DISCUSS/DSCN MKR DOCD: CPT | Performed by: SPECIALIST

## 2023-06-01 PROCEDURE — 1090F PRES/ABSN URINE INCON ASSESS: CPT | Performed by: SPECIALIST

## 2023-06-01 PROCEDURE — 93010 ELECTROCARDIOGRAM REPORT: CPT | Performed by: SPECIALIST

## 2023-06-01 PROCEDURE — 99214 OFFICE O/P EST MOD 30 MIN: CPT | Performed by: SPECIALIST

## 2023-06-01 PROCEDURE — 93005 ELECTROCARDIOGRAM TRACING: CPT | Performed by: SPECIALIST

## 2023-06-01 PROCEDURE — 77080 DXA BONE DENSITY AXIAL: CPT

## 2023-06-01 PROCEDURE — G8417 CALC BMI ABV UP PARAM F/U: HCPCS | Performed by: SPECIALIST

## 2023-06-01 NOTE — PROGRESS NOTES
Chief Complaint   Patient presents with    Follow-up     6 mo      Vitals:    06/01/23 1144   BP: 128/70   Site: Left Upper Arm   Position: Sitting   Pulse: 73   SpO2: 98%   Weight: 210 lb (95.3 kg)   Height: 5' 3\" (1.6 m)           Chest pain denied     SOB denied     Palpitations denied     Swelling in hands/feet denied     Dizziness denied     Recent hospital stays denied     Refills denied

## 2023-06-01 NOTE — PROGRESS NOTES
Shimon Dee MD. [facility]          Patient: Sia Helms  : 1935      Today's Date: 2023        HISTORY OF PRESENT ILLNESS:     History of Present Illness:    BP better - SBP usually 120-130's mostly - sometimes higher  Balance is poor --- feels OK sitting --- uses a walker and that helps - knees wear out later in day       PAST MEDICAL HISTORY:     Past Medical History:   Diagnosis Date    Arthritis     Asthma     Breast cancer ([facility] [address]     CAD (coronary artery disease)     INDU to RCA in      COVID-19     Aug 2022    Diabetes mellitus ([facility] [address]     Diverticulosis     Dyslipidemia     GERD (gastroesophageal reflux disease)     Hiatal hernia     HTN (hypertension)     Hypothyroid     Neuropathy     Obesity     FABIÁN on CPAP     RBBB (right bundle branch block)     Spinal stenosis        Past Surgical History:   Procedure Laterality Date    BUNIONECTOMY      CARDIAC CATHETERIZATION      Cardiac Cath 9/3/2010 showed mild CAD. CHOLECYSTECTOMY      CORONARY ANGIOPLASTY WITH STENT PLACEMENT      ORTHOPEDIC SURGERY      OTHER SURGICAL HISTORY      Aorta duplex 14 - aorta size 1.7 cm, no dilation     OTHER SURGICAL HISTORY      Lexiscan cardiolite 13 - normal MPI, LVEF 74%    OTHER SURGICAL HISTORY      Echo 10/5/12 - LVEF 60-65%, mod-marked LAE, MAC, grade 1 diastolic dysfunction     TOTAL HIP ARTHROPLASTY               CURRENT MEDICATIONS:    .  Current Outpatient Medications   Medication Sig Dispense Refill    acetaminophen (TYLENOL) 650 MG extended release tablet Take 1 tablet by mouth      acetaminophen (TYLENOL) 500 MG tablet Take 1-2 tablets by mouth 3 times daily as needed      amLODIPine (NORVASC) 5 MG tablet Take 1 tablet by mouth daily      aspirin 81 MG chewable tablet Take 1 tablet by mouth daily      bumetanide (BUMEX) 1 MG tablet Take 1 tablet by mouth three times a week      Calcium Carbonate-Vitamin D 600-3. 125 MG-MCG TABS Take by mouth      cloNIDine (CATAPRES) 0.1 MG tablet

## 2023-07-05 RX ORDER — SPIRONOLACTONE 25 MG/1
TABLET ORAL
Qty: 45 TABLET | Refills: 1 | Status: SHIPPED | OUTPATIENT
Start: 2023-07-05

## 2023-07-05 RX ORDER — BUMETANIDE 1 MG/1
TABLET ORAL
Qty: 90 TABLET | Refills: 1 | Status: SHIPPED | OUTPATIENT
Start: 2023-07-05

## 2023-07-05 NOTE — TELEPHONE ENCOUNTER
Refill per VO of Dr. Lowell Butts  Last appt: 6/1/2023    Future Appointments   Date Time Provider [address]   11/30/2023 11:20 AM Adelina Landon MD [facility]       Requested Prescriptions     Signed Prescriptions Disp Refills    spironolactone (ALDACTONE) 25 MG tablet 45 tablet 1     Sig: TAKE 1/2 TABLET DAILY     Authorizing Provider: Adelina Landno     Ordering User: Rajesh MENDOZA    bumetanide (BUMEX) 1 MG tablet 90 tablet 1     Sig: TAKE 1 TABLET DAILY     Authorizing Provider: Adelina Landon     Ordering User: Andrea Moore

## 2023-07-26 NOTE — TELEPHONE ENCOUNTER
Called son, they were able to get in touch with PCP, Dr. James Hull, suggested to start   Lasix which she had from before, stop HCTZ.  Legs R>L, spoke about to watch for pain (DVT) in spots, etc.
Pt son is calling she is having swelling and SOB and would like for someone to call please call [phone]
(2) good, crying

## 2023-10-04 RX ORDER — HYDRALAZINE HYDROCHLORIDE 100 MG/1
TABLET, FILM COATED ORAL
Qty: 270 TABLET | Refills: 2 | Status: SHIPPED | OUTPATIENT
Start: 2023-10-04

## 2023-10-04 RX ORDER — CLONIDINE HYDROCHLORIDE 0.1 MG/1
0.1 TABLET ORAL
Qty: 90 TABLET | Refills: 1 | Status: SHIPPED | OUTPATIENT
Start: 2023-10-04

## 2023-10-04 RX ORDER — AMLODIPINE BESYLATE 5 MG/1
5 TABLET ORAL DAILY
Qty: 90 TABLET | Refills: 1 | Status: SHIPPED | OUTPATIENT
Start: 2023-10-04

## 2023-10-30 ENCOUNTER — OFFICE VISIT (OUTPATIENT)
Age: 88
End: 2023-10-30
Payer: MEDICARE

## 2023-10-30 VITALS
HEART RATE: 66 BPM | DIASTOLIC BLOOD PRESSURE: 74 MMHG | WEIGHT: 207 LBS | BODY MASS INDEX: 36.68 KG/M2 | OXYGEN SATURATION: 96 % | HEIGHT: 63 IN | SYSTOLIC BLOOD PRESSURE: 138 MMHG

## 2023-10-30 DIAGNOSIS — R06.09 DOE (DYSPNEA ON EXERTION): ICD-10-CM

## 2023-10-30 DIAGNOSIS — I50.33 DIASTOLIC CHF, ACUTE ON CHRONIC (HCC): Primary | ICD-10-CM

## 2023-10-30 PROCEDURE — G8417 CALC BMI ABV UP PARAM F/U: HCPCS

## 2023-10-30 PROCEDURE — 1090F PRES/ABSN URINE INCON ASSESS: CPT

## 2023-10-30 PROCEDURE — 1123F ACP DISCUSS/DSCN MKR DOCD: CPT

## 2023-10-30 PROCEDURE — G8427 DOCREV CUR MEDS BY ELIG CLIN: HCPCS

## 2023-10-30 PROCEDURE — G8484 FLU IMMUNIZE NO ADMIN: HCPCS

## 2023-10-30 PROCEDURE — 1036F TOBACCO NON-USER: CPT

## 2023-10-30 PROCEDURE — 99214 OFFICE O/P EST MOD 30 MIN: CPT

## 2023-10-30 RX ORDER — NYSTATIN 100000 [USP'U]/G
1 POWDER TOPICAL DAILY
COMMUNITY
Start: 2018-10-29

## 2023-10-30 NOTE — PROGRESS NOTES
Primary Cardiologist:  Jessica Lau MD. [facility]          Patient: Anna Mortensen  : 1935      Today's Date: 10/30/2023        HISTORY OF PRESENT ILLNESS:     History of Present Illness:    Lives in assisted living. Last weekend she had a GI illness with nausea and diarrhea for 2 days. She reports a recent increase in CAMPOS for the last 1 month, peaking around illness. She states it has slowly gotten better the further away from her illness. Also reports increase in EMPERATRIZ after addition of amlodipine. PAST MEDICAL HISTORY:     Past Medical History:   Diagnosis Date    Arthritis     Asthma     Breast cancer ([address])     CAD (coronary artery disease)     INDU to RCA in      COVID-19     Aug 2022    Diabetes mellitus ([address])     Diverticulosis     Dyslipidemia     GERD (gastroesophageal reflux disease)     Hiatal hernia     HTN (hypertension)     Hypothyroid     Neuropathy     Obesity     FABIÁN on CPAP     RBBB (right bundle branch block)     Spinal stenosis        Past Surgical History:   Procedure Laterality Date    BUNIONECTOMY      CARDIAC CATHETERIZATION      Cardiac Cath 9/3/2010 showed mild CAD.       CHOLECYSTECTOMY      CORONARY ANGIOPLASTY WITH STENT PLACEMENT      ORTHOPEDIC SURGERY      OTHER SURGICAL HISTORY      Aorta duplex 14 - aorta size 1.7 cm, no dilation     OTHER SURGICAL HISTORY      Lexiscan cardiolite 13 - normal MPI, LVEF 74%    OTHER SURGICAL HISTORY      Echo 10/5/12 - LVEF 60-65%, mod-marked LAE, MAC, grade 1 diastolic dysfunction     TOTAL HIP ARTHROPLASTY               CURRENT MEDICATIONS:    .  Current Outpatient Medications   Medication Sig Dispense Refill    Glucos-Chond-MSM-Bor-D3-Hyalur (MOVE FREE JOINT HEALTH ADV + D PO) Take by mouth      Multiple Vitamins-Minerals (PRESERVISION AREDS 2 PO) Take 1 capsule by mouth 2 times daily      nystatin (MYCOSTATIN) 940693 UNIT/GM powder Apply 1 Application topically daily      amLODIPine (NORVASC) 5 MG tablet TAKE 1

## 2023-10-30 NOTE — PROGRESS NOTES
Alonso Wright is a 80 y.o. female    had concerns including Shortness of Breath. Vitals:    10/30/23 1043   BP: 138/74   Site: Left Upper Arm   Position: Sitting   Pulse: 66   SpO2: 96%   Weight: 93.9 kg (207 lb)   Height: 1.6 m (5' 3\")        Chest pain No    SOB with exertion - walking to dining room she has to stop     Dizziness at times when getting up in the morning     Swelling  more in the right than the left leg/ankle    Refills No    Covid Vaccinated yes      1. Have you been to the ER, urgent care clinic since your last visit? Hospitalized since your last visit? no    2. Have you seen or consulted any other health care providers outside of the [address] [place] [address] since your last visit? Include any pap smears or colon screening.  no

## 2023-11-30 ENCOUNTER — ANCILLARY PROCEDURE (OUTPATIENT)
Age: 88
End: 2023-11-30
Payer: MEDICARE

## 2023-11-30 ENCOUNTER — OFFICE VISIT (OUTPATIENT)
Age: 88
End: 2023-11-30
Payer: MEDICARE

## 2023-11-30 VITALS
BODY MASS INDEX: 37.03 KG/M2 | SYSTOLIC BLOOD PRESSURE: 158 MMHG | HEIGHT: 63 IN | DIASTOLIC BLOOD PRESSURE: 80 MMHG | WEIGHT: 209 LBS

## 2023-11-30 VITALS
WEIGHT: 209 LBS | SYSTOLIC BLOOD PRESSURE: 156 MMHG | HEART RATE: 73 BPM | DIASTOLIC BLOOD PRESSURE: 76 MMHG | HEIGHT: 63 IN | OXYGEN SATURATION: 97 % | BODY MASS INDEX: 37.03 KG/M2

## 2023-11-30 DIAGNOSIS — I50.33 DIASTOLIC CHF, ACUTE ON CHRONIC (HCC): Primary | ICD-10-CM

## 2023-11-30 DIAGNOSIS — I50.33 DIASTOLIC CHF, ACUTE ON CHRONIC (HCC): ICD-10-CM

## 2023-11-30 DIAGNOSIS — I25.118 CORONARY ARTERY DISEASE OF NATIVE ARTERY OF NATIVE HEART WITH STABLE ANGINA PECTORIS (HCC): ICD-10-CM

## 2023-11-30 DIAGNOSIS — R06.09 DOE (DYSPNEA ON EXERTION): ICD-10-CM

## 2023-11-30 DIAGNOSIS — N18.30 STAGE 3 CHRONIC KIDNEY DISEASE, UNSPECIFIED WHETHER STAGE 3A OR 3B CKD (HCC): ICD-10-CM

## 2023-11-30 LAB
ECHO AO ROOT DIAM: 3 CM
ECHO AO ROOT INDEX: 1.52 CM/M2
ECHO AV AREA PEAK VELOCITY: 2.1 CM2
ECHO AV AREA VTI: 2.2 CM2
ECHO AV AREA/BSA PEAK VELOCITY: 1.1 CM2/M2
ECHO AV AREA/BSA VTI: 1.1 CM2/M2
ECHO AV MEAN GRADIENT: 9 MMHG
ECHO AV MEAN VELOCITY: 1.4 M/S
ECHO AV PEAK GRADIENT: 18 MMHG
ECHO AV PEAK VELOCITY: 2.1 M/S
ECHO AV VELOCITY RATIO: 0.57
ECHO AV VTI: 41.1 CM
ECHO BSA: 2.05 M2
ECHO LA DIAMETER INDEX: 2.18 CM/M2
ECHO LA DIAMETER: 4.3 CM
ECHO LA TO AORTIC ROOT RATIO: 1.43
ECHO LA VOL A-L A2C: 73 ML (ref 22–52)
ECHO LA VOL A-L A4C: 59 ML (ref 22–52)
ECHO LA VOL BP: 63 ML (ref 22–52)
ECHO LA VOL MOD A2C: 69 ML (ref 22–52)
ECHO LA VOL MOD A4C: 54 ML (ref 22–52)
ECHO LA VOL/BSA BIPLANE: 32 ML/M2 (ref 16–34)
ECHO LA VOLUME AREA LENGTH: 68 ML
ECHO LA VOLUME INDEX A-L A2C: 37 ML/M2 (ref 16–34)
ECHO LA VOLUME INDEX A-L A4C: 30 ML/M2 (ref 16–34)
ECHO LA VOLUME INDEX AREA LENGTH: 35 ML/M2 (ref 16–34)
ECHO LA VOLUME INDEX MOD A2C: 35 ML/M2 (ref 16–34)
ECHO LA VOLUME INDEX MOD A4C: 27 ML/M2 (ref 16–34)
ECHO LV E' LATERAL VELOCITY: 7 CM/S
ECHO LV E' SEPTAL VELOCITY: 7 CM/S
ECHO LV EDV A2C: 77 ML
ECHO LV EDV A4C: 74 ML
ECHO LV EDV BP: 76 ML (ref 56–104)
ECHO LV EDV INDEX A4C: 38 ML/M2
ECHO LV EDV INDEX BP: 39 ML/M2
ECHO LV EDV NDEX A2C: 39 ML/M2
ECHO LV EJECTION FRACTION A2C: 59 %
ECHO LV EJECTION FRACTION A4C: 52 %
ECHO LV EJECTION FRACTION BIPLANE: 55 % (ref 55–100)
ECHO LV ESV A2C: 31 ML
ECHO LV ESV A4C: 36 ML
ECHO LV ESV BP: 34 ML (ref 19–49)
ECHO LV ESV INDEX A2C: 16 ML/M2
ECHO LV ESV INDEX A4C: 18 ML/M2
ECHO LV ESV INDEX BP: 17 ML/M2
ECHO LV FRACTIONAL SHORTENING: 31 % (ref 28–44)
ECHO LV INTERNAL DIMENSION DIASTOLE INDEX: 1.98 CM/M2
ECHO LV INTERNAL DIMENSION DIASTOLIC: 3.9 CM (ref 3.9–5.3)
ECHO LV INTERNAL DIMENSION SYSTOLIC INDEX: 1.37 CM/M2
ECHO LV INTERNAL DIMENSION SYSTOLIC: 2.7 CM
ECHO LV IVSD: 1.1 CM (ref 0.6–0.9)
ECHO LV MASS 2D: 140.1 G (ref 67–162)
ECHO LV MASS INDEX 2D: 71.1 G/M2 (ref 43–95)
ECHO LV POSTERIOR WALL DIASTOLIC: 1.1 CM (ref 0.6–0.9)
ECHO LV RELATIVE WALL THICKNESS RATIO: 0.56
ECHO LVOT AREA: 3.8 CM2
ECHO LVOT AV VTI INDEX: 0.6
ECHO LVOT DIAM: 2.2 CM
ECHO LVOT MEAN GRADIENT: 3 MMHG
ECHO LVOT PEAK GRADIENT: 6 MMHG
ECHO LVOT PEAK VELOCITY: 1.2 M/S
ECHO LVOT STROKE VOLUME INDEX: 47.8 ML/M2
ECHO LVOT SV: 94.2 ML
ECHO LVOT VTI: 24.8 CM
ECHO MV A VELOCITY: 1.64 M/S
ECHO MV AREA PHT: 3.5 CM2
ECHO MV AREA VTI: 2.4 CM2
ECHO MV E DECELERATION TIME (DT): 217 MS
ECHO MV E VELOCITY: 1.01 M/S
ECHO MV E/A RATIO: 0.62
ECHO MV E/E' LATERAL: 14.43
ECHO MV E/E' RATIO (AVERAGED): 14.43
ECHO MV LVOT VTI INDEX: 1.61
ECHO MV MAX VELOCITY: 1.6 M/S
ECHO MV MEAN GRADIENT: 4 MMHG
ECHO MV MEAN VELOCITY: 0.9 M/S
ECHO MV PEAK GRADIENT: 10 MMHG
ECHO MV PRESSURE HALF TIME (PHT): 62.9 MS
ECHO MV VTI: 40 CM
ECHO RV FREE WALL PEAK S': 12 CM/S
ECHO RV INTERNAL DIMENSION: 3.2 CM
ECHO RV TAPSE: 2.1 CM (ref 1.7–?)

## 2023-11-30 PROCEDURE — G8417 CALC BMI ABV UP PARAM F/U: HCPCS | Performed by: SPECIALIST

## 2023-11-30 PROCEDURE — G8484 FLU IMMUNIZE NO ADMIN: HCPCS | Performed by: SPECIALIST

## 2023-11-30 PROCEDURE — 93306 TTE W/DOPPLER COMPLETE: CPT | Performed by: SPECIALIST

## 2023-11-30 PROCEDURE — 1123F ACP DISCUSS/DSCN MKR DOCD: CPT | Performed by: SPECIALIST

## 2023-11-30 PROCEDURE — 99214 OFFICE O/P EST MOD 30 MIN: CPT | Performed by: SPECIALIST

## 2023-11-30 PROCEDURE — 1036F TOBACCO NON-USER: CPT | Performed by: SPECIALIST

## 2023-11-30 PROCEDURE — G8427 DOCREV CUR MEDS BY ELIG CLIN: HCPCS | Performed by: SPECIALIST

## 2023-11-30 PROCEDURE — 1090F PRES/ABSN URINE INCON ASSESS: CPT | Performed by: SPECIALIST

## 2023-11-30 RX ORDER — CARBOXYMETHYLCELLULOSE SODIUM 5 MG/ML
2 SOLUTION/ DROPS OPHTHALMIC 3 TIMES DAILY
COMMUNITY

## 2023-12-12 NOTE — PROGRESS NOTES
TRANSFER - IN REPORT:    Verbal report received from Ed and Willie on [address]  being received from procedure room for routine progression of care. Report consisted of patients Situation, Background, Assessment and Recommendations(SBAR). Information from the following report(s) SBAR was reviewed with the receiving clinician. Opportunity for questions and clarification was provided. Assessment completed upon patients arrival to [address] [place] [address] and care assumed. Cardiac Cath Lab Recovery Arrival Note:    [address] arrived to [facility] recovery area. Patient procedure= LHC; PTCA of RCA. Patient on cardiac monitor, non-invasive blood pressure, SPO2 monitor. On room air. IV  of NS on pump at 50 ml/hr. Patient status doing well without problems. Patient is A&Ox 3. Patient reports no CP. PROCEDURE SITE CHECK:    Procedure site:without any bleeding and no hematoma, No pain/discomfort reported at procedure site. No change in patient status. Continue to monitor patient and status. 1815    TRANSFER - OUT REPORT:    Verbal report given to Cong on [address] being transferred to [facility] room 325 for routine progression of care       Report consisted of patients Situation, Background, Assessment and   Recommendations(SBAR). Information from the following report(s) SBAR was reviewed with the receiving nurse. Opportunity for questions and clarification was provided. - c/w Statin

## 2023-12-20 ENCOUNTER — APPOINTMENT (OUTPATIENT)
Facility: HOSPITAL | Age: 88
DRG: 683 | End: 2023-12-20
Payer: MEDICARE

## 2023-12-20 ENCOUNTER — APPOINTMENT (OUTPATIENT)
Dept: VASCULAR SURGERY | Facility: HOSPITAL | Age: 88
DRG: 683 | End: 2023-12-20
Attending: INTERNAL MEDICINE
Payer: MEDICARE

## 2023-12-20 ENCOUNTER — HOSPITAL ENCOUNTER (INPATIENT)
Facility: HOSPITAL | Age: 88
LOS: 5 days | Discharge: HOME | DRG: 683 | End: 2023-12-25
Attending: STUDENT IN AN ORGANIZED HEALTH CARE EDUCATION/TRAINING PROGRAM | Admitting: INTERNAL MEDICINE
Payer: MEDICARE

## 2023-12-20 DIAGNOSIS — N17.9 AKI (ACUTE KIDNEY INJURY) (HCC): ICD-10-CM

## 2023-12-20 DIAGNOSIS — R06.09 DOE (DYSPNEA ON EXERTION): Primary | ICD-10-CM

## 2023-12-20 DIAGNOSIS — E87.5 HYPERKALEMIA: ICD-10-CM

## 2023-12-20 DIAGNOSIS — I50.33 DIASTOLIC CHF, ACUTE ON CHRONIC (HCC): ICD-10-CM

## 2023-12-20 LAB
ALBUMIN SERPL-MCNC: 3.3 G/DL (ref 3.5–5)
ALBUMIN/GLOB SERPL: 0.9 (ref 1.1–2.2)
ALP SERPL-CCNC: 57 U/L (ref 45–117)
ALT SERPL-CCNC: 18 U/L (ref 12–78)
ANION GAP SERPL CALC-SCNC: 8 MMOL/L (ref 5–15)
ANION GAP SERPL CALC-SCNC: 8 MMOL/L (ref 5–15)
APPEARANCE UR: CLEAR
AST SERPL-CCNC: 16 U/L (ref 15–37)
BACTERIA URNS QL MICRO: NEGATIVE /HPF
BASOPHILS # BLD: 0.1 K/UL (ref 0–0.1)
BASOPHILS NFR BLD: 1 % (ref 0–1)
BILIRUB SERPL-MCNC: 0.2 MG/DL (ref 0.2–1)
BILIRUB UR QL: NEGATIVE
BUN SERPL-MCNC: 47 MG/DL (ref 6–20)
BUN SERPL-MCNC: 51 MG/DL (ref 6–20)
BUN/CREAT SERPL: 15 (ref 12–20)
BUN/CREAT SERPL: 16 (ref 12–20)
CALCIUM SERPL-MCNC: 7.5 MG/DL (ref 8.5–10.1)
CALCIUM SERPL-MCNC: 7.6 MG/DL (ref 8.5–10.1)
CHLORIDE SERPL-SCNC: 107 MMOL/L (ref 97–108)
CHLORIDE SERPL-SCNC: 110 MMOL/L (ref 97–108)
CO2 SERPL-SCNC: 20 MMOL/L (ref 21–32)
CO2 SERPL-SCNC: 21 MMOL/L (ref 21–32)
COLOR UR: ABNORMAL
COMMENT:: NORMAL
CREAT SERPL-MCNC: 3.02 MG/DL (ref 0.55–1.02)
CREAT SERPL-MCNC: 3.5 MG/DL (ref 0.55–1.02)
CREAT UR-MCNC: 27 MG/DL
D DIMER PPP FEU-MCNC: 2.24 MG/L FEU (ref 0–0.65)
DIFFERENTIAL METHOD BLD: ABNORMAL
EOSINOPHIL # BLD: 0.1 K/UL (ref 0–0.4)
EOSINOPHIL NFR BLD: 1 % (ref 0–7)
EPITH CASTS URNS QL MICRO: ABNORMAL /LPF
ERYTHROCYTE [DISTWIDTH] IN BLOOD BY AUTOMATED COUNT: 13.7 % (ref 11.5–14.5)
FLUAV AG NPH QL IA: NEGATIVE
FLUBV AG NOSE QL IA: NEGATIVE
GLOBULIN SER CALC-MCNC: 3.8 G/DL (ref 2–4)
GLUCOSE SERPL-MCNC: 102 MG/DL (ref 65–100)
GLUCOSE SERPL-MCNC: 131 MG/DL (ref 65–100)
GLUCOSE UR STRIP.AUTO-MCNC: NEGATIVE MG/DL
HCT VFR BLD AUTO: 31.4 % (ref 35–47)
HGB BLD-MCNC: 10.2 G/DL (ref 11.5–16)
HGB UR QL STRIP: NEGATIVE
HYALINE CASTS URNS QL MICRO: ABNORMAL /LPF (ref 0–2)
IMM GRANULOCYTES # BLD AUTO: 0 K/UL (ref 0–0.04)
IMM GRANULOCYTES NFR BLD AUTO: 0 % (ref 0–0.5)
KETONES UR QL STRIP.AUTO: NEGATIVE MG/DL
LACTATE SERPL-SCNC: 0.7 MMOL/L (ref 0.4–2)
LACTATE SERPL-SCNC: 1 MMOL/L (ref 0.4–2)
LEUKOCYTE ESTERASE UR QL STRIP.AUTO: NEGATIVE
LIPASE SERPL-CCNC: 45 U/L (ref 13–75)
LYMPHOCYTES # BLD: 0.8 K/UL (ref 0.8–3.5)
LYMPHOCYTES NFR BLD: 10 % (ref 12–49)
MAGNESIUM SERPL-MCNC: 1.9 MG/DL (ref 1.6–2.4)
MCH RBC QN AUTO: 32.1 PG (ref 26–34)
MCHC RBC AUTO-ENTMCNC: 32.5 G/DL (ref 30–36.5)
MCV RBC AUTO: 98.7 FL (ref 80–99)
MONOCYTES # BLD: 0.8 K/UL (ref 0–1)
MONOCYTES NFR BLD: 10 % (ref 5–13)
NEUTS SEG # BLD: 6.2 K/UL (ref 1.8–8)
NEUTS SEG NFR BLD: 78 % (ref 32–75)
NITRITE UR QL STRIP.AUTO: NEGATIVE
NRBC # BLD: 0 K/UL (ref 0–0.01)
NRBC BLD-RTO: 0 PER 100 WBC
PH UR STRIP: 5 (ref 5–8)
PLATELET # BLD AUTO: 197 K/UL (ref 150–400)
PMV BLD AUTO: 11.1 FL (ref 8.9–12.9)
POTASSIUM SERPL-SCNC: 4.9 MMOL/L (ref 3.5–5.1)
POTASSIUM SERPL-SCNC: 5.7 MMOL/L (ref 3.5–5.1)
PROT SERPL-MCNC: 7.1 G/DL (ref 6.4–8.2)
PROT UR STRIP-MCNC: NEGATIVE MG/DL
RBC # BLD AUTO: 3.18 M/UL (ref 3.8–5.2)
RBC #/AREA URNS HPF: ABNORMAL /HPF (ref 0–5)
RBC MORPH BLD: ABNORMAL
SARS-COV-2 RDRP RESP QL NAA+PROBE: NOT DETECTED
SODIUM SERPL-SCNC: 135 MMOL/L (ref 136–145)
SODIUM SERPL-SCNC: 139 MMOL/L (ref 136–145)
SODIUM UR-SCNC: 83 MMOL/L
SOURCE: NORMAL
SP GR UR REFRACTOMETRY: 1.01 (ref 1–1.03)
SPECIMEN HOLD: NORMAL
TROPONIN I SERPL HS-MCNC: 14 NG/L (ref 0–51)
TROPONIN I SERPL HS-MCNC: 17 NG/L (ref 0–51)
TROPONIN I SERPL HS-MCNC: 22 NG/L (ref 0–51)
TSH SERPL DL<=0.05 MIU/L-ACNC: 0.91 UIU/ML (ref 0.36–3.74)
URINE CULTURE IF INDICATED: ABNORMAL
UROBILINOGEN UR QL STRIP.AUTO: 0.2 EU/DL (ref 0.2–1)
WBC # BLD AUTO: 8 K/UL (ref 3.6–11)
WBC URNS QL MICRO: ABNORMAL /HPF (ref 0–4)

## 2023-12-20 PROCEDURE — 2580000003 HC RX 258: Performed by: INTERNAL MEDICINE

## 2023-12-20 PROCEDURE — 83036 HEMOGLOBIN GLYCOSYLATED A1C: CPT

## 2023-12-20 PROCEDURE — 84443 ASSAY THYROID STIM HORMONE: CPT

## 2023-12-20 PROCEDURE — 6360000002 HC RX W HCPCS: Performed by: STUDENT IN AN ORGANIZED HEALTH CARE EDUCATION/TRAINING PROGRAM

## 2023-12-20 PROCEDURE — 87635 SARS-COV-2 COVID-19 AMP PRB: CPT

## 2023-12-20 PROCEDURE — APPSS30 APP SPLIT SHARED TIME 16-30 MINUTES: Performed by: NURSE PRACTITIONER

## 2023-12-20 PROCEDURE — 6370000000 HC RX 637 (ALT 250 FOR IP): Performed by: INTERNAL MEDICINE

## 2023-12-20 PROCEDURE — 6360000002 HC RX W HCPCS: Performed by: INTERNAL MEDICINE

## 2023-12-20 PROCEDURE — 81001 URINALYSIS AUTO W/SCOPE: CPT

## 2023-12-20 PROCEDURE — 87040 BLOOD CULTURE FOR BACTERIA: CPT

## 2023-12-20 PROCEDURE — 87804 INFLUENZA ASSAY W/OPTIC: CPT

## 2023-12-20 PROCEDURE — 82570 ASSAY OF URINE CREATININE: CPT

## 2023-12-20 PROCEDURE — 84300 ASSAY OF URINE SODIUM: CPT

## 2023-12-20 PROCEDURE — 84484 ASSAY OF TROPONIN QUANT: CPT

## 2023-12-20 PROCEDURE — 96361 HYDRATE IV INFUSION ADD-ON: CPT

## 2023-12-20 PROCEDURE — 85379 FIBRIN DEGRADATION QUANT: CPT

## 2023-12-20 PROCEDURE — 85025 COMPLETE CBC W/AUTO DIFF WBC: CPT

## 2023-12-20 PROCEDURE — 99285 EMERGENCY DEPT VISIT HI MDM: CPT

## 2023-12-20 PROCEDURE — 83605 ASSAY OF LACTIC ACID: CPT

## 2023-12-20 PROCEDURE — 83690 ASSAY OF LIPASE: CPT

## 2023-12-20 PROCEDURE — 93970 EXTREMITY STUDY: CPT

## 2023-12-20 PROCEDURE — 80053 COMPREHEN METABOLIC PANEL: CPT

## 2023-12-20 PROCEDURE — 2580000003 HC RX 258: Performed by: STUDENT IN AN ORGANIZED HEALTH CARE EDUCATION/TRAINING PROGRAM

## 2023-12-20 PROCEDURE — 93005 ELECTROCARDIOGRAM TRACING: CPT | Performed by: STUDENT IN AN ORGANIZED HEALTH CARE EDUCATION/TRAINING PROGRAM

## 2023-12-20 PROCEDURE — 74176 CT ABD & PELVIS W/O CONTRAST: CPT

## 2023-12-20 PROCEDURE — 1100000000 HC RM PRIVATE

## 2023-12-20 PROCEDURE — 83735 ASSAY OF MAGNESIUM: CPT

## 2023-12-20 PROCEDURE — 96374 THER/PROPH/DIAG INJ IV PUSH: CPT

## 2023-12-20 PROCEDURE — 71046 X-RAY EXAM CHEST 2 VIEWS: CPT

## 2023-12-20 PROCEDURE — 36415 COLL VENOUS BLD VENIPUNCTURE: CPT

## 2023-12-20 RX ORDER — AMLODIPINE BESYLATE 5 MG/1
5 TABLET ORAL DAILY
Status: DISCONTINUED | OUTPATIENT
Start: 2023-12-20 | End: 2023-12-20

## 2023-12-20 RX ORDER — CARBOXYMETHYLCELLULOSE SODIUM 10 MG/ML
2 GEL OPHTHALMIC 3 TIMES DAILY
Status: DISCONTINUED | OUTPATIENT
Start: 2023-12-20 | End: 2023-12-24

## 2023-12-20 RX ORDER — PANTOPRAZOLE SODIUM 40 MG/1
40 TABLET, DELAYED RELEASE ORAL
Status: DISCONTINUED | OUTPATIENT
Start: 2023-12-20 | End: 2023-12-25 | Stop reason: HOSPADM

## 2023-12-20 RX ORDER — ALBUTEROL SULFATE 1.25 MG/3ML
2.5 SOLUTION RESPIRATORY (INHALATION) EVERY 6 HOURS PRN
Status: DISCONTINUED | OUTPATIENT
Start: 2023-12-20 | End: 2023-12-20

## 2023-12-20 RX ORDER — SODIUM CHLORIDE 0.9 % (FLUSH) 0.9 %
5-40 SYRINGE (ML) INJECTION EVERY 12 HOURS SCHEDULED
Status: DISCONTINUED | OUTPATIENT
Start: 2023-12-20 | End: 2023-12-25 | Stop reason: HOSPADM

## 2023-12-20 RX ORDER — HEPARIN SODIUM 5000 [USP'U]/ML
5000 INJECTION, SOLUTION INTRAVENOUS; SUBCUTANEOUS EVERY 8 HOURS
Status: DISCONTINUED | OUTPATIENT
Start: 2023-12-20 | End: 2023-12-25 | Stop reason: HOSPADM

## 2023-12-20 RX ORDER — MONTELUKAST SODIUM 10 MG/1
10 TABLET ORAL NIGHTLY
Status: DISCONTINUED | OUTPATIENT
Start: 2023-12-20 | End: 2023-12-25 | Stop reason: HOSPADM

## 2023-12-20 RX ORDER — CLONIDINE HYDROCHLORIDE 0.1 MG/1
0.1 TABLET ORAL
Status: DISCONTINUED | OUTPATIENT
Start: 2023-12-20 | End: 2023-12-25 | Stop reason: HOSPADM

## 2023-12-20 RX ORDER — ACETAMINOPHEN 325 MG/1
650 TABLET ORAL EVERY 6 HOURS PRN
Status: DISCONTINUED | OUTPATIENT
Start: 2023-12-20 | End: 2023-12-25 | Stop reason: HOSPADM

## 2023-12-20 RX ORDER — CHOLECALCIFEROL (VITAMIN D3) 125 MCG
1000 CAPSULE ORAL DAILY
Status: DISCONTINUED | OUTPATIENT
Start: 2023-12-21 | End: 2023-12-25 | Stop reason: HOSPADM

## 2023-12-20 RX ORDER — HYDRALAZINE HYDROCHLORIDE 25 MG/1
100 TABLET, FILM COATED ORAL EVERY 8 HOURS SCHEDULED
Status: DISCONTINUED | OUTPATIENT
Start: 2023-12-20 | End: 2023-12-25 | Stop reason: HOSPADM

## 2023-12-20 RX ORDER — ASPIRIN 81 MG/1
81 TABLET, CHEWABLE ORAL DAILY
Status: DISCONTINUED | OUTPATIENT
Start: 2023-12-21 | End: 2023-12-25 | Stop reason: HOSPADM

## 2023-12-20 RX ORDER — SODIUM CHLORIDE 0.9 % (FLUSH) 0.9 %
5-40 SYRINGE (ML) INJECTION PRN
Status: DISCONTINUED | OUTPATIENT
Start: 2023-12-20 | End: 2023-12-25 | Stop reason: HOSPADM

## 2023-12-20 RX ORDER — ALBUTEROL SULFATE 2.5 MG/3ML
2.5 SOLUTION RESPIRATORY (INHALATION) EVERY 6 HOURS PRN
Status: DISCONTINUED | OUTPATIENT
Start: 2023-12-20 | End: 2023-12-25 | Stop reason: HOSPADM

## 2023-12-20 RX ORDER — SODIUM BICARBONATE 650 MG/1
650 TABLET ORAL 3 TIMES DAILY
Status: DISCONTINUED | OUTPATIENT
Start: 2023-12-20 | End: 2023-12-25 | Stop reason: HOSPADM

## 2023-12-20 RX ORDER — SODIUM CHLORIDE 9 MG/ML
INJECTION, SOLUTION INTRAVENOUS PRN
Status: DISCONTINUED | OUTPATIENT
Start: 2023-12-20 | End: 2023-12-25 | Stop reason: HOSPADM

## 2023-12-20 RX ORDER — ROSUVASTATIN CALCIUM 10 MG/1
20 TABLET, COATED ORAL NIGHTLY
Status: DISCONTINUED | OUTPATIENT
Start: 2023-12-20 | End: 2023-12-25 | Stop reason: HOSPADM

## 2023-12-20 RX ORDER — SODIUM CHLORIDE, SODIUM LACTATE, POTASSIUM CHLORIDE, CALCIUM CHLORIDE 600; 310; 30; 20 MG/100ML; MG/100ML; MG/100ML; MG/100ML
INJECTION, SOLUTION INTRAVENOUS CONTINUOUS
Status: DISCONTINUED | OUTPATIENT
Start: 2023-12-20 | End: 2023-12-22

## 2023-12-20 RX ORDER — AMLODIPINE BESYLATE 5 MG/1
5 TABLET ORAL DAILY
Status: DISCONTINUED | OUTPATIENT
Start: 2023-12-21 | End: 2023-12-21

## 2023-12-20 RX ORDER — LEVOTHYROXINE SODIUM 112 UG/1
112 TABLET ORAL
Status: DISCONTINUED | OUTPATIENT
Start: 2023-12-21 | End: 2023-12-20

## 2023-12-20 RX ORDER — GABAPENTIN 300 MG/1
600 CAPSULE ORAL DAILY
Status: DISCONTINUED | OUTPATIENT
Start: 2023-12-21 | End: 2023-12-20

## 2023-12-20 RX ORDER — LEVOTHYROXINE SODIUM 0.12 MG/1
125 TABLET ORAL
Status: DISCONTINUED | OUTPATIENT
Start: 2023-12-21 | End: 2023-12-25 | Stop reason: HOSPADM

## 2023-12-20 RX ORDER — 0.9 % SODIUM CHLORIDE 0.9 %
500 INTRAVENOUS SOLUTION INTRAVENOUS ONCE
Status: COMPLETED | OUTPATIENT
Start: 2023-12-20 | End: 2023-12-20

## 2023-12-20 RX ORDER — ACETAMINOPHEN 650 MG/1
650 SUPPOSITORY RECTAL EVERY 6 HOURS PRN
Status: DISCONTINUED | OUTPATIENT
Start: 2023-12-20 | End: 2023-12-25 | Stop reason: HOSPADM

## 2023-12-20 RX ORDER — ISOSORBIDE MONONITRATE 30 MG/1
30 TABLET, EXTENDED RELEASE ORAL DAILY
Status: DISCONTINUED | OUTPATIENT
Start: 2023-12-21 | End: 2023-12-25 | Stop reason: HOSPADM

## 2023-12-20 RX ORDER — GABAPENTIN 300 MG/1
300 CAPSULE ORAL DAILY
Status: DISCONTINUED | OUTPATIENT
Start: 2023-12-21 | End: 2023-12-21

## 2023-12-20 RX ORDER — ONDANSETRON 4 MG/1
4 TABLET, ORALLY DISINTEGRATING ORAL EVERY 8 HOURS PRN
Status: DISCONTINUED | OUTPATIENT
Start: 2023-12-20 | End: 2023-12-25 | Stop reason: HOSPADM

## 2023-12-20 RX ORDER — POLYETHYLENE GLYCOL 3350 17 G/17G
17 POWDER, FOR SOLUTION ORAL DAILY PRN
Status: DISCONTINUED | OUTPATIENT
Start: 2023-12-20 | End: 2023-12-25 | Stop reason: HOSPADM

## 2023-12-20 RX ORDER — CALCIUM GLUCONATE 20 MG/ML
2000 INJECTION, SOLUTION INTRAVENOUS ONCE
Status: COMPLETED | OUTPATIENT
Start: 2023-12-20 | End: 2023-12-20

## 2023-12-20 RX ORDER — ONDANSETRON 2 MG/ML
4 INJECTION INTRAMUSCULAR; INTRAVENOUS EVERY 6 HOURS PRN
Status: DISCONTINUED | OUTPATIENT
Start: 2023-12-20 | End: 2023-12-25 | Stop reason: HOSPADM

## 2023-12-20 RX ADMIN — ROSUVASTATIN CALCIUM 20 MG: 10 TABLET, COATED ORAL at 21:13

## 2023-12-20 RX ADMIN — SODIUM CHLORIDE, POTASSIUM CHLORIDE, SODIUM LACTATE AND CALCIUM CHLORIDE: 600; 310; 30; 20 INJECTION, SOLUTION INTRAVENOUS at 18:01

## 2023-12-20 RX ADMIN — CALCIUM GLUCONATE 2000 MG: 20 INJECTION, SOLUTION INTRAVENOUS at 14:26

## 2023-12-20 RX ADMIN — MONTELUKAST 10 MG: 10 TABLET, FILM COATED ORAL at 21:14

## 2023-12-20 RX ADMIN — PANTOPRAZOLE SODIUM 40 MG: 40 TABLET, DELAYED RELEASE ORAL at 17:40

## 2023-12-20 RX ADMIN — SODIUM ZIRCONIUM CYCLOSILICATE 10 G: 5 POWDER, FOR SUSPENSION ORAL at 17:56

## 2023-12-20 RX ADMIN — CLONIDINE HYDROCHLORIDE 0.1 MG: 0.1 TABLET ORAL at 21:13

## 2023-12-20 RX ADMIN — HYDRALAZINE HYDROCHLORIDE 100 MG: 25 TABLET, FILM COATED ORAL at 17:57

## 2023-12-20 RX ADMIN — HYDRALAZINE HYDROCHLORIDE 100 MG: 25 TABLET, FILM COATED ORAL at 21:13

## 2023-12-20 RX ADMIN — SODIUM CHLORIDE, PRESERVATIVE FREE 10 ML: 5 INJECTION INTRAVENOUS at 21:14

## 2023-12-20 RX ADMIN — SODIUM BICARBONATE 650 MG: 650 TABLET ORAL at 17:57

## 2023-12-20 RX ADMIN — SODIUM CHLORIDE 500 ML: 9 INJECTION, SOLUTION INTRAVENOUS at 13:00

## 2023-12-20 RX ADMIN — SODIUM BICARBONATE 650 MG: 650 TABLET ORAL at 21:14

## 2023-12-20 RX ADMIN — HEPARIN SODIUM 5000 UNITS: 5000 INJECTION INTRAVENOUS; SUBCUTANEOUS at 21:14

## 2023-12-20 NOTE — ED NOTES
Bedside and Verbal shift change report given to Buddy Hussein (oncoming nurse) by Bree SIMON (offgoing nurse). Report included the following information Nurse Handoff Report, Index, ED Encounter Summary, Adult Overview, Intake/Output, and Recent Results.

## 2023-12-20 NOTE — ED TRIAGE NOTES
Patient arrives to the ED via POV with complaints of shortness and breath and fatigue that started a week ago.      Patient denies pain at this time

## 2023-12-20 NOTE — H&P
[address] [place] [address]  [phone]    Admission History and Physical      NAME:  Kd Glasgow   :   1935   MRN:  [id number]     PCP:  QUINTIN Barajas NP     Date/Time:  2023         Subjective:     CHIEF COMPLAINT: \"I am tired when I exert myself and get SOB\"     HISTORY OF PRESENT ILLNESS:     Ms. Shahriar Romero is a 80 y.o.  female with PMH of CAD with INDU to RCA, asthma, HTN, DM, hypothyroidism, FABIÁN on CPAP admitted for ZULEYMA. Pt states that she had recently been on Bactrim for diverticulitis. She does endorse some mild residual ab pain but presents today with CAMPOS. Pt denies fevers/chills/cough. No chest pain. Past Medical History:   Diagnosis Date    Arthritis     Asthma     Breast cancer ([address])     CAD (coronary artery disease)     INDU to RCA in      COVID-19     Aug 2022    Diabetes mellitus ([address])     Diverticulosis     Dyslipidemia     GERD (gastroesophageal reflux disease)     Hiatal hernia     HTN (hypertension)     Hypothyroid     Neuropathy     Obesity     FABIÁN on CPAP     RBBB (right bundle branch block)     Spinal stenosis         Past Surgical History:   Procedure Laterality Date    BUNIONECTOMY      CARDIAC CATHETERIZATION      Cardiac Cath 9/3/2010 showed mild CAD.       CHOLECYSTECTOMY      CORONARY ANGIOPLASTY WITH STENT PLACEMENT      ORTHOPEDIC SURGERY      OTHER SURGICAL HISTORY      Aorta duplex 14 - aorta size 1.7 cm, no dilation     OTHER SURGICAL HISTORY      Lexiscan cardiolite 13 - normal MPI, LVEF 74%    OTHER SURGICAL HISTORY      Echo 10/5/12 - LVEF 60-65%, mod-marked LAE, MAC, grade 1 diastolic dysfunction     TOTAL HIP ARTHROPLASTY         Social History     Tobacco Use    Smoking status: Never    Smokeless tobacco: Never   Substance Use Topics    Alcohol use: No     Alcohol/week: 0.0 standard drinks of alcohol        Family History   Problem Relation Age of Onset    Coronary Art

## 2023-12-20 NOTE — CONSULTS
[facility]                    Cardiology Care Note     [x]Initial Encounter     []Follow-up    Patient Name: Penny Barreto - :1935 - MRY:[id number]  Primary Cardiologist: Alberto Ramires MD  Consulting Cardiologist: Thom Flood DO     Reason for encounter: CAMPOS    HPI:       Penny Barreto is a 80 y.o. female with PMH significant for  CAD s/p PCI on , HF p EF, chronic Class II CAMPOS, HTN, bradycardia, dyslipidemia who  presents to the ED for fatigue and shortness of breath over the last two days. She had a bout of diverticulitis a week ago and was placed on abx. She then developed diarrhea and fatigue. She says she feels like she did prior to her stent  placement . She normally can walk to the dining area and stops 3-4 times to catch her breath which is normal. Now she is SOB dressing and getting out of her chair. ED finds ZULEYMA. She tells me the nursing facility today recorded her BP as 24/42 then 646 systolic. Subjective:      Dysart Catarino Nailsmarlenistephen reports dyspnea and fatigue     Assessment and Plan       1. CAD s/p INDU RCA 2/3/21   - chronic class 2 CAMPOS now worse the past 2-3 days   - cont asa/statin  - no BB due to bradycardia previously  - currently not candidate for invasive work up given ZULEYMA  - can consider repeat cath if symptoms do not improve with supportive care. - limited echo for LV function only        2. HFpEF, LVEF 60-65%   -hold diuretics givne ZULEYMA         3. HTN    - she had ZULEYMA and hyperkalemia on 25 mg aldactone in past (thus had to lower doses)    - -140's usually at assisted living -- she feels worse when SBP drops < 120 and better when 's   -stable here         4 Dyslipidemia   -on  crestor        5 Chronic LE edema and prior cellulitis    - wears compression hose     6 fatigue: multifactorial ZULEYMA, ? Virus, recent diverticulitis tx  - viral panel  -      7.  Bradycardia   - Event Monitor 22-22 - NSR, HR , Avg HR 70 bpm.

## 2023-12-20 NOTE — ED NOTES
Pt placed on cardiac monitoring EDX3-6. Sinus ayesha @ 54 BPM. Pt resting in w/c w/ no new complaints.

## 2023-12-20 NOTE — ED PROVIDER NOTES
WITH STENT PLACEMENT      ORTHOPEDIC SURGERY      OTHER SURGICAL HISTORY      Aorta duplex 1/6/14 - aorta size 1.7 cm, no dilation     OTHER SURGICAL HISTORY      Lexiscan cardiolite 1/25/13 - normal MPI, LVEF 74%    OTHER SURGICAL HISTORY      Echo 10/5/12 - LVEF 60-65%, mod-marked LAE, MAC, grade 1 diastolic dysfunction     TOTAL HIP ARTHROPLASTY           CURRENT MEDICATIONS       Previous Medications    ACETAMINOPHEN (TYLENOL) 500 MG TABLET    Take 1-2 tablets by mouth 3 times daily as needed    ACETAMINOPHEN (TYLENOL) 650 MG EXTENDED RELEASE TABLET    Take 1 tablet by mouth    AMLODIPINE (NORVASC) 5 MG TABLET    TAKE 1 TABLET DAILY    ASPIRIN 81 MG CHEWABLE TABLET    Take 1 tablet by mouth daily    BUMETANIDE (BUMEX) 1 MG TABLET    TAKE 1 TABLET DAILY    CALCIUM CARBONATE-VITAMIN D 600-3. 125 MG-MCG TABS    Take by mouth 2 times daily    CARBOXYMETHYLCELLULOSE PF (REFRESH PLUS) 0.5 % SOLN OPHTHALMIC SOLUTION    2 drops 3 times daily    CLONIDINE (CATAPRES) 0.1 MG TABLET    TAKE 1 TABLET NIGHTLY    CYANOCOBALAMIN 1000 MCG TABLET    Take 1 tablet by mouth    GABAPENTIN (NEURONTIN) 300 MG CAPSULE    Take 2 capsules by mouth daily.     GLUCOS-CHOND-MSM-BOR-D3-HYALUR (MOVE FREE JOINT HEALTH ADV + D PO)    Take by mouth    HYDRALAZINE (APRESOLINE) 100 MG TABLET    TAKE 1 TABLET EVERY 8 HOURS    ISOSORBIDE MONONITRATE (IMDUR) 30 MG EXTENDED RELEASE TABLET    Take 1 tablet by mouth daily    LEVOTHYROXINE (SYNTHROID) 112 MCG TABLET    Take 125 mcg by mouth every morning (before breakfast)    LORATADINE (CLARITIN) 10 MG TABLET    Take 1 tablet by mouth    LOSARTAN (COZAAR) 100 MG TABLET    Take 1 tablet by mouth nightly    MONTELUKAST (SINGULAIR) 10 MG TABLET    Take 1 tablet by mouth nightly    MULTIPLE VITAMINS-MINERALS (PRESERVISION AREDS 2 PO)    Take 1 capsule by mouth 2 times daily    NYSTATIN (MYCOSTATIN) 602243 UNIT/GM POWDER    Apply 1 Application topically daily    OMEPRAZOLE (PRILOSEC) 20 MG DELAYED RELEASE

## 2023-12-21 ENCOUNTER — APPOINTMENT (OUTPATIENT)
Facility: HOSPITAL | Age: 88
DRG: 683 | End: 2023-12-21
Payer: MEDICARE

## 2023-12-21 ENCOUNTER — HOSPITAL ENCOUNTER (INPATIENT)
Facility: HOSPITAL | Age: 88
Discharge: HOME | DRG: 683 | End: 2023-12-23
Payer: MEDICARE

## 2023-12-21 LAB
ANION GAP SERPL CALC-SCNC: 5 MMOL/L (ref 5–15)
BASOPHILS # BLD: 0.1 K/UL (ref 0–0.1)
BASOPHILS NFR BLD: 1 % (ref 0–1)
BUN SERPL-MCNC: 45 MG/DL (ref 6–20)
BUN/CREAT SERPL: 18 (ref 12–20)
CALCIUM SERPL-MCNC: 8 MG/DL (ref 8.5–10.1)
CHLORIDE SERPL-SCNC: 112 MMOL/L (ref 97–108)
CO2 SERPL-SCNC: 21 MMOL/L (ref 21–32)
CREAT SERPL-MCNC: 2.53 MG/DL (ref 0.55–1.02)
DIFFERENTIAL METHOD BLD: ABNORMAL
ECHO BSA: 2.07 M2
ECHO BSA: 2.07 M2
ECHO LA DIAMETER INDEX: 2.07 CM/M2
ECHO LA DIAMETER: 4.1 CM
ECHO LV EDV A2C: 58 ML
ECHO LV EDV A4C: 67 ML
ECHO LV EDV BP: 63 ML (ref 56–104)
ECHO LV EDV INDEX A4C: 34 ML/M2
ECHO LV EDV INDEX BP: 32 ML/M2
ECHO LV EDV NDEX A2C: 29 ML/M2
ECHO LV EJECTION FRACTION A2C: 60 %
ECHO LV EJECTION FRACTION A4C: 66 %
ECHO LV EJECTION FRACTION BIPLANE: 60 % (ref 55–100)
ECHO LV ESV A2C: 23 ML
ECHO LV ESV A4C: 23 ML
ECHO LV ESV BP: 25 ML (ref 19–49)
ECHO LV ESV INDEX A2C: 12 ML/M2
ECHO LV ESV INDEX A4C: 12 ML/M2
ECHO LV ESV INDEX BP: 13 ML/M2
ECHO LV FRACTIONAL SHORTENING: 34 % (ref 28–44)
ECHO LV INTERNAL DIMENSION DIASTOLE INDEX: 2.93 CM/M2
ECHO LV INTERNAL DIMENSION DIASTOLIC: 5.8 CM (ref 3.9–5.3)
ECHO LV INTERNAL DIMENSION SYSTOLIC INDEX: 1.92 CM/M2
ECHO LV INTERNAL DIMENSION SYSTOLIC: 3.8 CM
ECHO LV IVSD: 0.7 CM (ref 0.6–0.9)
ECHO LV MASS 2D: 148.8 G (ref 67–162)
ECHO LV MASS INDEX 2D: 75.2 G/M2 (ref 43–95)
ECHO LV POSTERIOR WALL DIASTOLIC: 0.7 CM (ref 0.6–0.9)
ECHO LV RELATIVE WALL THICKNESS RATIO: 0.24
EKG ATRIAL RATE: 51 BPM
EKG DIAGNOSIS: NORMAL
EKG P-R INTERVAL: 130 MS
EKG Q-T INTERVAL: 446 MS
EKG QRS DURATION: 140 MS
EKG QTC CALCULATION (BAZETT): 414 MS
EKG R AXIS: -51 DEGREES
EKG T AXIS: 26 DEGREES
EKG VENTRICULAR RATE: 52 BPM
EOSINOPHIL # BLD: 0.3 K/UL (ref 0–0.4)
EOSINOPHIL NFR BLD: 4 % (ref 0–7)
ERYTHROCYTE [DISTWIDTH] IN BLOOD BY AUTOMATED COUNT: 13.5 % (ref 11.5–14.5)
EST. AVERAGE GLUCOSE BLD GHB EST-MCNC: 100 MG/DL
GLUCOSE SERPL-MCNC: 92 MG/DL (ref 65–100)
HBA1C MFR BLD: 5.1 % (ref 4–5.6)
HCT VFR BLD AUTO: 29.7 % (ref 35–47)
HGB BLD-MCNC: 9.6 G/DL (ref 11.5–16)
IMM GRANULOCYTES # BLD AUTO: 0 K/UL (ref 0–0.04)
IMM GRANULOCYTES NFR BLD AUTO: 0 % (ref 0–0.5)
LYMPHOCYTES # BLD: 0.7 K/UL (ref 0.8–3.5)
LYMPHOCYTES NFR BLD: 10 % (ref 12–49)
MAGNESIUM SERPL-MCNC: 2 MG/DL (ref 1.6–2.4)
MCH RBC QN AUTO: 32 PG (ref 26–34)
MCHC RBC AUTO-ENTMCNC: 32.3 G/DL (ref 30–36.5)
MCV RBC AUTO: 99 FL (ref 80–99)
MONOCYTES # BLD: 0.7 K/UL (ref 0–1)
MONOCYTES NFR BLD: 11 % (ref 5–13)
NEUTS SEG # BLD: 5 K/UL (ref 1.8–8)
NEUTS SEG NFR BLD: 74 % (ref 32–75)
NRBC # BLD: 0 K/UL (ref 0–0.01)
NRBC BLD-RTO: 0 PER 100 WBC
PLATELET # BLD AUTO: 175 K/UL (ref 150–400)
PMV BLD AUTO: 10.6 FL (ref 8.9–12.9)
POTASSIUM SERPL-SCNC: 5.2 MMOL/L (ref 3.5–5.1)
RBC # BLD AUTO: 3 M/UL (ref 3.8–5.2)
RBC MORPH BLD: ABNORMAL
SODIUM SERPL-SCNC: 138 MMOL/L (ref 136–145)
WBC # BLD AUTO: 6.8 K/UL (ref 3.6–11)

## 2023-12-21 PROCEDURE — 6360000002 HC RX W HCPCS: Performed by: INTERNAL MEDICINE

## 2023-12-21 PROCEDURE — 97116 GAIT TRAINING THERAPY: CPT

## 2023-12-21 PROCEDURE — 6370000000 HC RX 637 (ALT 250 FOR IP): Performed by: INTERNAL MEDICINE

## 2023-12-21 PROCEDURE — 97165 OT EVAL LOW COMPLEX 30 MIN: CPT

## 2023-12-21 PROCEDURE — 93308 TTE F-UP OR LMTD: CPT

## 2023-12-21 PROCEDURE — 36415 COLL VENOUS BLD VENIPUNCTURE: CPT

## 2023-12-21 PROCEDURE — 6370000000 HC RX 637 (ALT 250 FOR IP): Performed by: NURSE PRACTITIONER

## 2023-12-21 PROCEDURE — 80048 BASIC METABOLIC PNL TOTAL CA: CPT

## 2023-12-21 PROCEDURE — 93325 DOPPLER ECHO COLOR FLOW MAPG: CPT | Performed by: STUDENT IN AN ORGANIZED HEALTH CARE EDUCATION/TRAINING PROGRAM

## 2023-12-21 PROCEDURE — 94761 N-INVAS EAR/PLS OXIMETRY MLT: CPT

## 2023-12-21 PROCEDURE — 1100000000 HC RM PRIVATE

## 2023-12-21 PROCEDURE — 97161 PT EVAL LOW COMPLEX 20 MIN: CPT

## 2023-12-21 PROCEDURE — 93308 TTE F-UP OR LMTD: CPT | Performed by: STUDENT IN AN ORGANIZED HEALTH CARE EDUCATION/TRAINING PROGRAM

## 2023-12-21 PROCEDURE — 93010 ELECTROCARDIOGRAM REPORT: CPT | Performed by: INTERNAL MEDICINE

## 2023-12-21 PROCEDURE — APPSS30 APP SPLIT SHARED TIME 16-30 MINUTES: Performed by: NURSE PRACTITIONER

## 2023-12-21 PROCEDURE — 97535 SELF CARE MNGMENT TRAINING: CPT

## 2023-12-21 PROCEDURE — 85025 COMPLETE CBC W/AUTO DIFF WBC: CPT

## 2023-12-21 PROCEDURE — 83735 ASSAY OF MAGNESIUM: CPT

## 2023-12-21 PROCEDURE — 2580000003 HC RX 258: Performed by: INTERNAL MEDICINE

## 2023-12-21 RX ORDER — GABAPENTIN 300 MG/1
600 CAPSULE ORAL NIGHTLY
Status: DISCONTINUED | OUTPATIENT
Start: 2023-12-21 | End: 2023-12-25 | Stop reason: HOSPADM

## 2023-12-21 RX ORDER — REGADENOSON 0.08 MG/ML
0.4 INJECTION, SOLUTION INTRAVENOUS
Status: DISCONTINUED | OUTPATIENT
Start: 2023-12-21 | End: 2023-12-24 | Stop reason: HOSPADM

## 2023-12-21 RX ORDER — PROMETHAZINE HYDROCHLORIDE 25 MG/1
25 SUPPOSITORY RECTAL EVERY 6 HOURS PRN
Status: DISCONTINUED | OUTPATIENT
Start: 2023-12-21 | End: 2023-12-25 | Stop reason: HOSPADM

## 2023-12-21 RX ORDER — AMLODIPINE BESYLATE 5 MG/1
10 TABLET ORAL DAILY
Status: DISCONTINUED | OUTPATIENT
Start: 2023-12-22 | End: 2023-12-25 | Stop reason: HOSPADM

## 2023-12-21 RX ADMIN — SODIUM BICARBONATE 650 MG: 650 TABLET ORAL at 21:08

## 2023-12-21 RX ADMIN — ACETAMINOPHEN 650 MG: 325 TABLET ORAL at 21:20

## 2023-12-21 RX ADMIN — CARBOXYMETHYLCELLULOSE SODIUM 2 DROP: 10 GEL OPHTHALMIC at 21:10

## 2023-12-21 RX ADMIN — MONTELUKAST 10 MG: 10 TABLET, FILM COATED ORAL at 21:08

## 2023-12-21 RX ADMIN — CYANOCOBALAMIN TAB 500 MCG 1000 MCG: 500 TAB at 10:33

## 2023-12-21 RX ADMIN — AMLODIPINE BESYLATE 5 MG: 5 TABLET ORAL at 10:33

## 2023-12-21 RX ADMIN — PANTOPRAZOLE SODIUM 40 MG: 40 TABLET, DELAYED RELEASE ORAL at 06:51

## 2023-12-21 RX ADMIN — ISOSORBIDE MONONITRATE 30 MG: 30 TABLET, EXTENDED RELEASE ORAL at 10:33

## 2023-12-21 RX ADMIN — SODIUM CHLORIDE, POTASSIUM CHLORIDE, SODIUM LACTATE AND CALCIUM CHLORIDE: 600; 310; 30; 20 INJECTION, SOLUTION INTRAVENOUS at 08:11

## 2023-12-21 RX ADMIN — PROMETHAZINE HYDROCHLORIDE 25 MG: 25 SUPPOSITORY RECTAL at 21:24

## 2023-12-21 RX ADMIN — ASPIRIN 81 MG: 81 TABLET, CHEWABLE ORAL at 10:34

## 2023-12-21 RX ADMIN — LEVOTHYROXINE SODIUM 125 MCG: 0.12 TABLET ORAL at 06:51

## 2023-12-21 RX ADMIN — HEPARIN SODIUM 5000 UNITS: 5000 INJECTION INTRAVENOUS; SUBCUTANEOUS at 06:51

## 2023-12-21 RX ADMIN — CARBOXYMETHYLCELLULOSE SODIUM 2 DROP: 10 GEL OPHTHALMIC at 21:09

## 2023-12-21 RX ADMIN — HYDRALAZINE HYDROCHLORIDE 100 MG: 25 TABLET, FILM COATED ORAL at 21:21

## 2023-12-21 RX ADMIN — SODIUM BICARBONATE 650 MG: 650 TABLET ORAL at 10:33

## 2023-12-21 RX ADMIN — SODIUM CHLORIDE, PRESERVATIVE FREE 10 ML: 5 INJECTION INTRAVENOUS at 21:09

## 2023-12-21 RX ADMIN — ROSUVASTATIN CALCIUM 20 MG: 10 TABLET, COATED ORAL at 21:08

## 2023-12-21 RX ADMIN — HEPARIN SODIUM 5000 UNITS: 5000 INJECTION INTRAVENOUS; SUBCUTANEOUS at 23:21

## 2023-12-21 RX ADMIN — HYDRALAZINE HYDROCHLORIDE 100 MG: 25 TABLET, FILM COATED ORAL at 16:13

## 2023-12-21 RX ADMIN — HEPARIN SODIUM 5000 UNITS: 5000 INJECTION INTRAVENOUS; SUBCUTANEOUS at 16:14

## 2023-12-21 RX ADMIN — PANTOPRAZOLE SODIUM 40 MG: 40 TABLET, DELAYED RELEASE ORAL at 16:14

## 2023-12-21 RX ADMIN — SODIUM BICARBONATE 650 MG: 650 TABLET ORAL at 16:14

## 2023-12-21 RX ADMIN — CLONIDINE HYDROCHLORIDE 0.1 MG: 0.1 TABLET ORAL at 21:08

## 2023-12-21 NOTE — ED NOTES
Verbal shift change report given to [facility] (oncoming nurse) by [address] [place] [address] (offgoing nurse). Report included the following information ED Encounter Summary, ED SBAR, Adult Overview, MAR, and Recent Results.

## 2023-12-22 ENCOUNTER — APPOINTMENT (OUTPATIENT)
Facility: HOSPITAL | Age: 88
DRG: 683 | End: 2023-12-22
Payer: MEDICARE

## 2023-12-22 LAB
ANION GAP SERPL CALC-SCNC: 4 MMOL/L (ref 5–15)
BUN SERPL-MCNC: 33 MG/DL (ref 6–20)
BUN/CREAT SERPL: 17 (ref 12–20)
CALCIUM SERPL-MCNC: 8.1 MG/DL (ref 8.5–10.1)
CHLORIDE SERPL-SCNC: 112 MMOL/L (ref 97–108)
CO2 SERPL-SCNC: 22 MMOL/L (ref 21–32)
CREAT SERPL-MCNC: 1.89 MG/DL (ref 0.55–1.02)
ECHO BSA: 2.07 M2
GLUCOSE SERPL-MCNC: 96 MG/DL (ref 65–100)
NUC STRESS EJECTION FRACTION: 75 %
POTASSIUM SERPL-SCNC: 4.8 MMOL/L (ref 3.5–5.1)
SODIUM SERPL-SCNC: 138 MMOL/L (ref 136–145)
STRESS BASELINE DIAS BP: 65 MMHG
STRESS BASELINE HR: 96 BPM
STRESS BASELINE SYS BP: 168 MMHG
STRESS ESTIMATED WORKLOAD: 1 METS
STRESS PEAK DIAS BP: 65 MMHG
STRESS PEAK SYS BP: 168 MMHG
STRESS PERCENT HR ACHIEVED: 86 %
STRESS POST PEAK HR: 113 BPM
STRESS RATE PRESSURE PRODUCT: NORMAL BPM*MMHG
STRESS TARGET HR: 132 BPM

## 2023-12-22 PROCEDURE — 6370000000 HC RX 637 (ALT 250 FOR IP): Performed by: NURSE PRACTITIONER

## 2023-12-22 PROCEDURE — A9500 TC99M SESTAMIBI: HCPCS | Performed by: NURSE PRACTITIONER

## 2023-12-22 PROCEDURE — 2580000003 HC RX 258: Performed by: INTERNAL MEDICINE

## 2023-12-22 PROCEDURE — 78452 HT MUSCLE IMAGE SPECT MULT: CPT

## 2023-12-22 PROCEDURE — 99222 1ST HOSP IP/OBS MODERATE 55: CPT | Performed by: INTERNAL MEDICINE

## 2023-12-22 PROCEDURE — 80048 BASIC METABOLIC PNL TOTAL CA: CPT

## 2023-12-22 PROCEDURE — APPSS30 APP SPLIT SHARED TIME 16-30 MINUTES: Performed by: NURSE PRACTITIONER

## 2023-12-22 PROCEDURE — 36415 COLL VENOUS BLD VENIPUNCTURE: CPT

## 2023-12-22 PROCEDURE — 93016 CV STRESS TEST SUPVJ ONLY: CPT | Performed by: INTERNAL MEDICINE

## 2023-12-22 PROCEDURE — 6370000000 HC RX 637 (ALT 250 FOR IP): Performed by: INTERNAL MEDICINE

## 2023-12-22 PROCEDURE — 78452 HT MUSCLE IMAGE SPECT MULT: CPT | Performed by: INTERNAL MEDICINE

## 2023-12-22 PROCEDURE — 6360000002 HC RX W HCPCS

## 2023-12-22 PROCEDURE — 97530 THERAPEUTIC ACTIVITIES: CPT

## 2023-12-22 PROCEDURE — 1100000000 HC RM PRIVATE

## 2023-12-22 PROCEDURE — 93018 CV STRESS TEST I&R ONLY: CPT | Performed by: INTERNAL MEDICINE

## 2023-12-22 PROCEDURE — 2500000003 HC RX 250 WO HCPCS: Performed by: FAMILY MEDICINE

## 2023-12-22 PROCEDURE — 3430000000 HC RX DIAGNOSTIC RADIOPHARMACEUTICAL: Performed by: NURSE PRACTITIONER

## 2023-12-22 PROCEDURE — 6370000000 HC RX 637 (ALT 250 FOR IP): Performed by: FAMILY MEDICINE

## 2023-12-22 PROCEDURE — 6360000002 HC RX W HCPCS: Performed by: INTERNAL MEDICINE

## 2023-12-22 PROCEDURE — 97116 GAIT TRAINING THERAPY: CPT

## 2023-12-22 PROCEDURE — 93017 CV STRESS TEST TRACING ONLY: CPT

## 2023-12-22 RX ORDER — TETRAKIS(2-METHOXYISOBUTYLISOCYANIDE)COPPER(I) TETRAFLUOROBORATE 1 MG/ML
32 INJECTION, POWDER, LYOPHILIZED, FOR SOLUTION INTRAVENOUS ONCE
Status: COMPLETED | OUTPATIENT
Start: 2023-12-22 | End: 2023-12-22

## 2023-12-22 RX ORDER — LOPERAMIDE HYDROCHLORIDE 2 MG/1
2 CAPSULE ORAL ONCE
Status: COMPLETED | OUTPATIENT
Start: 2023-12-22 | End: 2023-12-22

## 2023-12-22 RX ORDER — REGADENOSON 0.08 MG/ML
INJECTION, SOLUTION INTRAVENOUS
Status: COMPLETED
Start: 2023-12-22 | End: 2023-12-22

## 2023-12-22 RX ORDER — TETRAKIS(2-METHOXYISOBUTYLISOCYANIDE)COPPER(I) TETRAFLUOROBORATE 1 MG/ML
11 INJECTION, POWDER, LYOPHILIZED, FOR SOLUTION INTRAVENOUS
Status: COMPLETED | OUTPATIENT
Start: 2023-12-22 | End: 2023-12-22

## 2023-12-22 RX ADMIN — HEPARIN SODIUM 5000 UNITS: 5000 INJECTION INTRAVENOUS; SUBCUTANEOUS at 17:15

## 2023-12-22 RX ADMIN — HYDRALAZINE HYDROCHLORIDE 100 MG: 25 TABLET, FILM COATED ORAL at 14:32

## 2023-12-22 RX ADMIN — PANTOPRAZOLE SODIUM 40 MG: 40 TABLET, DELAYED RELEASE ORAL at 17:15

## 2023-12-22 RX ADMIN — GABAPENTIN 600 MG: 300 CAPSULE ORAL at 21:11

## 2023-12-22 RX ADMIN — TETRAKIS(2-METHOXYISOBUTYLISOCYANIDE)COPPER(I) TETRAFLUOROBORATE 32 MILLICURIE: 1 INJECTION, POWDER, LYOPHILIZED, FOR SOLUTION INTRAVENOUS at 10:21

## 2023-12-22 RX ADMIN — HYDRALAZINE HYDROCHLORIDE 100 MG: 25 TABLET, FILM COATED ORAL at 21:11

## 2023-12-22 RX ADMIN — PANTOPRAZOLE SODIUM 40 MG: 40 TABLET, DELAYED RELEASE ORAL at 07:32

## 2023-12-22 RX ADMIN — SODIUM CHLORIDE, PRESERVATIVE FREE 10 ML: 5 INJECTION INTRAVENOUS at 12:30

## 2023-12-22 RX ADMIN — CYANOCOBALAMIN TAB 500 MCG 1000 MCG: 500 TAB at 12:29

## 2023-12-22 RX ADMIN — CARBOXYMETHYLCELLULOSE SODIUM 2 DROP: 10 GEL OPHTHALMIC at 14:33

## 2023-12-22 RX ADMIN — CLONIDINE HYDROCHLORIDE 0.1 MG: 0.1 TABLET ORAL at 21:11

## 2023-12-22 RX ADMIN — SODIUM BICARBONATE 650 MG: 650 TABLET ORAL at 12:29

## 2023-12-22 RX ADMIN — SODIUM CHLORIDE, PRESERVATIVE FREE 10 ML: 5 INJECTION INTRAVENOUS at 21:17

## 2023-12-22 RX ADMIN — SODIUM BICARBONATE 650 MG: 650 TABLET ORAL at 21:11

## 2023-12-22 RX ADMIN — ACETAMINOPHEN 650 MG: 325 TABLET ORAL at 22:37

## 2023-12-22 RX ADMIN — ISOSORBIDE MONONITRATE 30 MG: 30 TABLET, EXTENDED RELEASE ORAL at 12:29

## 2023-12-22 RX ADMIN — ACETAMINOPHEN 650 MG: 325 TABLET ORAL at 12:47

## 2023-12-22 RX ADMIN — LEVOTHYROXINE SODIUM 125 MCG: 0.12 TABLET ORAL at 07:32

## 2023-12-22 RX ADMIN — LOPERAMIDE HYDROCHLORIDE 2 MG: 2 CAPSULE ORAL at 12:29

## 2023-12-22 RX ADMIN — REGADENOSON 0.4 MG: 0.08 INJECTION, SOLUTION INTRAVENOUS at 10:10

## 2023-12-22 RX ADMIN — MONTELUKAST 10 MG: 10 TABLET, FILM COATED ORAL at 21:11

## 2023-12-22 RX ADMIN — AMLODIPINE BESYLATE 10 MG: 5 TABLET ORAL at 12:29

## 2023-12-22 RX ADMIN — HYDRALAZINE HYDROCHLORIDE 100 MG: 25 TABLET, FILM COATED ORAL at 07:35

## 2023-12-22 RX ADMIN — TETRAKIS(2-METHOXYISOBUTYLISOCYANIDE)COPPER(I) TETRAFLUOROBORATE 11 MILLICURIE: 1 INJECTION, POWDER, LYOPHILIZED, FOR SOLUTION INTRAVENOUS at 08:35

## 2023-12-22 RX ADMIN — ROSUVASTATIN CALCIUM 20 MG: 10 TABLET, COATED ORAL at 21:11

## 2023-12-22 RX ADMIN — LOPERAMIDE HYDROCHLORIDE 2 MG: 2 CAPSULE ORAL at 17:32

## 2023-12-22 RX ADMIN — MICONAZOLE NITRATE: 2 POWDER TOPICAL at 22:37

## 2023-12-22 RX ADMIN — HEPARIN SODIUM 5000 UNITS: 5000 INJECTION INTRAVENOUS; SUBCUTANEOUS at 22:37

## 2023-12-22 RX ADMIN — ASPIRIN 81 MG: 81 TABLET, CHEWABLE ORAL at 12:29

## 2023-12-23 LAB
ANION GAP SERPL CALC-SCNC: 4 MMOL/L (ref 5–15)
BUN SERPL-MCNC: 23 MG/DL (ref 6–20)
BUN/CREAT SERPL: 15 (ref 12–20)
CALCIUM SERPL-MCNC: 8.1 MG/DL (ref 8.5–10.1)
CHLORIDE SERPL-SCNC: 111 MMOL/L (ref 97–108)
CO2 SERPL-SCNC: 24 MMOL/L (ref 21–32)
CREAT SERPL-MCNC: 1.53 MG/DL (ref 0.55–1.02)
GLUCOSE SERPL-MCNC: 95 MG/DL (ref 65–100)
POTASSIUM SERPL-SCNC: 4.8 MMOL/L (ref 3.5–5.1)
SODIUM SERPL-SCNC: 139 MMOL/L (ref 136–145)

## 2023-12-23 PROCEDURE — 2580000003 HC RX 258: Performed by: INTERNAL MEDICINE

## 2023-12-23 PROCEDURE — 97535 SELF CARE MNGMENT TRAINING: CPT

## 2023-12-23 PROCEDURE — 80048 BASIC METABOLIC PNL TOTAL CA: CPT

## 2023-12-23 PROCEDURE — 6370000000 HC RX 637 (ALT 250 FOR IP): Performed by: INTERNAL MEDICINE

## 2023-12-23 PROCEDURE — 94761 N-INVAS EAR/PLS OXIMETRY MLT: CPT

## 2023-12-23 PROCEDURE — 6370000000 HC RX 637 (ALT 250 FOR IP): Performed by: NURSE PRACTITIONER

## 2023-12-23 PROCEDURE — 1100000000 HC RM PRIVATE

## 2023-12-23 PROCEDURE — 36415 COLL VENOUS BLD VENIPUNCTURE: CPT

## 2023-12-23 PROCEDURE — 2500000003 HC RX 250 WO HCPCS: Performed by: FAMILY MEDICINE

## 2023-12-23 PROCEDURE — 6360000002 HC RX W HCPCS: Performed by: INTERNAL MEDICINE

## 2023-12-23 RX ADMIN — HEPARIN SODIUM 5000 UNITS: 5000 INJECTION INTRAVENOUS; SUBCUTANEOUS at 11:23

## 2023-12-23 RX ADMIN — ISOSORBIDE MONONITRATE 30 MG: 30 TABLET, EXTENDED RELEASE ORAL at 11:22

## 2023-12-23 RX ADMIN — SODIUM BICARBONATE 650 MG: 650 TABLET ORAL at 14:52

## 2023-12-23 RX ADMIN — HEPARIN SODIUM 5000 UNITS: 5000 INJECTION INTRAVENOUS; SUBCUTANEOUS at 23:19

## 2023-12-23 RX ADMIN — HYDRALAZINE HYDROCHLORIDE 100 MG: 25 TABLET, FILM COATED ORAL at 14:52

## 2023-12-23 RX ADMIN — CARBOXYMETHYLCELLULOSE SODIUM 2 DROP: 10 GEL OPHTHALMIC at 14:52

## 2023-12-23 RX ADMIN — PANTOPRAZOLE SODIUM 40 MG: 40 TABLET, DELAYED RELEASE ORAL at 05:31

## 2023-12-23 RX ADMIN — HYDRALAZINE HYDROCHLORIDE 100 MG: 25 TABLET, FILM COATED ORAL at 21:17

## 2023-12-23 RX ADMIN — CYANOCOBALAMIN TAB 500 MCG 1000 MCG: 500 TAB at 11:22

## 2023-12-23 RX ADMIN — GABAPENTIN 600 MG: 300 CAPSULE ORAL at 21:17

## 2023-12-23 RX ADMIN — HEPARIN SODIUM 5000 UNITS: 5000 INJECTION INTRAVENOUS; SUBCUTANEOUS at 18:22

## 2023-12-23 RX ADMIN — SODIUM CHLORIDE, PRESERVATIVE FREE 10 ML: 5 INJECTION INTRAVENOUS at 21:18

## 2023-12-23 RX ADMIN — LEVOTHYROXINE SODIUM 125 MCG: 0.12 TABLET ORAL at 05:31

## 2023-12-23 RX ADMIN — AMLODIPINE BESYLATE 10 MG: 5 TABLET ORAL at 11:22

## 2023-12-23 RX ADMIN — MICONAZOLE NITRATE: 2 POWDER TOPICAL at 11:24

## 2023-12-23 RX ADMIN — ROSUVASTATIN CALCIUM 20 MG: 10 TABLET, COATED ORAL at 21:18

## 2023-12-23 RX ADMIN — MICONAZOLE NITRATE: 2 POWDER TOPICAL at 21:27

## 2023-12-23 RX ADMIN — SODIUM BICARBONATE 650 MG: 650 TABLET ORAL at 11:22

## 2023-12-23 RX ADMIN — CLONIDINE HYDROCHLORIDE 0.1 MG: 0.1 TABLET ORAL at 21:18

## 2023-12-23 RX ADMIN — PANTOPRAZOLE SODIUM 40 MG: 40 TABLET, DELAYED RELEASE ORAL at 18:22

## 2023-12-23 RX ADMIN — ACETAMINOPHEN 650 MG: 325 TABLET ORAL at 18:21

## 2023-12-23 RX ADMIN — CARBOXYMETHYLCELLULOSE SODIUM 2 DROP: 10 GEL OPHTHALMIC at 11:30

## 2023-12-23 RX ADMIN — CARBOXYMETHYLCELLULOSE SODIUM 2 DROP: 10 GEL OPHTHALMIC at 23:20

## 2023-12-23 RX ADMIN — SODIUM CHLORIDE, PRESERVATIVE FREE 10 ML: 5 INJECTION INTRAVENOUS at 11:23

## 2023-12-23 RX ADMIN — MONTELUKAST 10 MG: 10 TABLET, FILM COATED ORAL at 21:18

## 2023-12-23 RX ADMIN — SODIUM BICARBONATE 650 MG: 650 TABLET ORAL at 21:17

## 2023-12-23 RX ADMIN — ASPIRIN 81 MG: 81 TABLET, CHEWABLE ORAL at 11:22

## 2023-12-23 RX ADMIN — HYDRALAZINE HYDROCHLORIDE 100 MG: 25 TABLET, FILM COATED ORAL at 05:32

## 2023-12-24 LAB
ANION GAP SERPL CALC-SCNC: 7 MMOL/L (ref 5–15)
BASOPHILS # BLD: 0 K/UL (ref 0–0.1)
BASOPHILS NFR BLD: 1 % (ref 0–1)
BUN SERPL-MCNC: 17 MG/DL (ref 6–20)
BUN/CREAT SERPL: 12 (ref 12–20)
CALCIUM SERPL-MCNC: 8 MG/DL (ref 8.5–10.1)
CHLORIDE SERPL-SCNC: 112 MMOL/L (ref 97–108)
CO2 SERPL-SCNC: 22 MMOL/L (ref 21–32)
CREAT SERPL-MCNC: 1.46 MG/DL (ref 0.55–1.02)
DIFFERENTIAL METHOD BLD: ABNORMAL
EOSINOPHIL # BLD: 0.4 K/UL (ref 0–0.4)
EOSINOPHIL NFR BLD: 6 % (ref 0–7)
ERYTHROCYTE [DISTWIDTH] IN BLOOD BY AUTOMATED COUNT: 13.2 % (ref 11.5–14.5)
GLUCOSE SERPL-MCNC: 146 MG/DL (ref 65–100)
HCT VFR BLD AUTO: 33.3 % (ref 35–47)
HGB BLD-MCNC: 10.8 G/DL (ref 11.5–16)
IMM GRANULOCYTES # BLD AUTO: 0 K/UL (ref 0–0.04)
IMM GRANULOCYTES NFR BLD AUTO: 1 % (ref 0–0.5)
LYMPHOCYTES # BLD: 0.8 K/UL (ref 0.8–3.5)
LYMPHOCYTES NFR BLD: 14 % (ref 12–49)
MCH RBC QN AUTO: 31.7 PG (ref 26–34)
MCHC RBC AUTO-ENTMCNC: 32.4 G/DL (ref 30–36.5)
MCV RBC AUTO: 97.7 FL (ref 80–99)
MONOCYTES # BLD: 0.5 K/UL (ref 0–1)
MONOCYTES NFR BLD: 8 % (ref 5–13)
NEUTS SEG # BLD: 4.3 K/UL (ref 1.8–8)
NEUTS SEG NFR BLD: 70 % (ref 32–75)
NRBC # BLD: 0 K/UL (ref 0–0.01)
NRBC BLD-RTO: 0 PER 100 WBC
PLATELET # BLD AUTO: 243 K/UL (ref 150–400)
PMV BLD AUTO: 10.4 FL (ref 8.9–12.9)
POTASSIUM SERPL-SCNC: 3.9 MMOL/L (ref 3.5–5.1)
RBC # BLD AUTO: 3.41 M/UL (ref 3.8–5.2)
SODIUM SERPL-SCNC: 141 MMOL/L (ref 136–145)
WBC # BLD AUTO: 6.1 K/UL (ref 3.6–11)

## 2023-12-24 PROCEDURE — 85025 COMPLETE CBC W/AUTO DIFF WBC: CPT

## 2023-12-24 PROCEDURE — 2500000003 HC RX 250 WO HCPCS: Performed by: INTERNAL MEDICINE

## 2023-12-24 PROCEDURE — 2580000003 HC RX 258: Performed by: INTERNAL MEDICINE

## 2023-12-24 PROCEDURE — 6370000000 HC RX 637 (ALT 250 FOR IP): Performed by: NURSE PRACTITIONER

## 2023-12-24 PROCEDURE — 1100000000 HC RM PRIVATE

## 2023-12-24 PROCEDURE — 6360000002 HC RX W HCPCS: Performed by: INTERNAL MEDICINE

## 2023-12-24 PROCEDURE — 80048 BASIC METABOLIC PNL TOTAL CA: CPT

## 2023-12-24 PROCEDURE — 36415 COLL VENOUS BLD VENIPUNCTURE: CPT

## 2023-12-24 PROCEDURE — 6370000000 HC RX 637 (ALT 250 FOR IP): Performed by: INTERNAL MEDICINE

## 2023-12-24 RX ORDER — CARBOXYMETHYLCELLULOSE SODIUM 10 MG/ML
2 GEL OPHTHALMIC 3 TIMES DAILY
Status: DISCONTINUED | OUTPATIENT
Start: 2023-12-24 | End: 2023-12-25 | Stop reason: HOSPADM

## 2023-12-24 RX ORDER — BUMETANIDE 0.25 MG/ML
1 INJECTION INTRAMUSCULAR; INTRAVENOUS DAILY
Status: DISCONTINUED | OUTPATIENT
Start: 2023-12-24 | End: 2023-12-25 | Stop reason: HOSPADM

## 2023-12-24 RX ADMIN — CARBOXYMETHYLCELLULOSE SODIUM 2 DROP: 10 GEL OPHTHALMIC at 21:00

## 2023-12-24 RX ADMIN — HEPARIN SODIUM 5000 UNITS: 5000 INJECTION INTRAVENOUS; SUBCUTANEOUS at 15:34

## 2023-12-24 RX ADMIN — CLONIDINE HYDROCHLORIDE 0.1 MG: 0.1 TABLET ORAL at 20:59

## 2023-12-24 RX ADMIN — HEPARIN SODIUM 5000 UNITS: 5000 INJECTION INTRAVENOUS; SUBCUTANEOUS at 08:02

## 2023-12-24 RX ADMIN — ACETAMINOPHEN 650 MG: 325 TABLET ORAL at 20:59

## 2023-12-24 RX ADMIN — ASPIRIN 81 MG: 81 TABLET, CHEWABLE ORAL at 08:02

## 2023-12-24 RX ADMIN — SODIUM CHLORIDE, PRESERVATIVE FREE 10 ML: 5 INJECTION INTRAVENOUS at 08:03

## 2023-12-24 RX ADMIN — CARBOXYMETHYLCELLULOSE SODIUM 2 DROP: 10 GEL OPHTHALMIC at 14:36

## 2023-12-24 RX ADMIN — HYDRALAZINE HYDROCHLORIDE 100 MG: 25 TABLET, FILM COATED ORAL at 06:44

## 2023-12-24 RX ADMIN — GABAPENTIN 600 MG: 300 CAPSULE ORAL at 20:59

## 2023-12-24 RX ADMIN — CYANOCOBALAMIN TAB 500 MCG 1000 MCG: 500 TAB at 08:02

## 2023-12-24 RX ADMIN — LEVOTHYROXINE SODIUM 125 MCG: 0.12 TABLET ORAL at 06:44

## 2023-12-24 RX ADMIN — HYDRALAZINE HYDROCHLORIDE 100 MG: 25 TABLET, FILM COATED ORAL at 20:59

## 2023-12-24 RX ADMIN — ISOSORBIDE MONONITRATE 30 MG: 30 TABLET, EXTENDED RELEASE ORAL at 08:02

## 2023-12-24 RX ADMIN — MICONAZOLE NITRATE: 2 POWDER TOPICAL at 20:59

## 2023-12-24 RX ADMIN — HYDRALAZINE HYDROCHLORIDE 100 MG: 25 TABLET, FILM COATED ORAL at 14:35

## 2023-12-24 RX ADMIN — PANTOPRAZOLE SODIUM 40 MG: 40 TABLET, DELAYED RELEASE ORAL at 14:35

## 2023-12-24 RX ADMIN — BUMETANIDE 1 MG: 0.25 INJECTION INTRAMUSCULAR; INTRAVENOUS at 12:33

## 2023-12-24 RX ADMIN — AMLODIPINE BESYLATE 10 MG: 5 TABLET ORAL at 08:02

## 2023-12-24 RX ADMIN — CARBOXYMETHYLCELLULOSE SODIUM 2 DROP: 10 GEL OPHTHALMIC at 08:16

## 2023-12-24 RX ADMIN — PANTOPRAZOLE SODIUM 40 MG: 40 TABLET, DELAYED RELEASE ORAL at 06:45

## 2023-12-24 RX ADMIN — SODIUM BICARBONATE 650 MG: 650 TABLET ORAL at 08:02

## 2023-12-24 RX ADMIN — ROSUVASTATIN CALCIUM 20 MG: 10 TABLET, COATED ORAL at 20:59

## 2023-12-24 RX ADMIN — MICONAZOLE NITRATE: 2 POWDER TOPICAL at 08:04

## 2023-12-24 RX ADMIN — SODIUM BICARBONATE 650 MG: 650 TABLET ORAL at 20:59

## 2023-12-24 RX ADMIN — MONTELUKAST 10 MG: 10 TABLET, FILM COATED ORAL at 20:59

## 2023-12-24 RX ADMIN — SODIUM BICARBONATE 650 MG: 650 TABLET ORAL at 14:35

## 2023-12-25 VITALS
TEMPERATURE: 98.1 F | DIASTOLIC BLOOD PRESSURE: 81 MMHG | HEART RATE: 84 BPM | OXYGEN SATURATION: 96 % | BODY MASS INDEX: 37.56 KG/M2 | RESPIRATION RATE: 20 BRPM | WEIGHT: 212 LBS | HEIGHT: 63 IN | SYSTOLIC BLOOD PRESSURE: 150 MMHG

## 2023-12-25 LAB
ANION GAP SERPL CALC-SCNC: 6 MMOL/L (ref 5–15)
BUN SERPL-MCNC: 19 MG/DL (ref 6–20)
BUN/CREAT SERPL: 13 (ref 12–20)
CALCIUM SERPL-MCNC: 8.4 MG/DL (ref 8.5–10.1)
CHLORIDE SERPL-SCNC: 109 MMOL/L (ref 97–108)
CO2 SERPL-SCNC: 24 MMOL/L (ref 21–32)
CREAT SERPL-MCNC: 1.49 MG/DL (ref 0.55–1.02)
GLUCOSE SERPL-MCNC: 105 MG/DL (ref 65–100)
POTASSIUM SERPL-SCNC: 4 MMOL/L (ref 3.5–5.1)
SODIUM SERPL-SCNC: 139 MMOL/L (ref 136–145)

## 2023-12-25 PROCEDURE — 36415 COLL VENOUS BLD VENIPUNCTURE: CPT

## 2023-12-25 PROCEDURE — 80048 BASIC METABOLIC PNL TOTAL CA: CPT

## 2023-12-25 PROCEDURE — 2580000003 HC RX 258: Performed by: INTERNAL MEDICINE

## 2023-12-25 PROCEDURE — 6360000002 HC RX W HCPCS: Performed by: INTERNAL MEDICINE

## 2023-12-25 PROCEDURE — 6370000000 HC RX 637 (ALT 250 FOR IP): Performed by: NURSE PRACTITIONER

## 2023-12-25 PROCEDURE — 6370000000 HC RX 637 (ALT 250 FOR IP): Performed by: INTERNAL MEDICINE

## 2023-12-25 PROCEDURE — 2500000003 HC RX 250 WO HCPCS: Performed by: INTERNAL MEDICINE

## 2023-12-25 RX ORDER — AMLODIPINE BESYLATE 5 MG/1
10 TABLET ORAL DAILY
Qty: 30 TABLET | Refills: 0 | Status: SHIPPED
Start: 2023-12-25

## 2023-12-25 RX ADMIN — SODIUM CHLORIDE, PRESERVATIVE FREE 10 ML: 5 INJECTION INTRAVENOUS at 08:25

## 2023-12-25 RX ADMIN — AMLODIPINE BESYLATE 10 MG: 5 TABLET ORAL at 08:24

## 2023-12-25 RX ADMIN — CARBOXYMETHYLCELLULOSE SODIUM 2 DROP: 10 GEL OPHTHALMIC at 08:25

## 2023-12-25 RX ADMIN — HEPARIN SODIUM 5000 UNITS: 5000 INJECTION INTRAVENOUS; SUBCUTANEOUS at 00:11

## 2023-12-25 RX ADMIN — HYDRALAZINE HYDROCHLORIDE 100 MG: 25 TABLET, FILM COATED ORAL at 05:59

## 2023-12-25 RX ADMIN — SODIUM BICARBONATE 650 MG: 650 TABLET ORAL at 08:24

## 2023-12-25 RX ADMIN — BUMETANIDE 1 MG: 0.25 INJECTION INTRAMUSCULAR; INTRAVENOUS at 08:24

## 2023-12-25 RX ADMIN — CYANOCOBALAMIN TAB 500 MCG 1000 MCG: 500 TAB at 08:24

## 2023-12-25 RX ADMIN — ASPIRIN 81 MG: 81 TABLET, CHEWABLE ORAL at 08:23

## 2023-12-25 RX ADMIN — MICONAZOLE NITRATE: 2 POWDER TOPICAL at 08:25

## 2023-12-25 RX ADMIN — LEVOTHYROXINE SODIUM 125 MCG: 0.12 TABLET ORAL at 06:00

## 2023-12-25 RX ADMIN — ISOSORBIDE MONONITRATE 30 MG: 30 TABLET, EXTENDED RELEASE ORAL at 08:24

## 2023-12-25 RX ADMIN — HEPARIN SODIUM 5000 UNITS: 5000 INJECTION INTRAVENOUS; SUBCUTANEOUS at 08:24

## 2023-12-25 RX ADMIN — PANTOPRAZOLE SODIUM 40 MG: 40 TABLET, DELAYED RELEASE ORAL at 06:00

## 2023-12-25 NOTE — PLAN OF CARE
Problem: Discharge Planning  Goal: Discharge to home or other facility with appropriate resources  12/25/2023 1138 by Annalisa Webster RN  Outcome: Progressing  12/25/2023 0121 by Sincere Holland RN  Outcome: Progressing  Flowsheets (Taken 12/24/2023 2023)  Discharge to home or other facility with appropriate resources: Identify barriers to discharge with patient and caregiver     Problem: Skin/Tissue Integrity  Goal: Absence of new skin breakdown  Description: 1. Monitor for areas of redness and/or skin breakdown  2. Assess vascular access sites hourly  3. Every 4-6 hours minimum:  Change oxygen saturation probe site  4. Every 4-6 hours:  If on nasal continuous positive airway pressure, respiratory therapy assess nares and determine need for appliance change or resting period.   12/25/2023 1138 by Annalisa Webster RN  Outcome: Progressing  12/25/2023 0121 by Sincere Holland RN  Outcome: Progressing     Problem: Safety - Adult  Goal: Free from fall injury  Outcome: Progressing     Problem: ABCDS Injury Assessment  Goal: Absence of physical injury  Outcome: Progressing     Problem: Pain  Goal: Verbalizes/displays adequate comfort level or baseline comfort level  Outcome: Progressing
Problem: Discharge Planning  Goal: Discharge to home or other facility with appropriate resources  Outcome: [address] [address] Progressing     Problem: Skin/Tissue Integrity  Goal: Absence of new skin breakdown  Description: 1. Monitor for areas of redness and/or skin breakdown  2. Assess vascular access sites hourly  3. Every 4-6 hours minimum:  Change oxygen saturation probe site  4. Every 4-6 hours:  If on nasal continuous positive airway pressure, respiratory therapy assess nares and determine need for appliance change or resting period.   Outcome: [address] [address] Progressing     Problem: Safety - Adult  Goal: Free from fall injury  Outcome: [address] [address] Progressing     Problem: ABCDS Injury Assessment  Goal: Absence of physical injury  Outcome: [address] [address] Progressing     Problem: Pain  Goal: Verbalizes/displays adequate comfort level or baseline comfort level  Outcome: [address] [address] Progressing
Problem: Discharge Planning  Goal: Discharge to home or other facility with appropriate resources  Outcome: Progressing     Problem: Skin/Tissue Integrity  Goal: Absence of new skin breakdown  Description: 1. Monitor for areas of redness and/or skin breakdown  2. Assess vascular access sites hourly  3. Every 4-6 hours minimum:  Change oxygen saturation probe site  4. Every 4-6 hours:  If on nasal continuous positive airway pressure, respiratory therapy assess nares and determine need for appliance change or resting period.   Outcome: Progressing     Problem: Safety - Adult  Goal: Free from fall injury  Outcome: Progressing     Problem: ABCDS Injury Assessment  Goal: Absence of physical injury  Outcome: Progressing
Problem: Occupational Therapy - Adult  Goal: By Discharge: Performs self-care activities at highest level of function for planned discharge setting. See evaluation for individualized goals. Description: FUNCTIONAL STATUS PRIOR TO ADMISSION:  Patient was modified independent with UB dressing, toileting, and mobility/ ambulatory using rollator. She reports no falls in 2 years but some near falls. Staff assisted her with donning/ doffing shoes and compression stockings as well as seated showering. 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 HOME SUPPORT: Patient resided at [facility] with staff to provide assistance. Occupational Therapy Goals:  Initiated 12/21/2023  1. Patient will perform grooming standing at sink with Contact Guard Assist within 7 day(s). 2.  Patient will perform lower body dressing with Minimal Assist using LB AE within 7 day(s). 3.  Patient will perform upper body dressing with Set-up within 7 day(s). 4.  Patient will perform toilet transfers with Contact Guard Assist  within 7 day(s). 5.  Patient will perform all aspects of toileting with Minimal Assist within 7 day(s). 6.  Patient will participate in upper extremity therapeutic exercise/activities with Supervision for 10 minutes within 7 day(s). 7.  Patient will utilize energy conservation techniques during functional activities with verbal cues within 7 day(s). Outcome: Progressing   OCCUPATIONAL THERAPY TREATMENT  Patient: Brenita Cooks (72 y.o. female)  Date: 12/23/2023  Primary Diagnosis: Hyperkalemia [E87.5]  CAMPOS (dyspnea on exertion) [R06.09]  ZULEYMA (acute kidney injury) ([address]) [N17.9]       Precautions: Fall Risk                Chart, occupational therapy assessment, plan of care, and goals were reviewed. ASSESSMENT  Patient continues to benefit from skilled OT services and is progressing towards goals. Pt sat edge of bed in prep for ADL's. O2 sats 96% on room air.  Pt ambulated to restroom and stood for approximately 3
PTA  Minutes: 32
Factors: used hand  wipe seated on toilet, standing tolerance insufficient for handwashing standing    UE Bathing: Minimal assistance  UE Bathing Skilled Clinical Factors: inferred         LE Bathing: Moderate assistance  LE Bathing Skilled Clinical Factors: inferred for impaired distal reach, endurance    UE Dressing: Minimal assistance  UE Dressing Skilled Clinical Factors: inferred    LE Dressing: Moderate assistance  LE Dressing Skilled Clinical Factors: threaded underwear and pants seated on toilet/ reaching to feet on floor, although with difficulty and would benefit from reacher. donned slip on shoes with assistance to secure heel strap.  required assistance to raise underwear and pants standing    Toileting: Maximum assistance  Toileting Skilled Clinical Factors: performed bowel hygiene seated on toilet but required assistance for thoroughness and assistance to manage clothing             [address] [facility] \"[address]\"                                                       Daily Activity Inpatient Short Form  AM-PAC Daily Activity - Inpatient   How much help is needed for putting on and taking off regular lower body clothing?: A Lot  How much help is needed for bathing (which includes washing, rinsing, drying)?: A Lot  How much help is needed for toileting (which includes using toilet, bedpan, or urinal)?: A Lot  How much help is needed for putting on and taking off regular upper body clothing?: A Little  How much help is needed for taking care of personal grooming?: None  How much help for eating meals?: None  [facility] Inpatient Daily Activity Raw Score: 17  AM-PAC Inpatient ADL T-Scale Score : 37.26  ADL Inpatient CMS 0-100% Score: 50.11  ADL Inpatient CMS G-Code Modifier : CK     Interpretation of Tool:  Represents clinically-significant functional categories (i.e. Activities of daily living).   Cutoff score 39.4 (19) correlates to a good likelihood of discharging home versus a
to be of the following complexity level: Low

## 2023-12-25 NOTE — DISCHARGE SUMMARY
[address]  [phone]          Hospitalist Discharge Summary     Patient ID:  Artem De Oliveira  [id number]  80 y.o.  1935    Admit date: 12/20/2023    Discharge date and time: 12/25/2023 10:44 AM    Admission Diagnoses: Hyperkalemia [E87.5]  CAMPOS (dyspnea on exertion) [R06.09]  ZULEYMA (acute kidney injury) ([address]) [N17.9]    Discharge Diagnoses:  Principal Diagnosis ZULEYMA (acute kidney injury) ([address])                                            Principal Problem:    ZULEYMA (acute kidney injury) ([address])  Resolved Problems:    * No resolved hospital problems. *         Hospital Course:     ZULEYMA on CKD   -baseline Cr 1.2-1.6  -maybe secondary to bactrim or diuretics  -improved with gentle IVF  -resume bumex but hold hold arb and spironolactone for now   - fu Op - advised patient to discuss with PCP if and when to resume arb and spironolactone     Hyperkalemia  -resolved  -likely due to meds  -given albuterol, CA, valtessa, bicarb, and fluids  -hold arb and spironolactone on discharge and Fu OP      CAMPOS  Hx of CAD  -covid and flu neg  -unclear if this is cardiac but cardiology did a stress test which was neg  -cont asa, statin, imdur  - Fu cardio OP      HTN  -cont increased amlodipine, clonidine, hydrlazine, imdur  -avoid aggressive control given age and fall risk      Hx of DM  -not on meds  -A1C 5.1 so very well controlled with diet     Hypothyroidism  -cont LT4     Asthma  -stable  -cont singulair and prn albuterol    PCP: QUINTIN Saleem - NP     Consults: cardiology    Significant Diagnostic Studies:     Stress test     Stress Combined Conclusion: Normal pharmacological myocardial perfusion study. Findings suggest a low risk of cardiac events. Stress Function: Left ventricular function post-stress is normal. Post-stress ejection fraction is 75%. Perfusion Comments: LV perfusion is normal. There is no evidence of inducible ischemia.

## 2023-12-25 NOTE — CARE COORDINATION
12/24/2023 9:11 AM Per MD potential for pt to discharge on 12/25 back to [facility]. CM called to [facility]([phone]) spoke with Kareen. CM relayed potential for pt to be ready to return to [facility] on 12/25, Kareen confirmed they can accept pt back on this day. Requested pt's discharge paperwork be faxed to facility at [phone]. CM called to pt's son, Lorena Yates at [phone], relayed possible discharge tomorrow. Pt's son confirmed he will be able to transport pt back to [facility] on 12/25. AMBROCIO Yun     Nursing if pt is ready to discharge on 12/25:    Please call pt's son, Lorena Yates at [phone] to arrange timing for him to  pt. Please call report to [facility] at [phone]. Please fax pt's AVS to [facility] at [phone]. Please also sent pt with an extra AVS to provide to facility. Pt's medications will need to be sent to [address] [place] [address], pharmacy listed on file. Thank you.   TASIA YunW
12/25/2023 11:15 AM CM faxed pt's AVS, MAR, and discharge to [facility] at [phone], received fan confirmation. 12/25/2023 9:22 AM CM received return phone call from pt's son who confirmed he can be at [facility] at 12PM to take pt back to [facility]. CM notified pt's RN and pt's MD of discharge timing. CM also met with pt who is agreeable to discharge plan for today. Following for discharge orders. CM faxed pt's outpatient PT and OT orders to [facility] at [phone]. Received fax confirmation. Nursing please call report to [facility] at [phone], ask to be transferred to [facility]. 12/25/2023 9:06 AM Per MD, pt to discharge today back to [facility]. CM called to pt's son, Lorena Yates at [phone], had to lvm. CM called to [facility], spoke with Mary Stubbs and relayed pt will be returning today. CM will follow up.    Billy Sanches, TASIAW
Care Management Progress Note      ICD-10-CM    1. CAMPOS (dyspnea on exertion)  R06.09 Echo (TTE) limited (PRN contrast/bubble/strain/3D)     Echo (TTE) limited (PRN contrast/bubble/strain/3D)     Vascular duplex lower extremity venous bilateral     Vascular duplex lower extremity venous bilateral      2. ZULEYMA (acute kidney injury) ([address])  N17.9       3. Hyperkalemia  E87.5       4. Diastolic CHF, acute on chronic (HCC)  I50.33 Nuclear stress test with myocardial perfusion     Nuclear stress test with myocardial perfusion          RUR:  15%  Risk Level: []Low [x]Moderate []High    Transition of care plan:     Per previous CM note:        CM discussed PT and OT recommendations with pt, return to [facility] with increased assistance and HH vs SNF. Pt reported her preference is the return to [facility] at [address] [facility]) with increased assistance and PT/OT. CM called and spoke with Dylan, Director of [facility]([phone]) who reported pt was wheelchair level at baseline. CM relayed pt will need increased assistance with adls and transfers at discharge. Nanette confirmed they can provide this level of care and can accept pt back. Dylan requested pt discharge with a outpatient PT and OT script. CM faxed pt's updates to [address] [place] [address] at [phone], received fax confirmation. Nanette confirmed pt will not need a COVID test to return and they can accept pt back over the weekend if needed. Pharmacy: [facility], pharmacy through the [address] [place] [address] provides medication management for pt   AD: Nafisa Garcia  PCP: QUINTIN Barajas NP  DC ride: Pt's son, Hari Washburndge in [place])  ADLs: Received assistance with lower body dressing and sometimes upper body dressing   DME: Rollator and 2 wheelchairs  HH: no history, outpatient PT and OT through [facility], therapy provided in pt's apartment.   SNF: No history, not open to SNF placement at this time      Discharge: will need report called to [facility] at
Potential Assistance Purchasing Medications No  (Pt uses [facility] for pharmacy)   Type of [address] [employer] OT;PT   Patient expects to be discharged to: Assisted living   [address] Discharge   Transition of Care Consult (CM Consult) [facility] Discharge None   [employer] Information Provided? No   Mode of Transport at Discharge   (Pt's son)         Insurer: Active Insurance as of 12/20/2023       Primary Coverage       Payor Plan Insurance Group Employer/Plan Group    [facility] MEDICARE [facility] MEDICARE COMPLETE [id number]       Payor Plan Address Payor Plan Phone Number Payor Plan Fax Number Effective Dates    [address] [address]   1/1/2023 - None Entered    [facility] [id number]         Subscriber Name [address] Birth Date Member ID       Artem [place] 1935 [id number]                     PCP: QUINTIN Saleem NP   Address: [address] [place] [address]   Phone number: [phone]   Current patient: [x]Yes []No   Approximate date of last visit: Tuesday    Access to virtual PCP visits: []Yes []No             Transition of care plan:    CM discussed PT and OT recommendations with pt, return to [facility] with increased assistance and HH vs SNF. Pt reported her preference is the return to [facility] at [address] [facility]) with increased assistance and PT/OT. CM called and spoke with Dylan, Director of [facility]([phone]) who reported pt was wheelchair level at baseline. AMILCAR relayed pt will need increased assistance with adls and transfers at discharge. Nanette confirmed they can provide this level of care and can accept pt back. Dylan requested pt discharge with a outpatient PT and OT script. CM faxed pt's updates to [address]  at [phone], received fax confirmation. Nanette confirmed pt will not need a COVID test to return and they can accept pt back over the weekend if needed.      Pharmacy: [facility], pharmacy through the

## 2023-12-26 LAB
BACTERIA SPEC CULT: NORMAL
BACTERIA SPEC CULT: NORMAL
SERVICE CMNT-IMP: NORMAL
SERVICE CMNT-IMP: NORMAL

## 2024-01-08 NOTE — PROGRESS NOTES
Primary Cardiologist:  Maryan Huffman MD. [facility]          Patient: Mary Jo Velez  : 1935      Today's Date: 2024        HISTORY OF PRESENT ILLNESS:     History of Present Illness:    Says she is doing well.  Slowing down.    Balance is poor --- feels OK sitting --- uses a walker and that helps - knees wear out later in day   No new cardiac complaints.    Class 2 CAMPOS     Admitted - for CAMPOS associated with diarrhea and fatigue. Was found to have ZULEYMA that has since resolved. Stress test normal.    SOB improved since admission. Tires easily, will be starting PT for strengthening exercises.     SBP ranges 120-150, Weight stable.  Resides at [facility]    PAST MEDICAL HISTORY:     Past Medical History:   Diagnosis Date    Arthritis     Asthma     Breast cancer (HCC)     CAD (coronary artery disease)     INDU to RCA in      COVID-19     Aug 2022    Diabetes mellitus (HCC)     Diverticulosis     Dyslipidemia     GERD (gastroesophageal reflux disease)     Hiatal hernia     HTN (hypertension)     Hypothyroid     Neuropathy     Obesity     FABIÁN on CPAP     RBBB (right bundle branch block)     Spinal stenosis        Past Surgical History:   Procedure Laterality Date    BUNIONECTOMY      CARDIAC CATHETERIZATION      Cardiac Cath 9/3/2010 showed mild CAD.      CHOLECYSTECTOMY      CORONARY ANGIOPLASTY WITH STENT PLACEMENT      ORTHOPEDIC SURGERY      OTHER SURGICAL HISTORY      Aorta duplex 14 - aorta size 1.7 cm, no dilation     OTHER SURGICAL HISTORY      Lexiscan cardiolite 13 - normal MPI, LVEF 74%    OTHER SURGICAL HISTORY      Echo 10/5/12 - LVEF 60-65%, mod-marked LAE, MAC, grade 1 diastolic dysfunction     TOTAL HIP ARTHROPLASTY               CURRENT MEDICATIONS:    .  Current Outpatient Medications   Medication Sig Dispense Refill    Fexofenadine HCl (ALLEGRA ALLERGY PO) Take by mouth      amLODIPine (NORVASC) 10 MG tablet Take 1 tablet by mouth daily 90 tablet 3    aspirin 81 MG

## 2024-01-12 ENCOUNTER — OFFICE VISIT (OUTPATIENT)
Age: 89
End: 2024-01-12
Payer: MEDICARE

## 2024-01-12 VITALS
DIASTOLIC BLOOD PRESSURE: 78 MMHG | OXYGEN SATURATION: 93 % | HEART RATE: 82 BPM | SYSTOLIC BLOOD PRESSURE: 138 MMHG | HEIGHT: 63 IN | BODY MASS INDEX: 36.5 KG/M2 | WEIGHT: 206 LBS

## 2024-01-12 DIAGNOSIS — N18.30 STAGE 3 CHRONIC KIDNEY DISEASE, UNSPECIFIED WHETHER STAGE 3A OR 3B CKD (HCC): ICD-10-CM

## 2024-01-12 DIAGNOSIS — I50.33 DIASTOLIC CHF, ACUTE ON CHRONIC (HCC): ICD-10-CM

## 2024-01-12 DIAGNOSIS — I50.32 CHRONIC HEART FAILURE WITH PRESERVED EJECTION FRACTION (HCC): ICD-10-CM

## 2024-01-12 DIAGNOSIS — I10 ESSENTIAL (PRIMARY) HYPERTENSION: ICD-10-CM

## 2024-01-12 DIAGNOSIS — I25.118 CORONARY ARTERY DISEASE OF NATIVE ARTERY OF NATIVE HEART WITH STABLE ANGINA PECTORIS (HCC): Primary | ICD-10-CM

## 2024-01-12 DIAGNOSIS — R60.9 CHRONIC EDEMA: ICD-10-CM

## 2024-01-12 DIAGNOSIS — E78.2 MIXED HYPERLIPIDEMIA: ICD-10-CM

## 2024-01-12 DIAGNOSIS — R00.1 BRADYCARDIA: ICD-10-CM

## 2024-01-12 PROCEDURE — 99214 OFFICE O/P EST MOD 30 MIN: CPT

## 2024-01-12 PROCEDURE — 1036F TOBACCO NON-USER: CPT

## 2024-01-12 PROCEDURE — 1123F ACP DISCUSS/DSCN MKR DOCD: CPT

## 2024-01-12 PROCEDURE — G8427 DOCREV CUR MEDS BY ELIG CLIN: HCPCS

## 2024-01-12 PROCEDURE — G8417 CALC BMI ABV UP PARAM F/U: HCPCS

## 2024-01-12 PROCEDURE — 1111F DSCHRG MED/CURRENT MED MERGE: CPT

## 2024-01-12 PROCEDURE — G8484 FLU IMMUNIZE NO ADMIN: HCPCS

## 2024-01-12 PROCEDURE — 1090F PRES/ABSN URINE INCON ASSESS: CPT

## 2024-01-12 RX ORDER — BUMETANIDE 1 MG/1
1 TABLET ORAL DAILY
Qty: 90 TABLET | Refills: 3 | Status: SHIPPED | OUTPATIENT
Start: 2024-01-12

## 2024-01-12 RX ORDER — AMLODIPINE BESYLATE 10 MG/1
10 TABLET ORAL DAILY
Qty: 90 TABLET | Refills: 3 | Status: SHIPPED | OUTPATIENT
Start: 2024-01-12

## 2024-01-12 RX ORDER — ISOSORBIDE MONONITRATE 30 MG/1
30 TABLET, EXTENDED RELEASE ORAL DAILY
Qty: 90 TABLET | Refills: 3 | Status: SHIPPED | OUTPATIENT
Start: 2024-01-12

## 2024-01-12 RX ORDER — ASPIRIN 81 MG/1
81 TABLET, CHEWABLE ORAL DAILY
Qty: 90 TABLET | Refills: 3 | Status: SHIPPED | OUTPATIENT
Start: 2024-01-12

## 2024-01-12 RX ORDER — CLONIDINE HYDROCHLORIDE 0.1 MG/1
0.1 TABLET ORAL DAILY
Qty: 90 TABLET | Refills: 3 | Status: SHIPPED | OUTPATIENT
Start: 2024-01-12

## 2024-01-12 RX ORDER — HYDRALAZINE HYDROCHLORIDE 100 MG/1
100 TABLET, FILM COATED ORAL 3 TIMES DAILY
Qty: 270 TABLET | Refills: 3 | Status: SHIPPED | OUTPATIENT
Start: 2024-01-12

## 2024-01-12 NOTE — PROGRESS NOTES
had concerns including Follow-Up from Hospital (12/20-12/25).    Vitals:    01/12/24 1248   BP: 138/78   Site: Left Upper Arm   Position: Sitting   Pulse: 82   SpO2: 93%   Weight: 93.4 kg (206 lb)   Height: 1.6 m (5' 3\")        Chest pain No    Refills No        1. Have you been to the ER, urgent care clinic since your last visit? yes Hospitalized since your last visit? yes

## 2024-01-18 ENCOUNTER — TELEPHONE (OUTPATIENT)
Age: 89
End: 2024-01-18

## 2024-01-18 NOTE — TELEPHONE ENCOUNTER
Brendon lan PtLisa Has an allergy alert for aspirin     Brendon call back - [phone] Option 2  REF# [id number]

## 2024-01-18 NOTE — TELEPHONE ENCOUNTER
Spoke to Poonam (pharmacist) [facility]- Verified that it is ok to fill the 81 mg of ASA as patient does not have an allergy to the medication.     olmetin Anaphylaxis High  12/22/2010      Amlodipine Swelling Not Specified  8/29/2012   Swelling ankles/legs   Carvedilol Other (See Comments) Not Specified  8/29/2012   \"slow heart rate down to 44\"   Cefaclor Hives Not Specified  12/22/2010      Ciprofloxacin Hives, Swelling Not Specified  12/22/2010      Lisinopril Hives Not Specified  12/22/2010      Metoprolol Swelling Not Specified  10/5/2012   Swellings ankles/legs   Nitrofurantoin Other (See Comments) Not Specified  12/16/2014   Does not recall   Propoxyphene Swelling Not Specified  12/22/2010   Swelling lips and hands   Spironolactone Other (See Comments) Not Specified  12/6/2013   hyperkalemia   Tetracycline Swelling Not Specified  12/22/2010   Swelling lips/hands   Valsartan Other (See Comments) Not Specified  5/1/2016   Also \"slow heart rate down to 44\"      Will fill per request.

## 2024-06-17 ENCOUNTER — OFFICE VISIT (OUTPATIENT)
Age: 89
End: 2024-06-17
Payer: MEDICARE

## 2024-06-17 VITALS
WEIGHT: 207 LBS | HEART RATE: 83 BPM | BODY MASS INDEX: 36.68 KG/M2 | HEIGHT: 63 IN | DIASTOLIC BLOOD PRESSURE: 68 MMHG | OXYGEN SATURATION: 96 % | SYSTOLIC BLOOD PRESSURE: 136 MMHG

## 2024-06-17 DIAGNOSIS — I50.33 DIASTOLIC CHF, ACUTE ON CHRONIC (HCC): ICD-10-CM

## 2024-06-17 DIAGNOSIS — R06.09 DOE (DYSPNEA ON EXERTION): Primary | ICD-10-CM

## 2024-06-17 DIAGNOSIS — E78.2 MIXED HYPERLIPIDEMIA: ICD-10-CM

## 2024-06-17 DIAGNOSIS — I10 PRIMARY HYPERTENSION: ICD-10-CM

## 2024-06-17 DIAGNOSIS — I25.118 CORONARY ARTERY DISEASE OF NATIVE ARTERY OF NATIVE HEART WITH STABLE ANGINA PECTORIS (HCC): ICD-10-CM

## 2024-06-17 DIAGNOSIS — I25.118 ATHEROSCLEROTIC HEART DISEASE OF NATIVE CORONARY ARTERY WITH OTHER FORMS OF ANGINA PECTORIS (HCC): ICD-10-CM

## 2024-06-17 PROCEDURE — 1123F ACP DISCUSS/DSCN MKR DOCD: CPT

## 2024-06-17 PROCEDURE — G8417 CALC BMI ABV UP PARAM F/U: HCPCS

## 2024-06-17 PROCEDURE — 99214 OFFICE O/P EST MOD 30 MIN: CPT

## 2024-06-17 PROCEDURE — 1090F PRES/ABSN URINE INCON ASSESS: CPT

## 2024-06-17 PROCEDURE — 1036F TOBACCO NON-USER: CPT

## 2024-06-17 PROCEDURE — G8427 DOCREV CUR MEDS BY ELIG CLIN: HCPCS

## 2024-06-17 RX ORDER — ROSUVASTATIN CALCIUM 20 MG/1
20 TABLET, COATED ORAL NIGHTLY
Qty: 90 TABLET | Refills: 3 | Status: SHIPPED | OUTPATIENT
Start: 2024-06-17

## 2024-06-17 RX ORDER — BUMETANIDE 1 MG/1
1 TABLET ORAL DAILY
Qty: 90 TABLET | Refills: 3 | Status: SHIPPED | OUTPATIENT
Start: 2024-06-17

## 2024-06-17 NOTE — PROGRESS NOTES
Chief Complaint   Patient presents with    Congestive Heart Failure    Hypertension    Coronary Artery Disease     Vitals:    06/17/24 1031   BP: 136/68   Site: Left Upper Arm   Position: Sitting   Pulse: 83   SpO2: 96%   Weight: 93.9 kg (207 lb)   Height: 1.6 m (5' 3\")     Chest pain: DENIED     Recent hospital stays: DENIED     Refills: BUMEX 1MG, CRESTOR 20MG     SOB W/ EXERTION   NEW ALLERGY- ALPHA GAL (MEAT)   
gradient is 4 mmHg.  Severe LAE, RVSP 42.        Event Monitor 4/12/22-4/25/22 - NSR, HR , Avg HR 70 bpm.  Longest pause 2.0 sec.        Echo 11/30/23 - PRELIM - stable   Echo 12/21/23- LVEF 60-65%   Madisyn nuc stress- 12/22/23- normal perfusion, post stress  LVEF 75%.        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/12/18 - NSR, RBBB, LAFB   EKG 12/10/19 - NSR, RBBB,    EKG 7/21/20 - NSR, RBBB, LAD   EKG 2/3/21 - NSR, RBBB, LAFB    EKG 4/5/22 - sinus ayesha, RBBB  EKG 6/1/23 - NSR, RBBB, LAFB       ASSESSMENT AND PLAN:     Assessment and Plan:    Generalized weakness after COVID Aug 2022   - Getting PT and improved   - now in exercise class--- doing better       CAD s/p INDU RCA 2/3/21, Chronic CAMPOS   - breathing improved - chronic class 2 CAMPOS    - Cont Aspirin and high intensity statin, IMDUR    - no BB due to bradycardia previously  -Echo 12/21/23- LVEF 60-65%  - Madisyn nuc stress- 12/22/23- normal perfusion, post stress  LVEF 75%.  -1/12/24 Denies CP today, CAMPOS better       HFpEF, LVEF 60-65%   - In past over diuresed previously on torsemide and spironolactone with ZULEYMA which improved off meds   - cont Bumex 3 days a week -- labs followed by Dr. Mcfarlane   -Pt now on Bumex daily. Labs stable 1/8/24  -Echo 12/21/23- LVEF 60-65%, Spironolactone stopped duerin admission for ZULEYMA and hyperkalemia   Madisyn nuc stress- 12/22/23- normal perfusion, post stress  LVEF 75%.     --1/12/24 CAMPOS better , now taking Bumex daily. Labs stable Cr 1.3 1/8/24  - on 6/17/24 - CAMPOS worse recently, will check echo next visit - asked facility to check labs and send, will add jardiance 10 mg daily, when we add jardiace, we will cut bumex to half tablet daily        HTN    - she had ZULEYMA and hyperkalemia on 25 mg aldactone in past (thus had to lower doses)    - -150's usually at assisted living -- she feels worse when SBP drops < 120 and better when 's   - Will continue multi med regimen   - I reviewed options of increasing meds, but due to

## 2024-06-28 NOTE — PROGRESS NOTES
Primary Cardiologist:  Maryan Huffman MD. [facility]      Patient: Mary Jo Velez  : 1935    Today's Date: 2024      HISTORY OF PRESENT ILLNESS:     History of Present Illness:  Presents today for follow-up.   Continues with shortness of breath.   Says she is short of breath with dressing in the mornings. Does not a feel difference starting the jardiance. Feels like legs are still swollen. Wears compression stockings, says swelling goes down by the morning.   Uses CPAP nightly.   Continues with decreased mobility. Feels it is related to living in a facility.   SBP ranges 120-150, Weight stable. Has lost 10 lbs in the last few months.   Resides at [facility]/   Has alpha-gal. Had an episode where she felt dehydrated due to abdominal pain/distention. She says it resolved.     PAST MEDICAL HISTORY:     Past Medical History:   Diagnosis Date    Arthritis     Asthma     Breast cancer (HCC)     CAD (coronary artery disease)     IDNU to RCA in      COVID-19     Aug 2022    Diabetes mellitus (HCC)     Diverticulosis     Dyslipidemia     GERD (gastroesophageal reflux disease)     Hiatal hernia     HTN (hypertension)     Hypothyroid     Neuropathy     Obesity     FABIÁN on CPAP     RBBB (right bundle branch block)     Spinal stenosis        Past Surgical History:   Procedure Laterality Date    BUNIONECTOMY      CARDIAC CATHETERIZATION      Cardiac Cath 9/3/2010 showed mild CAD.      CHOLECYSTECTOMY      CORONARY ANGIOPLASTY WITH STENT PLACEMENT      ORTHOPEDIC SURGERY      OTHER SURGICAL HISTORY      Aorta duplex 14 - aorta size 1.7 cm, no dilation     OTHER SURGICAL HISTORY      Lexiscan cardiolite 13 - normal MPI, LVEF 74%    OTHER SURGICAL HISTORY      Echo 10/5/12 - LVEF 60-65%, mod-marked LAE, MAC, grade 1 diastolic dysfunction     TOTAL HIP ARTHROPLASTY               CURRENT MEDICATIONS:    .  Current Outpatient Medications   Medication Sig Dispense Refill    dicyclomine (BENTYL) 10 MG capsule

## 2024-07-02 ENCOUNTER — OFFICE VISIT (OUTPATIENT)
Age: 89
End: 2024-07-02
Payer: MEDICARE

## 2024-07-02 VITALS
OXYGEN SATURATION: 96 % | WEIGHT: 204 LBS | BODY MASS INDEX: 36.14 KG/M2 | SYSTOLIC BLOOD PRESSURE: 126 MMHG | HEIGHT: 63 IN | DIASTOLIC BLOOD PRESSURE: 64 MMHG | HEART RATE: 76 BPM

## 2024-07-02 DIAGNOSIS — E78.2 MIXED HYPERLIPIDEMIA: ICD-10-CM

## 2024-07-02 DIAGNOSIS — I25.118 CORONARY ARTERY DISEASE OF NATIVE ARTERY OF NATIVE HEART WITH STABLE ANGINA PECTORIS (HCC): ICD-10-CM

## 2024-07-02 DIAGNOSIS — I25.118 ATHEROSCLEROTIC HEART DISEASE OF NATIVE CORONARY ARTERY WITH OTHER FORMS OF ANGINA PECTORIS (HCC): ICD-10-CM

## 2024-07-02 DIAGNOSIS — I50.33 DIASTOLIC CHF, ACUTE ON CHRONIC (HCC): ICD-10-CM

## 2024-07-02 DIAGNOSIS — I10 PRIMARY HYPERTENSION: ICD-10-CM

## 2024-07-02 DIAGNOSIS — R06.09 DOE (DYSPNEA ON EXERTION): Primary | ICD-10-CM

## 2024-07-02 PROCEDURE — 1036F TOBACCO NON-USER: CPT

## 2024-07-02 PROCEDURE — 99214 OFFICE O/P EST MOD 30 MIN: CPT

## 2024-07-02 PROCEDURE — 1090F PRES/ABSN URINE INCON ASSESS: CPT

## 2024-07-02 PROCEDURE — G8417 CALC BMI ABV UP PARAM F/U: HCPCS

## 2024-07-02 PROCEDURE — G8428 CUR MEDS NOT DOCUMENT: HCPCS

## 2024-07-02 PROCEDURE — 1123F ACP DISCUSS/DSCN MKR DOCD: CPT

## 2024-07-02 RX ORDER — LOPERAMIDE HYDROCHLORIDE 2 MG/1
2 CAPSULE ORAL 4 TIMES DAILY PRN
COMMUNITY

## 2024-07-02 RX ORDER — DICYCLOMINE HYDROCHLORIDE 10 MG/1
10 CAPSULE ORAL
COMMUNITY

## 2024-07-02 RX ORDER — SIMETHICONE 80 MG
80 TABLET,CHEWABLE ORAL EVERY 6 HOURS PRN
COMMUNITY

## 2024-10-03 ENCOUNTER — OFFICE VISIT (OUTPATIENT)
Age: 89
End: 2024-10-03
Payer: MEDICARE

## 2024-10-03 ENCOUNTER — ANCILLARY PROCEDURE (OUTPATIENT)
Age: 89
End: 2024-10-03
Payer: MEDICARE

## 2024-10-03 VITALS
BODY MASS INDEX: 36.14 KG/M2 | HEIGHT: 63 IN | WEIGHT: 204 LBS | OXYGEN SATURATION: 96 % | DIASTOLIC BLOOD PRESSURE: 80 MMHG | HEART RATE: 80 BPM | SYSTOLIC BLOOD PRESSURE: 130 MMHG

## 2024-10-03 VITALS
SYSTOLIC BLOOD PRESSURE: 130 MMHG | HEIGHT: 63 IN | DIASTOLIC BLOOD PRESSURE: 74 MMHG | BODY MASS INDEX: 34.3 KG/M2 | HEART RATE: 84 BPM | WEIGHT: 193.6 LBS

## 2024-10-03 DIAGNOSIS — I10 PRIMARY HYPERTENSION: ICD-10-CM

## 2024-10-03 DIAGNOSIS — I25.118 CORONARY ARTERY DISEASE OF NATIVE ARTERY OF NATIVE HEART WITH STABLE ANGINA PECTORIS (HCC): ICD-10-CM

## 2024-10-03 DIAGNOSIS — R06.09 DOE (DYSPNEA ON EXERTION): ICD-10-CM

## 2024-10-03 DIAGNOSIS — I50.32 CHRONIC DIASTOLIC HEART FAILURE (HCC): Primary | ICD-10-CM

## 2024-10-03 LAB
ECHO AO ASC DIAM: 3.1 CM
ECHO AO ASCENDING AORTA INDEX: 1.62 CM/M2
ECHO AV MEAN GRADIENT: 6 MMHG
ECHO AV MEAN VELOCITY: 1.1 M/S
ECHO AV PEAK GRADIENT: 11 MMHG
ECHO AV PEAK VELOCITY: 1.6 M/S
ECHO AV VELOCITY RATIO: 0.69
ECHO AV VTI: 25.8 CM
ECHO BSA: 1.98 M2
ECHO LV EF PHYSICIAN: 55 %
ECHO LV FRACTIONAL SHORTENING: 31 % (ref 28–44)
ECHO LV INTERNAL DIMENSION DIASTOLE INDEX: 2.72 CM/M2
ECHO LV INTERNAL DIMENSION DIASTOLIC: 5.2 CM (ref 3.9–5.3)
ECHO LV INTERNAL DIMENSION SYSTOLIC INDEX: 1.88 CM/M2
ECHO LV INTERNAL DIMENSION SYSTOLIC: 3.6 CM
ECHO LV IVSD: 0.9 CM (ref 0.6–0.9)
ECHO LV MASS 2D: 169 G (ref 67–162)
ECHO LV MASS INDEX 2D: 88.5 G/M2 (ref 43–95)
ECHO LV POSTERIOR WALL DIASTOLIC: 0.9 CM (ref 0.6–0.9)
ECHO LV RELATIVE WALL THICKNESS RATIO: 0.35
ECHO LVOT AV VTI INDEX: 0.67
ECHO LVOT MEAN GRADIENT: 2 MMHG
ECHO LVOT PEAK GRADIENT: 5 MMHG
ECHO LVOT PEAK VELOCITY: 1.1 M/S
ECHO LVOT VTI: 17.3 CM
ECHO MV A VELOCITY: 1.15 M/S
ECHO MV AREA PHT: 1.8 CM2
ECHO MV E DECELERATION TIME (DT): 412.6 MS
ECHO MV E VELOCITY: 0.55 M/S
ECHO MV E/A RATIO: 0.48
ECHO MV PRESSURE HALF TIME (PHT): 119.6 MS
ECHO RV TAPSE: 1.9 CM (ref 1.7–?)

## 2024-10-03 PROCEDURE — 1090F PRES/ABSN URINE INCON ASSESS: CPT | Performed by: SPECIALIST

## 2024-10-03 PROCEDURE — 1123F ACP DISCUSS/DSCN MKR DOCD: CPT | Performed by: SPECIALIST

## 2024-10-03 PROCEDURE — 1036F TOBACCO NON-USER: CPT | Performed by: SPECIALIST

## 2024-10-03 PROCEDURE — G8427 DOCREV CUR MEDS BY ELIG CLIN: HCPCS | Performed by: SPECIALIST

## 2024-10-03 PROCEDURE — 93308 TTE F-UP OR LMTD: CPT | Performed by: SPECIALIST

## 2024-10-03 PROCEDURE — 93325 DOPPLER ECHO COLOR FLOW MAPG: CPT | Performed by: SPECIALIST

## 2024-10-03 PROCEDURE — G8484 FLU IMMUNIZE NO ADMIN: HCPCS | Performed by: SPECIALIST

## 2024-10-03 PROCEDURE — 99214 OFFICE O/P EST MOD 30 MIN: CPT | Performed by: SPECIALIST

## 2024-10-03 PROCEDURE — G8417 CALC BMI ABV UP PARAM F/U: HCPCS | Performed by: SPECIALIST

## 2024-10-03 PROCEDURE — 93321 DOPPLER ECHO F-UP/LMTD STD: CPT | Performed by: SPECIALIST

## 2024-10-03 NOTE — PROGRESS NOTES
Chief Complaint   Patient presents with    Follow-up    Coronary Artery Disease    Congestive Heart Failure    Cholesterol Problem    Bradycardia     Vitals:    10/03/24 1348   BP: 130/80   Site: Left Upper Arm   Position: Sitting   Cuff Size: Medium Adult   Pulse: 80   SpO2: 96%   Weight: 92.5 kg (204 lb)   Height: 1.6 m (5' 3\")      /80 (Site: Left Upper Arm, Position: Sitting, Cuff Size: Medium Adult)   Pulse 80   Ht 1.6 m (5' 3\")   Wt 92.5 kg (204 lb)   SpO2 96%   BMI 36.14 kg/m²        
diastology,  Mitral Valve: Thickened leaflet. Moderate annular calcification of the mitral valve. Mild regurgitation.  Mild stenosis noted. MV mean gradient is 4 mmHg.  Severe LAE, RVSP 42.        Event Monitor 4/12/22-4/25/22 - NSR, HR , Avg HR 70 bpm.  Longest pause 2.0 sec.        Echo 11/30/23 - LVEF 60-65%, AV sclerosis   Echo 12/21/23- LVEF 60-65%  Madisyn nuc stress- 12/22/23- normal perfusion, post stress  LVEF 75%.       Echo 10/3/24 - PRELIM - stable       EKG 7/21/20 - NSR, RBBB, LAD  EKG 2/3/21 - NSR, RBBB, LAFB   EKG 4/5/22 - sinus ayesha, RBBB  EKG 6/1/23 - NSR, RBBB, LAFB       ASSESSMENT AND PLAN:     Assessment and Plan:      CAD s/p INDU RCA 2/3/21, Chronic CAMPOS   - breathing improved - chronic class 2 CAMPOS    - Cont Aspirin and high intensity statin, IMDUR    - no BB due to bradycardia previously  -Echo 12/21/23- LVEF 60-65%  - Madisyn nuc stress- 12/22/23- normal perfusion, post stress  LVEF 75%.  -1/12/24 Denies CP today, CAMPOS better       HFpEF, LVEF 60-65%   - In past over diuresed previously on torsemide and spironolactone with ZULEYMA which improved off meds  - aldactone stopped due to ZULEYMA and hyperkalemia in past   - Echo 12/21/23- LVEF 60-65%   Madisyn nuc stress- 12/22/23- normal perfusion, post stress  LVEF 75%.     - on 6/17/24 - CAMPOS worse recently, will check echo next visit - asked facility to check labs and send, will add jardiance 10 mg daily, when we add jardiace, we will cut bumex to half tablet daily  - on 7/2/24 - after adding jardiance - she does not feel a major difference - she is not having side effects - so will continue current meds  - On 10/3/24 - She says she feels better since Jardiance - will continue Jardiance and Bumex     HTN   - she had ZULEYMA and hyperkalemia on ARB and aldactone in past (thus had to lower doses)    - she feels worse when SBP drops < 120 and better when 's  - Will continue multi med regimen   - I reviewed options of increasing meds, but due to side effect

## 2024-11-26 RX ORDER — EMPAGLIFLOZIN 10 MG/1
10 TABLET, FILM COATED ORAL DAILY
Qty: 90 TABLET | Refills: 2 | Status: SHIPPED | OUTPATIENT
Start: 2024-11-26

## 2024-12-02 RX ORDER — HYDRALAZINE HYDROCHLORIDE 100 MG/1
100 TABLET, FILM COATED ORAL 3 TIMES DAILY
Qty: 270 TABLET | Refills: 3 | Status: SHIPPED | OUTPATIENT
Start: 2024-12-02

## 2024-12-02 NOTE — TELEPHONE ENCOUNTER
Refill per VO of Dr. Huffman  Last appt: 10/3/2024    Future Appointments   Date Time Provider Department Center   1/31/2025  9:30 AM Jessica Chin MD [id number]   4/14/2025  2:00 PM Maryan Huffman MD [facility]       Requested Prescriptions     Signed Prescriptions Disp Refills    hydrALAZINE (APRESOLINE) 100 MG tablet 270 tablet 3     Sig: Take 1 tablet by mouth 3 times daily     Authorizing Provider: MARYAN HUFFMAN     Ordering User: GORAN BARONE

## 2025-03-14 ENCOUNTER — TELEPHONE (OUTPATIENT)
Age: 89
End: 2025-03-14

## 2025-03-14 RX ORDER — AMLODIPINE BESYLATE 10 MG/1
10 TABLET ORAL DAILY
Qty: 90 TABLET | Refills: 3 | Status: SHIPPED | OUTPATIENT
Start: 2025-03-14

## 2025-03-14 RX ORDER — ISOSORBIDE MONONITRATE 30 MG/1
30 TABLET, EXTENDED RELEASE ORAL DAILY
Qty: 90 TABLET | Refills: 3 | Status: SHIPPED | OUTPATIENT
Start: 2025-03-14

## 2025-03-14 NOTE — TELEPHONE ENCOUNTER
Wily called from [facility] called in requesting for refill for     amLODIPine (NORVASC) 10 MG tablet    isosorbide mononitrate (IMDUR) 30 MG extended release tablet        [facility]  [phone] opt 2  Case # [id number]

## 2025-03-14 NOTE — TELEPHONE ENCOUNTER
Refill per VO of Dr. Maryan Huffman  Last appt: 10/3/2024    Future Appointments   Date Time Provider Department Center   5/16/2025  1:00 PM Mary Hanley, APRN - NP [facility]       Requested Prescriptions     Signed Prescriptions Disp Refills    amLODIPine (NORVASC) 10 MG tablet 90 tablet 3     Sig: Take 1 tablet by mouth daily     Authorizing Provider: MARYAN HUFFMAN     Ordering User: ZAYRA DOUGHERTY    isosorbide mononitrate (IMDUR) 30 MG extended release tablet 90 tablet 3     Sig: Take 1 tablet by mouth daily     Authorizing Provider: MARYAN HUFFMAN     Ordering User: ZAYRA DOUGHERTY      Ftsg Text: The defect edges were debeveled with a #15 scalpel blade.  Given the location of the defect, shape of the defect and the proximity to free margins a full thickness skin graft was deemed most appropriate.  Using a sterile surgical marker, the primary defect shape was transferred to the donor site. The area thus outlined was incised deep to adipose tissue with a #15 scalpel blade.  The harvested graft was then trimmed of adipose tissue until only dermis and epidermis was left.  The skin margins of the secondary defect were undermined to an appropriate distance in all directions utilizing iris scissors.  The secondary defect was closed with interrupted buried subcutaneous sutures.  The skin edges were then re-apposed with running  sutures.  The skin graft was then placed in the primary defect and oriented appropriately.

## 2025-04-28 ENCOUNTER — TELEPHONE (OUTPATIENT)
Age: 89
End: 2025-04-28

## 2025-05-02 NOTE — TELEPHONE ENCOUNTER
Patient calling to follow up with nurse concerning symptoms. Patient request to be seen 5/6 because shell be in the area. Let her know our next available.     # [phone]

## 2025-05-02 NOTE — TELEPHONE ENCOUNTER
Future Appointments   Date Time Provider Department Center   5/16/2025  1:00 PM Mary Hanley, APRN - NP [facility]     ID verified using two patient identifiers. Writer spoke with patient who confirmed she will be seeing Mray Hanley NP on the 16th. Patient stated that she feels fine now but wanted to see if she could come for an appointment 5/6; \"I'll be in [place] on the 6th and wanted to see if I could come then.\" Writer let her know that Dr. Huffman is not in clinic next week and both of the NP's are full. Patient stated understanding and confirmed appointment with Mary Hanley NP.

## 2025-05-16 ENCOUNTER — OFFICE VISIT (OUTPATIENT)
Age: 89
End: 2025-05-16
Payer: MEDICARE

## 2025-05-16 ENCOUNTER — TELEPHONE (OUTPATIENT)
Age: 89
End: 2025-05-16

## 2025-05-16 VITALS
BODY MASS INDEX: 35.61 KG/M2 | SYSTOLIC BLOOD PRESSURE: 128 MMHG | HEART RATE: 82 BPM | HEIGHT: 63 IN | RESPIRATION RATE: 20 BRPM | WEIGHT: 201 LBS | OXYGEN SATURATION: 95 % | DIASTOLIC BLOOD PRESSURE: 70 MMHG

## 2025-05-16 DIAGNOSIS — I10 PRIMARY HYPERTENSION: ICD-10-CM

## 2025-05-16 DIAGNOSIS — R00.1 BRADYCARDIA: ICD-10-CM

## 2025-05-16 DIAGNOSIS — I50.32 CHRONIC HEART FAILURE WITH PRESERVED EJECTION FRACTION (HCC): ICD-10-CM

## 2025-05-16 DIAGNOSIS — N18.30 STAGE 3 CHRONIC KIDNEY DISEASE, UNSPECIFIED WHETHER STAGE 3A OR 3B CKD (HCC): ICD-10-CM

## 2025-05-16 DIAGNOSIS — R06.09 DOE (DYSPNEA ON EXERTION): ICD-10-CM

## 2025-05-16 DIAGNOSIS — E78.2 MIXED HYPERLIPIDEMIA: ICD-10-CM

## 2025-05-16 DIAGNOSIS — I50.33 DIASTOLIC CHF, ACUTE ON CHRONIC (HCC): ICD-10-CM

## 2025-05-16 DIAGNOSIS — R60.9 CHRONIC EDEMA: ICD-10-CM

## 2025-05-16 DIAGNOSIS — Z86.73 HX OF COMPLETED STROKE: ICD-10-CM

## 2025-05-16 DIAGNOSIS — R53.83 OTHER FATIGUE: ICD-10-CM

## 2025-05-16 DIAGNOSIS — I25.118 CORONARY ARTERY DISEASE OF NATIVE ARTERY OF NATIVE HEART WITH STABLE ANGINA PECTORIS: Primary | ICD-10-CM

## 2025-05-16 DIAGNOSIS — R53.81 PHYSICAL DECONDITIONING: ICD-10-CM

## 2025-05-16 PROCEDURE — G8417 CALC BMI ABV UP PARAM F/U: HCPCS

## 2025-05-16 PROCEDURE — 1126F AMNT PAIN NOTED NONE PRSNT: CPT

## 2025-05-16 PROCEDURE — 93010 ELECTROCARDIOGRAM REPORT: CPT

## 2025-05-16 PROCEDURE — 1160F RVW MEDS BY RX/DR IN RCRD: CPT

## 2025-05-16 PROCEDURE — 1159F MED LIST DOCD IN RCRD: CPT

## 2025-05-16 PROCEDURE — 1036F TOBACCO NON-USER: CPT

## 2025-05-16 PROCEDURE — G8427 DOCREV CUR MEDS BY ELIG CLIN: HCPCS

## 2025-05-16 PROCEDURE — 1090F PRES/ABSN URINE INCON ASSESS: CPT

## 2025-05-16 PROCEDURE — 1123F ACP DISCUSS/DSCN MKR DOCD: CPT

## 2025-05-16 PROCEDURE — 99214 OFFICE O/P EST MOD 30 MIN: CPT

## 2025-05-16 PROCEDURE — 93005 ELECTROCARDIOGRAM TRACING: CPT

## 2025-05-16 RX ORDER — ISOSORBIDE MONONITRATE 30 MG/1
30 TABLET, EXTENDED RELEASE ORAL DAILY
Qty: 90 TABLET | Refills: 3 | Status: SHIPPED | OUTPATIENT
Start: 2025-05-16

## 2025-05-16 RX ORDER — HYDRALAZINE HYDROCHLORIDE 100 MG/1
100 TABLET, FILM COATED ORAL 3 TIMES DAILY
Qty: 270 TABLET | Refills: 3 | Status: SHIPPED | OUTPATIENT
Start: 2025-05-16

## 2025-05-16 RX ORDER — FLUTICASONE PROPIONATE 50 MCG
1 SPRAY, SUSPENSION (ML) NASAL DAILY PRN
COMMUNITY
Start: 2025-04-15

## 2025-05-16 RX ORDER — BUMETANIDE 1 MG/1
1 TABLET ORAL DAILY
Qty: 90 TABLET | Refills: 3 | Status: SHIPPED | OUTPATIENT
Start: 2025-05-16

## 2025-05-16 RX ORDER — ONDANSETRON 4 MG/1
4 TABLET, FILM COATED ORAL EVERY 8 HOURS PRN
COMMUNITY
Start: 2025-02-13

## 2025-05-16 RX ORDER — FUROSEMIDE 20 MG/1
20 TABLET ORAL DAILY
COMMUNITY
Start: 2025-05-14

## 2025-05-16 RX ORDER — NYSTATIN AND TRIAMCINOLONE ACETONIDE 100000; 1 [USP'U]/G; MG/G
CREAM TOPICAL AS NEEDED
COMMUNITY
Start: 2024-09-13

## 2025-05-16 RX ORDER — ROSUVASTATIN CALCIUM 20 MG/1
20 TABLET, COATED ORAL NIGHTLY
Qty: 90 TABLET | Refills: 3 | Status: SHIPPED | OUTPATIENT
Start: 2025-05-16

## 2025-05-16 NOTE — TELEPHONE ENCOUNTER
----- Message from QUINTIN Martinez NP sent at 5/16/2025  1:33 PM EDT -----  7 day monitor  for irregular HR

## 2025-05-16 NOTE — PROGRESS NOTES
Patient states SOB; fatigue    had concerns including Hypertension and Coronary Artery Disease.    Vitals:    05/16/25 1258   BP: 128/70   BP Site: Left Upper Arm   Patient Position: Sitting   BP Cuff Size: Large Adult   Pulse: 82   Resp: 20   SpO2: 95%   Weight: 91.2 kg (201 lb)   Height: 1.6 m (5' 3\")        Chest pain No    Refills No        1. Have you been to the ER, urgent care clinic since your last visit? No       Hospitalized since your last visit? No       Where?        Date?  
Carvedilol Other (See Comments)     \"slow heart rate down to 44\"    Cefaclor Hives    Ciprofloxacin Hives and Swelling    Lisinopril Hives    Metoprolol Swelling     Swellings ankles/legs    Nitrofurantoin Other (See Comments)     Does not recall    Propoxyphene Swelling     Swelling lips and hands    Spironolactone Other (See Comments)     hyperkalemia    Tetracycline Swelling     Swelling lips/hands    Valsartan Other (See Comments)     Also \"slow heart rate down to 44\"         SOCIAL HISTORY:     Social History     Tobacco Use    Smoking status: Never    Smokeless tobacco: Never   Substance Use Topics    Alcohol use: No     Alcohol/week: 0.0 standard drinks of alcohol    Drug use: No         FAMILY HISTORY:     Family History   Problem Relation Age of Onset    Coronary Art Dis Father     Heart Disease Father     Coronary Art Dis Paternal Aunt     Cancer Mother     Parkinson's Disease Mother     Stroke Mother     Thyroid Disease Mother     Seizures Brother          REVIEW OF SYMPTOMS:     Review of Symptoms:  Constitutional: Negative for fever, chills  HEENT: Negative for nosebleeds, tinnitus, and vision changes.   Respiratory: + CAMPOS class 2   Cardiovascular: Negative for syncope  Gastrointestinal: Negative for abdominal pain, diarrhea, melena.   Genitourinary: Negative for dysuria  Musculoskeletal: + joint pain. + back pain   Skin: Negative for rash  Heme: No problems bleeding.  Neurological: Negative for speech change and focal weakness.       PHYSICAL EXAM:     Physical Exam:  /70 (BP Site: Left Upper Arm, Patient Position: Sitting, BP Cuff Size: Large Adult)   Pulse 82   Resp 20   Ht 1.6 m (5' 3\")   Wt 91.2 kg (201 lb)   SpO2 95%   BMI 35.61 kg/m²     Patient appears generally well, mood and affect are appropriate and pleasant.  HEENT: Hearing intact, non-icteric, normocephalic, atraumatic.    Neck Exam: Supple  Lung Exam:  clear   Cardiac Exam: Regular rate and rhythm with no murmur  Abdomen:

## 2025-05-28 NOTE — ADDENDUM NOTE
Addended by: Constance Steward on: 2/6/2020 08:33 AM     Modules accepted: Orders Refill approved as requested.

## 2025-06-05 ENCOUNTER — RESULTS FOLLOW-UP (OUTPATIENT)
Age: 89
End: 2025-06-05

## 2025-06-09 ENCOUNTER — CLINICAL DOCUMENTATION (OUTPATIENT)
Age: 89
End: 2025-06-09

## 2025-06-09 NOTE — PROGRESS NOTES
Received fax from [email or web address] for different dx codes for pts holter per insurance.    Faxed following: R00.1 ayesha, I48.91 AF, R53.83 fatigue, R06.09 CAMPOS.    Faxed to [email or web address] f#[phone] on 5/28/25 & confirmation received.

## 2025-06-12 ENCOUNTER — TELEPHONE (OUTPATIENT)
Age: 89
End: 2025-06-12

## 2025-06-12 NOTE — TELEPHONE ENCOUNTER
No recommend monitoring for symptoms.  Thanks for clarifying.  Would not treat unless she is having symptoms which she was not when ariane saw her  Me to Kyung Bejarano APRN - NP  ET    6/5/25  3:03 PM  You want pt to wear a another monitor for longer?  Kyung Bejarano APRN - NP to Me        6/5/25  1:06 PM  Hi can you please call patient with monitor results.  Thanks

## (undated) DEVICE — CATH GUID COR AL10 6FR 100CM -- LAUNCHER

## (undated) DEVICE — CATH GUID COR JR4.0 6FR 100CM -- LAUNCHER

## (undated) DEVICE — Device

## (undated) DEVICE — COPILOT BLEEDBACK CONTROL VALVE: Brand: COPILOT

## (undated) DEVICE — CATH GUID COR AR10 6FR 100CM -- LAUNCHER

## (undated) DEVICE — DEVICE INFL 20ML 30ATM DGT FLD DISPNS SYR W ACCESSPLUS BLU

## (undated) DEVICE — PROCEDURE KIT FLUID MGMT CUST MAINFOLD STRL

## (undated) DEVICE — CATH DIAG-D 6F MULTI PIG 155 5 -- IMPULSE 16599-302

## (undated) DEVICE — TUBING PRSS MON L12IN PVC RIG NONEXPANDING M TO FEM CONN

## (undated) DEVICE — CATHETER BLLN 142CM  SPRINTER LEGEND RX 2MM X 12MM GW

## (undated) DEVICE — RUNTHROUGH NS EXTRA FLOPPY PTCA GUIDEWIRE: Brand: RUNTHROUGH

## (undated) DEVICE — SYRINGE MED 10ML RED POLYCARB BRL FIX M LUER CONN FLAT GRP

## (undated) DEVICE — PINNACLE PRECISION ACCESS SYSTEM INTRODUCER SHEATH: Brand: PINNACLE PRECISION ACCESS SYSTEM

## (undated) DEVICE — PACK PROCEDURE SURG HRT CATH